# Patient Record
Sex: MALE | Race: WHITE | NOT HISPANIC OR LATINO | Employment: OTHER | URBAN - METROPOLITAN AREA
[De-identification: names, ages, dates, MRNs, and addresses within clinical notes are randomized per-mention and may not be internally consistent; named-entity substitution may affect disease eponyms.]

---

## 2017-01-01 ENCOUNTER — ALLSCRIPTS OFFICE VISIT (OUTPATIENT)
Dept: OTHER | Facility: OTHER | Age: 82
End: 2017-01-01

## 2017-01-01 ENCOUNTER — HOSPITAL ENCOUNTER (EMERGENCY)
Facility: HOSPITAL | Age: 82
Discharge: HOME | End: 2017-08-13
Attending: EMERGENCY MEDICINE
Payer: COMMERCIAL

## 2017-01-01 ENCOUNTER — APPOINTMENT (EMERGENCY)
Dept: CT IMAGING | Facility: HOSPITAL | Age: 82
End: 2017-01-01
Payer: COMMERCIAL

## 2017-01-01 VITALS
WEIGHT: 246.03 LBS | BODY MASS INDEX: 34.31 KG/M2 | HEART RATE: 69 BPM | RESPIRATION RATE: 16 BRPM | OXYGEN SATURATION: 99 % | TEMPERATURE: 97.9 F | DIASTOLIC BLOOD PRESSURE: 69 MMHG | SYSTOLIC BLOOD PRESSURE: 157 MMHG

## 2017-01-01 DIAGNOSIS — R33.9 URINARY RETENTION: ICD-10-CM

## 2017-01-01 DIAGNOSIS — N28.9 RENAL INSUFFICIENCY: ICD-10-CM

## 2017-01-01 DIAGNOSIS — N39.0 URINARY TRACT INFECTION: Primary | ICD-10-CM

## 2017-01-01 LAB
ALBUMIN SERPL BCP-MCNC: 3 G/DL (ref 3.5–5)
ALP SERPL-CCNC: 187 U/L (ref 46–116)
ALT SERPL W P-5'-P-CCNC: 10 U/L (ref 12–78)
ANION GAP SERPL CALCULATED.3IONS-SCNC: 7 MMOL/L (ref 4–13)
APTT PPP: 36 SECONDS (ref 23–35)
AST SERPL W P-5'-P-CCNC: 22 U/L (ref 5–45)
ATRIAL RATE: 70 BPM
BACTERIA BLD CULT: NORMAL
BACTERIA BLD CULT: NORMAL
BACTERIA UR CULT: NORMAL
BACTERIA UR QL AUTO: ABNORMAL /HPF
BASOPHILS # BLD AUTO: 0.02 THOUSANDS/ΜL (ref 0–0.1)
BASOPHILS NFR BLD AUTO: 0 % (ref 0–1)
BILIRUB SERPL-MCNC: 0.9 MG/DL (ref 0.2–1)
BILIRUB UR QL STRIP: NEGATIVE
BUN SERPL-MCNC: 35 MG/DL (ref 5–25)
CALCIUM SERPL-MCNC: 9.2 MG/DL (ref 8.3–10.1)
CHLORIDE SERPL-SCNC: 102 MMOL/L (ref 100–108)
CLARITY UR: CLEAR
CO2 SERPL-SCNC: 36 MMOL/L (ref 21–32)
COLOR UR: YELLOW
CREAT SERPL-MCNC: 1.97 MG/DL (ref 0.6–1.3)
EOSINOPHIL # BLD AUTO: 0.15 THOUSAND/ΜL (ref 0–0.61)
EOSINOPHIL NFR BLD AUTO: 2 % (ref 0–6)
ERYTHROCYTE [DISTWIDTH] IN BLOOD BY AUTOMATED COUNT: 16.8 % (ref 11.6–15.1)
GFR SERPL CREATININE-BSD FRML MDRD: 29 ML/MIN/1.73SQ M
GLUCOSE SERPL-MCNC: 185 MG/DL (ref 65–140)
GLUCOSE UR STRIP-MCNC: NEGATIVE MG/DL
HCT VFR BLD AUTO: 37.7 % (ref 36.5–49.3)
HGB BLD-MCNC: 11.7 G/DL (ref 12–17)
HGB UR QL STRIP.AUTO: ABNORMAL
INR PPP: 1.66 (ref 0.86–1.16)
KETONES UR STRIP-MCNC: NEGATIVE MG/DL
LEUKOCYTE ESTERASE UR QL STRIP: ABNORMAL
LYMPHOCYTES # BLD AUTO: 0.74 THOUSANDS/ΜL (ref 0.6–4.47)
LYMPHOCYTES NFR BLD AUTO: 9 % (ref 14–44)
MCH RBC QN AUTO: 30.7 PG (ref 26.8–34.3)
MCHC RBC AUTO-ENTMCNC: 31 G/DL (ref 31.4–37.4)
MCV RBC AUTO: 99 FL (ref 82–98)
MONOCYTES # BLD AUTO: 0.57 THOUSAND/ΜL (ref 0.17–1.22)
MONOCYTES NFR BLD AUTO: 7 % (ref 4–12)
NEUTROPHILS # BLD AUTO: 6.43 THOUSANDS/ΜL (ref 1.85–7.62)
NEUTS SEG NFR BLD AUTO: 82 % (ref 43–75)
NITRITE UR QL STRIP: NEGATIVE
NON-SQ EPI CELLS URNS QL MICRO: ABNORMAL /HPF
OTHER STN SPEC: ABNORMAL
P AXIS: 133 DEGREES
PH UR STRIP.AUTO: 6.5 [PH] (ref 4.5–8)
PLATELET # BLD AUTO: 97 THOUSANDS/UL (ref 149–390)
PMV BLD AUTO: 12.5 FL (ref 8.9–12.7)
POTASSIUM SERPL-SCNC: 3.2 MMOL/L (ref 3.5–5.3)
PR INTERVAL: 110 MS
PROT SERPL-MCNC: 7.6 G/DL (ref 6.4–8.2)
PROT UR STRIP-MCNC: NEGATIVE MG/DL
PROTHROMBIN TIME: 19.6 SECONDS (ref 12.1–14.4)
QRS AXIS: -72 DEGREES
QRSD INTERVAL: 160 MS
QT INTERVAL: 484 MS
QTC INTERVAL: 522 MS
RBC # BLD AUTO: 3.81 MILLION/UL (ref 3.88–5.62)
RBC #/AREA URNS AUTO: ABNORMAL /HPF
SODIUM SERPL-SCNC: 145 MMOL/L (ref 136–145)
SP GR UR STRIP.AUTO: 1.01 (ref 1–1.03)
T WAVE AXIS: 110 DEGREES
TROPONIN I SERPL-MCNC: 0.02 NG/ML
UROBILINOGEN UR QL STRIP.AUTO: 0.2 E.U./DL
VENTRICULAR RATE: 70 BPM
WBC # BLD AUTO: 7.91 THOUSAND/UL (ref 4.31–10.16)
WBC #/AREA URNS AUTO: ABNORMAL /HPF

## 2017-01-01 PROCEDURE — 85730 THROMBOPLASTIN TIME PARTIAL: CPT | Performed by: EMERGENCY MEDICINE

## 2017-01-01 PROCEDURE — 80053 COMPREHEN METABOLIC PANEL: CPT | Performed by: EMERGENCY MEDICINE

## 2017-01-01 PROCEDURE — 81001 URINALYSIS AUTO W/SCOPE: CPT | Performed by: EMERGENCY MEDICINE

## 2017-01-01 PROCEDURE — 84484 ASSAY OF TROPONIN QUANT: CPT | Performed by: EMERGENCY MEDICINE

## 2017-01-01 PROCEDURE — 74176 CT ABD & PELVIS W/O CONTRAST: CPT

## 2017-01-01 PROCEDURE — 70450 CT HEAD/BRAIN W/O DYE: CPT

## 2017-01-01 PROCEDURE — 93005 ELECTROCARDIOGRAM TRACING: CPT | Performed by: EMERGENCY MEDICINE

## 2017-01-01 PROCEDURE — 87147 CULTURE TYPE IMMUNOLOGIC: CPT | Performed by: EMERGENCY MEDICINE

## 2017-01-01 PROCEDURE — 87086 URINE CULTURE/COLONY COUNT: CPT | Performed by: EMERGENCY MEDICINE

## 2017-01-01 PROCEDURE — 99285 EMERGENCY DEPT VISIT HI MDM: CPT

## 2017-01-01 PROCEDURE — 85610 PROTHROMBIN TIME: CPT | Performed by: EMERGENCY MEDICINE

## 2017-01-01 PROCEDURE — 85025 COMPLETE CBC W/AUTO DIFF WBC: CPT | Performed by: EMERGENCY MEDICINE

## 2017-01-01 RX ORDER — POTASSIUM CHLORIDE 20 MEQ/1
20 TABLET, EXTENDED RELEASE ORAL ONCE
Status: COMPLETED | OUTPATIENT
Start: 2017-01-01 | End: 2017-01-01

## 2017-01-01 RX ORDER — NITROFURANTOIN 25; 75 MG/1; MG/1
100 CAPSULE ORAL 2 TIMES DAILY WITH MEALS
Status: DISCONTINUED | OUTPATIENT
Start: 2017-01-01 | End: 2017-01-01 | Stop reason: HOSPADM

## 2017-01-01 RX ADMIN — POTASSIUM CHLORIDE 20 MEQ: 1500 TABLET, EXTENDED RELEASE ORAL at 18:23

## 2017-01-01 RX ADMIN — NITROFURANTOIN (MONOHYDRATE/MACROCRYSTALS) 100 MG: 75; 25 CAPSULE ORAL at 18:23

## 2017-04-13 ENCOUNTER — APPOINTMENT (EMERGENCY)
Dept: RADIOLOGY | Facility: HOSPITAL | Age: 82
DRG: 194 | End: 2017-04-13
Payer: COMMERCIAL

## 2017-04-13 ENCOUNTER — HOSPITAL ENCOUNTER (INPATIENT)
Facility: HOSPITAL | Age: 82
LOS: 5 days | Discharge: NURSING, REHAB OR LONG-TERM CARE | DRG: 194 | End: 2017-04-18
Attending: EMERGENCY MEDICINE | Admitting: INTERNAL MEDICINE
Payer: COMMERCIAL

## 2017-04-13 DIAGNOSIS — I48.91 ATRIAL FIBRILLATION (HCC): ICD-10-CM

## 2017-04-13 DIAGNOSIS — R06.00 ACUTE DYSPNEA: Primary | ICD-10-CM

## 2017-04-13 DIAGNOSIS — S81.809D WOUND OF LOWER EXTREMITY, UNSPECIFIED LATERALITY, SUBSEQUENT ENCOUNTER: ICD-10-CM

## 2017-04-13 DIAGNOSIS — K83.8 DILATED CBD, ACQUIRED: ICD-10-CM

## 2017-04-13 DIAGNOSIS — J18.9 PNEUMONIA: ICD-10-CM

## 2017-04-13 DIAGNOSIS — R33.9 URINARY RETENTION: ICD-10-CM

## 2017-04-13 DIAGNOSIS — R23.0 CIRCUMORAL CYANOSIS: ICD-10-CM

## 2017-04-13 PROBLEM — D69.6 THROMBOCYTOPENIA (HCC): Chronic | Status: ACTIVE | Noted: 2017-04-13

## 2017-04-13 PROBLEM — E78.5 HYPERLIPIDEMIA: Chronic | Status: ACTIVE | Noted: 2017-04-13
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
## 2017-04-13 LAB
ALBUMIN SERPL BCP-MCNC: 2.9 G/DL (ref 3.5–5)
ALP SERPL-CCNC: 261 U/L (ref 46–116)
ALT SERPL W P-5'-P-CCNC: <6 U/L (ref 12–78)
ANION GAP SERPL CALCULATED.3IONS-SCNC: 4 MMOL/L (ref 4–13)
AST SERPL W P-5'-P-CCNC: 24 U/L (ref 5–45)
ATRIAL RATE: 65 BPM
BASOPHILS # BLD AUTO: 0.02 THOUSANDS/ΜL (ref 0–0.1)
BASOPHILS NFR BLD AUTO: 0 % (ref 0–1)
BILIRUB SERPL-MCNC: 0.9 MG/DL (ref 0.2–1)
BILIRUB UR QL STRIP: NEGATIVE
BUN SERPL-MCNC: 36 MG/DL (ref 5–25)
CALCIUM SERPL-MCNC: 9 MG/DL (ref 8.3–10.1)
CHLORIDE SERPL-SCNC: 105 MMOL/L (ref 100–108)
CLARITY UR: CLEAR
CO2 SERPL-SCNC: 35 MMOL/L (ref 21–32)
COLOR UR: YELLOW
CREAT SERPL-MCNC: 2.25 MG/DL (ref 0.6–1.3)
EOSINOPHIL # BLD AUTO: 0.28 THOUSAND/ΜL (ref 0–0.61)
EOSINOPHIL NFR BLD AUTO: 3 % (ref 0–6)
ERYTHROCYTE [DISTWIDTH] IN BLOOD BY AUTOMATED COUNT: 16.2 % (ref 11.6–15.1)
GFR SERPL CREATININE-BSD FRML MDRD: 27.7 ML/MIN/1.73SQ M
GLUCOSE SERPL-MCNC: 101 MG/DL (ref 65–140)
GLUCOSE SERPL-MCNC: 106 MG/DL (ref 65–140)
GLUCOSE SERPL-MCNC: 148 MG/DL (ref 65–140)
GLUCOSE SERPL-MCNC: 166 MG/DL (ref 65–140)
GLUCOSE UR STRIP-MCNC: NEGATIVE MG/DL
HCT VFR BLD AUTO: 40.2 % (ref 36.5–49.3)
HGB BLD-MCNC: 12.2 G/DL (ref 12–17)
HGB UR QL STRIP.AUTO: NEGATIVE
KETONES UR STRIP-MCNC: NEGATIVE MG/DL
L PNEUMO1 AG UR QL IA.RAPID: NEGATIVE
LEUKOCYTE ESTERASE UR QL STRIP: NEGATIVE
LYMPHOCYTES # BLD AUTO: 0.49 THOUSANDS/ΜL (ref 0.6–4.47)
LYMPHOCYTES NFR BLD AUTO: 6 % (ref 14–44)
MCH RBC QN AUTO: 29.5 PG (ref 26.8–34.3)
MCHC RBC AUTO-ENTMCNC: 30.3 G/DL (ref 31.4–37.4)
MCV RBC AUTO: 97 FL (ref 82–98)
MONOCYTES # BLD AUTO: 0.44 THOUSAND/ΜL (ref 0.17–1.22)
MONOCYTES NFR BLD AUTO: 5 % (ref 4–12)
NEUTROPHILS # BLD AUTO: 7.37 THOUSANDS/ΜL (ref 1.85–7.62)
NEUTS SEG NFR BLD AUTO: 86 % (ref 43–75)
NITRITE UR QL STRIP: NEGATIVE
NT-PROBNP SERPL-MCNC: 3128 PG/ML
PH UR STRIP.AUTO: 6 [PH] (ref 4.5–8)
PLATELET # BLD AUTO: 85 THOUSANDS/UL (ref 149–390)
POTASSIUM SERPL-SCNC: 5 MMOL/L (ref 3.5–5.3)
PROT SERPL-MCNC: 7.7 G/DL (ref 6.4–8.2)
PROT UR STRIP-MCNC: NEGATIVE MG/DL
QRS AXIS: -70 DEGREES
QRSD INTERVAL: 138 MS
QT INTERVAL: 414 MS
QTC INTERVAL: 447 MS
RBC # BLD AUTO: 4.13 MILLION/UL (ref 3.88–5.62)
S PNEUM AG UR QL: NEGATIVE
SODIUM SERPL-SCNC: 144 MMOL/L (ref 136–145)
SP GR UR STRIP.AUTO: 1.01 (ref 1–1.03)
T WAVE AXIS: 105 DEGREES
TROPONIN I SERPL-MCNC: 0.02 NG/ML
UROBILINOGEN UR QL STRIP.AUTO: 0.2 E.U./DL
VENTRICULAR RATE: 70 BPM
WBC # BLD AUTO: 8.6 THOUSAND/UL (ref 4.31–10.16)

## 2017-04-13 PROCEDURE — 36415 COLL VENOUS BLD VENIPUNCTURE: CPT | Performed by: EMERGENCY MEDICINE

## 2017-04-13 PROCEDURE — 87798 DETECT AGENT NOS DNA AMP: CPT | Performed by: INTERNAL MEDICINE

## 2017-04-13 PROCEDURE — 80053 COMPREHEN METABOLIC PANEL: CPT | Performed by: EMERGENCY MEDICINE

## 2017-04-13 PROCEDURE — 84484 ASSAY OF TROPONIN QUANT: CPT | Performed by: EMERGENCY MEDICINE

## 2017-04-13 PROCEDURE — 94640 AIRWAY INHALATION TREATMENT: CPT

## 2017-04-13 PROCEDURE — 85025 COMPLETE CBC W/AUTO DIFF WBC: CPT | Performed by: EMERGENCY MEDICINE

## 2017-04-13 PROCEDURE — 87147 CULTURE TYPE IMMUNOLOGIC: CPT | Performed by: INTERNAL MEDICINE

## 2017-04-13 PROCEDURE — 81003 URINALYSIS AUTO W/O SCOPE: CPT | Performed by: EMERGENCY MEDICINE

## 2017-04-13 PROCEDURE — 71020 HB CHEST X-RAY 2VW FRONTAL&LATL: CPT

## 2017-04-13 PROCEDURE — 83880 ASSAY OF NATRIURETIC PEPTIDE: CPT | Performed by: EMERGENCY MEDICINE

## 2017-04-13 PROCEDURE — 93005 ELECTROCARDIOGRAM TRACING: CPT | Performed by: EMERGENCY MEDICINE

## 2017-04-13 PROCEDURE — 87449 NOS EACH ORGANISM AG IA: CPT | Performed by: INTERNAL MEDICINE

## 2017-04-13 PROCEDURE — 82948 REAGENT STRIP/BLOOD GLUCOSE: CPT

## 2017-04-13 PROCEDURE — 87040 BLOOD CULTURE FOR BACTERIA: CPT | Performed by: EMERGENCY MEDICINE

## 2017-04-13 PROCEDURE — 99285 EMERGENCY DEPT VISIT HI MDM: CPT

## 2017-04-13 PROCEDURE — 94664 DEMO&/EVAL PT USE INHALER: CPT

## 2017-04-13 PROCEDURE — 94760 N-INVAS EAR/PLS OXIMETRY 1: CPT

## 2017-04-13 PROCEDURE — 87081 CULTURE SCREEN ONLY: CPT | Performed by: INTERNAL MEDICINE

## 2017-04-13 RX ORDER — CALCIUM CARBONATE 500(1250)
2 TABLET ORAL 2 TIMES DAILY WITH MEALS
Status: DISCONTINUED | OUTPATIENT
Start: 2017-04-13 | End: 2017-04-13 | Stop reason: CLARIF

## 2017-04-13 RX ORDER — CARVEDILOL 12.5 MG/1
12.5 TABLET ORAL 2 TIMES DAILY WITH MEALS
Status: DISCONTINUED | OUTPATIENT
Start: 2017-04-13 | End: 2017-04-18 | Stop reason: HOSPADM

## 2017-04-13 RX ORDER — ONDANSETRON 2 MG/ML
4 INJECTION INTRAMUSCULAR; INTRAVENOUS EVERY 6 HOURS PRN
Status: DISCONTINUED | OUTPATIENT
Start: 2017-04-13 | End: 2017-04-18 | Stop reason: HOSPADM

## 2017-04-13 RX ORDER — OXYCODONE HYDROCHLORIDE 5 MG/1
2.5 TABLET ORAL EVERY 4 HOURS PRN
Status: DISCONTINUED | OUTPATIENT
Start: 2017-04-13 | End: 2017-04-18 | Stop reason: HOSPADM

## 2017-04-13 RX ORDER — ASPIRIN 81 MG/1
81 TABLET, CHEWABLE ORAL DAILY
Status: DISCONTINUED | OUTPATIENT
Start: 2017-04-14 | End: 2017-04-18 | Stop reason: HOSPADM

## 2017-04-13 RX ORDER — ACETAMINOPHEN 325 MG/1
650 TABLET ORAL EVERY 6 HOURS PRN
Status: DISCONTINUED | OUTPATIENT
Start: 2017-04-13 | End: 2017-04-18 | Stop reason: HOSPADM

## 2017-04-13 RX ORDER — ISOSORBIDE MONONITRATE 60 MG/1
60 TABLET, EXTENDED RELEASE ORAL DAILY
Status: DISCONTINUED | OUTPATIENT
Start: 2017-04-14 | End: 2017-04-18 | Stop reason: HOSPADM

## 2017-04-13 RX ORDER — ACETAMINOPHEN 325 MG/1
650 TABLET ORAL ONCE
Status: COMPLETED | OUTPATIENT
Start: 2017-04-13 | End: 2017-04-13

## 2017-04-13 RX ORDER — TAMSULOSIN HYDROCHLORIDE 0.4 MG/1
0.4 CAPSULE ORAL
Status: DISCONTINUED | OUTPATIENT
Start: 2017-04-13 | End: 2017-04-18 | Stop reason: HOSPADM

## 2017-04-13 RX ORDER — BUMETANIDE 1 MG/1
2 TABLET ORAL DAILY
Status: DISCONTINUED | OUTPATIENT
Start: 2017-04-13 | End: 2017-04-18 | Stop reason: HOSPADM

## 2017-04-13 RX ORDER — ALBUTEROL SULFATE 2.5 MG/3ML
2.5 SOLUTION RESPIRATORY (INHALATION) EVERY 6 HOURS PRN
Status: DISCONTINUED | OUTPATIENT
Start: 2017-04-13 | End: 2017-04-18 | Stop reason: HOSPADM

## 2017-04-13 RX ORDER — BRIMONIDINE TARTRATE 0.15 %
1 DROPS OPHTHALMIC (EYE) 3 TIMES DAILY
Status: DISCONTINUED | OUTPATIENT
Start: 2017-04-13 | End: 2017-04-18 | Stop reason: HOSPADM

## 2017-04-13 RX ORDER — CALCIUM CARBONATE 200(500)MG
1000 TABLET,CHEWABLE ORAL 2 TIMES DAILY
Status: DISCONTINUED | OUTPATIENT
Start: 2017-04-13 | End: 2017-04-18 | Stop reason: HOSPADM

## 2017-04-13 RX ORDER — HYDRALAZINE HYDROCHLORIDE 25 MG/1
50 TABLET, FILM COATED ORAL EVERY 8 HOURS SCHEDULED
Status: DISCONTINUED | OUTPATIENT
Start: 2017-04-13 | End: 2017-04-18 | Stop reason: HOSPADM

## 2017-04-13 RX ORDER — LORATADINE 10 MG/1
10 TABLET ORAL DAILY
COMMUNITY

## 2017-04-13 RX ORDER — LACTULOSE 20 G/30ML
20 SOLUTION ORAL 3 TIMES WEEKLY
Status: DISCONTINUED | OUTPATIENT
Start: 2017-04-14 | End: 2017-04-18 | Stop reason: HOSPADM

## 2017-04-13 RX ORDER — MELATONIN
1000 DAILY
Status: DISCONTINUED | OUTPATIENT
Start: 2017-04-14 | End: 2017-04-18 | Stop reason: HOSPADM

## 2017-04-13 RX ORDER — ASCORBIC ACID 500 MG
500 TABLET ORAL DAILY
Status: DISCONTINUED | OUTPATIENT
Start: 2017-04-14 | End: 2017-04-13 | Stop reason: CLARIF

## 2017-04-13 RX ORDER — PANTOPRAZOLE SODIUM 40 MG/1
40 TABLET, DELAYED RELEASE ORAL
Status: DISCONTINUED | OUTPATIENT
Start: 2017-04-14 | End: 2017-04-18 | Stop reason: HOSPADM

## 2017-04-13 RX ORDER — TAMSULOSIN HYDROCHLORIDE 0.4 MG/1
0.4 CAPSULE ORAL 2 TIMES DAILY
Status: DISCONTINUED | OUTPATIENT
Start: 2017-04-13 | End: 2017-04-13

## 2017-04-13 RX ORDER — METOLAZONE 2.5 MG/1
5 TABLET ORAL EVERY OTHER DAY
Status: DISCONTINUED | OUTPATIENT
Start: 2017-04-14 | End: 2017-04-18 | Stop reason: HOSPADM

## 2017-04-13 RX ORDER — CEFTRIAXONE SODIUM 1 G/50ML
1000 INJECTION, SOLUTION INTRAVENOUS ONCE
Status: COMPLETED | OUTPATIENT
Start: 2017-04-13 | End: 2017-04-13

## 2017-04-13 RX ORDER — LORATADINE 10 MG/1
10 TABLET ORAL DAILY
Status: DISCONTINUED | OUTPATIENT
Start: 2017-04-14 | End: 2017-04-18 | Stop reason: HOSPADM

## 2017-04-13 RX ORDER — CHOLECALCIFEROL (VITAMIN D3) 10 MCG
1 TABLET ORAL DAILY
Status: DISCONTINUED | OUTPATIENT
Start: 2017-04-13 | End: 2017-04-18 | Stop reason: HOSPADM

## 2017-04-13 RX ORDER — GUAIFENESIN 600 MG
600 TABLET, EXTENDED RELEASE 12 HR ORAL EVERY 12 HOURS SCHEDULED
Status: DISCONTINUED | OUTPATIENT
Start: 2017-04-13 | End: 2017-04-16

## 2017-04-13 RX ORDER — GABAPENTIN 100 MG/1
200 CAPSULE ORAL 2 TIMES DAILY
Status: DISCONTINUED | OUTPATIENT
Start: 2017-04-13 | End: 2017-04-18 | Stop reason: HOSPADM

## 2017-04-13 RX ORDER — FEBUXOSTAT 40 MG/1
80 TABLET, FILM COATED ORAL DAILY
Status: DISCONTINUED | OUTPATIENT
Start: 2017-04-14 | End: 2017-04-18 | Stop reason: HOSPADM

## 2017-04-13 RX ORDER — LEVALBUTEROL 1.25 MG/.5ML
1.25 SOLUTION, CONCENTRATE RESPIRATORY (INHALATION)
Status: DISCONTINUED | OUTPATIENT
Start: 2017-04-13 | End: 2017-04-18 | Stop reason: HOSPADM

## 2017-04-13 RX ORDER — SODIUM CHLORIDE FOR INHALATION 0.9 %
3 VIAL, NEBULIZER (ML) INHALATION
Status: DISCONTINUED | OUTPATIENT
Start: 2017-04-13 | End: 2017-04-18 | Stop reason: HOSPADM

## 2017-04-13 RX ORDER — POTASSIUM CHLORIDE 1500 MG/1
20 TABLET, FILM COATED, EXTENDED RELEASE ORAL
Status: DISCONTINUED | OUTPATIENT
Start: 2017-04-13 | End: 2017-04-13 | Stop reason: CLARIF

## 2017-04-13 RX ADMIN — ACETAMINOPHEN 650 MG: 325 TABLET, FILM COATED ORAL at 12:10

## 2017-04-13 RX ADMIN — BUMETANIDE 2 MG: 1 TABLET ORAL at 19:04

## 2017-04-13 RX ADMIN — HYDRALAZINE HYDROCHLORIDE 50 MG: 25 TABLET ORAL at 19:05

## 2017-04-13 RX ADMIN — Medication 400 MG: at 22:54

## 2017-04-13 RX ADMIN — ANTACID TABLETS 1000 MG: 500 TABLET, CHEWABLE ORAL at 19:02

## 2017-04-13 RX ADMIN — GUAIFENESIN 600 MG: 600 TABLET, EXTENDED RELEASE ORAL at 22:54

## 2017-04-13 RX ADMIN — CEFEPIME HYDROCHLORIDE 1000 MG: 1 INJECTION, POWDER, FOR SOLUTION INTRAMUSCULAR; INTRAVENOUS at 22:48

## 2017-04-13 RX ADMIN — CEFTRIAXONE 1000 MG: 1 INJECTION, SOLUTION INTRAVENOUS at 12:11

## 2017-04-13 RX ADMIN — ISODIUM CHLORIDE 3 ML: 0.03 SOLUTION RESPIRATORY (INHALATION) at 20:49

## 2017-04-13 RX ADMIN — CARBIDOPA AND LEVODOPA 1 TABLET: 10; 100 TABLET ORAL at 19:06

## 2017-04-13 RX ADMIN — LEVALBUTEROL 1.25 MG: 1.25 SOLUTION, CONCENTRATE RESPIRATORY (INHALATION) at 20:49

## 2017-04-13 RX ADMIN — TAMSULOSIN HYDROCHLORIDE 0.4 MG: 0.4 CAPSULE ORAL at 19:04

## 2017-04-13 RX ADMIN — ACETAMINOPHEN 650 MG: 325 TABLET, FILM COATED ORAL at 22:58

## 2017-04-13 RX ADMIN — CARVEDILOL 12.5 MG: 12.5 TABLET, FILM COATED ORAL at 19:07

## 2017-04-13 RX ADMIN — BRIMONIDINE TARTRATE 1 DROP: 1.5 SOLUTION OPHTHALMIC at 19:06

## 2017-04-13 RX ADMIN — GABAPENTIN 200 MG: 100 CAPSULE ORAL at 19:05

## 2017-04-13 RX ADMIN — VANCOMYCIN HYDROCHLORIDE 1500 MG: 1 INJECTION, POWDER, LYOPHILIZED, FOR SOLUTION INTRAVENOUS at 13:08

## 2017-04-13 RX ADMIN — Medication 1 CAPSULE: at 19:04

## 2017-04-14 ENCOUNTER — APPOINTMENT (INPATIENT)
Dept: ULTRASOUND IMAGING | Facility: HOSPITAL | Age: 82
DRG: 194 | End: 2017-04-14
Payer: COMMERCIAL

## 2017-04-14 LAB
ALBUMIN SERPL BCP-MCNC: 2.6 G/DL (ref 3.5–5)
ALP SERPL-CCNC: 208 U/L (ref 46–116)
ALT SERPL W P-5'-P-CCNC: 20 U/L (ref 12–78)
ANION GAP SERPL CALCULATED.3IONS-SCNC: 5 MMOL/L (ref 4–13)
AST SERPL W P-5'-P-CCNC: 22 U/L (ref 5–45)
BASOPHILS # BLD AUTO: 0.02 THOUSANDS/ΜL (ref 0–0.1)
BASOPHILS NFR BLD AUTO: 0 % (ref 0–1)
BILIRUB SERPL-MCNC: 1.2 MG/DL (ref 0.2–1)
BUN SERPL-MCNC: 38 MG/DL (ref 5–25)
CALCIUM SERPL-MCNC: 9 MG/DL (ref 8.3–10.1)
CHLORIDE SERPL-SCNC: 104 MMOL/L (ref 100–108)
CO2 SERPL-SCNC: 37 MMOL/L (ref 21–32)
CREAT SERPL-MCNC: 2.34 MG/DL (ref 0.6–1.3)
EOSINOPHIL # BLD AUTO: 0.29 THOUSAND/ΜL (ref 0–0.61)
EOSINOPHIL NFR BLD AUTO: 3 % (ref 0–6)
ERYTHROCYTE [DISTWIDTH] IN BLOOD BY AUTOMATED COUNT: 16.5 % (ref 11.6–15.1)
FLUAV AG SPEC QL: NORMAL
FLUBV AG SPEC QL: NORMAL
GFR SERPL CREATININE-BSD FRML MDRD: 26.4 ML/MIN/1.73SQ M
GLUCOSE SERPL-MCNC: 101 MG/DL (ref 65–140)
GLUCOSE SERPL-MCNC: 101 MG/DL (ref 65–140)
GLUCOSE SERPL-MCNC: 138 MG/DL (ref 65–140)
GLUCOSE SERPL-MCNC: 186 MG/DL (ref 65–140)
GLUCOSE SERPL-MCNC: 211 MG/DL (ref 65–140)
HCT VFR BLD AUTO: 37.1 % (ref 36.5–49.3)
HGB BLD-MCNC: 11.4 G/DL (ref 12–17)
INR PPP: 2.56 (ref 0.86–1.16)
LYMPHOCYTES # BLD AUTO: 0.93 THOUSANDS/ΜL (ref 0.6–4.47)
LYMPHOCYTES NFR BLD AUTO: 11 % (ref 14–44)
MCH RBC QN AUTO: 29.8 PG (ref 26.8–34.3)
MCHC RBC AUTO-ENTMCNC: 30.7 G/DL (ref 31.4–37.4)
MCV RBC AUTO: 97 FL (ref 82–98)
MONOCYTES # BLD AUTO: 0.7 THOUSAND/ΜL (ref 0.17–1.22)
MONOCYTES NFR BLD AUTO: 8 % (ref 4–12)
NEUTROPHILS # BLD AUTO: 6.51 THOUSANDS/ΜL (ref 1.85–7.62)
NEUTS SEG NFR BLD AUTO: 78 % (ref 43–75)
PLATELET # BLD AUTO: 73 THOUSANDS/UL (ref 149–390)
PMV BLD AUTO: 12.9 FL (ref 8.9–12.7)
POTASSIUM SERPL-SCNC: 4.1 MMOL/L (ref 3.5–5.3)
PROT SERPL-MCNC: 7.2 G/DL (ref 6.4–8.2)
PROTHROMBIN TIME: 26.3 SECONDS (ref 12–14.3)
RBC # BLD AUTO: 3.82 MILLION/UL (ref 3.88–5.62)
RSV B RNA SPEC QL NAA+PROBE: NORMAL
SODIUM SERPL-SCNC: 146 MMOL/L (ref 136–145)
WBC # BLD AUTO: 8.45 THOUSAND/UL (ref 4.31–10.16)

## 2017-04-14 PROCEDURE — 87205 SMEAR GRAM STAIN: CPT | Performed by: INTERNAL MEDICINE

## 2017-04-14 PROCEDURE — 94640 AIRWAY INHALATION TREATMENT: CPT

## 2017-04-14 PROCEDURE — 87070 CULTURE OTHR SPECIMN AEROBIC: CPT | Performed by: INTERNAL MEDICINE

## 2017-04-14 PROCEDURE — 76705 ECHO EXAM OF ABDOMEN: CPT

## 2017-04-14 PROCEDURE — 94760 N-INVAS EAR/PLS OXIMETRY 1: CPT

## 2017-04-14 PROCEDURE — 82948 REAGENT STRIP/BLOOD GLUCOSE: CPT

## 2017-04-14 PROCEDURE — 85025 COMPLETE CBC W/AUTO DIFF WBC: CPT | Performed by: INTERNAL MEDICINE

## 2017-04-14 PROCEDURE — 85610 PROTHROMBIN TIME: CPT | Performed by: INTERNAL MEDICINE

## 2017-04-14 PROCEDURE — 80053 COMPREHEN METABOLIC PANEL: CPT | Performed by: INTERNAL MEDICINE

## 2017-04-14 RX ORDER — WARFARIN SODIUM 6 MG/1
6 TABLET ORAL
Status: DISCONTINUED | OUTPATIENT
Start: 2017-04-14 | End: 2017-04-18 | Stop reason: HOSPADM

## 2017-04-14 RX ADMIN — ASPIRIN 81 MG CHEWABLE TABLET 81 MG: 81 TABLET CHEWABLE at 09:25

## 2017-04-14 RX ADMIN — Medication 400 MG: at 21:31

## 2017-04-14 RX ADMIN — WARFARIN SODIUM 6 MG: 6 TABLET ORAL at 18:27

## 2017-04-14 RX ADMIN — BRIMONIDINE TARTRATE 1 DROP: 1.5 SOLUTION OPHTHALMIC at 09:26

## 2017-04-14 RX ADMIN — METOLAZONE 5 MG: 2.5 TABLET ORAL at 09:26

## 2017-04-14 RX ADMIN — LEVALBUTEROL 1.25 MG: 1.25 SOLUTION, CONCENTRATE RESPIRATORY (INHALATION) at 08:26

## 2017-04-14 RX ADMIN — ANTACID TABLETS 1000 MG: 500 TABLET, CHEWABLE ORAL at 09:26

## 2017-04-14 RX ADMIN — TAMSULOSIN HYDROCHLORIDE 0.4 MG: 0.4 CAPSULE ORAL at 16:33

## 2017-04-14 RX ADMIN — GUAIFENESIN 600 MG: 600 TABLET, EXTENDED RELEASE ORAL at 21:31

## 2017-04-14 RX ADMIN — ISODIUM CHLORIDE 3 ML: 0.03 SOLUTION RESPIRATORY (INHALATION) at 08:26

## 2017-04-14 RX ADMIN — CARVEDILOL 12.5 MG: 12.5 TABLET, FILM COATED ORAL at 16:33

## 2017-04-14 RX ADMIN — VITAMIN D, TAB 1000IU (100/BT) 1000 UNITS: 25 TAB at 09:25

## 2017-04-14 RX ADMIN — BRIMONIDINE TARTRATE 1 DROP: 1.5 SOLUTION OPHTHALMIC at 16:34

## 2017-04-14 RX ADMIN — LEVALBUTEROL 1.25 MG: 1.25 SOLUTION, CONCENTRATE RESPIRATORY (INHALATION) at 21:01

## 2017-04-14 RX ADMIN — PANTOPRAZOLE SODIUM 40 MG: 40 TABLET, DELAYED RELEASE ORAL at 05:32

## 2017-04-14 RX ADMIN — ISOSORBIDE MONONITRATE 60 MG: 60 TABLET, EXTENDED RELEASE ORAL at 09:25

## 2017-04-14 RX ADMIN — CARBIDOPA AND LEVODOPA 1 TABLET: 10; 100 TABLET ORAL at 21:32

## 2017-04-14 RX ADMIN — INSULIN LISPRO 1 UNITS: 100 INJECTION, SOLUTION INTRAVENOUS; SUBCUTANEOUS at 21:32

## 2017-04-14 RX ADMIN — CEFEPIME HYDROCHLORIDE 1000 MG: 1 INJECTION, POWDER, FOR SOLUTION INTRAMUSCULAR; INTRAVENOUS at 21:34

## 2017-04-14 RX ADMIN — GABAPENTIN 200 MG: 100 CAPSULE ORAL at 18:26

## 2017-04-14 RX ADMIN — BUMETANIDE 2 MG: 1 TABLET ORAL at 09:26

## 2017-04-14 RX ADMIN — Medication 400 MG: at 08:30

## 2017-04-14 RX ADMIN — LACTULOSE 20 G: 20 SOLUTION ORAL at 09:37

## 2017-04-14 RX ADMIN — Medication 400 MG: at 16:34

## 2017-04-14 RX ADMIN — HYDRALAZINE HYDROCHLORIDE 50 MG: 25 TABLET ORAL at 00:52

## 2017-04-14 RX ADMIN — CARBIDOPA AND LEVODOPA 1 TABLET: 10; 100 TABLET ORAL at 09:26

## 2017-04-14 RX ADMIN — LORATADINE 10 MG: 10 TABLET ORAL at 09:26

## 2017-04-14 RX ADMIN — BRIMONIDINE TARTRATE 1 DROP: 1.5 SOLUTION OPHTHALMIC at 21:32

## 2017-04-14 RX ADMIN — HYDRALAZINE HYDROCHLORIDE 50 MG: 25 TABLET ORAL at 16:33

## 2017-04-14 RX ADMIN — OXYCODONE HYDROCHLORIDE 2.5 MG: 5 TABLET ORAL at 09:46

## 2017-04-14 RX ADMIN — Medication 1 CAPSULE: at 18:26

## 2017-04-14 RX ADMIN — HYDRALAZINE HYDROCHLORIDE 50 MG: 25 TABLET ORAL at 23:33

## 2017-04-14 RX ADMIN — LEVOTHYROXINE SODIUM 225 MCG: 175 TABLET ORAL at 05:32

## 2017-04-14 RX ADMIN — GABAPENTIN 200 MG: 100 CAPSULE ORAL at 09:26

## 2017-04-14 RX ADMIN — CARVEDILOL 12.5 MG: 12.5 TABLET, FILM COATED ORAL at 08:30

## 2017-04-14 RX ADMIN — Medication 400 MG: at 12:58

## 2017-04-14 RX ADMIN — INSULIN LISPRO 1 UNITS: 100 INJECTION, SOLUTION INTRAVENOUS; SUBCUTANEOUS at 16:34

## 2017-04-14 RX ADMIN — VANCOMYCIN HYDROCHLORIDE 1250 MG: 500 INJECTION, POWDER, LYOPHILIZED, FOR SOLUTION INTRAVENOUS at 13:02

## 2017-04-14 RX ADMIN — HYDRALAZINE HYDROCHLORIDE 50 MG: 25 TABLET ORAL at 08:30

## 2017-04-14 RX ADMIN — ISODIUM CHLORIDE 3 ML: 0.03 SOLUTION RESPIRATORY (INHALATION) at 21:01

## 2017-04-14 RX ADMIN — CARBIDOPA AND LEVODOPA 1 TABLET: 10; 100 TABLET ORAL at 16:34

## 2017-04-14 RX ADMIN — GUAIFENESIN 600 MG: 600 TABLET, EXTENDED RELEASE ORAL at 09:25

## 2017-04-15 LAB
ALBUMIN SERPL BCP-MCNC: 2.4 G/DL (ref 3.5–5)
ALP SERPL-CCNC: 208 U/L (ref 46–116)
ALT SERPL W P-5'-P-CCNC: 14 U/L (ref 12–78)
ANION GAP SERPL CALCULATED.3IONS-SCNC: 3 MMOL/L (ref 4–13)
AST SERPL W P-5'-P-CCNC: 20 U/L (ref 5–45)
BASOPHILS # BLD AUTO: 0.01 THOUSANDS/ΜL (ref 0–0.1)
BASOPHILS NFR BLD AUTO: 0 % (ref 0–1)
BILIRUB SERPL-MCNC: 0.8 MG/DL (ref 0.2–1)
BUN SERPL-MCNC: 40 MG/DL (ref 5–25)
CALCIUM SERPL-MCNC: 8.5 MG/DL (ref 8.3–10.1)
CHLORIDE SERPL-SCNC: 102 MMOL/L (ref 100–108)
CO2 SERPL-SCNC: 39 MMOL/L (ref 21–32)
CREAT SERPL-MCNC: 2.14 MG/DL (ref 0.6–1.3)
EOSINOPHIL # BLD AUTO: 0.41 THOUSAND/ΜL (ref 0–0.61)
EOSINOPHIL NFR BLD AUTO: 6 % (ref 0–6)
ERYTHROCYTE [DISTWIDTH] IN BLOOD BY AUTOMATED COUNT: 16.3 % (ref 11.6–15.1)
GFR SERPL CREATININE-BSD FRML MDRD: 29.3 ML/MIN/1.73SQ M
GLUCOSE SERPL-MCNC: 107 MG/DL (ref 65–140)
GLUCOSE SERPL-MCNC: 121 MG/DL (ref 65–140)
GLUCOSE SERPL-MCNC: 146 MG/DL (ref 65–140)
GLUCOSE SERPL-MCNC: 228 MG/DL (ref 65–140)
GLUCOSE SERPL-MCNC: 243 MG/DL (ref 65–140)
HCT VFR BLD AUTO: 35.2 % (ref 36.5–49.3)
HGB BLD-MCNC: 11 G/DL (ref 12–17)
INR PPP: 2.39 (ref 0.86–1.16)
LYMPHOCYTES # BLD AUTO: 0.8 THOUSANDS/ΜL (ref 0.6–4.47)
LYMPHOCYTES NFR BLD AUTO: 11 % (ref 14–44)
MCH RBC QN AUTO: 30.1 PG (ref 26.8–34.3)
MCHC RBC AUTO-ENTMCNC: 31.3 G/DL (ref 31.4–37.4)
MCV RBC AUTO: 96 FL (ref 82–98)
MONOCYTES # BLD AUTO: 0.64 THOUSAND/ΜL (ref 0.17–1.22)
MONOCYTES NFR BLD AUTO: 9 % (ref 4–12)
MRSA NOSE QL CULT: ABNORMAL
NEUTROPHILS # BLD AUTO: 5.29 THOUSANDS/ΜL (ref 1.85–7.62)
NEUTS SEG NFR BLD AUTO: 74 % (ref 43–75)
PLATELET # BLD AUTO: 92 THOUSANDS/UL (ref 149–390)
PMV BLD AUTO: 13.1 FL (ref 8.9–12.7)
POTASSIUM SERPL-SCNC: 3.4 MMOL/L (ref 3.5–5.3)
PROT SERPL-MCNC: 7 G/DL (ref 6.4–8.2)
PROTHROMBIN TIME: 25 SECONDS (ref 12–14.3)
RBC # BLD AUTO: 3.65 MILLION/UL (ref 3.88–5.62)
SODIUM SERPL-SCNC: 144 MMOL/L (ref 136–145)
WBC # BLD AUTO: 7.15 THOUSAND/UL (ref 4.31–10.16)

## 2017-04-15 PROCEDURE — 85025 COMPLETE CBC W/AUTO DIFF WBC: CPT | Performed by: INTERNAL MEDICINE

## 2017-04-15 PROCEDURE — 85610 PROTHROMBIN TIME: CPT | Performed by: INTERNAL MEDICINE

## 2017-04-15 PROCEDURE — 94640 AIRWAY INHALATION TREATMENT: CPT

## 2017-04-15 PROCEDURE — 82948 REAGENT STRIP/BLOOD GLUCOSE: CPT

## 2017-04-15 PROCEDURE — 94760 N-INVAS EAR/PLS OXIMETRY 1: CPT

## 2017-04-15 PROCEDURE — 80053 COMPREHEN METABOLIC PANEL: CPT | Performed by: INTERNAL MEDICINE

## 2017-04-15 RX ADMIN — ISODIUM CHLORIDE 3 ML: 0.03 SOLUTION RESPIRATORY (INHALATION) at 20:26

## 2017-04-15 RX ADMIN — GABAPENTIN 200 MG: 100 CAPSULE ORAL at 17:53

## 2017-04-15 RX ADMIN — GUAIFENESIN 600 MG: 600 TABLET, EXTENDED RELEASE ORAL at 20:59

## 2017-04-15 RX ADMIN — VANCOMYCIN HYDROCHLORIDE 1250 MG: 500 INJECTION, POWDER, LYOPHILIZED, FOR SOLUTION INTRAVENOUS at 12:53

## 2017-04-15 RX ADMIN — OXYCODONE HYDROCHLORIDE 2.5 MG: 5 TABLET ORAL at 15:18

## 2017-04-15 RX ADMIN — Medication 400 MG: at 17:49

## 2017-04-15 RX ADMIN — BRIMONIDINE TARTRATE 1 DROP: 1.5 SOLUTION OPHTHALMIC at 09:24

## 2017-04-15 RX ADMIN — GUAIFENESIN 600 MG: 600 TABLET, EXTENDED RELEASE ORAL at 09:24

## 2017-04-15 RX ADMIN — CARVEDILOL 12.5 MG: 12.5 TABLET, FILM COATED ORAL at 17:51

## 2017-04-15 RX ADMIN — ASPIRIN 81 MG CHEWABLE TABLET 81 MG: 81 TABLET CHEWABLE at 09:23

## 2017-04-15 RX ADMIN — ANTACID TABLETS 1000 MG: 500 TABLET, CHEWABLE ORAL at 17:49

## 2017-04-15 RX ADMIN — CARBIDOPA AND LEVODOPA 1 TABLET: 10; 100 TABLET ORAL at 20:59

## 2017-04-15 RX ADMIN — BRIMONIDINE TARTRATE 1 DROP: 1.5 SOLUTION OPHTHALMIC at 17:48

## 2017-04-15 RX ADMIN — GABAPENTIN 200 MG: 100 CAPSULE ORAL at 09:23

## 2017-04-15 RX ADMIN — Medication 400 MG: at 07:39

## 2017-04-15 RX ADMIN — VITAMIN D, TAB 1000IU (100/BT) 1000 UNITS: 25 TAB at 09:23

## 2017-04-15 RX ADMIN — Medication 400 MG: at 12:52

## 2017-04-15 RX ADMIN — LORATADINE 10 MG: 10 TABLET ORAL at 09:24

## 2017-04-15 RX ADMIN — LEVALBUTEROL 1.25 MG: 1.25 SOLUTION, CONCENTRATE RESPIRATORY (INHALATION) at 20:26

## 2017-04-15 RX ADMIN — BRIMONIDINE TARTRATE 1 DROP: 1.5 SOLUTION OPHTHALMIC at 21:00

## 2017-04-15 RX ADMIN — ISOSORBIDE MONONITRATE 60 MG: 60 TABLET, EXTENDED RELEASE ORAL at 09:23

## 2017-04-15 RX ADMIN — TAMSULOSIN HYDROCHLORIDE 0.4 MG: 0.4 CAPSULE ORAL at 17:48

## 2017-04-15 RX ADMIN — Medication 400 MG: at 20:59

## 2017-04-15 RX ADMIN — BUMETANIDE 2 MG: 1 TABLET ORAL at 09:23

## 2017-04-15 RX ADMIN — Medication 1 APPLICATION: at 14:10

## 2017-04-15 RX ADMIN — ANTACID TABLETS 1000 MG: 500 TABLET, CHEWABLE ORAL at 09:23

## 2017-04-15 RX ADMIN — CARBIDOPA AND LEVODOPA 1 TABLET: 10; 100 TABLET ORAL at 17:48

## 2017-04-15 RX ADMIN — WARFARIN SODIUM 6 MG: 6 TABLET ORAL at 17:48

## 2017-04-15 RX ADMIN — CARBIDOPA AND LEVODOPA 1 TABLET: 10; 100 TABLET ORAL at 09:24

## 2017-04-15 RX ADMIN — CEFEPIME HYDROCHLORIDE 1000 MG: 1 INJECTION, POWDER, FOR SOLUTION INTRAMUSCULAR; INTRAVENOUS at 21:00

## 2017-04-15 RX ADMIN — HYDRALAZINE HYDROCHLORIDE 50 MG: 25 TABLET ORAL at 07:39

## 2017-04-15 RX ADMIN — Medication 1 CAPSULE: at 17:49

## 2017-04-15 RX ADMIN — LEVOTHYROXINE SODIUM 225 MCG: 175 TABLET ORAL at 06:07

## 2017-04-15 RX ADMIN — HYDRALAZINE HYDROCHLORIDE 50 MG: 25 TABLET ORAL at 17:48

## 2017-04-15 RX ADMIN — PANTOPRAZOLE SODIUM 40 MG: 40 TABLET, DELAYED RELEASE ORAL at 06:07

## 2017-04-15 RX ADMIN — Medication: at 21:00

## 2017-04-15 RX ADMIN — INSULIN LISPRO 3 UNITS: 100 INJECTION, SOLUTION INTRAVENOUS; SUBCUTANEOUS at 12:22

## 2017-04-15 RX ADMIN — CARVEDILOL 12.5 MG: 12.5 TABLET, FILM COATED ORAL at 07:39

## 2017-04-15 RX ADMIN — ISODIUM CHLORIDE 3 ML: 0.03 SOLUTION RESPIRATORY (INHALATION) at 08:56

## 2017-04-15 RX ADMIN — INSULIN LISPRO 2 UNITS: 100 INJECTION, SOLUTION INTRAVENOUS; SUBCUTANEOUS at 21:06

## 2017-04-15 RX ADMIN — LEVALBUTEROL 1.25 MG: 1.25 SOLUTION, CONCENTRATE RESPIRATORY (INHALATION) at 08:56

## 2017-04-16 LAB
ANION GAP SERPL CALCULATED.3IONS-SCNC: 2 MMOL/L (ref 4–13)
BACTERIA SPT RESP CULT: NORMAL
BASOPHILS # BLD AUTO: 0.02 THOUSANDS/ΜL (ref 0–0.1)
BASOPHILS NFR BLD AUTO: 0 % (ref 0–1)
BUN SERPL-MCNC: 46 MG/DL (ref 5–25)
CALCIUM SERPL-MCNC: 8.3 MG/DL (ref 8.3–10.1)
CHLORIDE SERPL-SCNC: 101 MMOL/L (ref 100–108)
CO2 SERPL-SCNC: 40 MMOL/L (ref 21–32)
CREAT SERPL-MCNC: 2.23 MG/DL (ref 0.6–1.3)
EOSINOPHIL # BLD AUTO: 0.36 THOUSAND/ΜL (ref 0–0.61)
EOSINOPHIL NFR BLD AUTO: 5 % (ref 0–6)
ERYTHROCYTE [DISTWIDTH] IN BLOOD BY AUTOMATED COUNT: 16.1 % (ref 11.6–15.1)
GFR SERPL CREATININE-BSD FRML MDRD: 27.9 ML/MIN/1.73SQ M
GLUCOSE SERPL-MCNC: 115 MG/DL (ref 65–140)
GLUCOSE SERPL-MCNC: 118 MG/DL (ref 65–140)
GLUCOSE SERPL-MCNC: 134 MG/DL (ref 65–140)
GLUCOSE SERPL-MCNC: 203 MG/DL (ref 65–140)
GLUCOSE SERPL-MCNC: 247 MG/DL (ref 65–140)
GRAM STN SPEC: NORMAL
HCT VFR BLD AUTO: 34.1 % (ref 36.5–49.3)
HGB BLD-MCNC: 10.5 G/DL (ref 12–17)
INR PPP: 2.05 (ref 0.86–1.16)
LYMPHOCYTES # BLD AUTO: 0.87 THOUSANDS/ΜL (ref 0.6–4.47)
LYMPHOCYTES NFR BLD AUTO: 12 % (ref 14–44)
MCH RBC QN AUTO: 29.5 PG (ref 26.8–34.3)
MCHC RBC AUTO-ENTMCNC: 30.8 G/DL (ref 31.4–37.4)
MCV RBC AUTO: 96 FL (ref 82–98)
MONOCYTES # BLD AUTO: 0.71 THOUSAND/ΜL (ref 0.17–1.22)
MONOCYTES NFR BLD AUTO: 10 % (ref 4–12)
NEUTROPHILS # BLD AUTO: 5.39 THOUSANDS/ΜL (ref 1.85–7.62)
NEUTS SEG NFR BLD AUTO: 73 % (ref 43–75)
PLATELET # BLD AUTO: 113 THOUSANDS/UL (ref 149–390)
PMV BLD AUTO: 12.8 FL (ref 8.9–12.7)
POTASSIUM SERPL-SCNC: 3 MMOL/L (ref 3.5–5.3)
PROTHROMBIN TIME: 22.2 SECONDS (ref 12–14.3)
RBC # BLD AUTO: 3.56 MILLION/UL (ref 3.88–5.62)
SODIUM SERPL-SCNC: 143 MMOL/L (ref 136–145)
WBC # BLD AUTO: 7.35 THOUSAND/UL (ref 4.31–10.16)

## 2017-04-16 PROCEDURE — 85610 PROTHROMBIN TIME: CPT | Performed by: HOSPITALIST

## 2017-04-16 PROCEDURE — 82948 REAGENT STRIP/BLOOD GLUCOSE: CPT

## 2017-04-16 PROCEDURE — 94760 N-INVAS EAR/PLS OXIMETRY 1: CPT

## 2017-04-16 PROCEDURE — 80048 BASIC METABOLIC PNL TOTAL CA: CPT | Performed by: HOSPITALIST

## 2017-04-16 PROCEDURE — 94640 AIRWAY INHALATION TREATMENT: CPT

## 2017-04-16 PROCEDURE — 85025 COMPLETE CBC W/AUTO DIFF WBC: CPT | Performed by: HOSPITALIST

## 2017-04-16 RX ORDER — GUAIFENESIN/DEXTROMETHORPHAN 100-10MG/5
10 SYRUP ORAL EVERY 4 HOURS PRN
Status: DISCONTINUED | OUTPATIENT
Start: 2017-04-16 | End: 2017-04-18 | Stop reason: HOSPADM

## 2017-04-16 RX ORDER — DOCUSATE SODIUM 100 MG/1
100 CAPSULE, LIQUID FILLED ORAL 2 TIMES DAILY
Status: DISCONTINUED | OUTPATIENT
Start: 2017-04-16 | End: 2017-04-18 | Stop reason: HOSPADM

## 2017-04-16 RX ORDER — POTASSIUM CHLORIDE 20 MEQ/1
40 TABLET, EXTENDED RELEASE ORAL 2 TIMES DAILY
Status: COMPLETED | OUTPATIENT
Start: 2017-04-16 | End: 2017-04-16

## 2017-04-16 RX ORDER — LEVOFLOXACIN 5 MG/ML
500 INJECTION, SOLUTION INTRAVENOUS
Status: DISCONTINUED | OUTPATIENT
Start: 2017-04-16 | End: 2017-04-18 | Stop reason: HOSPADM

## 2017-04-16 RX ADMIN — BRIMONIDINE TARTRATE 1 DROP: 1.5 SOLUTION OPHTHALMIC at 16:29

## 2017-04-16 RX ADMIN — CARVEDILOL 12.5 MG: 12.5 TABLET, FILM COATED ORAL at 09:54

## 2017-04-16 RX ADMIN — GABAPENTIN 200 MG: 100 CAPSULE ORAL at 18:04

## 2017-04-16 RX ADMIN — HYDRALAZINE HYDROCHLORIDE 50 MG: 25 TABLET ORAL at 00:16

## 2017-04-16 RX ADMIN — BRIMONIDINE TARTRATE 1 DROP: 1.5 SOLUTION OPHTHALMIC at 09:54

## 2017-04-16 RX ADMIN — INSULIN LISPRO 3 UNITS: 100 INJECTION, SOLUTION INTRAVENOUS; SUBCUTANEOUS at 11:34

## 2017-04-16 RX ADMIN — HYDRALAZINE HYDROCHLORIDE 50 MG: 25 TABLET ORAL at 23:00

## 2017-04-16 RX ADMIN — LEVOFLOXACIN 500 MG: 5 INJECTION, SOLUTION INTRAVENOUS at 10:20

## 2017-04-16 RX ADMIN — OXYCODONE HYDROCHLORIDE 2.5 MG: 5 TABLET ORAL at 00:21

## 2017-04-16 RX ADMIN — Medication 400 MG: at 22:57

## 2017-04-16 RX ADMIN — BUMETANIDE 2 MG: 1 TABLET ORAL at 09:55

## 2017-04-16 RX ADMIN — GUAIFENESIN AND DEXTROMETHORPHAN 10 ML: 100; 10 SYRUP ORAL at 02:16

## 2017-04-16 RX ADMIN — WARFARIN SODIUM 6 MG: 6 TABLET ORAL at 18:04

## 2017-04-16 RX ADMIN — GABAPENTIN 200 MG: 100 CAPSULE ORAL at 09:54

## 2017-04-16 RX ADMIN — POTASSIUM CHLORIDE 40 MEQ: 1500 TABLET, EXTENDED RELEASE ORAL at 18:04

## 2017-04-16 RX ADMIN — VITAMIN D, TAB 1000IU (100/BT) 1000 UNITS: 25 TAB at 09:55

## 2017-04-16 RX ADMIN — ISODIUM CHLORIDE 3 ML: 0.03 SOLUTION RESPIRATORY (INHALATION) at 08:42

## 2017-04-16 RX ADMIN — ACETAMINOPHEN 650 MG: 325 TABLET, FILM COATED ORAL at 01:54

## 2017-04-16 RX ADMIN — LEVOTHYROXINE SODIUM 225 MCG: 175 TABLET ORAL at 05:18

## 2017-04-16 RX ADMIN — PANTOPRAZOLE SODIUM 40 MG: 40 TABLET, DELAYED RELEASE ORAL at 05:18

## 2017-04-16 RX ADMIN — LORATADINE 10 MG: 10 TABLET ORAL at 09:55

## 2017-04-16 RX ADMIN — LEVALBUTEROL 1.25 MG: 1.25 SOLUTION, CONCENTRATE RESPIRATORY (INHALATION) at 08:42

## 2017-04-16 RX ADMIN — HYDRALAZINE HYDROCHLORIDE 50 MG: 25 TABLET ORAL at 09:55

## 2017-04-16 RX ADMIN — HYDRALAZINE HYDROCHLORIDE 50 MG: 25 TABLET ORAL at 16:29

## 2017-04-16 RX ADMIN — CARVEDILOL 12.5 MG: 12.5 TABLET, FILM COATED ORAL at 16:30

## 2017-04-16 RX ADMIN — CARBIDOPA AND LEVODOPA 1 TABLET: 10; 100 TABLET ORAL at 16:30

## 2017-04-16 RX ADMIN — Medication 400 MG: at 16:30

## 2017-04-16 RX ADMIN — Medication 400 MG: at 09:54

## 2017-04-16 RX ADMIN — Medication: at 22:57

## 2017-04-16 RX ADMIN — OXYCODONE HYDROCHLORIDE 2.5 MG: 5 TABLET ORAL at 19:51

## 2017-04-16 RX ADMIN — ANTACID TABLETS 1000 MG: 500 TABLET, CHEWABLE ORAL at 09:54

## 2017-04-16 RX ADMIN — METOLAZONE 5 MG: 2.5 TABLET ORAL at 09:55

## 2017-04-16 RX ADMIN — GUAIFENESIN AND DEXTROMETHORPHAN 10 ML: 100; 10 SYRUP ORAL at 16:47

## 2017-04-16 RX ADMIN — ANORECTAL OINTMENT: 15.7; .44; 24; 20.6 OINTMENT TOPICAL at 18:04

## 2017-04-16 RX ADMIN — Medication: at 09:56

## 2017-04-16 RX ADMIN — Medication 400 MG: at 11:38

## 2017-04-16 RX ADMIN — DOCUSATE SODIUM 100 MG: 100 CAPSULE, LIQUID FILLED ORAL at 18:04

## 2017-04-16 RX ADMIN — ISOSORBIDE MONONITRATE 60 MG: 60 TABLET, EXTENDED RELEASE ORAL at 09:55

## 2017-04-16 RX ADMIN — DOCUSATE SODIUM 100 MG: 100 CAPSULE, LIQUID FILLED ORAL at 10:05

## 2017-04-16 RX ADMIN — POTASSIUM CHLORIDE 40 MEQ: 1500 TABLET, EXTENDED RELEASE ORAL at 10:16

## 2017-04-16 RX ADMIN — ASPIRIN 81 MG CHEWABLE TABLET 81 MG: 81 TABLET CHEWABLE at 09:55

## 2017-04-16 RX ADMIN — ACETAMINOPHEN 650 MG: 325 TABLET, FILM COATED ORAL at 16:29

## 2017-04-16 RX ADMIN — INSULIN LISPRO 1 UNITS: 100 INJECTION, SOLUTION INTRAVENOUS; SUBCUTANEOUS at 22:54

## 2017-04-16 RX ADMIN — OXYCODONE HYDROCHLORIDE 2.5 MG: 5 TABLET ORAL at 22:55

## 2017-04-16 RX ADMIN — ANORECTAL OINTMENT: 15.7; .44; 24; 20.6 OINTMENT TOPICAL at 11:34

## 2017-04-16 RX ADMIN — CARBIDOPA AND LEVODOPA 1 TABLET: 10; 100 TABLET ORAL at 09:55

## 2017-04-16 RX ADMIN — BRIMONIDINE TARTRATE 1 DROP: 1.5 SOLUTION OPHTHALMIC at 22:53

## 2017-04-16 RX ADMIN — CARBIDOPA AND LEVODOPA 1 TABLET: 10; 100 TABLET ORAL at 22:54

## 2017-04-16 RX ADMIN — Medication 1 CAPSULE: at 18:03

## 2017-04-16 RX ADMIN — TAMSULOSIN HYDROCHLORIDE 0.4 MG: 0.4 CAPSULE ORAL at 16:30

## 2017-04-17 ENCOUNTER — APPOINTMENT (INPATIENT)
Dept: OCCUPATIONAL THERAPY | Facility: HOSPITAL | Age: 82
DRG: 194 | End: 2017-04-17
Payer: COMMERCIAL

## 2017-04-17 PROBLEM — K83.8 DILATED CBD, ACQUIRED: Status: ACTIVE | Noted: 2017-04-17

## 2017-04-17 LAB
ANION GAP SERPL CALCULATED.3IONS-SCNC: 4 MMOL/L (ref 4–13)
BASOPHILS # BLD AUTO: 0.02 THOUSANDS/ΜL (ref 0–0.1)
BASOPHILS NFR BLD AUTO: 0 % (ref 0–1)
BUN SERPL-MCNC: 53 MG/DL (ref 5–25)
CALCIUM SERPL-MCNC: 8.8 MG/DL (ref 8.3–10.1)
CHLORIDE SERPL-SCNC: 101 MMOL/L (ref 100–108)
CO2 SERPL-SCNC: 37 MMOL/L (ref 21–32)
CREAT SERPL-MCNC: 2.21 MG/DL (ref 0.6–1.3)
EOSINOPHIL # BLD AUTO: 0.34 THOUSAND/ΜL (ref 0–0.61)
EOSINOPHIL NFR BLD AUTO: 6 % (ref 0–6)
ERYTHROCYTE [DISTWIDTH] IN BLOOD BY AUTOMATED COUNT: 16.2 % (ref 11.6–15.1)
GFR SERPL CREATININE-BSD FRML MDRD: 28.2 ML/MIN/1.73SQ M
GLUCOSE SERPL-MCNC: 141 MG/DL (ref 65–140)
GLUCOSE SERPL-MCNC: 145 MG/DL (ref 65–140)
GLUCOSE SERPL-MCNC: 161 MG/DL (ref 65–140)
GLUCOSE SERPL-MCNC: 176 MG/DL (ref 65–140)
HCT VFR BLD AUTO: 37.3 % (ref 36.5–49.3)
HGB BLD-MCNC: 11.6 G/DL (ref 12–17)
INR PPP: 1.77 (ref 0.86–1.16)
LYMPHOCYTES # BLD AUTO: 0.85 THOUSANDS/ΜL (ref 0.6–4.47)
LYMPHOCYTES NFR BLD AUTO: 14 % (ref 14–44)
MCH RBC QN AUTO: 29.7 PG (ref 26.8–34.3)
MCHC RBC AUTO-ENTMCNC: 31.1 G/DL (ref 31.4–37.4)
MCV RBC AUTO: 96 FL (ref 82–98)
MONOCYTES # BLD AUTO: 0.64 THOUSAND/ΜL (ref 0.17–1.22)
MONOCYTES NFR BLD AUTO: 10 % (ref 4–12)
NEUTROPHILS # BLD AUTO: 4.31 THOUSANDS/ΜL (ref 1.85–7.62)
NEUTS SEG NFR BLD AUTO: 70 % (ref 43–75)
PLATELET # BLD AUTO: 108 THOUSANDS/UL (ref 149–390)
PMV BLD AUTO: 12.2 FL (ref 8.9–12.7)
POTASSIUM SERPL-SCNC: 3.6 MMOL/L (ref 3.5–5.3)
PROTHROMBIN TIME: 19.9 SECONDS (ref 12–14.3)
RBC # BLD AUTO: 3.9 MILLION/UL (ref 3.88–5.62)
SODIUM SERPL-SCNC: 142 MMOL/L (ref 136–145)
WBC # BLD AUTO: 6.16 THOUSAND/UL (ref 4.31–10.16)

## 2017-04-17 PROCEDURE — 94640 AIRWAY INHALATION TREATMENT: CPT

## 2017-04-17 PROCEDURE — 85025 COMPLETE CBC W/AUTO DIFF WBC: CPT | Performed by: HOSPITALIST

## 2017-04-17 PROCEDURE — G8979 MOBILITY GOAL STATUS: HCPCS | Performed by: PHYSICAL THERAPIST

## 2017-04-17 PROCEDURE — 85610 PROTHROMBIN TIME: CPT | Performed by: HOSPITALIST

## 2017-04-17 PROCEDURE — 97530 THERAPEUTIC ACTIVITIES: CPT

## 2017-04-17 PROCEDURE — G8997 SWALLOW GOAL STATUS: HCPCS | Performed by: SPEECH-LANGUAGE PATHOLOGIST

## 2017-04-17 PROCEDURE — 94760 N-INVAS EAR/PLS OXIMETRY 1: CPT

## 2017-04-17 PROCEDURE — G8978 MOBILITY CURRENT STATUS: HCPCS | Performed by: PHYSICAL THERAPIST

## 2017-04-17 PROCEDURE — G8988 SELF CARE GOAL STATUS: HCPCS

## 2017-04-17 PROCEDURE — G8987 SELF CARE CURRENT STATUS: HCPCS

## 2017-04-17 PROCEDURE — 97163 PT EVAL HIGH COMPLEX 45 MIN: CPT | Performed by: PHYSICAL THERAPIST

## 2017-04-17 PROCEDURE — 92526 ORAL FUNCTION THERAPY: CPT | Performed by: SPEECH-LANGUAGE PATHOLOGIST

## 2017-04-17 PROCEDURE — G8996 SWALLOW CURRENT STATUS: HCPCS | Performed by: SPEECH-LANGUAGE PATHOLOGIST

## 2017-04-17 PROCEDURE — 97116 GAIT TRAINING THERAPY: CPT | Performed by: PHYSICAL THERAPIST

## 2017-04-17 PROCEDURE — 82948 REAGENT STRIP/BLOOD GLUCOSE: CPT

## 2017-04-17 PROCEDURE — G8998 SWALLOW D/C STATUS: HCPCS | Performed by: SPEECH-LANGUAGE PATHOLOGIST

## 2017-04-17 PROCEDURE — 80048 BASIC METABOLIC PNL TOTAL CA: CPT | Performed by: HOSPITALIST

## 2017-04-17 PROCEDURE — 97167 OT EVAL HIGH COMPLEX 60 MIN: CPT

## 2017-04-17 RX ADMIN — Medication 400 MG: at 13:49

## 2017-04-17 RX ADMIN — Medication 400 MG: at 16:23

## 2017-04-17 RX ADMIN — CARBIDOPA AND LEVODOPA 1 TABLET: 10; 100 TABLET ORAL at 16:23

## 2017-04-17 RX ADMIN — CARVEDILOL 12.5 MG: 12.5 TABLET, FILM COATED ORAL at 16:23

## 2017-04-17 RX ADMIN — PANTOPRAZOLE SODIUM 40 MG: 40 TABLET, DELAYED RELEASE ORAL at 05:28

## 2017-04-17 RX ADMIN — ISODIUM CHLORIDE 3 ML: 0.03 SOLUTION RESPIRATORY (INHALATION) at 08:34

## 2017-04-17 RX ADMIN — CARVEDILOL 12.5 MG: 12.5 TABLET, FILM COATED ORAL at 08:41

## 2017-04-17 RX ADMIN — GABAPENTIN 200 MG: 100 CAPSULE ORAL at 19:56

## 2017-04-17 RX ADMIN — LACTULOSE 20 G: 20 SOLUTION ORAL at 08:46

## 2017-04-17 RX ADMIN — ANORECTAL OINTMENT: 15.7; .44; 24; 20.6 OINTMENT TOPICAL at 08:47

## 2017-04-17 RX ADMIN — DOCUSATE SODIUM 100 MG: 100 CAPSULE, LIQUID FILLED ORAL at 19:57

## 2017-04-17 RX ADMIN — DOCUSATE SODIUM 100 MG: 100 CAPSULE, LIQUID FILLED ORAL at 08:44

## 2017-04-17 RX ADMIN — INSULIN LISPRO 1 UNITS: 100 INJECTION, SOLUTION INTRAVENOUS; SUBCUTANEOUS at 13:49

## 2017-04-17 RX ADMIN — HYDRALAZINE HYDROCHLORIDE 50 MG: 25 TABLET ORAL at 16:23

## 2017-04-17 RX ADMIN — ISOSORBIDE MONONITRATE 60 MG: 60 TABLET, EXTENDED RELEASE ORAL at 08:46

## 2017-04-17 RX ADMIN — BUMETANIDE 2 MG: 1 TABLET ORAL at 08:40

## 2017-04-17 RX ADMIN — CARBIDOPA AND LEVODOPA 1 TABLET: 10; 100 TABLET ORAL at 08:43

## 2017-04-17 RX ADMIN — INSULIN LISPRO 1 UNITS: 100 INJECTION, SOLUTION INTRAVENOUS; SUBCUTANEOUS at 16:20

## 2017-04-17 RX ADMIN — Medication 1 CAPSULE: at 19:57

## 2017-04-17 RX ADMIN — ASPIRIN 81 MG CHEWABLE TABLET 81 MG: 81 TABLET CHEWABLE at 08:39

## 2017-04-17 RX ADMIN — VITAMIN D, TAB 1000IU (100/BT) 1000 UNITS: 25 TAB at 08:44

## 2017-04-17 RX ADMIN — GABAPENTIN 200 MG: 100 CAPSULE ORAL at 08:44

## 2017-04-17 RX ADMIN — Medication 400 MG: at 08:47

## 2017-04-17 RX ADMIN — LEVALBUTEROL 1.25 MG: 1.25 SOLUTION, CONCENTRATE RESPIRATORY (INHALATION) at 20:29

## 2017-04-17 RX ADMIN — Medication 1 APPLICATION: at 08:48

## 2017-04-17 RX ADMIN — ANTACID TABLETS 1000 MG: 500 TABLET, CHEWABLE ORAL at 08:41

## 2017-04-17 RX ADMIN — ISODIUM CHLORIDE 3 ML: 0.03 SOLUTION RESPIRATORY (INHALATION) at 20:29

## 2017-04-17 RX ADMIN — Medication 400 MG: at 19:56

## 2017-04-17 RX ADMIN — ANORECTAL OINTMENT: 15.7; .44; 24; 20.6 OINTMENT TOPICAL at 20:00

## 2017-04-17 RX ADMIN — OXYCODONE HYDROCHLORIDE 2.5 MG: 5 TABLET ORAL at 16:21

## 2017-04-17 RX ADMIN — WARFARIN SODIUM 6 MG: 6 TABLET ORAL at 19:57

## 2017-04-17 RX ADMIN — HYDRALAZINE HYDROCHLORIDE 50 MG: 25 TABLET ORAL at 08:45

## 2017-04-17 RX ADMIN — BRIMONIDINE TARTRATE 1 DROP: 1.5 SOLUTION OPHTHALMIC at 16:24

## 2017-04-17 RX ADMIN — BRIMONIDINE TARTRATE 1 DROP: 1.5 SOLUTION OPHTHALMIC at 08:40

## 2017-04-17 RX ADMIN — ACETAMINOPHEN 650 MG: 325 TABLET, FILM COATED ORAL at 19:57

## 2017-04-17 RX ADMIN — LEVALBUTEROL 1.25 MG: 1.25 SOLUTION, CONCENTRATE RESPIRATORY (INHALATION) at 08:34

## 2017-04-17 RX ADMIN — LORATADINE 10 MG: 10 TABLET ORAL at 08:47

## 2017-04-17 RX ADMIN — LEVOTHYROXINE SODIUM 225 MCG: 175 TABLET ORAL at 05:28

## 2017-04-17 RX ADMIN — TAMSULOSIN HYDROCHLORIDE 0.4 MG: 0.4 CAPSULE ORAL at 16:22

## 2017-04-18 ENCOUNTER — APPOINTMENT (INPATIENT)
Dept: PHYSICAL THERAPY | Facility: HOSPITAL | Age: 82
DRG: 194 | End: 2017-04-18
Payer: COMMERCIAL

## 2017-04-18 ENCOUNTER — APPOINTMENT (INPATIENT)
Dept: OCCUPATIONAL THERAPY | Facility: HOSPITAL | Age: 82
DRG: 194 | End: 2017-04-18
Payer: COMMERCIAL

## 2017-04-18 VITALS
TEMPERATURE: 98.8 F | BODY MASS INDEX: 30.06 KG/M2 | WEIGHT: 214.73 LBS | DIASTOLIC BLOOD PRESSURE: 72 MMHG | HEIGHT: 71 IN | HEART RATE: 70 BPM | SYSTOLIC BLOOD PRESSURE: 152 MMHG | RESPIRATION RATE: 20 BRPM | OXYGEN SATURATION: 94 %

## 2017-04-18 LAB
ALBUMIN SERPL BCP-MCNC: 2.5 G/DL (ref 3.5–5)
ALP SERPL-CCNC: 275 U/L (ref 46–116)
ALT SERPL W P-5'-P-CCNC: 11 U/L (ref 12–78)
ANION GAP SERPL CALCULATED.3IONS-SCNC: 4 MMOL/L (ref 4–13)
AST SERPL W P-5'-P-CCNC: 29 U/L (ref 5–45)
BACTERIA BLD CULT: NORMAL
BACTERIA BLD CULT: NORMAL
BASOPHILS # BLD AUTO: 0.03 THOUSANDS/ΜL (ref 0–0.1)
BASOPHILS NFR BLD AUTO: 0 % (ref 0–1)
BILIRUB DIRECT SERPL-MCNC: 0.38 MG/DL (ref 0–0.2)
BILIRUB SERPL-MCNC: 0.9 MG/DL (ref 0.2–1)
BUN SERPL-MCNC: 54 MG/DL (ref 5–25)
CALCIUM SERPL-MCNC: 8.9 MG/DL (ref 8.3–10.1)
CHLORIDE SERPL-SCNC: 100 MMOL/L (ref 100–108)
CO2 SERPL-SCNC: 38 MMOL/L (ref 21–32)
CREAT SERPL-MCNC: 2.11 MG/DL (ref 0.6–1.3)
EOSINOPHIL # BLD AUTO: 0.51 THOUSAND/ΜL (ref 0–0.61)
EOSINOPHIL NFR BLD AUTO: 7 % (ref 0–6)
ERYTHROCYTE [DISTWIDTH] IN BLOOD BY AUTOMATED COUNT: 16.2 % (ref 11.6–15.1)
GFR SERPL CREATININE-BSD FRML MDRD: 29.8 ML/MIN/1.73SQ M
GLUCOSE SERPL-MCNC: 110 MG/DL (ref 65–140)
GLUCOSE SERPL-MCNC: 126 MG/DL (ref 65–140)
GLUCOSE SERPL-MCNC: 154 MG/DL (ref 65–140)
GLUCOSE SERPL-MCNC: 162 MG/DL (ref 65–140)
GLUCOSE SERPL-MCNC: 232 MG/DL (ref 65–140)
HCT VFR BLD AUTO: 38.4 % (ref 36.5–49.3)
HGB BLD-MCNC: 12 G/DL (ref 12–17)
INR PPP: 1.73 (ref 0.86–1.16)
LYMPHOCYTES # BLD AUTO: 0.89 THOUSANDS/ΜL (ref 0.6–4.47)
LYMPHOCYTES NFR BLD AUTO: 13 % (ref 14–44)
MCH RBC QN AUTO: 29.9 PG (ref 26.8–34.3)
MCHC RBC AUTO-ENTMCNC: 31.3 G/DL (ref 31.4–37.4)
MCV RBC AUTO: 96 FL (ref 82–98)
MONOCYTES # BLD AUTO: 0.53 THOUSAND/ΜL (ref 0.17–1.22)
MONOCYTES NFR BLD AUTO: 8 % (ref 4–12)
NEUTROPHILS # BLD AUTO: 4.9 THOUSANDS/ΜL (ref 1.85–7.62)
NEUTS SEG NFR BLD AUTO: 72 % (ref 43–75)
PLATELET # BLD AUTO: 119 THOUSANDS/UL (ref 149–390)
PMV BLD AUTO: 12.9 FL (ref 8.9–12.7)
POTASSIUM SERPL-SCNC: 3.3 MMOL/L (ref 3.5–5.3)
PROT SERPL-MCNC: 7.7 G/DL (ref 6.4–8.2)
PROTHROMBIN TIME: 19.5 SECONDS (ref 12–14.3)
RBC # BLD AUTO: 4.02 MILLION/UL (ref 3.88–5.62)
SODIUM SERPL-SCNC: 142 MMOL/L (ref 136–145)
WBC # BLD AUTO: 6.86 THOUSAND/UL (ref 4.31–10.16)

## 2017-04-18 PROCEDURE — 94640 AIRWAY INHALATION TREATMENT: CPT

## 2017-04-18 PROCEDURE — 94760 N-INVAS EAR/PLS OXIMETRY 1: CPT

## 2017-04-18 PROCEDURE — 82948 REAGENT STRIP/BLOOD GLUCOSE: CPT

## 2017-04-18 PROCEDURE — 97530 THERAPEUTIC ACTIVITIES: CPT | Performed by: PHYSICAL THERAPIST

## 2017-04-18 PROCEDURE — 85610 PROTHROMBIN TIME: CPT | Performed by: HOSPITALIST

## 2017-04-18 PROCEDURE — 80076 HEPATIC FUNCTION PANEL: CPT | Performed by: PHYSICIAN ASSISTANT

## 2017-04-18 PROCEDURE — 85025 COMPLETE CBC W/AUTO DIFF WBC: CPT | Performed by: HOSPITALIST

## 2017-04-18 PROCEDURE — 97530 THERAPEUTIC ACTIVITIES: CPT

## 2017-04-18 PROCEDURE — 80048 BASIC METABOLIC PNL TOTAL CA: CPT | Performed by: HOSPITALIST

## 2017-04-18 PROCEDURE — 97116 GAIT TRAINING THERAPY: CPT

## 2017-04-18 RX ORDER — LEVOFLOXACIN 750 MG/1
750 TABLET ORAL DAILY
Qty: 7 TABLET | Refills: 0 | Status: SHIPPED | OUTPATIENT
Start: 2017-04-18 | End: 2017-04-25

## 2017-04-18 RX ORDER — GUAIFENESIN/DEXTROMETHORPHAN 100-10MG/5
10 SYRUP ORAL EVERY 4 HOURS PRN
Qty: 118 ML | Refills: 0 | Status: SHIPPED | OUTPATIENT
Start: 2017-04-18 | End: 2017-05-18

## 2017-04-18 RX ADMIN — OXYCODONE HYDROCHLORIDE 2.5 MG: 5 TABLET ORAL at 13:09

## 2017-04-18 RX ADMIN — LEVALBUTEROL 1.25 MG: 1.25 SOLUTION, CONCENTRATE RESPIRATORY (INHALATION) at 07:39

## 2017-04-18 RX ADMIN — ANORECTAL OINTMENT: 15.7; .44; 24; 20.6 OINTMENT TOPICAL at 09:17

## 2017-04-18 RX ADMIN — Medication 1 APPLICATION: at 09:08

## 2017-04-18 RX ADMIN — ASPIRIN 81 MG CHEWABLE TABLET 81 MG: 81 TABLET CHEWABLE at 09:11

## 2017-04-18 RX ADMIN — PANTOPRAZOLE SODIUM 40 MG: 40 TABLET, DELAYED RELEASE ORAL at 06:03

## 2017-04-18 RX ADMIN — CARBIDOPA AND LEVODOPA 1 TABLET: 10; 100 TABLET ORAL at 17:04

## 2017-04-18 RX ADMIN — Medication 400 MG: at 13:02

## 2017-04-18 RX ADMIN — DOCUSATE SODIUM 100 MG: 100 CAPSULE, LIQUID FILLED ORAL at 09:14

## 2017-04-18 RX ADMIN — INSULIN LISPRO 1 UNITS: 100 INJECTION, SOLUTION INTRAVENOUS; SUBCUTANEOUS at 00:11

## 2017-04-18 RX ADMIN — METOLAZONE 5 MG: 2.5 TABLET ORAL at 09:17

## 2017-04-18 RX ADMIN — CARBIDOPA AND LEVODOPA 1 TABLET: 10; 100 TABLET ORAL at 00:12

## 2017-04-18 RX ADMIN — TAMSULOSIN HYDROCHLORIDE 0.4 MG: 0.4 CAPSULE ORAL at 17:07

## 2017-04-18 RX ADMIN — Medication 400 MG: at 09:16

## 2017-04-18 RX ADMIN — INSULIN LISPRO 1 UNITS: 100 INJECTION, SOLUTION INTRAVENOUS; SUBCUTANEOUS at 09:10

## 2017-04-18 RX ADMIN — INSULIN LISPRO 3 UNITS: 100 INJECTION, SOLUTION INTRAVENOUS; SUBCUTANEOUS at 13:01

## 2017-04-18 RX ADMIN — LEVOTHYROXINE SODIUM 225 MCG: 175 TABLET ORAL at 06:03

## 2017-04-18 RX ADMIN — HYDRALAZINE HYDROCHLORIDE 50 MG: 25 TABLET ORAL at 09:15

## 2017-04-18 RX ADMIN — Medication: at 00:13

## 2017-04-18 RX ADMIN — GABAPENTIN 200 MG: 100 CAPSULE ORAL at 09:15

## 2017-04-18 RX ADMIN — HYDRALAZINE HYDROCHLORIDE 50 MG: 25 TABLET ORAL at 00:29

## 2017-04-18 RX ADMIN — LORATADINE 10 MG: 10 TABLET ORAL at 09:16

## 2017-04-18 RX ADMIN — BUMETANIDE 2 MG: 1 TABLET ORAL at 09:12

## 2017-04-18 RX ADMIN — CARVEDILOL 12.5 MG: 12.5 TABLET, FILM COATED ORAL at 09:13

## 2017-04-18 RX ADMIN — CARBIDOPA AND LEVODOPA 1 TABLET: 10; 100 TABLET ORAL at 09:13

## 2017-04-18 RX ADMIN — Medication 400 MG: at 17:06

## 2017-04-18 RX ADMIN — CARVEDILOL 12.5 MG: 12.5 TABLET, FILM COATED ORAL at 17:04

## 2017-04-18 RX ADMIN — ANTACID TABLETS 1000 MG: 500 TABLET, CHEWABLE ORAL at 09:12

## 2017-04-18 RX ADMIN — ISODIUM CHLORIDE 3 ML: 0.03 SOLUTION RESPIRATORY (INHALATION) at 07:39

## 2017-04-18 RX ADMIN — LEVOFLOXACIN 500 MG: 5 INJECTION, SOLUTION INTRAVENOUS at 12:58

## 2017-04-18 RX ADMIN — VITAMIN D, TAB 1000IU (100/BT) 1000 UNITS: 25 TAB at 09:14

## 2017-04-18 RX ADMIN — BRIMONIDINE TARTRATE 1 DROP: 1.5 SOLUTION OPHTHALMIC at 00:12

## 2017-04-18 RX ADMIN — BRIMONIDINE TARTRATE 1 DROP: 1.5 SOLUTION OPHTHALMIC at 09:08

## 2017-04-18 RX ADMIN — HYDRALAZINE HYDROCHLORIDE 50 MG: 25 TABLET ORAL at 17:05

## 2017-04-18 RX ADMIN — BRIMONIDINE TARTRATE 1 DROP: 1.5 SOLUTION OPHTHALMIC at 17:04

## 2017-04-18 RX ADMIN — ISOSORBIDE MONONITRATE 60 MG: 60 TABLET, EXTENDED RELEASE ORAL at 09:16

## 2017-04-21 ENCOUNTER — HOSPITAL ENCOUNTER (EMERGENCY)
Facility: HOSPITAL | Age: 82
Discharge: HOME | End: 2017-04-21
Attending: EMERGENCY MEDICINE | Admitting: EMERGENCY MEDICINE
Payer: COMMERCIAL

## 2017-04-21 VITALS
WEIGHT: 229.5 LBS | HEART RATE: 69 BPM | TEMPERATURE: 97.7 F | SYSTOLIC BLOOD PRESSURE: 123 MMHG | OXYGEN SATURATION: 96 % | DIASTOLIC BLOOD PRESSURE: 65 MMHG | RESPIRATION RATE: 18 BRPM | BODY MASS INDEX: 32.01 KG/M2

## 2017-04-21 DIAGNOSIS — S81.809A MULTIPLE OPEN WOUNDS OF LOWER LEG: Primary | ICD-10-CM

## 2017-04-21 LAB
APTT PPP: 43 SECONDS (ref 24–36)
BASOPHILS # BLD AUTO: 0.03 THOUSANDS/ΜL (ref 0–0.1)
BASOPHILS NFR BLD AUTO: 1 % (ref 0–1)
EOSINOPHIL # BLD AUTO: 0.44 THOUSAND/ΜL (ref 0–0.61)
EOSINOPHIL NFR BLD AUTO: 8 % (ref 0–6)
ERYTHROCYTE [DISTWIDTH] IN BLOOD BY AUTOMATED COUNT: 16.4 % (ref 11.6–15.1)
HCT VFR BLD AUTO: 37.4 % (ref 36.5–49.3)
HGB BLD-MCNC: 11.5 G/DL (ref 12–17)
INR PPP: 2.06 (ref 0.86–1.16)
LYMPHOCYTES # BLD AUTO: 0.96 THOUSANDS/ΜL (ref 0.6–4.47)
LYMPHOCYTES NFR BLD AUTO: 17 % (ref 14–44)
MCH RBC QN AUTO: 29.8 PG (ref 26.8–34.3)
MCHC RBC AUTO-ENTMCNC: 30.7 G/DL (ref 31.4–37.4)
MCV RBC AUTO: 97 FL (ref 82–98)
MONOCYTES # BLD AUTO: 0.41 THOUSAND/ΜL (ref 0.17–1.22)
MONOCYTES NFR BLD AUTO: 7 % (ref 4–12)
NEUTROPHILS # BLD AUTO: 3.97 THOUSANDS/ΜL (ref 1.85–7.62)
NEUTS SEG NFR BLD AUTO: 67 % (ref 43–75)
PLATELET # BLD AUTO: 119 THOUSANDS/UL (ref 149–390)
PMV BLD AUTO: 11.6 FL (ref 8.9–12.7)
PROTHROMBIN TIME: 22.3 SECONDS (ref 12–14.3)
RBC # BLD AUTO: 3.86 MILLION/UL (ref 3.88–5.62)
WBC # BLD AUTO: 5.81 THOUSAND/UL (ref 4.31–10.16)

## 2017-04-21 PROCEDURE — 85025 COMPLETE CBC W/AUTO DIFF WBC: CPT | Performed by: EMERGENCY MEDICINE

## 2017-04-21 PROCEDURE — 85610 PROTHROMBIN TIME: CPT | Performed by: EMERGENCY MEDICINE

## 2017-04-21 PROCEDURE — 99284 EMERGENCY DEPT VISIT MOD MDM: CPT

## 2017-04-21 PROCEDURE — 36415 COLL VENOUS BLD VENIPUNCTURE: CPT | Performed by: EMERGENCY MEDICINE

## 2017-04-21 PROCEDURE — 85730 THROMBOPLASTIN TIME PARTIAL: CPT | Performed by: EMERGENCY MEDICINE

## 2017-07-07 ENCOUNTER — HOSPITAL ENCOUNTER (EMERGENCY)
Facility: HOSPITAL | Age: 82
Discharge: HOME | End: 2017-07-07
Attending: EMERGENCY MEDICINE
Payer: COMMERCIAL

## 2017-07-07 VITALS
DIASTOLIC BLOOD PRESSURE: 65 MMHG | TEMPERATURE: 97.2 F | HEART RATE: 69 BPM | OXYGEN SATURATION: 98 % | SYSTOLIC BLOOD PRESSURE: 145 MMHG | HEIGHT: 71 IN | WEIGHT: 248.24 LBS | RESPIRATION RATE: 17 BRPM | BODY MASS INDEX: 34.75 KG/M2

## 2017-07-07 DIAGNOSIS — N39.0 URINARY TRACT INFECTION: ICD-10-CM

## 2017-07-07 DIAGNOSIS — I49.5 SICK SINUS SYNDROME (HCC): ICD-10-CM

## 2017-07-07 DIAGNOSIS — Z95.0 PACEMAKER: ICD-10-CM

## 2017-07-07 DIAGNOSIS — R53.1 WEAKNESS GENERALIZED: Primary | ICD-10-CM

## 2017-07-07 DIAGNOSIS — N18.9 CHRONIC KIDNEY DISEASE: ICD-10-CM

## 2017-07-07 LAB
ALBUMIN SERPL BCP-MCNC: 2.7 G/DL (ref 3.5–5)
ALP SERPL-CCNC: 188 U/L (ref 46–116)
ALT SERPL W P-5'-P-CCNC: 9 U/L (ref 12–78)
ANION GAP SERPL CALCULATED.3IONS-SCNC: 1 MMOL/L (ref 4–13)
APTT PPP: 46 SECONDS (ref 23–35)
AST SERPL W P-5'-P-CCNC: 27 U/L (ref 5–45)
BACTERIA UR QL AUTO: ABNORMAL /HPF
BASOPHILS # BLD AUTO: 0.03 THOUSANDS/ΜL (ref 0–0.1)
BASOPHILS NFR BLD AUTO: 1 % (ref 0–1)
BILIRUB SERPL-MCNC: 0.6 MG/DL (ref 0.2–1)
BILIRUB UR QL STRIP: NEGATIVE
BUN SERPL-MCNC: 34 MG/DL (ref 5–25)
CALCIUM SERPL-MCNC: 8.6 MG/DL (ref 8.3–10.1)
CHLORIDE SERPL-SCNC: 108 MMOL/L (ref 100–108)
CLARITY UR: ABNORMAL
CO2 SERPL-SCNC: 37 MMOL/L (ref 21–32)
COLOR UR: YELLOW
CREAT SERPL-MCNC: 2.12 MG/DL (ref 0.6–1.3)
EOSINOPHIL # BLD AUTO: 0.47 THOUSAND/ΜL (ref 0–0.61)
EOSINOPHIL NFR BLD AUTO: 9 % (ref 0–6)
ERYTHROCYTE [DISTWIDTH] IN BLOOD BY AUTOMATED COUNT: 18.1 % (ref 11.6–15.1)
GFR SERPL CREATININE-BSD FRML MDRD: 29.6 ML/MIN/1.73SQ M
GLUCOSE SERPL-MCNC: 104 MG/DL (ref 65–140)
GLUCOSE UR STRIP-MCNC: NEGATIVE MG/DL
HCT VFR BLD AUTO: 35 % (ref 36.5–49.3)
HGB BLD-MCNC: 11.1 G/DL (ref 12–17)
HGB UR QL STRIP.AUTO: ABNORMAL
INR PPP: 2.52 (ref 0.86–1.16)
KETONES UR STRIP-MCNC: NEGATIVE MG/DL
LACTATE SERPL-SCNC: 1.3 MMOL/L (ref 0.5–2)
LEUKOCYTE ESTERASE UR QL STRIP: ABNORMAL
LYMPHOCYTES # BLD AUTO: 0.7 THOUSANDS/ΜL (ref 0.6–4.47)
LYMPHOCYTES NFR BLD AUTO: 13 % (ref 14–44)
MCH RBC QN AUTO: 31.4 PG (ref 26.8–34.3)
MCHC RBC AUTO-ENTMCNC: 31.7 G/DL (ref 31.4–37.4)
MCV RBC AUTO: 99 FL (ref 82–98)
MONOCYTES # BLD AUTO: 0.38 THOUSAND/ΜL (ref 0.17–1.22)
MONOCYTES NFR BLD AUTO: 7 % (ref 4–12)
NEUTROPHILS # BLD AUTO: 3.9 THOUSANDS/ΜL (ref 1.85–7.62)
NEUTS SEG NFR BLD AUTO: 70 % (ref 43–75)
NITRITE UR QL STRIP: NEGATIVE
NON-SQ EPI CELLS URNS QL MICRO: ABNORMAL /HPF
PH UR STRIP.AUTO: 7.5 [PH] (ref 4.5–8)
PLATELET # BLD AUTO: 81 THOUSANDS/UL (ref 149–390)
PMV BLD AUTO: 12.8 FL (ref 8.9–12.7)
POTASSIUM SERPL-SCNC: 3.5 MMOL/L (ref 3.5–5.3)
PROT SERPL-MCNC: 7.1 G/DL (ref 6.4–8.2)
PROT UR STRIP-MCNC: ABNORMAL MG/DL
PROTHROMBIN TIME: 27.3 SECONDS (ref 12.1–14.4)
RBC # BLD AUTO: 3.53 MILLION/UL (ref 3.88–5.62)
RBC #/AREA URNS AUTO: ABNORMAL /HPF
SODIUM SERPL-SCNC: 146 MMOL/L (ref 136–145)
SP GR UR STRIP.AUTO: 1.01 (ref 1–1.03)
TROPONIN I SERPL-MCNC: 0.02 NG/ML
UROBILINOGEN UR QL STRIP.AUTO: 0.2 E.U./DL
WBC # BLD AUTO: 5.48 THOUSAND/UL (ref 4.31–10.16)
WBC #/AREA URNS AUTO: ABNORMAL /HPF

## 2017-07-07 PROCEDURE — 99285 EMERGENCY DEPT VISIT HI MDM: CPT

## 2017-07-07 PROCEDURE — 87186 SC STD MICRODIL/AGAR DIL: CPT | Performed by: EMERGENCY MEDICINE

## 2017-07-07 PROCEDURE — 87086 URINE CULTURE/COLONY COUNT: CPT | Performed by: EMERGENCY MEDICINE

## 2017-07-07 PROCEDURE — 80053 COMPREHEN METABOLIC PANEL: CPT | Performed by: EMERGENCY MEDICINE

## 2017-07-07 PROCEDURE — 87147 CULTURE TYPE IMMUNOLOGIC: CPT | Performed by: EMERGENCY MEDICINE

## 2017-07-07 PROCEDURE — 85025 COMPLETE CBC W/AUTO DIFF WBC: CPT | Performed by: EMERGENCY MEDICINE

## 2017-07-07 PROCEDURE — 87077 CULTURE AEROBIC IDENTIFY: CPT | Performed by: EMERGENCY MEDICINE

## 2017-07-07 PROCEDURE — 85730 THROMBOPLASTIN TIME PARTIAL: CPT | Performed by: EMERGENCY MEDICINE

## 2017-07-07 PROCEDURE — 84484 ASSAY OF TROPONIN QUANT: CPT | Performed by: EMERGENCY MEDICINE

## 2017-07-07 PROCEDURE — 96365 THER/PROPH/DIAG IV INF INIT: CPT

## 2017-07-07 PROCEDURE — 81001 URINALYSIS AUTO W/SCOPE: CPT | Performed by: EMERGENCY MEDICINE

## 2017-07-07 PROCEDURE — 83605 ASSAY OF LACTIC ACID: CPT | Performed by: EMERGENCY MEDICINE

## 2017-07-07 PROCEDURE — 85610 PROTHROMBIN TIME: CPT | Performed by: EMERGENCY MEDICINE

## 2017-07-07 PROCEDURE — 36415 COLL VENOUS BLD VENIPUNCTURE: CPT | Performed by: EMERGENCY MEDICINE

## 2017-07-07 PROCEDURE — 87040 BLOOD CULTURE FOR BACTERIA: CPT | Performed by: EMERGENCY MEDICINE

## 2017-07-07 RX ORDER — ASCORBIC ACID 500 MG
500 TABLET ORAL DAILY
Status: ON HOLD | COMMUNITY
End: 2018-01-01

## 2017-07-07 RX ORDER — LEVOFLOXACIN 500 MG/1
500 TABLET, FILM COATED ORAL DAILY
Qty: 7 TABLET | Refills: 0 | Status: SHIPPED | OUTPATIENT
Start: 2017-07-07 | End: 2017-01-01

## 2017-07-07 RX ORDER — POTASSIUM CHLORIDE 1.5 G/1.77G
20 POWDER, FOR SOLUTION ORAL 4 TIMES DAILY
COMMUNITY

## 2017-07-07 RX ORDER — LEVOFLOXACIN 5 MG/ML
500 INJECTION, SOLUTION INTRAVENOUS ONCE
Status: COMPLETED | OUTPATIENT
Start: 2017-07-07 | End: 2017-07-07

## 2017-07-07 RX ADMIN — LEVOFLOXACIN 500 MG: 5 INJECTION, SOLUTION INTRAVENOUS at 10:26

## 2017-07-10 LAB
BACTERIA UR CULT: NORMAL
BACTERIA UR CULT: NORMAL

## 2017-10-17 NOTE — PROGRESS NOTES
Assessment  1  Urinary retention (063 22) (R33 9)    Discussion/Summary  Discussion Summary:   27-year-old male managed by Dr Hailey Webster retention x 2 failed void trails  patient has no abnormalities of his scrotum or penis and his Wiley catheter is in place draining clear yellow urine  Was changed to 10/117 and will be due for another change 11/1/17  Have asked [place] [address] home to continue every four week changes and to obtain a urine culture and treat as necessary as this may be a source of the patient's fever and confusion  Otherwise, he can begin following up on an annual basis or sooner if he has trouble with his catheter  In the past was discussed placing a suprapubic tube but the patient does not wish to do so  The patient and his daughter understand and agree to this treatment plan  All questions and concerns have been addressed and answered  Chief Complaint  Chief Complaint Free Text Note Form: PT HERE FOR WILEY ISSUES (HX OF RETENTION/ FAILED VOID TRAILS)      History of Present Illness  HPI: Mariana Reed is an 27-year-old male here for evaluation of Wiley catheter issues  The patient and his daughter are unaware what these Wiley catheter issues are so [place] [address] facility was called and they have no record or report of Wiley catheter issues  They state that a family member requested his visit today  The patient presents with no complaints about his Wiley catheter  The daughter states he does ask to have it taken out frequently  Unfortunately, he has failed 2 voiding trials in the past so it has been deemed necessary to keep Wiley catheter in indefinitely  Of note, the patient does have a reported fever yesterday and the daughter states that he is acting slightly confused this morning  There is an upper respiratory illness going around hometown nursing per the daughter   In addition the daughter states that she was called in the near past about a wound on the patients penis for which wound care has been managing  Review of Systems  Complete-Male Urology:   Constitutional: No fever or chills, feels well, no tiredness, no recent weight gain or weight loss  Respiratory: No complaints of shortness of breath, no wheezing, no cough, no SOB on exertion, no orthopnea or PND  Cardiovascular: No complaints of slow heart rate, no fast heart rate, no chest pain, no palpitations, no leg claudication, no lower extremity  Gastrointestinal: No complaints of abdominal pain, no constipation, no nausea or vomiting, no diarrhea or bloody stools  Genitourinary: Urethral catheter in place  Musculoskeletal: No complaints of arthralgia, no myalgias, no joint swelling or stiffness, no limb pain or swelling  Integumentary: No complaints of skin rash or skin lesions, no itching, no skin wound, no dry skin  Hematologic/Lymphatic: No complaints of swollen glands, no swollen glands in the neck, does not bleed easily, no easy bruising  Neurological: No compliants of headache, no confusion, no convulsions, no numbness or tingling, no dizziness or fainting, no limb weakness, no difficulty walking  ROS Reviewed:   ROS reviewed  Active Problems  1  Arthralgia of right hand (719 44) (M25 541)   2  Atrial fibrillation (427 31) (I48 91)   3  Benign localized hyperplasia of prostate with urinary obstruction and other lower urinary   tract symptoms (LUTS) (600 21,599 69) (N40 1)   4  Cardiomegaly (429 3) (I51 7)   5  Carpal tunnel syndrome, unspecified laterality (354 0) (G56 00)   6  Chronic kidney disease, stage 3 (585 3) (N18 3)   7  Chronic obstructive pulmonary disease (496) (J44 9)   8  Closed fracture of distal end of left radius (813 42) (S52 502A)   9  Colles' fracture of left radius (813 41) (S52 532A)   10  Congestive heart failure (428 0) (I50 9)   11  Coronary artery disease (414 00) (I25 10)   12  Decubitus ulcer of left heel, stage 2 (174 41,070 67) (S58 462)   13   Depression (311) (F32 9) 14  Diabetes Mellitus (250 00)   15  Diverticular disease of colon (562 10) (K57 30)   16  Hyperlipidemia (272 4) (E78 5)   17  Hypertension (401 9) (I10)   18  Hypothyroidism (244 9) (E03 9)   19  Hypoxia (799 02) (R09 02)   20  Incomplete emptying of bladder (788 21) (R33 9)   21  Osteoarthritis (715 90) (M19 90)   22  Premature ventricular contraction (427 69) (I49 3)   23  Thrombocytopenia (287 5) (D69 6)   24  Transient ischemic attack (435 9) (G45 9)   25  Tremor (781 0) (R25 1)   26  Unstable angina (411 1) (I20 0)   27  Urinary retention (788 20) (R33 9)   28  Venous insufficiency (chronic) (peripheral) (459 81) (I87 2)    Past Medical History  1  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   (453 50) (I82 509)   2  History of Gout (274 9) (M10 9)   3  History of stroke (V12 54) (Z86 73)   4  History of thyroid disease (V12 29) (Z86 39)   5  Old myocardial infarction (12) (I25 2)  Active Problems And Past Medical History Reviewed: The active problems and past medical history were reviewed and updated today  Surgical History  1  History of CABG   2  History of Cardiac Cath Procedure Summary   3  History of Colonoscopic Polypectomy Via Colostomy   4  History of Hip Surgery   5  History of Pacemaker Placement   6  History of Pacemaker Placement  Surgical History Reviewed: The surgical history was reviewed and updated today  Family History  Mother    1  Family history of cerebrovascular accident (V17 1) (Z82 3)  Father    2  Family history of asthma (V17 5) (Z82 5)  Brother    3  Family history of cardiac disorder (V17 49) (Z82 49)   4  Family history of cerebrovascular accident (V17 1) (Z82 3)   5  Family history of diabetes mellitus (V18 0) (Z83 3)  Family History    6  Family history of Coronary Artery Disease (V17 49)   7  Family history of Diabetes Mellitus (V18 0)   8  Family history of Hypertension (V17 49)  Family History Reviewed:    The family history was reviewed and updated today  Social History   · Denied: History of Alcohol Use (History)   · Denied: History of Drug Use   · Former smoker (V15 82) (U79 264)   · Marital History -   Social History Reviewed: The social history was reviewed and updated today  The social history was reviewed and is unchanged  Current Meds   1  Acetaminophen 325 MG CAPS Recorded   2  Allopurinol 300 MG Oral Tablet; Therapy: (25-62-29-72) to Recorded   3  Alphagan P 0 1 % Ophthalmic Solution; Therapy: ([facility]98XNI5485) to Recorded   4  Amiodarone HCl - 200 MG Oral Tablet; TAKE 1 TABLET TWICE DAILY; Therapy: (Recorded:17Bzl1369) to Recorded   5  Bacid TABS; Therapy: (Recorded:15Swe2323) to Recorded   6  Bumetanide 2 MG Oral Tablet; Therapy: (Recorded:38Rav4188) to Recorded   7  Carbidopa-Levodopa  MG Oral Tablet Disintegrating; Therapy: (Recorded:06Axx3624) to Recorded   8  Carvedilol 6 25 MG Oral Tablet; Therapy: 94XAJ6731 to Recorded   9  DiphenhydrAMINE HCl - 50 MG Oral Capsule; Therapy: (Recorded:89Yhv0969) to Recorded   10  Fleet Enema 7-19 GM/118ML Rectal Enema; USE AS DIRECTED; Therapy: 67IAZ1394 to Recorded   11  Fleet Enema 7-19 GM/118ML Rectal Enema; Therapy: (Recorded:48Yir2672) to Recorded   12  Gabapentin 100 MG Oral Capsule; TAKE 2 CAPSULE Daily; Therapy: 02IXC1825 to Recorded   13  HM Loperamide HCl - 2 MG Oral Capsule Recorded   14  HydrALAZINE HCl - 50 MG Oral Tablet; Therapy: (Recorded:49Sqm0542) to Recorded   15  Isosorbide Mononitrate ER 60 MG Oral Tablet Extended Release 24 Hour; 60 mg  every    12 hrs; Therapy: (Recorded:11Rtv7537) to Recorded   16  Lactulose 10 GM/15ML Oral Solution; Therapy: (Recorded:72Keb7620) to Recorded   17  Loratadine 10 MG Oral Capsule; Therapy: (Recorded:42Acy2310) to Recorded   18  Magnesium Oxide 400 MG Oral Tablet; TAKE 1 TABLET DAILY; Therapy: (Recorded:09Xqp6452) to Recorded   19   MetOLazone 5 MG Oral Tablet; Therapy: (Recorded:09Sep2014) to Recorded   20  Nitrostat 0 4 MG Sublingual Tablet Sublingual; DISSOLVE 1 TABLET UNDER THE    TONGUE AS NEEDED FOR CHEST PAIN;    Therapy: 36TKA5103 to Recorded   21  NovoLOG 100 UNIT/ML Subcutaneous Solution; Therapy: (Recorded:66Zgj4860) to Recorded   22  Oxycodone-Acetaminophen 5-325 MG Oral Tablet; TAKE 1 TABLET 1/2 HOUR PRIOR TO    PROCEDURE THEN 1 TO 2 TABLETS EVERY 4 TO 6 HOURS AS NEEDED    FOR PAIN;    Therapy: 00YJA9568 to Recorded   23  Pantoprazole Sodium 40 MG Oral Tablet Delayed Release Recorded   24  Potassium Chloride 20 MEQ/15ML (10%) LIQD;    Therapy: (Recorded:09Sep2014) to Recorded   25  [employer]; Therapy: (Recorded:24Ewo6871) to Recorded   26  Repaglinide 1 MG Oral Tablet; Therapy: (Recorded:50Jyr2459) to Recorded   27  Senexon 8 8 MG/5ML LIQD;    Therapy: (Recorded:09Sep2014) to Recorded   28  Senna S 8 6-50 MG Oral Tablet; TAKE 2 TABLETS DAILY AS NEEDED; Therapy: (Recorded:30Apr2014) to Recorded   29  Siltussin  MG/5ML Oral Syrup; Therapy: (Recorded:10Ofn2758) to Recorded   30  Sorbitol 70 % Oral Solution; Therapy: (Recorded:09Sep2014) to Recorded   31  Systane 0 4-0 3 % Ophthalmic Gel Recorded   32  Tab-A-Soham TABS; Therapy: (Recorded:09Sep2014) to Recorded   33  Tamsulosin HCl - 0 4 MG Oral Capsule; one tablet twice a day; Therapy: 53QHF5180 to (Evaluate:07Jan2015)  Requested for: 09Sep2014; Last    Rx:09Sep2014 Ordered   34  Tamsulosin HCl - 0 4 MG Oral Capsule; Therapy: (Recorded:95Suz4720) to Recorded   35  Tylenol 500 MG CAPS; TAKE 1 CAPSULE Bedtime; Therapy: (Recorded:10Jun2014) to Recorded   36  Uloric 80 MG Oral Tablet; Therapy: (Recorded:75Ciq8889) to Recorded   37  Vitamin C CAPS; Therapy: (Recorded:55Rsg1572) to Recorded   38  Vitamin D (Ergocalciferol) 82383 UNIT Oral Capsule; TAKE 1 CAPSULE WEEKLY; Therapy: 17DCJ1311 to Recorded   39  Warfarin Sodium 1 MG Oral Tablet;     Therapy: (FPYZCITX:09TRX1349) to Recorded  Medication List Reviewed: The medication list was reviewed and updated today  Allergies  1  Cardura TABS   2  Codeine   3  Cozaar TABS   4  Cymbalta CPEP   5  Dyazide CAPS   6  furosemide   7  HydroCHLOROthiazide TABS   8  Hydroxyurea CAPS   9  Iodides   10  Latex Exam Gloves MISC   11  Lozol   12  Metoprolol Tartrate TABS   13  olmesartan   14  Prinivil TABS   15  Procardia CAPS   16  spironolactone   17  [address]   18  Verelan CP24   19  Voltaren SOLN  20  Latex    Vitals  Vital Signs    Recorded: 43XIU7126 09:21AM   Heart Rate 82    Systolic 286    Diastolic 68    Patient Refused Height No No   Patient Refused Weight No No   Height Unobtainable Yes    Weight Unobtainable Yes      Physical Exam    Constitutional   General appearance: No acute distress, well appearing and well nourished  Eyes   Conjunctiva and lids: No erythema, swelling or discharge  Ears, Nose, Mouth, and Throat   Hearing: Normal     Pulmonary   Respiratory effort: No increased work of breathing or signs of respiratory distress  Abdomen   Abdomen: Non-tender, no masses  Genitourinary   Scrotum contents: Normal size, no masses  Penis: Normal, no lesions  -- Indwelling urethral catheter draining clear yellow urine        Signatures   Electronically signed by : Mario Santana, ; Oct 16 2017 10:27AM EST                       (Author)    Electronically signed by : Isa Valencia MD; Oct 16 2017  3:51PM EST

## 2018-01-01 ENCOUNTER — HOSPITAL ENCOUNTER (OUTPATIENT)
Dept: RADIOLOGY | Facility: HOSPITAL | Age: 83
Discharge: HOME | End: 2018-03-02
Payer: COMMERCIAL

## 2018-01-01 ENCOUNTER — APPOINTMENT (INPATIENT)
Dept: ULTRASOUND IMAGING | Facility: HOSPITAL | Age: 83
DRG: 291 | End: 2018-01-01
Payer: COMMERCIAL

## 2018-01-01 ENCOUNTER — TELEPHONE (OUTPATIENT)
Dept: RADIOLOGY | Facility: HOSPITAL | Age: 83
End: 2018-01-01

## 2018-01-01 ENCOUNTER — APPOINTMENT (INPATIENT)
Dept: RADIOLOGY | Facility: HOSPITAL | Age: 83
DRG: 698 | End: 2018-01-01
Payer: COMMERCIAL

## 2018-01-01 ENCOUNTER — APPOINTMENT (EMERGENCY)
Dept: RADIOLOGY | Facility: HOSPITAL | Age: 83
DRG: 291 | End: 2018-01-01
Payer: COMMERCIAL

## 2018-01-01 ENCOUNTER — HOSPITAL ENCOUNTER (INPATIENT)
Facility: HOSPITAL | Age: 83
LOS: 10 days | Discharge: NURSING, REHAB OR LONG-TERM CARE | DRG: 698 | End: 2018-04-10
Attending: EMERGENCY MEDICINE | Admitting: INTERNAL MEDICINE
Payer: COMMERCIAL

## 2018-01-01 ENCOUNTER — APPOINTMENT (INPATIENT)
Dept: RADIOLOGY | Facility: HOSPITAL | Age: 83
DRG: 682 | End: 2018-01-01
Payer: COMMERCIAL

## 2018-01-01 ENCOUNTER — HOSPITAL ENCOUNTER (INPATIENT)
Facility: HOSPITAL | Age: 83
LOS: 4 days | DRG: 682 | End: 2018-07-12
Attending: INTERNAL MEDICINE | Admitting: INTERNAL MEDICINE
Payer: COMMERCIAL

## 2018-01-01 ENCOUNTER — TELEPHONE (OUTPATIENT)
Dept: SURGERY | Facility: HOSPITAL | Age: 83
End: 2018-01-01

## 2018-01-01 ENCOUNTER — APPOINTMENT (INPATIENT)
Dept: CT IMAGING | Facility: HOSPITAL | Age: 83
DRG: 698 | End: 2018-01-01
Payer: COMMERCIAL

## 2018-01-01 ENCOUNTER — APPOINTMENT (INPATIENT)
Dept: NON INVASIVE DIAGNOSTICS | Facility: HOSPITAL | Age: 83
DRG: 682 | End: 2018-01-01
Payer: COMMERCIAL

## 2018-01-01 ENCOUNTER — OFFICE VISIT (OUTPATIENT)
Dept: UROLOGY | Facility: HOSPITAL | Age: 83
End: 2018-01-01
Payer: COMMERCIAL

## 2018-01-01 ENCOUNTER — TELEPHONE (OUTPATIENT)
Dept: GASTROENTEROLOGY | Facility: CLINIC | Age: 83
End: 2018-01-01

## 2018-01-01 ENCOUNTER — ANESTHESIA EVENT (OUTPATIENT)
Dept: RADIOLOGY | Facility: HOSPITAL | Age: 83
End: 2018-01-01

## 2018-01-01 ENCOUNTER — APPOINTMENT (EMERGENCY)
Dept: CT IMAGING | Facility: HOSPITAL | Age: 83
End: 2018-01-01
Payer: COMMERCIAL

## 2018-01-01 ENCOUNTER — CLINICAL SUPPORT (OUTPATIENT)
Dept: CARDIOLOGY CLINIC | Facility: CLINIC | Age: 83
End: 2018-01-01
Payer: COMMERCIAL

## 2018-01-01 ENCOUNTER — APPOINTMENT (INPATIENT)
Dept: RADIOLOGY | Facility: HOSPITAL | Age: 83
DRG: 682 | End: 2018-01-01
Attending: INTERNAL MEDICINE
Payer: COMMERCIAL

## 2018-01-01 ENCOUNTER — OFFICE VISIT (OUTPATIENT)
Dept: GASTROENTEROLOGY | Facility: HOSPITAL | Age: 83
End: 2018-01-01
Payer: COMMERCIAL

## 2018-01-01 ENCOUNTER — APPOINTMENT (EMERGENCY)
Dept: RADIOLOGY | Facility: HOSPITAL | Age: 83
DRG: 698 | End: 2018-01-01
Payer: COMMERCIAL

## 2018-01-01 ENCOUNTER — ANESTHESIA (OUTPATIENT)
Dept: RADIOLOGY | Facility: HOSPITAL | Age: 83
End: 2018-01-01

## 2018-01-01 ENCOUNTER — HOSPITAL ENCOUNTER (EMERGENCY)
Facility: HOSPITAL | Age: 83
Discharge: HOME | End: 2018-01-29
Attending: EMERGENCY MEDICINE | Admitting: EMERGENCY MEDICINE
Payer: COMMERCIAL

## 2018-01-01 ENCOUNTER — HOSPITAL ENCOUNTER (OUTPATIENT)
Dept: RADIOLOGY | Facility: HOSPITAL | Age: 83
Discharge: HOME | End: 2018-05-18
Attending: INTERNAL MEDICINE
Payer: COMMERCIAL

## 2018-01-01 ENCOUNTER — APPOINTMENT (INPATIENT)
Dept: NON INVASIVE DIAGNOSTICS | Facility: HOSPITAL | Age: 83
DRG: 698 | End: 2018-01-01
Payer: COMMERCIAL

## 2018-01-01 ENCOUNTER — TELEPHONE (OUTPATIENT)
Dept: UROLOGY | Facility: CLINIC | Age: 83
End: 2018-01-01

## 2018-01-01 ENCOUNTER — HOSPITAL ENCOUNTER (INPATIENT)
Facility: HOSPITAL | Age: 83
LOS: 2 days | DRG: 291 | End: 2018-07-08
Attending: EMERGENCY MEDICINE | Admitting: FAMILY MEDICINE
Payer: COMMERCIAL

## 2018-01-01 VITALS
DIASTOLIC BLOOD PRESSURE: 70 MMHG | SYSTOLIC BLOOD PRESSURE: 146 MMHG | TEMPERATURE: 98.4 F | BODY MASS INDEX: 35.96 KG/M2 | WEIGHT: 256.84 LBS | HEIGHT: 71 IN | OXYGEN SATURATION: 96 % | RESPIRATION RATE: 18 BRPM | HEART RATE: 70 BPM

## 2018-01-01 VITALS
WEIGHT: 269 LBS | TEMPERATURE: 98.6 F | DIASTOLIC BLOOD PRESSURE: 72 MMHG | BODY MASS INDEX: 37.66 KG/M2 | HEART RATE: 70 BPM | SYSTOLIC BLOOD PRESSURE: 152 MMHG | HEIGHT: 71 IN

## 2018-01-01 VITALS
HEIGHT: 71 IN | BODY MASS INDEX: 36.2 KG/M2 | HEART RATE: 70 BPM | WEIGHT: 258.6 LBS | RESPIRATION RATE: 16 BRPM | SYSTOLIC BLOOD PRESSURE: 80 MMHG | TEMPERATURE: 97.7 F | DIASTOLIC BLOOD PRESSURE: 38 MMHG | OXYGEN SATURATION: 98 %

## 2018-01-01 VITALS
DIASTOLIC BLOOD PRESSURE: 65 MMHG | HEIGHT: 71 IN | OXYGEN SATURATION: 96 % | RESPIRATION RATE: 18 BRPM | WEIGHT: 264.99 LBS | BODY MASS INDEX: 37.1 KG/M2 | SYSTOLIC BLOOD PRESSURE: 128 MMHG | TEMPERATURE: 98.4 F | HEART RATE: 68 BPM

## 2018-01-01 VITALS
RESPIRATION RATE: 15 BRPM | DIASTOLIC BLOOD PRESSURE: 71 MMHG | SYSTOLIC BLOOD PRESSURE: 123 MMHG | TEMPERATURE: 98.3 F | HEART RATE: 71 BPM | OXYGEN SATURATION: 96 %

## 2018-01-01 VITALS — DIASTOLIC BLOOD PRESSURE: 62 MMHG | SYSTOLIC BLOOD PRESSURE: 124 MMHG | HEART RATE: 70 BPM

## 2018-01-01 VITALS
RESPIRATION RATE: 18 BRPM | OXYGEN SATURATION: 99 % | HEART RATE: 70 BPM | DIASTOLIC BLOOD PRESSURE: 70 MMHG | SYSTOLIC BLOOD PRESSURE: 132 MMHG | WEIGHT: 264.99 LBS | BODY MASS INDEX: 36.96 KG/M2 | TEMPERATURE: 99.1 F

## 2018-01-01 VITALS — SYSTOLIC BLOOD PRESSURE: 122 MMHG | HEART RATE: 82 BPM | DIASTOLIC BLOOD PRESSURE: 68 MMHG

## 2018-01-01 VITALS — SYSTOLIC BLOOD PRESSURE: 128 MMHG | DIASTOLIC BLOOD PRESSURE: 60 MMHG | HEART RATE: 68 BPM

## 2018-01-01 VITALS
WEIGHT: 246 LBS | SYSTOLIC BLOOD PRESSURE: 130 MMHG | DIASTOLIC BLOOD PRESSURE: 70 MMHG | HEART RATE: 68 BPM | BODY MASS INDEX: 34.31 KG/M2

## 2018-01-01 DIAGNOSIS — I25.119 CORONARY ARTERY DISEASE INVOLVING NATIVE CORONARY ARTERY OF NATIVE HEART WITH ANGINA PECTORIS (HCC): ICD-10-CM

## 2018-01-01 DIAGNOSIS — E11.65 TYPE 2 DIABETES MELLITUS WITH HYPERGLYCEMIA, WITH LONG-TERM CURRENT USE OF INSULIN (HCC): ICD-10-CM

## 2018-01-01 DIAGNOSIS — M54.50 LOW BACK PAIN: ICD-10-CM

## 2018-01-01 DIAGNOSIS — Z95.0 PRESENCE OF PERMANENT CARDIAC PACEMAKER: ICD-10-CM

## 2018-01-01 DIAGNOSIS — N17.9 AKI (ACUTE KIDNEY INJURY) (HCC): Primary | ICD-10-CM

## 2018-01-01 DIAGNOSIS — Z93.59 SUPRAPUBIC CATHETER (HCC): ICD-10-CM

## 2018-01-01 DIAGNOSIS — R13.19 ESOPHAGEAL DYSPHAGIA: ICD-10-CM

## 2018-01-01 DIAGNOSIS — M48.061 SPINAL STENOSIS AT L4-L5 LEVEL: Primary | ICD-10-CM

## 2018-01-01 DIAGNOSIS — R33.9 URINARY RETENTION: ICD-10-CM

## 2018-01-01 DIAGNOSIS — R13.10 DYSPHAGIA, UNSPECIFIED TYPE: ICD-10-CM

## 2018-01-01 DIAGNOSIS — I50.23 ACUTE ON CHRONIC SYSTOLIC CHF (CONGESTIVE HEART FAILURE) (HCC): ICD-10-CM

## 2018-01-01 DIAGNOSIS — R33.9 RETENTION OF URINE: Primary | ICD-10-CM

## 2018-01-01 DIAGNOSIS — N17.9 ACUTE KIDNEY INJURY SUPERIMPOSED ON CHRONIC KIDNEY DISEASE (HCC): Primary | ICD-10-CM

## 2018-01-01 DIAGNOSIS — I50.9 CHF (CONGESTIVE HEART FAILURE) (HCC): Primary | ICD-10-CM

## 2018-01-01 DIAGNOSIS — R33.9 URINARY RETENTION: Primary | ICD-10-CM

## 2018-01-01 DIAGNOSIS — I50.9 ACUTE CONGESTIVE HEART FAILURE, UNSPECIFIED CONGESTIVE HEART FAILURE TYPE: ICD-10-CM

## 2018-01-01 DIAGNOSIS — D69.6 THROMBOCYTOPENIA (HCC): ICD-10-CM

## 2018-01-01 DIAGNOSIS — J96.00 ACUTE RESPIRATORY FAILURE (HCC): ICD-10-CM

## 2018-01-01 DIAGNOSIS — Z79.4 TYPE 2 DIABETES MELLITUS WITH HYPERGLYCEMIA, WITH LONG-TERM CURRENT USE OF INSULIN (HCC): ICD-10-CM

## 2018-01-01 DIAGNOSIS — I50.23 ACUTE ON CHRONIC SYSTOLIC (CONGESTIVE) HEART FAILURE (HCC): ICD-10-CM

## 2018-01-01 DIAGNOSIS — N30.00 ACUTE CYSTITIS WITHOUT HEMATURIA: ICD-10-CM

## 2018-01-01 DIAGNOSIS — J18.9 HEALTHCARE-ASSOCIATED PNEUMONIA: ICD-10-CM

## 2018-01-01 DIAGNOSIS — N18.9 ACUTE KIDNEY INJURY SUPERIMPOSED ON CHRONIC KIDNEY DISEASE (HCC): Primary | ICD-10-CM

## 2018-01-01 DIAGNOSIS — J96.01 ACUTE RESPIRATORY FAILURE WITH HYPOXIA (HCC): ICD-10-CM

## 2018-01-01 DIAGNOSIS — E88.09 HYPOALBUMINEMIA: ICD-10-CM

## 2018-01-01 DIAGNOSIS — J90 PLEURAL EFFUSION: ICD-10-CM

## 2018-01-01 DIAGNOSIS — N18.4 STAGE 4 CHRONIC KIDNEY DISEASE (HCC): ICD-10-CM

## 2018-01-01 DIAGNOSIS — N39.0 UTI (URINARY TRACT INFECTION): ICD-10-CM

## 2018-01-01 DIAGNOSIS — R79.1 SUPRATHERAPEUTIC INR: ICD-10-CM

## 2018-01-01 DIAGNOSIS — N17.9 ARF (ACUTE RENAL FAILURE) (HCC): ICD-10-CM

## 2018-01-01 DIAGNOSIS — R13.19 ESOPHAGEAL DYSPHAGIA: Primary | ICD-10-CM

## 2018-01-01 DIAGNOSIS — N17.9 ACUTE KIDNEY INJURY SUPERIMPOSED ON CHRONIC KIDNEY DISEASE (HCC): ICD-10-CM

## 2018-01-01 DIAGNOSIS — R79.89 ELEVATED BRAIN NATRIURETIC PEPTIDE (BNP) LEVEL: ICD-10-CM

## 2018-01-01 DIAGNOSIS — R50.9 FEVER: ICD-10-CM

## 2018-01-01 DIAGNOSIS — I49.5 SICK SINUS SYNDROME (HCC): Primary | ICD-10-CM

## 2018-01-01 DIAGNOSIS — N18.9 ACUTE KIDNEY INJURY SUPERIMPOSED ON CHRONIC KIDNEY DISEASE (HCC): ICD-10-CM

## 2018-01-01 LAB
ALBUMIN FLD-MCNC: 1.8 G/DL
ALBUMIN SERPL BCP-MCNC: 2.2 G/DL (ref 3.5–5)
ALBUMIN SERPL BCP-MCNC: 2.3 G/DL (ref 3.5–5)
ALBUMIN SERPL BCP-MCNC: 2.4 G/DL (ref 3.5–5)
ALBUMIN SERPL BCP-MCNC: 2.6 G/DL (ref 3.5–5)
ALBUMIN SERPL BCP-MCNC: 3.1 G/DL (ref 3.5–5)
ALP SERPL-CCNC: 180 U/L (ref 46–116)
ALP SERPL-CCNC: 191 U/L (ref 46–116)
ALP SERPL-CCNC: 209 U/L (ref 46–116)
ALP SERPL-CCNC: 234 U/L (ref 46–116)
ALP SERPL-CCNC: 237 U/L (ref 46–116)
ALP SERPL-CCNC: 262 U/L (ref 46–116)
ALP SERPL-CCNC: 280 U/L (ref 46–116)
ALP SERPL-CCNC: 361 U/L (ref 46–116)
ALT SERPL W P-5'-P-CCNC: 14 U/L (ref 12–78)
ALT SERPL W P-5'-P-CCNC: 14 U/L (ref 12–78)
ALT SERPL W P-5'-P-CCNC: 16 U/L (ref 12–78)
ALT SERPL W P-5'-P-CCNC: 6 U/L (ref 12–78)
ALT SERPL W P-5'-P-CCNC: 7 U/L (ref 12–78)
ALT SERPL W P-5'-P-CCNC: 7 U/L (ref 12–78)
ALT SERPL W P-5'-P-CCNC: 8 U/L (ref 12–78)
ALT SERPL W P-5'-P-CCNC: 8 U/L (ref 12–78)
AMORPH PHOS CRY URNS QL MICRO: ABNORMAL /HPF
ANION GAP SERPL CALCULATED.3IONS-SCNC: 10 MMOL/L (ref 4–13)
ANION GAP SERPL CALCULATED.3IONS-SCNC: 12 MMOL/L (ref 4–13)
ANION GAP SERPL CALCULATED.3IONS-SCNC: 3 MMOL/L (ref 4–13)
ANION GAP SERPL CALCULATED.3IONS-SCNC: 3 MMOL/L (ref 4–13)
ANION GAP SERPL CALCULATED.3IONS-SCNC: 4 MMOL/L (ref 4–13)
ANION GAP SERPL CALCULATED.3IONS-SCNC: 5 MMOL/L (ref 4–13)
ANION GAP SERPL CALCULATED.3IONS-SCNC: 6 MMOL/L (ref 4–13)
ANION GAP SERPL CALCULATED.3IONS-SCNC: 6 MMOL/L (ref 4–13)
ANION GAP SERPL CALCULATED.3IONS-SCNC: 7 MMOL/L (ref 4–13)
ANION GAP SERPL CALCULATED.3IONS-SCNC: 8 MMOL/L (ref 4–13)
APTT PPP: 51 SECONDS (ref 23–35)
APTT PPP: 54 SECONDS (ref 24–36)
ARTERIAL PATENCY WRIST A: NO
ARTERIAL PATENCY WRIST A: YES
AST SERPL W P-5'-P-CCNC: 19 U/L (ref 5–45)
AST SERPL W P-5'-P-CCNC: 21 U/L (ref 5–45)
AST SERPL W P-5'-P-CCNC: 22 U/L (ref 5–45)
AST SERPL W P-5'-P-CCNC: 24 U/L (ref 5–45)
AST SERPL W P-5'-P-CCNC: 24 U/L (ref 5–45)
AST SERPL W P-5'-P-CCNC: 25 U/L (ref 5–45)
AST SERPL W P-5'-P-CCNC: 25 U/L (ref 5–45)
AST SERPL W P-5'-P-CCNC: 26 U/L (ref 5–45)
ATRIAL RATE: 40 BPM
ATRIAL RATE: 82 BPM
BACTERIA BLD CULT: NORMAL
BACTERIA UR CULT: ABNORMAL
BACTERIA UR QL AUTO: ABNORMAL /HPF
BASE EX.OXY STD BLDV CALC-SCNC: 71 % (ref 60–80)
BASE EXCESS BLDA CALC-SCNC: -0.4 MMOL/L
BASE EXCESS BLDA CALC-SCNC: -5.9 MMOL/L
BASE EXCESS BLDA CALC-SCNC: 12.6 MMOL/L
BASE EXCESS BLDA CALC-SCNC: 3.6 MMOL/L
BASE EXCESS BLDA CALC-SCNC: 5 MMOL/L
BASE EXCESS BLDV CALC-SCNC: 2.7 MMOL/L
BASOPHILS # BLD AUTO: 0.01 THOUSANDS/ΜL (ref 0–0.1)
BASOPHILS # BLD AUTO: 0.02 THOUSANDS/ΜL (ref 0–0.1)
BASOPHILS # BLD AUTO: 0.04 THOUSANDS/ΜL (ref 0–0.1)
BASOPHILS # BLD AUTO: 0.06 THOUSANDS/ΜL (ref 0–0.1)
BASOPHILS # BLD MANUAL: 0 THOUSAND/UL (ref 0–0.1)
BASOPHILS NFR BLD AUTO: 0 % (ref 0–1)
BASOPHILS NFR BLD AUTO: 1 % (ref 0–1)
BASOPHILS NFR BLD AUTO: 2 % (ref 0–1)
BASOPHILS NFR MAR MANUAL: 0 % (ref 0–1)
BILIRUB DIRECT SERPL-MCNC: 0.54 MG/DL (ref 0–0.2)
BILIRUB SERPL-MCNC: 0.8 MG/DL (ref 0.2–1)
BILIRUB SERPL-MCNC: 0.8 MG/DL (ref 0.2–1)
BILIRUB SERPL-MCNC: 0.9 MG/DL (ref 0.2–1)
BILIRUB SERPL-MCNC: 1.1 MG/DL (ref 0.2–1)
BILIRUB SERPL-MCNC: 1.2 MG/DL (ref 0.2–1)
BILIRUB UR QL STRIP: NEGATIVE
BLD SMEAR INTERP: NORMAL
BUN SERPL-MCNC: 28 MG/DL (ref 5–25)
BUN SERPL-MCNC: 31 MG/DL (ref 5–25)
BUN SERPL-MCNC: 34 MG/DL (ref 5–25)
BUN SERPL-MCNC: 35 MG/DL (ref 5–25)
BUN SERPL-MCNC: 35 MG/DL (ref 5–25)
BUN SERPL-MCNC: 40 MG/DL (ref 5–25)
BUN SERPL-MCNC: 41 MG/DL (ref 5–25)
BUN SERPL-MCNC: 42 MG/DL (ref 5–25)
BUN SERPL-MCNC: 42 MG/DL (ref 5–25)
BUN SERPL-MCNC: 43 MG/DL (ref 5–25)
BUN SERPL-MCNC: 44 MG/DL (ref 5–25)
BUN SERPL-MCNC: 45 MG/DL (ref 5–25)
BUN SERPL-MCNC: 56 MG/DL (ref 5–25)
BUN SERPL-MCNC: 62 MG/DL (ref 5–25)
BUN SERPL-MCNC: 70 MG/DL (ref 5–25)
BUN SERPL-MCNC: 81 MG/DL (ref 5–25)
BUN SERPL-MCNC: 88 MG/DL (ref 5–25)
BUN SERPL-MCNC: 93 MG/DL (ref 5–25)
CALCIUM SERPL-MCNC: 8.4 MG/DL (ref 8.3–10.1)
CALCIUM SERPL-MCNC: 8.5 MG/DL (ref 8.3–10.1)
CALCIUM SERPL-MCNC: 8.5 MG/DL (ref 8.3–10.1)
CALCIUM SERPL-MCNC: 8.6 MG/DL (ref 8.3–10.1)
CALCIUM SERPL-MCNC: 8.7 MG/DL (ref 8.3–10.1)
CALCIUM SERPL-MCNC: 8.7 MG/DL (ref 8.3–10.1)
CALCIUM SERPL-MCNC: 8.8 MG/DL (ref 8.3–10.1)
CALCIUM SERPL-MCNC: 8.9 MG/DL (ref 8.3–10.1)
CALCIUM SERPL-MCNC: 9 MG/DL
CALCIUM SERPL-MCNC: 9 MG/DL (ref 8.3–10.1)
CHLORIDE SERPL-SCNC: 102 MMOL/L (ref 100–108)
CHLORIDE SERPL-SCNC: 102 MMOL/L (ref 100–108)
CHLORIDE SERPL-SCNC: 103 MMOL/L (ref 100–108)
CHLORIDE SERPL-SCNC: 104 MMOL/L (ref 100–108)
CHLORIDE SERPL-SCNC: 105 MMOL/L (ref 100–108)
CHLORIDE SERPL-SCNC: 106 MMOL/L (ref 100–108)
CHLORIDE SERPL-SCNC: 107 MMOL/L (ref 100–108)
CHLORIDE UR-SCNC: 109 MMOL/L (ref 10–330)
CK SERPL-CCNC: 48 U/L (ref 39–308)
CLARITY UR: ABNORMAL
CO2 SERPL-SCNC: 27 MMOL/L (ref 21–32)
CO2 SERPL-SCNC: 27 MMOL/L (ref 21–32)
CO2 SERPL-SCNC: 29 MMOL/L (ref 21–32)
CO2 SERPL-SCNC: 29 MMOL/L (ref 21–32)
CO2 SERPL-SCNC: 30 MMOL/L (ref 21–32)
CO2 SERPL-SCNC: 31 MMOL/L (ref 21–32)
CO2 SERPL-SCNC: 33 MMOL/L (ref 21–32)
CO2 SERPL-SCNC: 34 MMOL/L (ref 21–32)
CO2 SERPL-SCNC: 35 MMOL/L (ref 21–32)
CO2 SERPL-SCNC: 36 MMOL/L (ref 21–32)
CO2 SERPL-SCNC: 37 MMOL/L (ref 21–32)
CO2 SERPL-SCNC: 38 MMOL/L (ref 21–32)
CO2 SERPL-SCNC: 39 MMOL/L (ref 21–32)
COLOR UR: ABNORMAL
COLOR UR: YELLOW
CREAT SERPL-MCNC: 1.93 MG/DL (ref 0.6–1.3)
CREAT SERPL-MCNC: 1.95 MG/DL (ref 0.6–1.3)
CREAT SERPL-MCNC: 1.99 MG/DL (ref 0.6–1.3)
CREAT SERPL-MCNC: 2.05 MG/DL (ref 0.6–1.3)
CREAT SERPL-MCNC: 2.22 MG/DL (ref 0.6–1.3)
CREAT SERPL-MCNC: 2.34 MG/DL (ref 0.6–1.3)
CREAT SERPL-MCNC: 2.39 MG/DL (ref 0.6–1.3)
CREAT SERPL-MCNC: 2.41 MG/DL (ref 0.6–1.3)
CREAT SERPL-MCNC: 2.43 MG/DL (ref 0.6–1.3)
CREAT SERPL-MCNC: 2.44 MG/DL (ref 0.6–1.3)
CREAT SERPL-MCNC: 2.45 MG/DL (ref 0.6–1.3)
CREAT SERPL-MCNC: 2.58 MG/DL (ref 0.6–1.3)
CREAT SERPL-MCNC: 2.87 MG/DL (ref 0.6–1.3)
CREAT SERPL-MCNC: 2.98 MG/DL (ref 0.6–1.3)
CREAT SERPL-MCNC: 3.43 MG/DL (ref 0.6–1.3)
CREAT SERPL-MCNC: 3.53 MG/DL (ref 0.6–1.3)
CREAT SERPL-MCNC: 3.74 MG/DL (ref 0.6–1.3)
CREAT SERPL-MCNC: 3.75 MG/DL (ref 0.6–1.3)
CREAT UR-MCNC: 33.5 MG/DL
CREAT UR-MCNC: 50.5 MG/DL
CREAT UR-MCNC: 56.4 MG/DL
EOSINOPHIL # BLD AUTO: 0.02 THOUSAND/ΜL (ref 0–0.61)
EOSINOPHIL # BLD AUTO: 0.06 THOUSAND/ΜL (ref 0–0.61)
EOSINOPHIL # BLD AUTO: 0.07 THOUSAND/ΜL (ref 0–0.61)
EOSINOPHIL # BLD AUTO: 0.09 THOUSAND/ΜL (ref 0–0.61)
EOSINOPHIL # BLD AUTO: 0.13 THOUSAND/ΜL (ref 0–0.61)
EOSINOPHIL # BLD AUTO: 0.3 THOUSAND/ΜL (ref 0–0.61)
EOSINOPHIL # BLD AUTO: 0.37 THOUSAND/ΜL (ref 0–0.61)
EOSINOPHIL # BLD AUTO: 0.41 THOUSAND/ΜL (ref 0–0.61)
EOSINOPHIL # BLD AUTO: 0.41 THOUSAND/ΜL (ref 0–0.61)
EOSINOPHIL # BLD AUTO: 0.46 THOUSAND/ΜL (ref 0–0.61)
EOSINOPHIL # BLD AUTO: 0.48 THOUSAND/ΜL (ref 0–0.61)
EOSINOPHIL # BLD AUTO: 0.51 THOUSAND/ΜL (ref 0–0.61)
EOSINOPHIL # BLD MANUAL: 0.42 THOUSAND/UL (ref 0–0.4)
EOSINOPHIL NFR BLD AUTO: 0 % (ref 0–6)
EOSINOPHIL NFR BLD AUTO: 1 % (ref 0–6)
EOSINOPHIL NFR BLD AUTO: 11 % (ref 0–6)
EOSINOPHIL NFR BLD AUTO: 13 % (ref 0–6)
EOSINOPHIL NFR BLD AUTO: 2 % (ref 0–6)
EOSINOPHIL NFR BLD AUTO: 2 % (ref 0–6)
EOSINOPHIL NFR BLD AUTO: 3 % (ref 0–6)
EOSINOPHIL NFR BLD AUTO: 5 % (ref 0–6)
EOSINOPHIL NFR BLD AUTO: 5 % (ref 0–6)
EOSINOPHIL NFR BLD AUTO: 7 % (ref 0–6)
EOSINOPHIL NFR BLD AUTO: 9 % (ref 0–6)
EOSINOPHIL NFR BLD AUTO: 9 % (ref 0–6)
EOSINOPHIL NFR BLD MANUAL: 8 % (ref 0–6)
ERYTHROCYTE [DISTWIDTH] IN BLOOD BY AUTOMATED COUNT: 17.2 % (ref 11.6–15.1)
ERYTHROCYTE [DISTWIDTH] IN BLOOD BY AUTOMATED COUNT: 17.4 % (ref 11.6–15.1)
ERYTHROCYTE [DISTWIDTH] IN BLOOD BY AUTOMATED COUNT: 17.6 % (ref 11.6–15.1)
ERYTHROCYTE [DISTWIDTH] IN BLOOD BY AUTOMATED COUNT: 17.7 % (ref 11.6–15.1)
ERYTHROCYTE [DISTWIDTH] IN BLOOD BY AUTOMATED COUNT: 17.7 % (ref 11.6–15.1)
ERYTHROCYTE [DISTWIDTH] IN BLOOD BY AUTOMATED COUNT: 17.8 % (ref 11.6–15.1)
ERYTHROCYTE [DISTWIDTH] IN BLOOD BY AUTOMATED COUNT: 17.8 % (ref 11.6–15.1)
ERYTHROCYTE [DISTWIDTH] IN BLOOD BY AUTOMATED COUNT: 17.9 % (ref 11.6–15.1)
ERYTHROCYTE [DISTWIDTH] IN BLOOD BY AUTOMATED COUNT: 18.2 % (ref 11.6–15.1)
ERYTHROCYTE [DISTWIDTH] IN BLOOD BY AUTOMATED COUNT: 18.2 % (ref 11.6–15.1)
ERYTHROCYTE [DISTWIDTH] IN BLOOD BY AUTOMATED COUNT: 18.4 % (ref 11.6–15.1)
ERYTHROCYTE [DISTWIDTH] IN BLOOD BY AUTOMATED COUNT: 18.4 % (ref 11.6–15.1)
ERYTHROCYTE [DISTWIDTH] IN BLOOD BY AUTOMATED COUNT: 18.7 % (ref 11.6–15.1)
ERYTHROCYTE [DISTWIDTH] IN BLOOD BY AUTOMATED COUNT: 18.7 % (ref 11.6–15.1)
EST. AVERAGE GLUCOSE BLD GHB EST-MCNC: 146 MG/DL
EST. AVERAGE GLUCOSE BLD GHB EST-MCNC: 151 MG/DL
FLUAV AG SPEC QL: NORMAL
FLUBV AG SPEC QL: NORMAL
FOLATE SERPL-MCNC: >20 NG/ML (ref 3.1–17.5)
GFR SERPL CREATININE-BSD FRML MDRD: 13 ML/MIN/1.73SQ M
GFR SERPL CREATININE-BSD FRML MDRD: 13 ML/MIN/1.73SQ M
GFR SERPL CREATININE-BSD FRML MDRD: 14 ML/MIN/1.73SQ M
GFR SERPL CREATININE-BSD FRML MDRD: 15 ML/MIN/1.73SQ M
GFR SERPL CREATININE-BSD FRML MDRD: 18 ML/MIN/1.73SQ M
GFR SERPL CREATININE-BSD FRML MDRD: 19 ML/MIN/1.73SQ M
GFR SERPL CREATININE-BSD FRML MDRD: 21 ML/MIN/1.73SQ M
GFR SERPL CREATININE-BSD FRML MDRD: 22 ML/MIN/1.73SQ M
GFR SERPL CREATININE-BSD FRML MDRD: 23 ML/MIN/1.73SQ M
GFR SERPL CREATININE-BSD FRML MDRD: 24 ML/MIN/1.73SQ M
GFR SERPL CREATININE-BSD FRML MDRD: 25 ML/MIN/1.73SQ M
GFR SERPL CREATININE-BSD FRML MDRD: 28 ML/MIN/1.73SQ M
GFR SERPL CREATININE-BSD FRML MDRD: 29 ML/MIN/1.73SQ M
GFR SERPL CREATININE-BSD FRML MDRD: 30 ML/MIN/1.73SQ M
GFR SERPL CREATININE-BSD FRML MDRD: 30 ML/MIN/1.73SQ M
GLUCOSE P FAST SERPL-MCNC: 132 MG/DL (ref 65–99)
GLUCOSE SERPL-MCNC: 102 MG/DL (ref 65–140)
GLUCOSE SERPL-MCNC: 103 MG/DL (ref 65–140)
GLUCOSE SERPL-MCNC: 105 MG/DL (ref 65–140)
GLUCOSE SERPL-MCNC: 105 MG/DL (ref 65–140)
GLUCOSE SERPL-MCNC: 106 MG/DL (ref 65–140)
GLUCOSE SERPL-MCNC: 106 MG/DL (ref 65–140)
GLUCOSE SERPL-MCNC: 107 MG/DL (ref 65–140)
GLUCOSE SERPL-MCNC: 109 MG/DL (ref 65–140)
GLUCOSE SERPL-MCNC: 111 MG/DL (ref 65–140)
GLUCOSE SERPL-MCNC: 112 MG/DL (ref 65–140)
GLUCOSE SERPL-MCNC: 115 MG/DL (ref 65–140)
GLUCOSE SERPL-MCNC: 115 MG/DL (ref 65–140)
GLUCOSE SERPL-MCNC: 117 MG/DL (ref 65–140)
GLUCOSE SERPL-MCNC: 120 MG/DL (ref 65–140)
GLUCOSE SERPL-MCNC: 121 MG/DL (ref 65–140)
GLUCOSE SERPL-MCNC: 123 MG/DL (ref 65–140)
GLUCOSE SERPL-MCNC: 127 MG/DL (ref 65–140)
GLUCOSE SERPL-MCNC: 128 MG/DL (ref 65–140)
GLUCOSE SERPL-MCNC: 129 MG/DL (ref 65–140)
GLUCOSE SERPL-MCNC: 129 MG/DL (ref 65–140)
GLUCOSE SERPL-MCNC: 132 MG/DL (ref 65–140)
GLUCOSE SERPL-MCNC: 134 MG/DL (ref 65–140)
GLUCOSE SERPL-MCNC: 134 MG/DL (ref 65–140)
GLUCOSE SERPL-MCNC: 135 MG/DL (ref 65–140)
GLUCOSE SERPL-MCNC: 135 MG/DL (ref 65–140)
GLUCOSE SERPL-MCNC: 136 MG/DL (ref 65–140)
GLUCOSE SERPL-MCNC: 136 MG/DL (ref 65–140)
GLUCOSE SERPL-MCNC: 137 MG/DL (ref 65–140)
GLUCOSE SERPL-MCNC: 141 MG/DL (ref 65–140)
GLUCOSE SERPL-MCNC: 141 MG/DL (ref 65–140)
GLUCOSE SERPL-MCNC: 143 MG/DL (ref 65–140)
GLUCOSE SERPL-MCNC: 147 MG/DL (ref 65–140)
GLUCOSE SERPL-MCNC: 150 MG/DL (ref 65–140)
GLUCOSE SERPL-MCNC: 150 MG/DL (ref 65–140)
GLUCOSE SERPL-MCNC: 151 MG/DL (ref 65–140)
GLUCOSE SERPL-MCNC: 154 MG/DL (ref 65–140)
GLUCOSE SERPL-MCNC: 154 MG/DL (ref 65–140)
GLUCOSE SERPL-MCNC: 155 MG/DL (ref 65–140)
GLUCOSE SERPL-MCNC: 156 MG/DL (ref 65–140)
GLUCOSE SERPL-MCNC: 162 MG/DL (ref 65–140)
GLUCOSE SERPL-MCNC: 163 MG/DL (ref 65–140)
GLUCOSE SERPL-MCNC: 164 MG/DL (ref 65–140)
GLUCOSE SERPL-MCNC: 165 MG/DL (ref 65–140)
GLUCOSE SERPL-MCNC: 170 MG/DL (ref 65–140)
GLUCOSE SERPL-MCNC: 171 MG/DL (ref 65–140)
GLUCOSE SERPL-MCNC: 173 MG/DL (ref 65–140)
GLUCOSE SERPL-MCNC: 175 MG/DL (ref 65–140)
GLUCOSE SERPL-MCNC: 180 MG/DL (ref 65–140)
GLUCOSE SERPL-MCNC: 181 MG/DL (ref 65–140)
GLUCOSE SERPL-MCNC: 182 MG/DL (ref 65–140)
GLUCOSE SERPL-MCNC: 186 MG/DL (ref 65–140)
GLUCOSE SERPL-MCNC: 189 MG/DL (ref 65–140)
GLUCOSE SERPL-MCNC: 196 MG/DL (ref 65–140)
GLUCOSE SERPL-MCNC: 210 MG/DL (ref 65–140)
GLUCOSE SERPL-MCNC: 65 MG/DL (ref 65–140)
GLUCOSE SERPL-MCNC: 67 MG/DL (ref 65–140)
GLUCOSE SERPL-MCNC: 69 MG/DL (ref 65–140)
GLUCOSE SERPL-MCNC: 75 MG/DL (ref 65–140)
GLUCOSE SERPL-MCNC: 76 MG/DL (ref 65–140)
GLUCOSE SERPL-MCNC: 78 MG/DL (ref 65–140)
GLUCOSE SERPL-MCNC: 79 MG/DL (ref 65–140)
GLUCOSE SERPL-MCNC: 80 MG/DL (ref 65–140)
GLUCOSE SERPL-MCNC: 81 MG/DL (ref 65–140)
GLUCOSE SERPL-MCNC: 84 MG/DL (ref 65–140)
GLUCOSE SERPL-MCNC: 85 MG/DL (ref 65–140)
GLUCOSE SERPL-MCNC: 86 MG/DL (ref 65–140)
GLUCOSE SERPL-MCNC: 87 MG/DL (ref 65–140)
GLUCOSE SERPL-MCNC: 89 MG/DL (ref 65–140)
GLUCOSE SERPL-MCNC: 89 MG/DL (ref 65–140)
GLUCOSE SERPL-MCNC: 90 MG/DL (ref 65–140)
GLUCOSE SERPL-MCNC: 91 MG/DL (ref 65–140)
GLUCOSE SERPL-MCNC: 92 MG/DL (ref 65–140)
GLUCOSE SERPL-MCNC: 93 MG/DL (ref 65–140)
GLUCOSE SERPL-MCNC: 96 MG/DL (ref 65–140)
GLUCOSE SERPL-MCNC: 96 MG/DL (ref 65–140)
GLUCOSE SERPL-MCNC: 97 MG/DL (ref 65–140)
GLUCOSE SERPL-MCNC: 97 MG/DL (ref 65–140)
GLUCOSE SERPL-MCNC: 98 MG/DL (ref 65–140)
GLUCOSE UR STRIP-MCNC: NEGATIVE MG/DL
HBA1C MFR BLD: 6.7 % (ref 4.2–6.3)
HBA1C MFR BLD: 6.9 % (ref 4.2–6.3)
HCO3 BLDA-SCNC: 19.3 MMOL/L (ref 22–28)
HCO3 BLDA-SCNC: 26.8 MMOL/L (ref 22–28)
HCO3 BLDA-SCNC: 29.1 MMOL/L (ref 22–28)
HCO3 BLDA-SCNC: 30.2 MMOL/L (ref 22–28)
HCO3 BLDA-SCNC: 36.9 MMOL/L (ref 22–28)
HCO3 BLDV-SCNC: 28.9 MMOL/L (ref 24–30)
HCT VFR BLD AUTO: 30.3 % (ref 36.5–49.3)
HCT VFR BLD AUTO: 31.4 % (ref 36.5–49.3)
HCT VFR BLD AUTO: 31.8 % (ref 36.5–49.3)
HCT VFR BLD AUTO: 32.1 % (ref 36.5–49.3)
HCT VFR BLD AUTO: 32.1 % (ref 36.5–49.3)
HCT VFR BLD AUTO: 32.2 % (ref 36.5–49.3)
HCT VFR BLD AUTO: 32.2 % (ref 36.5–49.3)
HCT VFR BLD AUTO: 32.4 % (ref 36.5–49.3)
HCT VFR BLD AUTO: 32.5 % (ref 36.5–49.3)
HCT VFR BLD AUTO: 33.1 % (ref 36.5–49.3)
HCT VFR BLD AUTO: 33.8 % (ref 36.5–49.3)
HCT VFR BLD AUTO: 34.4 % (ref 36.5–49.3)
HCT VFR BLD AUTO: 34.7 % (ref 36.5–49.3)
HCT VFR BLD AUTO: 34.8 % (ref 36.5–49.3)
HCT VFR BLD AUTO: 35.4 % (ref 36.5–49.3)
HCT VFR BLD AUTO: 36.5 % (ref 36.5–49.3)
HCT VFR BLD AUTO: 37.7 % (ref 36.5–49.3)
HGB BLD-MCNC: 10 G/DL (ref 12–17)
HGB BLD-MCNC: 10.1 G/DL (ref 12–17)
HGB BLD-MCNC: 10.1 G/DL (ref 12–17)
HGB BLD-MCNC: 10.2 G/DL (ref 12–17)
HGB BLD-MCNC: 10.4 G/DL (ref 12–17)
HGB BLD-MCNC: 10.6 G/DL (ref 12–17)
HGB BLD-MCNC: 10.7 G/DL (ref 12–17)
HGB BLD-MCNC: 10.8 G/DL (ref 12–17)
HGB BLD-MCNC: 11.3 G/DL (ref 12–17)
HGB BLD-MCNC: 11.9 G/DL (ref 12–17)
HGB BLD-MCNC: 9.4 G/DL (ref 12–17)
HGB BLD-MCNC: 9.6 G/DL (ref 12–17)
HGB BLD-MCNC: 9.8 G/DL (ref 12–17)
HGB BLD-MCNC: 9.9 G/DL (ref 12–17)
HGB BLD-MCNC: 9.9 G/DL (ref 12–17)
HGB UR QL STRIP.AUTO: ABNORMAL
HOLD SPECIMEN: NORMAL
IMM GRANULOCYTES # BLD AUTO: 0.02 THOUSAND/UL (ref 0–0.2)
IMM GRANULOCYTES # BLD AUTO: 0.02 THOUSAND/UL (ref 0–0.2)
IMM GRANULOCYTES NFR BLD AUTO: 0 % (ref 0–2)
IMM GRANULOCYTES NFR BLD AUTO: 1 % (ref 0–2)
INR PPP: 1.64 (ref 0.86–1.16)
INR PPP: 2.01 (ref 0.86–1.16)
INR PPP: 2.09 (ref 0.86–1.16)
INR PPP: 2.18 (ref 0.86–1.16)
INR PPP: 2.27 (ref 0.86–1.16)
INR PPP: 2.28 (ref 0.86–1.16)
INR PPP: 2.32 (ref 0.86–1.17)
INR PPP: 2.58 (ref 0.86–1.16)
INR PPP: 2.75 (ref 0.86–1.16)
INR PPP: 2.97 (ref 0.86–1.16)
INR PPP: 3.03 (ref 0.86–1.16)
INR PPP: 3.28 (ref 0.86–1.16)
INR PPP: 3.32 (ref 0.86–1.17)
INR PPP: 3.33 (ref 0.86–1.16)
INR PPP: 4.35 (ref 0.86–1.17)
INR PPP: 7.13 (ref 0.86–1.17)
IPAP: 14
IPAP: 16
IRON SATN MFR SERPL: 12 %
IRON SATN MFR SERPL: 7 %
IRON SERPL-MCNC: 22 UG/DL (ref 65–175)
IRON SERPL-MCNC: 51 UG/DL (ref 65–175)
KETONES UR STRIP-MCNC: ABNORMAL MG/DL
KETONES UR STRIP-MCNC: NEGATIVE MG/DL
LACTATE SERPL-SCNC: 0.9 MMOL/L (ref 0.5–2)
LACTATE SERPL-SCNC: 1.1 MMOL/L (ref 0.5–2)
LACTATE SERPL-SCNC: 1.6 MMOL/L (ref 0.5–2)
LDH FLD L TO P-CCNC: 70 U/L
LEUKOCYTE ESTERASE UR QL STRIP: ABNORMAL
LEUKOCYTE ESTERASE UR QL STRIP: NEGATIVE
LG PLATELETS BLD QL SMEAR: PRESENT
LYMPHOCYTES # BLD AUTO: 0.35 THOUSANDS/ΜL (ref 0.6–4.47)
LYMPHOCYTES # BLD AUTO: 0.39 THOUSANDS/ΜL (ref 0.6–4.47)
LYMPHOCYTES # BLD AUTO: 0.46 THOUSANDS/ΜL (ref 0.6–4.47)
LYMPHOCYTES # BLD AUTO: 0.46 THOUSANDS/ΜL (ref 0.6–4.47)
LYMPHOCYTES # BLD AUTO: 0.51 THOUSANDS/ΜL (ref 0.6–4.47)
LYMPHOCYTES # BLD AUTO: 0.51 THOUSANDS/ΜL (ref 0.6–4.47)
LYMPHOCYTES # BLD AUTO: 0.56 THOUSANDS/ΜL (ref 0.6–4.47)
LYMPHOCYTES # BLD AUTO: 0.56 THOUSANDS/ΜL (ref 0.6–4.47)
LYMPHOCYTES # BLD AUTO: 0.59 THOUSANDS/ΜL (ref 0.6–4.47)
LYMPHOCYTES # BLD AUTO: 0.6 THOUSANDS/ΜL (ref 0.6–4.47)
LYMPHOCYTES # BLD AUTO: 0.63 THOUSAND/UL (ref 0.6–4.47)
LYMPHOCYTES # BLD AUTO: 0.65 THOUSANDS/ΜL (ref 0.6–4.47)
LYMPHOCYTES # BLD AUTO: 0.88 THOUSANDS/ΜL (ref 0.6–4.47)
LYMPHOCYTES # BLD AUTO: 12 % (ref 14–44)
LYMPHOCYTES NFR BLD AUTO: 10 % (ref 14–44)
LYMPHOCYTES NFR BLD AUTO: 12 % (ref 14–44)
LYMPHOCYTES NFR BLD AUTO: 13 % (ref 14–44)
LYMPHOCYTES NFR BLD AUTO: 15 % (ref 14–44)
LYMPHOCYTES NFR BLD AUTO: 19 % (ref 14–44)
LYMPHOCYTES NFR BLD AUTO: 27 %
LYMPHOCYTES NFR BLD AUTO: 6 % (ref 14–44)
LYMPHOCYTES NFR BLD AUTO: 8 % (ref 14–44)
LYMPHOCYTES NFR BLD AUTO: 9 % (ref 14–44)
LYMPHOCYTES NFR BLD AUTO: 9 % (ref 14–44)
MAGNESIUM SERPL-MCNC: 1.6 MG/DL (ref 1.6–2.6)
MAGNESIUM SERPL-MCNC: 1.6 MG/DL (ref 1.6–2.6)
MAGNESIUM SERPL-MCNC: 1.7 MG/DL (ref 1.6–2.6)
MAGNESIUM SERPL-MCNC: 1.8 MG/DL (ref 1.6–2.6)
MAGNESIUM SERPL-MCNC: 2.1 MG/DL (ref 1.6–2.6)
MAGNESIUM SERPL-MCNC: 2.1 MG/DL (ref 1.6–2.6)
MAGNESIUM SERPL-MCNC: 2.6 MG/DL (ref 1.6–2.6)
MCH RBC QN AUTO: 30.2 PG (ref 26.8–34.3)
MCH RBC QN AUTO: 30.2 PG (ref 26.8–34.3)
MCH RBC QN AUTO: 30.6 PG (ref 26.8–34.3)
MCH RBC QN AUTO: 30.6 PG (ref 26.8–34.3)
MCH RBC QN AUTO: 30.7 PG (ref 26.8–34.3)
MCH RBC QN AUTO: 30.8 PG (ref 26.8–34.3)
MCH RBC QN AUTO: 30.8 PG (ref 26.8–34.3)
MCH RBC QN AUTO: 30.9 PG (ref 26.8–34.3)
MCH RBC QN AUTO: 30.9 PG (ref 26.8–34.3)
MCH RBC QN AUTO: 31 PG (ref 26.8–34.3)
MCH RBC QN AUTO: 31 PG (ref 26.8–34.3)
MCH RBC QN AUTO: 31.1 PG (ref 26.8–34.3)
MCH RBC QN AUTO: 31.3 PG (ref 26.8–34.3)
MCH RBC QN AUTO: 31.4 PG (ref 26.8–34.3)
MCH RBC QN AUTO: 31.5 PG (ref 26.8–34.3)
MCHC RBC AUTO-ENTMCNC: 30 G/DL (ref 31.4–37.4)
MCHC RBC AUTO-ENTMCNC: 30.2 G/DL (ref 31.4–37.4)
MCHC RBC AUTO-ENTMCNC: 30.4 G/DL (ref 31.4–37.4)
MCHC RBC AUTO-ENTMCNC: 30.5 G/DL (ref 31.4–37.4)
MCHC RBC AUTO-ENTMCNC: 30.5 G/DL (ref 31.4–37.4)
MCHC RBC AUTO-ENTMCNC: 30.6 G/DL (ref 31.4–37.4)
MCHC RBC AUTO-ENTMCNC: 30.8 G/DL (ref 31.4–37.4)
MCHC RBC AUTO-ENTMCNC: 31 G/DL (ref 31.4–37.4)
MCHC RBC AUTO-ENTMCNC: 31 G/DL (ref 31.4–37.4)
MCHC RBC AUTO-ENTMCNC: 31.1 G/DL (ref 31.4–37.4)
MCHC RBC AUTO-ENTMCNC: 31.2 G/DL (ref 31.4–37.4)
MCHC RBC AUTO-ENTMCNC: 31.2 G/DL (ref 31.4–37.4)
MCHC RBC AUTO-ENTMCNC: 31.4 G/DL (ref 31.4–37.4)
MCHC RBC AUTO-ENTMCNC: 31.6 G/DL (ref 31.4–37.4)
MCHC RBC AUTO-ENTMCNC: 31.7 G/DL (ref 31.4–37.4)
MCV RBC AUTO: 100 FL (ref 82–98)
MCV RBC AUTO: 101 FL (ref 82–98)
MCV RBC AUTO: 102 FL (ref 82–98)
MCV RBC AUTO: 98 FL (ref 82–98)
MCV RBC AUTO: 99 FL (ref 82–98)
MICROALBUMIN UR-MCNC: 53.3 MG/L (ref 0–20)
MICROALBUMIN/CREAT 24H UR: 106 MG/G CREATININE (ref 0–30)
MONOCYTES # BLD AUTO: 0.26 THOUSAND/ΜL (ref 0.17–1.22)
MONOCYTES # BLD AUTO: 0.28 THOUSAND/ΜL (ref 0.17–1.22)
MONOCYTES # BLD AUTO: 0.29 THOUSAND/ΜL (ref 0.17–1.22)
MONOCYTES # BLD AUTO: 0.32 THOUSAND/UL (ref 0–1.22)
MONOCYTES # BLD AUTO: 0.34 THOUSAND/ΜL (ref 0.17–1.22)
MONOCYTES # BLD AUTO: 0.35 THOUSAND/ΜL (ref 0.17–1.22)
MONOCYTES # BLD AUTO: 0.37 THOUSAND/ΜL (ref 0.17–1.22)
MONOCYTES # BLD AUTO: 0.43 THOUSAND/ΜL (ref 0.17–1.22)
MONOCYTES # BLD AUTO: 0.46 THOUSAND/ΜL (ref 0.17–1.22)
MONOCYTES # BLD AUTO: 0.49 THOUSAND/ΜL (ref 0.17–1.22)
MONOCYTES # BLD AUTO: 0.49 THOUSAND/ΜL (ref 0.17–1.22)
MONOCYTES # BLD AUTO: 0.52 THOUSAND/ΜL (ref 0.17–1.22)
MONOCYTES # BLD AUTO: 0.63 THOUSAND/ΜL (ref 0.17–1.22)
MONOCYTES NFR BLD AUTO: 47 %
MONOCYTES NFR BLD AUTO: 5 % (ref 4–12)
MONOCYTES NFR BLD AUTO: 6 % (ref 4–12)
MONOCYTES NFR BLD AUTO: 7 % (ref 4–12)
MONOCYTES NFR BLD AUTO: 7 % (ref 4–12)
MONOCYTES NFR BLD AUTO: 8 % (ref 4–12)
MONOCYTES NFR BLD AUTO: 9 % (ref 4–12)
MONOCYTES NFR BLD AUTO: 9 % (ref 4–12)
MONOCYTES NFR BLD: 6 % (ref 4–12)
MRSA NOSE QL CULT: NORMAL
NASAL CANNULA: ABNORMAL
NEUTROPHILS # BLD AUTO: 2.06 THOUSANDS/ΜL (ref 1.85–7.62)
NEUTROPHILS # BLD AUTO: 2.19 THOUSANDS/ΜL (ref 1.85–7.62)
NEUTROPHILS # BLD AUTO: 3.08 THOUSANDS/ΜL (ref 1.85–7.62)
NEUTROPHILS # BLD AUTO: 3.26 THOUSANDS/ΜL (ref 1.85–7.62)
NEUTROPHILS # BLD AUTO: 3.56 THOUSANDS/ΜL (ref 1.85–7.62)
NEUTROPHILS # BLD AUTO: 3.58 THOUSANDS/ΜL (ref 1.85–7.62)
NEUTROPHILS # BLD AUTO: 3.73 THOUSANDS/ΜL (ref 1.85–7.62)
NEUTROPHILS # BLD AUTO: 4.71 THOUSANDS/ΜL (ref 1.85–7.62)
NEUTROPHILS # BLD AUTO: 4.78 THOUSANDS/ΜL (ref 1.85–7.62)
NEUTROPHILS # BLD AUTO: 5.82 THOUSANDS/ΜL (ref 1.85–7.62)
NEUTROPHILS # BLD AUTO: 7.96 THOUSANDS/ΜL (ref 1.85–7.62)
NEUTROPHILS # BLD AUTO: 8.47 THOUSANDS/ΜL (ref 1.85–7.62)
NEUTROPHILS # BLD MANUAL: 3.91 THOUSAND/UL (ref 1.85–7.62)
NEUTS SEG NFR BLD AUTO: 26 %
NEUTS SEG NFR BLD AUTO: 62 % (ref 43–75)
NEUTS SEG NFR BLD AUTO: 63 % (ref 43–75)
NEUTS SEG NFR BLD AUTO: 69 % (ref 43–75)
NEUTS SEG NFR BLD AUTO: 70 % (ref 43–75)
NEUTS SEG NFR BLD AUTO: 74 % (ref 43–75)
NEUTS SEG NFR BLD AUTO: 77 % (ref 43–75)
NEUTS SEG NFR BLD AUTO: 79 % (ref 43–75)
NEUTS SEG NFR BLD AUTO: 80 % (ref 43–75)
NEUTS SEG NFR BLD AUTO: 81 % (ref 43–75)
NEUTS SEG NFR BLD AUTO: 82 % (ref 43–75)
NEUTS SEG NFR BLD AUTO: 87 % (ref 43–75)
NITRITE UR QL STRIP: NEGATIVE
NITRITE UR QL STRIP: NEGATIVE
NITRITE UR QL STRIP: POSITIVE
NITRITE UR QL STRIP: POSITIVE
NON VENT- BIPAP: ABNORMAL
NON-SQ EPI CELLS URNS QL MICRO: ABNORMAL /HPF
NRBC BLD AUTO-RTO: 0 /100 WBCS
NRBC BLD AUTO-RTO: 0 /100 WBCS
NT-PROBNP SERPL-MCNC: 3072 PG/ML
NT-PROBNP SERPL-MCNC: 6852 PG/ML
O2 CT BLDA-SCNC: 13.3 ML/DL (ref 16–23)
O2 CT BLDA-SCNC: 14.1 ML/DL (ref 16–23)
O2 CT BLDA-SCNC: 15 ML/DL (ref 16–23)
O2 CT BLDA-SCNC: 16 ML/DL (ref 16–23)
O2 CT BLDA-SCNC: 16.1 ML/DL (ref 16–23)
O2 CT BLDV-SCNC: 8.5 ML/DL
OXYHGB MFR BLDA: 91.7 % (ref 94–97)
OXYHGB MFR BLDA: 96.5 % (ref 94–97)
OXYHGB MFR BLDA: 96.7 % (ref 94–97)
OXYHGB MFR BLDA: 97.7 % (ref 94–97)
OXYHGB MFR BLDA: 98.1 % (ref 94–97)
PCO2 BLDA: 36.8 MM HG (ref 36–44)
PCO2 BLDA: 40.8 MM HG (ref 36–44)
PCO2 BLDA: 46.6 MM HG (ref 36–44)
PCO2 BLDA: 55.4 MM HG (ref 36–44)
PCO2 BLDA: 57 MM HG (ref 36–44)
PCO2 BLDV: 53.4 MM HG (ref 42–50)
PEEP MAX SETTING VENT: 6 CM[H2O]
PEEP MAX SETTING VENT: 8 CM[H2O]
PH BLDA: 7.29 [PH] (ref 7.35–7.45)
PH BLDA: 7.34 [PH] (ref 7.35–7.45)
PH BLDA: 7.36 [PH] (ref 7.35–7.45)
PH BLDA: 7.47 [PH] (ref 7.35–7.45)
PH BLDA: 7.52 [PH] (ref 7.35–7.45)
PH BLDV: 7.35 [PH] (ref 7.3–7.4)
PH UR STRIP.AUTO: 5 [PH] (ref 4.5–8)
PH UR STRIP.AUTO: 5.5 [PH] (ref 4.5–8)
PH UR STRIP.AUTO: 7 [PH] (ref 4.5–8)
PH UR STRIP.AUTO: 7 [PH] (ref 4.5–8)
PHOSPHATE SERPL-MCNC: 3.1 MG/DL (ref 2.3–4.1)
PHOSPHATE SERPL-MCNC: 3.4 MG/DL (ref 2.3–4.1)
PHOSPHATE SERPL-MCNC: 3.8 MG/DL (ref 2.3–4.1)
PHOSPHATE SERPL-MCNC: 4.1 MG/DL (ref 2.3–4.1)
PHOSPHATE SERPL-MCNC: 4.8 MG/DL (ref 2.3–4.1)
PHOSPHATE SERPL-MCNC: 6.2 MG/DL (ref 2.3–4.1)
PLATELET # BLD AUTO: 117 THOUSANDS/UL (ref 149–390)
PLATELET # BLD AUTO: 69 THOUSANDS/UL (ref 149–390)
PLATELET # BLD AUTO: 71 THOUSANDS/UL (ref 149–390)
PLATELET # BLD AUTO: 72 THOUSANDS/UL (ref 149–390)
PLATELET # BLD AUTO: 73 THOUSANDS/UL (ref 149–390)
PLATELET # BLD AUTO: 74 THOUSANDS/UL (ref 149–390)
PLATELET # BLD AUTO: 75 THOUSANDS/UL (ref 149–390)
PLATELET # BLD AUTO: 75 THOUSANDS/UL (ref 149–390)
PLATELET # BLD AUTO: 76 THOUSANDS/UL (ref 149–390)
PLATELET # BLD AUTO: 76 THOUSANDS/UL (ref 149–390)
PLATELET # BLD AUTO: 77 THOUSANDS/UL (ref 149–390)
PLATELET # BLD AUTO: 81 THOUSANDS/UL (ref 149–390)
PLATELET # BLD AUTO: 84 THOUSANDS/UL (ref 149–390)
PLATELET # BLD AUTO: 85 THOUSANDS/UL (ref 149–390)
PLATELET # BLD AUTO: 97 THOUSANDS/UL (ref 149–390)
PLATELET BLD QL SMEAR: ABNORMAL
PMV BLD AUTO: 12.5 FL (ref 8.9–12.7)
PMV BLD AUTO: 12.6 FL (ref 8.9–12.7)
PMV BLD AUTO: 12.8 FL (ref 8.9–12.7)
PMV BLD AUTO: 12.9 FL (ref 8.9–12.7)
PMV BLD AUTO: 13 FL (ref 8.9–12.7)
PMV BLD AUTO: 13.5 FL (ref 8.9–12.7)
PMV BLD AUTO: 13.5 FL (ref 8.9–12.7)
PMV BLD AUTO: 13.8 FL (ref 8.9–12.7)
PMV BLD AUTO: 14 FL (ref 8.9–12.7)
PO2 BLDA: 104.3 MM HG (ref 75–129)
PO2 BLDA: 127.5 MM HG (ref 75–129)
PO2 BLDA: 181.7 MM HG (ref 75–129)
PO2 BLDA: 63.6 MM HG (ref 75–129)
PO2 BLDA: 99.1 MM HG (ref 75–129)
PO2 BLDV: 42.5 MM HG (ref 35–45)
POTASSIUM SERPL-SCNC: 2.8 MMOL/L (ref 3.5–5.3)
POTASSIUM SERPL-SCNC: 2.8 MMOL/L (ref 3.5–5.3)
POTASSIUM SERPL-SCNC: 2.9 MMOL/L (ref 3.5–5.3)
POTASSIUM SERPL-SCNC: 2.9 MMOL/L (ref 3.5–5.3)
POTASSIUM SERPL-SCNC: 3 MMOL/L (ref 3.5–5.3)
POTASSIUM SERPL-SCNC: 3.3 MMOL/L (ref 3.5–5.3)
POTASSIUM SERPL-SCNC: 3.4 MMOL/L (ref 3.5–5.3)
POTASSIUM SERPL-SCNC: 3.4 MMOL/L (ref 3.5–5.3)
POTASSIUM SERPL-SCNC: 3.7 MMOL/L (ref 3.5–5.3)
POTASSIUM SERPL-SCNC: 3.9 MMOL/L (ref 3.5–5.3)
POTASSIUM SERPL-SCNC: 4.1 MMOL/L (ref 3.5–5.3)
POTASSIUM SERPL-SCNC: 4.5 MMOL/L (ref 3.5–5.3)
POTASSIUM SERPL-SCNC: 4.6 MMOL/L (ref 3.5–5.3)
PREALB SERPL-MCNC: 12.4 MG/DL (ref 18–40)
PROCALCITONIN SERPL-MCNC: 0.31 NG/ML
PROCALCITONIN SERPL-MCNC: 0.33 NG/ML
PROCALCITONIN SERPL-MCNC: 0.4 NG/ML
PROT FLD-MCNC: 4 G/DL
PROT SERPL-MCNC: 6.4 G/DL (ref 6.4–8.2)
PROT SERPL-MCNC: 6.4 G/DL (ref 6.4–8.2)
PROT SERPL-MCNC: 6.5 G/DL (ref 6.4–8.2)
PROT SERPL-MCNC: 6.6 G/DL (ref 6.4–8.2)
PROT SERPL-MCNC: 7.3 G/DL (ref 6.4–8.2)
PROT SERPL-MCNC: 7.9 G/DL (ref 6.4–8.2)
PROT UR STRIP-MCNC: ABNORMAL MG/DL
PROT UR STRIP-MCNC: ABNORMAL MG/DL
PROT UR STRIP-MCNC: NEGATIVE MG/DL
PROT UR STRIP-MCNC: NEGATIVE MG/DL
PROT UR-MCNC: 23 MG/DL
PROT/CREAT UR: 0.46 MG/G{CREAT} (ref 0–0.1)
PROTHROMBIN TIME: 19.5 SECONDS (ref 12.1–14.4)
PROTHROMBIN TIME: 22.8 SECONDS (ref 12.1–14.4)
PROTHROMBIN TIME: 23.5 SECONDS (ref 12.1–14.4)
PROTHROMBIN TIME: 24.3 SECONDS (ref 12.1–14.4)
PROTHROMBIN TIME: 25.1 SECONDS (ref 12.1–14.4)
PROTHROMBIN TIME: 25.2 SECONDS (ref 12.1–14.4)
PROTHROMBIN TIME: 25.5 SECONDS (ref 11.8–14.2)
PROTHROMBIN TIME: 27.8 SECONDS (ref 12.1–14.4)
PROTHROMBIN TIME: 29.2 SECONDS (ref 12.1–14.4)
PROTHROMBIN TIME: 31.1 SECONDS (ref 12.1–14.4)
PROTHROMBIN TIME: 31.6 SECONDS (ref 12.1–14.4)
PROTHROMBIN TIME: 32.2 SECONDS (ref 11.8–14.2)
PROTHROMBIN TIME: 33.6 SECONDS (ref 12.1–14.4)
PROTHROMBIN TIME: 34 SECONDS (ref 12.1–14.4)
PROTHROMBIN TIME: 39.7 SECONDS (ref 11.8–14.2)
PROTHROMBIN TIME: 58.2 SECONDS (ref 11.8–14.2)
QRS AXIS: -62 DEGREES
QRS AXIS: -70 DEGREES
QRSD INTERVAL: 120 MS
QRSD INTERVAL: 144 MS
QT INTERVAL: 386 MS
QT INTERVAL: 424 MS
QTC INTERVAL: 450 MS
QTC INTERVAL: 457 MS
RBC # BLD AUTO: 3.04 MILLION/UL (ref 3.88–5.62)
RBC # BLD AUTO: 3.12 MILLION/UL (ref 3.88–5.62)
RBC # BLD AUTO: 3.2 MILLION/UL (ref 3.88–5.62)
RBC # BLD AUTO: 3.23 MILLION/UL (ref 3.88–5.62)
RBC # BLD AUTO: 3.24 MILLION/UL (ref 3.88–5.62)
RBC # BLD AUTO: 3.25 MILLION/UL (ref 3.88–5.62)
RBC # BLD AUTO: 3.31 MILLION/UL (ref 3.88–5.62)
RBC # BLD AUTO: 3.31 MILLION/UL (ref 3.88–5.62)
RBC # BLD AUTO: 3.4 MILLION/UL (ref 3.88–5.62)
RBC # BLD AUTO: 3.42 MILLION/UL (ref 3.88–5.62)
RBC # BLD AUTO: 3.44 MILLION/UL (ref 3.88–5.62)
RBC # BLD AUTO: 3.46 MILLION/UL (ref 3.88–5.62)
RBC # BLD AUTO: 3.48 MILLION/UL (ref 3.88–5.62)
RBC # BLD AUTO: 3.61 MILLION/UL (ref 3.88–5.62)
RBC # BLD AUTO: 3.78 MILLION/UL (ref 3.88–5.62)
RBC #/AREA URNS AUTO: ABNORMAL /HPF
RSV B RNA SPEC QL NAA+PROBE: NORMAL
SITE: NORMAL
SODIUM 24H UR-SCNC: 47 MOL/L
SODIUM 24H UR-SCNC: 61 MOL/L
SODIUM SERPL-SCNC: 141 MMOL/L (ref 136–145)
SODIUM SERPL-SCNC: 143 MMOL/L (ref 136–145)
SODIUM SERPL-SCNC: 144 MMOL/L (ref 136–145)
SODIUM SERPL-SCNC: 144 MMOL/L (ref 136–145)
SODIUM SERPL-SCNC: 145 MMOL/L (ref 136–145)
SODIUM SERPL-SCNC: 146 MMOL/L (ref 136–145)
SODIUM SERPL-SCNC: 147 MMOL/L (ref 136–145)
SODIUM SERPL-SCNC: 147 MMOL/L (ref 136–145)
SP GR UR STRIP.AUTO: 1.01 (ref 1–1.03)
SP GR UR STRIP.AUTO: 1.01 (ref 1–1.03)
SP GR UR STRIP.AUTO: 1.02 (ref 1–1.03)
SP GR UR STRIP.AUTO: 1.02 (ref 1–1.03)
SPECIMEN SOURCE: ABNORMAL
T WAVE AXIS: -66 DEGREES
T WAVE AXIS: 104 DEGREES
TIBC SERPL-MCNC: 316 UG/DL (ref 250–450)
TIBC SERPL-MCNC: 422 UG/DL (ref 250–450)
TOTAL CELLS COUNTED SPEC: 100
TOTAL CELLS COUNTED SPEC: 100
TROPONIN I SERPL-MCNC: 0.02 NG/ML
TROPONIN I SERPL-MCNC: 0.03 NG/ML
TROPONIN I SERPL-MCNC: 0.04 NG/ML
TROPONIN I SERPL-MCNC: 0.05 NG/ML
TSH SERPL DL<=0.05 MIU/L-ACNC: 0.61 UIU/ML (ref 0.36–3.74)
TSH SERPL DL<=0.05 MIU/L-ACNC: 1.77 UIU/ML (ref 0.36–3.74)
TSH SERPL DL<=0.05 MIU/L-ACNC: 3.76 UIU/ML (ref 0.36–3.74)
UROBILINOGEN UR QL STRIP.AUTO: 0.2 E.U./DL
UROBILINOGEN UR QL STRIP.AUTO: 1 E.U./DL
UUN 24H UR-MCNC: 233 MG/DL
UUN 24H UR-MCNC: 276 MG/DL
VENT BIPAP FIO2: 40 %
VENT BIPAP FIO2: 50 %
VENTRICULAR RATE: 70 BPM
VENTRICULAR RATE: 82 BPM
VIT B12 SERPL-MCNC: 505 PG/ML (ref 100–900)
WBC # BLD AUTO: 3.35 THOUSAND/UL (ref 4.31–10.16)
WBC # BLD AUTO: 3.46 THOUSAND/UL (ref 4.31–10.16)
WBC # BLD AUTO: 3.83 THOUSAND/UL (ref 4.31–10.16)
WBC # BLD AUTO: 4.06 THOUSAND/UL (ref 4.31–10.16)
WBC # BLD AUTO: 4.38 THOUSAND/UL (ref 4.31–10.16)
WBC # BLD AUTO: 4.44 THOUSAND/UL (ref 4.31–10.16)
WBC # BLD AUTO: 4.47 THOUSAND/UL (ref 4.31–10.16)
WBC # BLD AUTO: 4.66 THOUSAND/UL (ref 4.31–10.16)
WBC # BLD AUTO: 4.72 THOUSAND/UL (ref 4.31–10.16)
WBC # BLD AUTO: 4.86 THOUSAND/UL (ref 4.31–10.16)
WBC # BLD AUTO: 5.28 THOUSAND/UL (ref 4.31–10.16)
WBC # BLD AUTO: 5.84 THOUSAND/UL (ref 4.31–10.16)
WBC # BLD AUTO: 6.13 THOUSAND/UL (ref 4.31–10.16)
WBC # BLD AUTO: 7.28 THOUSAND/UL (ref 4.31–10.16)
WBC # BLD AUTO: 9.73 THOUSAND/UL (ref 4.31–10.16)
WBC # BLD AUTO: 9.75 THOUSAND/UL (ref 4.31–10.16)
WBC # BLD AUTO: 9.82 THOUSAND/UL (ref 4.31–10.16)
WBC # FLD MANUAL: 233 /UL
WBC #/AREA URNS AUTO: ABNORMAL /HPF

## 2018-01-01 PROCEDURE — 82948 REAGENT STRIP/BLOOD GLUCOSE: CPT

## 2018-01-01 PROCEDURE — 99232 SBSQ HOSP IP/OBS MODERATE 35: CPT | Performed by: INTERNAL MEDICINE

## 2018-01-01 PROCEDURE — 80048 BASIC METABOLIC PNL TOTAL CA: CPT | Performed by: RADIOLOGY

## 2018-01-01 PROCEDURE — 85025 COMPLETE CBC W/AUTO DIFF WBC: CPT | Performed by: FAMILY MEDICINE

## 2018-01-01 PROCEDURE — 84443 ASSAY THYROID STIM HORMONE: CPT | Performed by: INTERNAL MEDICINE

## 2018-01-01 PROCEDURE — 82248 BILIRUBIN DIRECT: CPT | Performed by: INTERNAL MEDICINE

## 2018-01-01 PROCEDURE — 84484 ASSAY OF TROPONIN QUANT: CPT | Performed by: PHYSICIAN ASSISTANT

## 2018-01-01 PROCEDURE — 85610 PROTHROMBIN TIME: CPT | Performed by: INTERNAL MEDICINE

## 2018-01-01 PROCEDURE — 83605 ASSAY OF LACTIC ACID: CPT | Performed by: INTERNAL MEDICINE

## 2018-01-01 PROCEDURE — 80048 BASIC METABOLIC PNL TOTAL CA: CPT | Performed by: INTERNAL MEDICINE

## 2018-01-01 PROCEDURE — 36415 COLL VENOUS BLD VENIPUNCTURE: CPT | Performed by: EMERGENCY MEDICINE

## 2018-01-01 PROCEDURE — 82436 ASSAY OF URINE CHLORIDE: CPT | Performed by: PHYSICIAN ASSISTANT

## 2018-01-01 PROCEDURE — 84157 ASSAY OF PROTEIN OTHER: CPT | Performed by: INTERNAL MEDICINE

## 2018-01-01 PROCEDURE — 94660 CPAP INITIATION&MGMT: CPT

## 2018-01-01 PROCEDURE — 92526 ORAL FUNCTION THERAPY: CPT | Performed by: SPEECH-LANGUAGE PATHOLOGIST

## 2018-01-01 PROCEDURE — 87070 CULTURE OTHR SPECIMN AEROBIC: CPT | Performed by: INTERNAL MEDICINE

## 2018-01-01 PROCEDURE — 93005 ELECTROCARDIOGRAM TRACING: CPT

## 2018-01-01 PROCEDURE — 83735 ASSAY OF MAGNESIUM: CPT | Performed by: FAMILY MEDICINE

## 2018-01-01 PROCEDURE — 80053 COMPREHEN METABOLIC PANEL: CPT | Performed by: EMERGENCY MEDICINE

## 2018-01-01 PROCEDURE — 99223 1ST HOSP IP/OBS HIGH 75: CPT | Performed by: INTERNAL MEDICINE

## 2018-01-01 PROCEDURE — 89051 BODY FLUID CELL COUNT: CPT | Performed by: INTERNAL MEDICINE

## 2018-01-01 PROCEDURE — 84484 ASSAY OF TROPONIN QUANT: CPT | Performed by: INTERNAL MEDICINE

## 2018-01-01 PROCEDURE — 80053 COMPREHEN METABOLIC PANEL: CPT | Performed by: INTERNAL MEDICINE

## 2018-01-01 PROCEDURE — 85610 PROTHROMBIN TIME: CPT | Performed by: PHYSICIAN ASSISTANT

## 2018-01-01 PROCEDURE — 93010 ELECTROCARDIOGRAM REPORT: CPT | Performed by: INTERNAL MEDICINE

## 2018-01-01 PROCEDURE — 84100 ASSAY OF PHOSPHORUS: CPT | Performed by: INTERNAL MEDICINE

## 2018-01-01 PROCEDURE — 84156 ASSAY OF PROTEIN URINE: CPT | Performed by: INTERNAL MEDICINE

## 2018-01-01 PROCEDURE — 87040 BLOOD CULTURE FOR BACTERIA: CPT | Performed by: PHYSICIAN ASSISTANT

## 2018-01-01 PROCEDURE — 83605 ASSAY OF LACTIC ACID: CPT | Performed by: PHYSICIAN ASSISTANT

## 2018-01-01 PROCEDURE — 36600 WITHDRAWAL OF ARTERIAL BLOOD: CPT

## 2018-01-01 PROCEDURE — 81001 URINALYSIS AUTO W/SCOPE: CPT | Performed by: PHYSICIAN ASSISTANT

## 2018-01-01 PROCEDURE — 87186 SC STD MICRODIL/AGAR DIL: CPT | Performed by: PHYSICIAN ASSISTANT

## 2018-01-01 PROCEDURE — 0T2BX0Z CHANGE DRAINAGE DEVICE IN BLADDER, EXTERNAL APPROACH: ICD-10-PCS | Performed by: UROLOGY

## 2018-01-01 PROCEDURE — 97530 THERAPEUTIC ACTIVITIES: CPT

## 2018-01-01 PROCEDURE — 84145 PROCALCITONIN (PCT): CPT | Performed by: INTERNAL MEDICINE

## 2018-01-01 PROCEDURE — 80048 BASIC METABOLIC PNL TOTAL CA: CPT | Performed by: PHYSICIAN ASSISTANT

## 2018-01-01 PROCEDURE — 82805 BLOOD GASES W/O2 SATURATION: CPT | Performed by: INTERNAL MEDICINE

## 2018-01-01 PROCEDURE — 94760 N-INVAS EAR/PLS OXIMETRY 1: CPT

## 2018-01-01 PROCEDURE — G8988 SELF CARE GOAL STATUS: HCPCS

## 2018-01-01 PROCEDURE — 97162 PT EVAL MOD COMPLEX 30 MIN: CPT

## 2018-01-01 PROCEDURE — 94640 AIRWAY INHALATION TREATMENT: CPT

## 2018-01-01 PROCEDURE — 36569 INSJ PICC 5 YR+ W/O IMAGING: CPT

## 2018-01-01 PROCEDURE — 71046 X-RAY EXAM CHEST 2 VIEWS: CPT

## 2018-01-01 PROCEDURE — 84540 ASSAY OF URINE/UREA-N: CPT | Performed by: INTERNAL MEDICINE

## 2018-01-01 PROCEDURE — 93293 PM PHONE R-STRIP DEVICE EVAL: CPT | Performed by: INTERNAL MEDICINE

## 2018-01-01 PROCEDURE — 99232 SBSQ HOSP IP/OBS MODERATE 35: CPT | Performed by: PHYSICIAN ASSISTANT

## 2018-01-01 PROCEDURE — 82607 VITAMIN B-12: CPT | Performed by: INTERNAL MEDICINE

## 2018-01-01 PROCEDURE — 83036 HEMOGLOBIN GLYCOSYLATED A1C: CPT | Performed by: INTERNAL MEDICINE

## 2018-01-01 PROCEDURE — 81001 URINALYSIS AUTO W/SCOPE: CPT | Performed by: EMERGENCY MEDICINE

## 2018-01-01 PROCEDURE — 83735 ASSAY OF MAGNESIUM: CPT | Performed by: PHYSICIAN ASSISTANT

## 2018-01-01 PROCEDURE — 83880 ASSAY OF NATRIURETIC PEPTIDE: CPT | Performed by: PHYSICIAN ASSISTANT

## 2018-01-01 PROCEDURE — 82570 ASSAY OF URINE CREATININE: CPT | Performed by: INTERNAL MEDICINE

## 2018-01-01 PROCEDURE — 83550 IRON BINDING TEST: CPT | Performed by: INTERNAL MEDICINE

## 2018-01-01 PROCEDURE — 87040 BLOOD CULTURE FOR BACTERIA: CPT | Performed by: EMERGENCY MEDICINE

## 2018-01-01 PROCEDURE — 85610 PROTHROMBIN TIME: CPT | Performed by: RADIOLOGY

## 2018-01-01 PROCEDURE — 51102 DRAIN BL W/CATH INSERTION: CPT | Performed by: RADIOLOGY

## 2018-01-01 PROCEDURE — 83540 ASSAY OF IRON: CPT | Performed by: INTERNAL MEDICINE

## 2018-01-01 PROCEDURE — 85610 PROTHROMBIN TIME: CPT | Performed by: FAMILY MEDICINE

## 2018-01-01 PROCEDURE — 84134 ASSAY OF PREALBUMIN: CPT | Performed by: INTERNAL MEDICINE

## 2018-01-01 PROCEDURE — 85007 BL SMEAR W/DIFF WBC COUNT: CPT | Performed by: PHYSICIAN ASSISTANT

## 2018-01-01 PROCEDURE — 76700 US EXAM ABDOM COMPLETE: CPT

## 2018-01-01 PROCEDURE — 87186 SC STD MICRODIL/AGAR DIL: CPT | Performed by: EMERGENCY MEDICINE

## 2018-01-01 PROCEDURE — 99222 1ST HOSP IP/OBS MODERATE 55: CPT | Performed by: FAMILY MEDICINE

## 2018-01-01 PROCEDURE — 36415 COLL VENOUS BLD VENIPUNCTURE: CPT | Performed by: PHYSICIAN ASSISTANT

## 2018-01-01 PROCEDURE — 72131 CT LUMBAR SPINE W/O DYE: CPT

## 2018-01-01 PROCEDURE — 87086 URINE CULTURE/COLONY COUNT: CPT | Performed by: EMERGENCY MEDICINE

## 2018-01-01 PROCEDURE — 74220 X-RAY XM ESOPHAGUS 1CNTRST: CPT

## 2018-01-01 PROCEDURE — 85025 COMPLETE CBC W/AUTO DIFF WBC: CPT | Performed by: INTERNAL MEDICINE

## 2018-01-01 PROCEDURE — 87798 DETECT AGENT NOS DNA AMP: CPT | Performed by: EMERGENCY MEDICINE

## 2018-01-01 PROCEDURE — 99232 SBSQ HOSP IP/OBS MODERATE 35: CPT | Performed by: NURSE PRACTITIONER

## 2018-01-01 PROCEDURE — 85025 COMPLETE CBC W/AUTO DIFF WBC: CPT | Performed by: PHYSICIAN ASSISTANT

## 2018-01-01 PROCEDURE — 87086 URINE CULTURE/COLONY COUNT: CPT | Performed by: PHYSICIAN ASSISTANT

## 2018-01-01 PROCEDURE — 85027 COMPLETE CBC AUTOMATED: CPT | Performed by: INTERNAL MEDICINE

## 2018-01-01 PROCEDURE — 83735 ASSAY OF MAGNESIUM: CPT | Performed by: EMERGENCY MEDICINE

## 2018-01-01 PROCEDURE — 99213 OFFICE O/P EST LOW 20 MIN: CPT | Performed by: PHYSICIAN ASSISTANT

## 2018-01-01 PROCEDURE — 85025 COMPLETE CBC W/AUTO DIFF WBC: CPT | Performed by: EMERGENCY MEDICINE

## 2018-01-01 PROCEDURE — 92610 EVALUATE SWALLOWING FUNCTION: CPT

## 2018-01-01 PROCEDURE — 83615 LACTATE (LD) (LDH) ENZYME: CPT | Performed by: INTERNAL MEDICINE

## 2018-01-01 PROCEDURE — 81001 URINALYSIS AUTO W/SCOPE: CPT | Performed by: INTERNAL MEDICINE

## 2018-01-01 PROCEDURE — 99285 EMERGENCY DEPT VISIT HI MDM: CPT

## 2018-01-01 PROCEDURE — 83735 ASSAY OF MAGNESIUM: CPT | Performed by: INTERNAL MEDICINE

## 2018-01-01 PROCEDURE — 71045 X-RAY EXAM CHEST 1 VIEW: CPT

## 2018-01-01 PROCEDURE — 87081 CULTURE SCREEN ONLY: CPT | Performed by: INTERNAL MEDICINE

## 2018-01-01 PROCEDURE — 94664 DEMO&/EVAL PT USE INHALER: CPT

## 2018-01-01 PROCEDURE — 99232 SBSQ HOSP IP/OBS MODERATE 35: CPT | Performed by: FAMILY MEDICINE

## 2018-01-01 PROCEDURE — 76942 ECHO GUIDE FOR BIOPSY: CPT | Performed by: RADIOLOGY

## 2018-01-01 PROCEDURE — 85610 PROTHROMBIN TIME: CPT | Performed by: EMERGENCY MEDICINE

## 2018-01-01 PROCEDURE — 87205 SMEAR GRAM STAIN: CPT | Performed by: INTERNAL MEDICINE

## 2018-01-01 PROCEDURE — C1751 CATH, INF, PER/CENT/MIDLINE: HCPCS

## 2018-01-01 PROCEDURE — 84300 ASSAY OF URINE SODIUM: CPT | Performed by: FAMILY MEDICINE

## 2018-01-01 PROCEDURE — 84443 ASSAY THYROID STIM HORMONE: CPT | Performed by: PHYSICIAN ASSISTANT

## 2018-01-01 PROCEDURE — 82042 OTHER SOURCE ALBUMIN QUAN EA: CPT | Performed by: INTERNAL MEDICINE

## 2018-01-01 PROCEDURE — 84484 ASSAY OF TROPONIN QUANT: CPT | Performed by: EMERGENCY MEDICINE

## 2018-01-01 PROCEDURE — 87077 CULTURE AEROBIC IDENTIFY: CPT | Performed by: EMERGENCY MEDICINE

## 2018-01-01 PROCEDURE — 83880 ASSAY OF NATRIURETIC PEPTIDE: CPT | Performed by: EMERGENCY MEDICINE

## 2018-01-01 PROCEDURE — 99222 1ST HOSP IP/OBS MODERATE 55: CPT | Performed by: INTERNAL MEDICINE

## 2018-01-01 PROCEDURE — 93306 TTE W/DOPPLER COMPLETE: CPT

## 2018-01-01 PROCEDURE — G8979 MOBILITY GOAL STATUS: HCPCS

## 2018-01-01 PROCEDURE — 82570 ASSAY OF URINE CREATININE: CPT | Performed by: FAMILY MEDICINE

## 2018-01-01 PROCEDURE — 84300 ASSAY OF URINE SODIUM: CPT | Performed by: PHYSICIAN ASSISTANT

## 2018-01-01 PROCEDURE — 85027 COMPLETE CBC AUTOMATED: CPT | Performed by: PHYSICIAN ASSISTANT

## 2018-01-01 PROCEDURE — 76770 US EXAM ABDO BACK WALL COMP: CPT

## 2018-01-01 PROCEDURE — G8987 SELF CARE CURRENT STATUS: HCPCS

## 2018-01-01 PROCEDURE — 99239 HOSP IP/OBS DSCHRG MGMT >30: CPT | Performed by: FAMILY MEDICINE

## 2018-01-01 PROCEDURE — 84484 ASSAY OF TROPONIN QUANT: CPT | Performed by: FAMILY MEDICINE

## 2018-01-01 PROCEDURE — 87147 CULTURE TYPE IMMUNOLOGIC: CPT | Performed by: EMERGENCY MEDICINE

## 2018-01-01 PROCEDURE — 96365 THER/PROPH/DIAG IV INF INIT: CPT

## 2018-01-01 PROCEDURE — 02HV33Z INSERTION OF INFUSION DEVICE INTO SUPERIOR VENA CAVA, PERCUTANEOUS APPROACH: ICD-10-PCS | Performed by: INTERNAL MEDICINE

## 2018-01-01 PROCEDURE — 99222 1ST HOSP IP/OBS MODERATE 55: CPT | Performed by: UROLOGY

## 2018-01-01 PROCEDURE — 87077 CULTURE AEROBIC IDENTIFY: CPT | Performed by: PHYSICIAN ASSISTANT

## 2018-01-01 PROCEDURE — 97530 THERAPEUTIC ACTIVITIES: CPT | Performed by: PHYSICAL THERAPIST

## 2018-01-01 PROCEDURE — 92610 EVALUATE SWALLOWING FUNCTION: CPT | Performed by: SPEECH-LANGUAGE PATHOLOGIST

## 2018-01-01 PROCEDURE — 99204 OFFICE O/P NEW MOD 45 MIN: CPT | Performed by: INTERNAL MEDICINE

## 2018-01-01 PROCEDURE — 84443 ASSAY THYROID STIM HORMONE: CPT | Performed by: EMERGENCY MEDICINE

## 2018-01-01 PROCEDURE — 82043 UR ALBUMIN QUANTITATIVE: CPT | Performed by: INTERNAL MEDICINE

## 2018-01-01 PROCEDURE — 84145 PROCALCITONIN (PCT): CPT | Performed by: FAMILY MEDICINE

## 2018-01-01 PROCEDURE — 82570 ASSAY OF URINE CREATININE: CPT | Performed by: PHYSICIAN ASSISTANT

## 2018-01-01 PROCEDURE — 80053 COMPREHEN METABOLIC PANEL: CPT | Performed by: PHYSICIAN ASSISTANT

## 2018-01-01 PROCEDURE — 71250 CT THORAX DX C-: CPT

## 2018-01-01 PROCEDURE — 97167 OT EVAL HIGH COMPLEX 60 MIN: CPT

## 2018-01-01 PROCEDURE — 93306 TTE W/DOPPLER COMPLETE: CPT | Performed by: INTERNAL MEDICINE

## 2018-01-01 PROCEDURE — 85730 THROMBOPLASTIN TIME PARTIAL: CPT | Performed by: EMERGENCY MEDICINE

## 2018-01-01 PROCEDURE — 80048 BASIC METABOLIC PNL TOTAL CA: CPT | Performed by: FAMILY MEDICINE

## 2018-01-01 PROCEDURE — 94762 N-INVAS EAR/PLS OXIMTRY CONT: CPT

## 2018-01-01 PROCEDURE — 96375 TX/PRO/DX INJ NEW DRUG ADDON: CPT

## 2018-01-01 PROCEDURE — 97110 THERAPEUTIC EXERCISES: CPT

## 2018-01-01 PROCEDURE — 82550 ASSAY OF CK (CPK): CPT | Performed by: INTERNAL MEDICINE

## 2018-01-01 PROCEDURE — 0W9G3ZZ DRAINAGE OF PERITONEAL CAVITY, PERCUTANEOUS APPROACH: ICD-10-PCS | Performed by: INTERNAL MEDICINE

## 2018-01-01 PROCEDURE — 51705 CHANGE OF BLADDER TUBE: CPT | Performed by: UROLOGY

## 2018-01-01 PROCEDURE — 84540 ASSAY OF URINE/UREA-N: CPT | Performed by: PHYSICIAN ASSISTANT

## 2018-01-01 PROCEDURE — 85027 COMPLETE CBC AUTOMATED: CPT | Performed by: RADIOLOGY

## 2018-01-01 PROCEDURE — G8978 MOBILITY CURRENT STATUS: HCPCS

## 2018-01-01 PROCEDURE — 82746 ASSAY OF FOLIC ACID SERUM: CPT | Performed by: INTERNAL MEDICINE

## 2018-01-01 PROCEDURE — 85730 THROMBOPLASTIN TIME PARTIAL: CPT | Performed by: PHYSICIAN ASSISTANT

## 2018-01-01 PROCEDURE — 51102 DRAIN BL W/CATH INSERTION: CPT

## 2018-01-01 PROCEDURE — 99238 HOSP IP/OBS DSCHRG MGMT 30/<: CPT | Performed by: PHYSICIAN ASSISTANT

## 2018-01-01 PROCEDURE — 96372 THER/PROPH/DIAG INJ SC/IM: CPT

## 2018-01-01 PROCEDURE — C1769 GUIDE WIRE: HCPCS

## 2018-01-01 RX ORDER — ISOSORBIDE MONONITRATE 60 MG/1
60 TABLET, EXTENDED RELEASE ORAL DAILY
Status: DISCONTINUED | OUTPATIENT
Start: 2018-01-01 | End: 2018-07-12 | Stop reason: HOSPADM

## 2018-01-01 RX ORDER — ALBUMIN (HUMAN) 12.5 G/50ML
12.5 SOLUTION INTRAVENOUS ONCE
Status: COMPLETED | OUTPATIENT
Start: 2018-01-01 | End: 2018-01-01

## 2018-01-01 RX ORDER — BUMETANIDE 0.25 MG/ML
1 INJECTION, SOLUTION INTRAMUSCULAR; INTRAVENOUS ONCE
Status: COMPLETED | OUTPATIENT
Start: 2018-01-01 | End: 2018-01-01

## 2018-01-01 RX ORDER — PROPOFOL 10 MG/ML
INJECTION, EMULSION INTRAVENOUS CONTINUOUS PRN
Status: DISCONTINUED | OUTPATIENT
Start: 2018-01-01 | End: 2018-01-01 | Stop reason: SURG

## 2018-01-01 RX ORDER — BUMETANIDE 1 MG/1
2 TABLET ORAL DAILY
Status: DISCONTINUED | OUTPATIENT
Start: 2018-01-01 | End: 2018-01-01

## 2018-01-01 RX ORDER — ASPIRIN 81 MG/1
81 TABLET, CHEWABLE ORAL DAILY
Status: DISCONTINUED | OUTPATIENT
Start: 2018-01-01 | End: 2018-07-12 | Stop reason: HOSPADM

## 2018-01-01 RX ORDER — ACETAMINOPHEN 325 MG/1
650 TABLET ORAL EVERY 6 HOURS PRN
Status: DISCONTINUED | OUTPATIENT
Start: 2018-01-01 | End: 2018-01-01

## 2018-01-01 RX ORDER — PHYTONADIONE 5 MG/1
5 TABLET ORAL ONCE
Status: COMPLETED | OUTPATIENT
Start: 2018-01-01 | End: 2018-01-01

## 2018-01-01 RX ORDER — LEVALBUTEROL 1.25 MG/.5ML
1.25 SOLUTION, CONCENTRATE RESPIRATORY (INHALATION)
Status: DISCONTINUED | OUTPATIENT
Start: 2018-01-01 | End: 2018-01-01

## 2018-01-01 RX ORDER — ATORVASTATIN CALCIUM 40 MG/1
40 TABLET, FILM COATED ORAL
Status: CANCELLED | OUTPATIENT
Start: 2018-01-01

## 2018-01-01 RX ORDER — CARVEDILOL 12.5 MG/1
12.5 TABLET ORAL 2 TIMES DAILY WITH MEALS
Status: DISCONTINUED | OUTPATIENT
Start: 2018-01-01 | End: 2018-07-12 | Stop reason: HOSPADM

## 2018-01-01 RX ORDER — ISOSORBIDE MONONITRATE 60 MG/1
60 TABLET, EXTENDED RELEASE ORAL DAILY
Status: DISCONTINUED | OUTPATIENT
Start: 2018-01-01 | End: 2018-01-01

## 2018-01-01 RX ORDER — POTASSIUM CHLORIDE 20 MEQ/1
40 TABLET, EXTENDED RELEASE ORAL 2 TIMES DAILY
Status: COMPLETED | OUTPATIENT
Start: 2018-01-01 | End: 2018-01-01

## 2018-01-01 RX ORDER — EPHEDRINE SULFATE 50 MG/ML
INJECTION, SOLUTION INTRAVENOUS AS NEEDED
Status: DISCONTINUED | OUTPATIENT
Start: 2018-01-01 | End: 2018-01-01 | Stop reason: SURG

## 2018-01-01 RX ORDER — LACTULOSE 20 G/30ML
20 SOLUTION ORAL 3 TIMES WEEKLY
Status: CANCELLED | OUTPATIENT
Start: 2018-01-01

## 2018-01-01 RX ORDER — METOLAZONE 2.5 MG/1
2.5 TABLET ORAL DAILY
Status: DISCONTINUED | OUTPATIENT
Start: 2018-01-01 | End: 2018-07-12 | Stop reason: HOSPADM

## 2018-01-01 RX ORDER — FEBUXOSTAT 80 MG/1
80 TABLET, FILM COATED ORAL DAILY
COMMUNITY

## 2018-01-01 RX ORDER — GUAIFENESIN 600 MG
600 TABLET, EXTENDED RELEASE 12 HR ORAL EVERY 12 HOURS SCHEDULED
Qty: 20 TABLET | Refills: 0 | Status: SHIPPED | OUTPATIENT
Start: 2018-01-01

## 2018-01-01 RX ORDER — BUMETANIDE 0.25 MG/ML
2 INJECTION, SOLUTION INTRAMUSCULAR; INTRAVENOUS 2 TIMES DAILY
Status: DISCONTINUED | OUTPATIENT
Start: 2018-01-01 | End: 2018-01-01

## 2018-01-01 RX ORDER — HYDRALAZINE HYDROCHLORIDE 25 MG/1
50 TABLET, FILM COATED ORAL EVERY 8 HOURS SCHEDULED
Status: DISCONTINUED | OUTPATIENT
Start: 2018-01-01 | End: 2018-01-01

## 2018-01-01 RX ORDER — ASPIRIN 81 MG/1
81 TABLET, CHEWABLE ORAL DAILY
Status: DISCONTINUED | OUTPATIENT
Start: 2018-01-01 | End: 2018-01-01 | Stop reason: HOSPADM

## 2018-01-01 RX ORDER — CHOLECALCIFEROL (VITAMIN D3) 10 MCG
1 TABLET ORAL DAILY
Status: CANCELLED | OUTPATIENT
Start: 2018-01-01

## 2018-01-01 RX ORDER — IPRATROPIUM BROMIDE AND ALBUTEROL SULFATE 2.5; .5 MG/3ML; MG/3ML
3 SOLUTION RESPIRATORY (INHALATION)
Status: DISCONTINUED | OUTPATIENT
Start: 2018-01-01 | End: 2018-01-01

## 2018-01-01 RX ORDER — BENZONATATE 100 MG/1
100 CAPSULE ORAL 3 TIMES DAILY PRN
Status: DISCONTINUED | OUTPATIENT
Start: 2018-01-01 | End: 2018-01-01 | Stop reason: HOSPADM

## 2018-01-01 RX ORDER — ATORVASTATIN CALCIUM 40 MG/1
40 TABLET, FILM COATED ORAL
Status: DISCONTINUED | OUTPATIENT
Start: 2018-01-01 | End: 2018-01-01 | Stop reason: HOSPADM

## 2018-01-01 RX ORDER — LACTULOSE 20 G/30ML
20 SOLUTION ORAL 3 TIMES WEEKLY
Status: DISCONTINUED | OUTPATIENT
Start: 2018-01-01 | End: 2018-07-12 | Stop reason: HOSPADM

## 2018-01-01 RX ORDER — LEVOFLOXACIN 5 MG/ML
750 INJECTION, SOLUTION INTRAVENOUS ONCE
Status: COMPLETED | OUTPATIENT
Start: 2018-01-01 | End: 2018-01-01

## 2018-01-01 RX ORDER — HYDRALAZINE HYDROCHLORIDE 25 MG/1
50 TABLET, FILM COATED ORAL EVERY 8 HOURS SCHEDULED
Status: DISCONTINUED | OUTPATIENT
Start: 2018-01-01 | End: 2018-07-12 | Stop reason: HOSPADM

## 2018-01-01 RX ORDER — FENTANYL CITRATE 50 UG/ML
INJECTION, SOLUTION INTRAMUSCULAR; INTRAVENOUS AS NEEDED
Status: DISCONTINUED | OUTPATIENT
Start: 2018-01-01 | End: 2018-01-01 | Stop reason: SURG

## 2018-01-01 RX ORDER — BUMETANIDE 1 MG/1
1 TABLET ORAL DAILY
Status: DISCONTINUED | OUTPATIENT
Start: 2018-01-01 | End: 2018-01-01 | Stop reason: HOSPADM

## 2018-01-01 RX ORDER — LEVOFLOXACIN 5 MG/ML
750 INJECTION, SOLUTION INTRAVENOUS
Status: DISCONTINUED | OUTPATIENT
Start: 2018-01-01 | End: 2018-01-01

## 2018-01-01 RX ORDER — ISOSORBIDE MONONITRATE 60 MG/1
60 TABLET, EXTENDED RELEASE ORAL DAILY
Status: DISCONTINUED | OUTPATIENT
Start: 2018-01-01 | End: 2018-01-01 | Stop reason: HOSPADM

## 2018-01-01 RX ORDER — WARFARIN SODIUM 3 MG/1
3 TABLET ORAL
Status: DISCONTINUED | OUTPATIENT
Start: 2018-01-01 | End: 2018-01-01

## 2018-01-01 RX ORDER — ALLOPURINOL 100 MG/1
200 TABLET ORAL DAILY
Status: DISCONTINUED | OUTPATIENT
Start: 2018-01-01 | End: 2018-01-01 | Stop reason: HOSPADM

## 2018-01-01 RX ORDER — BUMETANIDE 0.25 MG/ML
1 INJECTION, SOLUTION INTRAMUSCULAR; INTRAVENOUS
Status: DISCONTINUED | OUTPATIENT
Start: 2018-01-01 | End: 2018-01-01

## 2018-01-01 RX ORDER — POTASSIUM CHLORIDE 20 MEQ/1
40 TABLET, EXTENDED RELEASE ORAL 2 TIMES DAILY WITH MEALS
Status: COMPLETED | OUTPATIENT
Start: 2018-01-01 | End: 2018-01-01

## 2018-01-01 RX ORDER — DEXTROSE MONOHYDRATE 25 G/50ML
25 INJECTION, SOLUTION INTRAVENOUS EVERY 4 HOURS PRN
Status: CANCELLED | OUTPATIENT
Start: 2018-01-01

## 2018-01-01 RX ORDER — AMIODARONE HYDROCHLORIDE 200 MG/1
1 TABLET ORAL 2 TIMES DAILY
COMMUNITY
End: 2018-01-01 | Stop reason: ALTCHOICE

## 2018-01-01 RX ORDER — ALLOPURINOL 100 MG/1
200 TABLET ORAL DAILY
Status: CANCELLED | OUTPATIENT
Start: 2018-01-01

## 2018-01-01 RX ORDER — GABAPENTIN 100 MG/1
100 CAPSULE ORAL 2 TIMES DAILY
Status: DISCONTINUED | OUTPATIENT
Start: 2018-01-01 | End: 2018-01-01 | Stop reason: HOSPADM

## 2018-01-01 RX ORDER — POTASSIUM CHLORIDE 20 MEQ/1
40 TABLET, EXTENDED RELEASE ORAL ONCE
Status: DISCONTINUED | OUTPATIENT
Start: 2018-01-01 | End: 2018-01-01

## 2018-01-01 RX ORDER — ASPIRIN 81 MG/1
81 TABLET, CHEWABLE ORAL DAILY
Status: CANCELLED | OUTPATIENT
Start: 2018-01-01

## 2018-01-01 RX ORDER — GUAIFENESIN 600 MG
600 TABLET, EXTENDED RELEASE 12 HR ORAL EVERY 12 HOURS SCHEDULED
Status: DISCONTINUED | OUTPATIENT
Start: 2018-01-01 | End: 2018-01-01 | Stop reason: HOSPADM

## 2018-01-01 RX ORDER — ATORVASTATIN CALCIUM 40 MG/1
40 TABLET, FILM COATED ORAL
Status: DISCONTINUED | OUTPATIENT
Start: 2018-01-01 | End: 2018-07-12 | Stop reason: HOSPADM

## 2018-01-01 RX ORDER — LORATADINE 10 MG/1
10 TABLET ORAL DAILY
Status: DISCONTINUED | OUTPATIENT
Start: 2018-01-01 | End: 2018-01-01 | Stop reason: HOSPADM

## 2018-01-01 RX ORDER — MELATONIN
2000 DAILY
Status: DISCONTINUED | OUTPATIENT
Start: 2018-01-01 | End: 2018-01-01 | Stop reason: HOSPADM

## 2018-01-01 RX ORDER — AMOXICILLIN 250 MG
1 CAPSULE ORAL DAILY
Status: DISCONTINUED | OUTPATIENT
Start: 2018-01-01 | End: 2018-01-01

## 2018-01-01 RX ORDER — POTASSIUM CHLORIDE 20 MEQ/1
40 TABLET, EXTENDED RELEASE ORAL ONCE
Status: COMPLETED | OUTPATIENT
Start: 2018-01-01 | End: 2018-01-01

## 2018-01-01 RX ORDER — ISOSORBIDE MONONITRATE 60 MG/1
60 TABLET, EXTENDED RELEASE ORAL DAILY
Status: CANCELLED | OUTPATIENT
Start: 2018-01-01

## 2018-01-01 RX ORDER — PANTOPRAZOLE SODIUM 40 MG/1
40 TABLET, DELAYED RELEASE ORAL
Status: DISCONTINUED | OUTPATIENT
Start: 2018-01-01 | End: 2018-01-01

## 2018-01-01 RX ORDER — CHOLECALCIFEROL (VITAMIN D3) 125 MCG
2000 CAPSULE ORAL DAILY
COMMUNITY

## 2018-01-01 RX ORDER — LACTULOSE 20 G/30ML
20 SOLUTION ORAL 3 TIMES WEEKLY
Status: DISCONTINUED | OUTPATIENT
Start: 2018-01-01 | End: 2018-01-01 | Stop reason: HOSPADM

## 2018-01-01 RX ORDER — POTASSIUM CHLORIDE 14.9 MG/ML
20 INJECTION INTRAVENOUS ONCE
Status: COMPLETED | OUTPATIENT
Start: 2018-01-01 | End: 2018-01-01

## 2018-01-01 RX ORDER — CARVEDILOL 12.5 MG/1
12.5 TABLET ORAL 2 TIMES DAILY WITH MEALS
Status: DISCONTINUED | OUTPATIENT
Start: 2018-01-01 | End: 2018-01-01 | Stop reason: HOSPADM

## 2018-01-01 RX ORDER — POTASSIUM CHLORIDE 20MEQ/15ML
60 LIQUID (ML) ORAL ONCE
Status: DISCONTINUED | OUTPATIENT
Start: 2018-01-01 | End: 2018-01-01

## 2018-01-01 RX ORDER — ACETAMINOPHEN 325 MG/1
650 TABLET ORAL EVERY 6 HOURS PRN
Status: DISCONTINUED | OUTPATIENT
Start: 2018-01-01 | End: 2018-01-01 | Stop reason: HOSPADM

## 2018-01-01 RX ORDER — PANTOPRAZOLE SODIUM 40 MG/1
40 TABLET, DELAYED RELEASE ORAL
Status: DISCONTINUED | OUTPATIENT
Start: 2018-01-01 | End: 2018-01-01 | Stop reason: HOSPADM

## 2018-01-01 RX ORDER — OXYCODONE HYDROCHLORIDE AND ACETAMINOPHEN 5; 325 MG/1; MG/1
1 TABLET ORAL EVERY 4 HOURS PRN
Status: DISCONTINUED | OUTPATIENT
Start: 2018-01-01 | End: 2018-01-01 | Stop reason: HOSPADM

## 2018-01-01 RX ORDER — FEBUXOSTAT 40 MG/1
80 TABLET, FILM COATED ORAL DAILY
Status: DISCONTINUED | OUTPATIENT
Start: 2018-01-01 | End: 2018-01-01

## 2018-01-01 RX ORDER — PANTOPRAZOLE SODIUM 40 MG/1
40 TABLET, DELAYED RELEASE ORAL
Status: CANCELLED | OUTPATIENT
Start: 2018-01-01

## 2018-01-01 RX ORDER — SODIUM CHLORIDE 9 MG/ML
50 INJECTION, SOLUTION INTRAVENOUS CONTINUOUS
Status: CANCELLED | OUTPATIENT
Start: 2018-01-01

## 2018-01-01 RX ORDER — GABAPENTIN 100 MG/1
100 CAPSULE ORAL 2 TIMES DAILY
Status: DISCONTINUED | OUTPATIENT
Start: 2018-01-01 | End: 2018-07-12 | Stop reason: HOSPADM

## 2018-01-01 RX ORDER — HEPARIN SODIUM 5000 [USP'U]/ML
5000 INJECTION, SOLUTION INTRAVENOUS; SUBCUTANEOUS EVERY 8 HOURS SCHEDULED
Status: DISCONTINUED | OUTPATIENT
Start: 2018-01-01 | End: 2018-07-12 | Stop reason: HOSPADM

## 2018-01-01 RX ORDER — CHOLECALCIFEROL (VITAMIN D3) 10 MCG
1 TABLET ORAL DAILY
Status: DISCONTINUED | OUTPATIENT
Start: 2018-01-01 | End: 2018-07-12 | Stop reason: HOSPADM

## 2018-01-01 RX ORDER — LOPERAMIDE HYDROCHLORIDE 2 MG/1
CAPSULE ORAL
COMMUNITY
End: 2018-01-01 | Stop reason: ALTCHOICE

## 2018-01-01 RX ORDER — NITROGLYCERIN 0.4 MG/1
0.4 TABLET SUBLINGUAL
Status: DISCONTINUED | OUTPATIENT
Start: 2018-01-01 | End: 2018-01-01

## 2018-01-01 RX ORDER — ONDANSETRON 2 MG/ML
INJECTION INTRAMUSCULAR; INTRAVENOUS AS NEEDED
Status: DISCONTINUED | OUTPATIENT
Start: 2018-01-01 | End: 2018-01-01 | Stop reason: SURG

## 2018-01-01 RX ORDER — ALBUTEROL SULFATE 2.5 MG/3ML
2.5 SOLUTION RESPIRATORY (INHALATION) EVERY 4 HOURS PRN
Status: DISCONTINUED | OUTPATIENT
Start: 2018-01-01 | End: 2018-01-01

## 2018-01-01 RX ORDER — OXYCODONE HYDROCHLORIDE AND ACETAMINOPHEN 5; 325 MG/1; MG/1
1 TABLET ORAL EVERY 4 HOURS PRN
Status: DISCONTINUED | OUTPATIENT
Start: 2018-01-01 | End: 2018-01-01

## 2018-01-01 RX ORDER — FLUTICASONE PROPIONATE 50 MCG
1 SPRAY, SUSPENSION (ML) NASAL DAILY
Status: DISCONTINUED | OUTPATIENT
Start: 2018-01-01 | End: 2018-01-01 | Stop reason: HOSPADM

## 2018-01-01 RX ORDER — POTASSIUM CHLORIDE 20 MEQ/1
40 TABLET, EXTENDED RELEASE ORAL 2 TIMES DAILY WITH MEALS
Status: DISPENSED | OUTPATIENT
Start: 2018-01-01 | End: 2018-01-01

## 2018-01-01 RX ORDER — PANTOPRAZOLE SODIUM 40 MG/1
40 TABLET, DELAYED RELEASE ORAL
Status: DISCONTINUED | OUTPATIENT
Start: 2018-01-01 | End: 2018-07-12 | Stop reason: HOSPADM

## 2018-01-01 RX ORDER — ATORVASTATIN CALCIUM 40 MG/1
40 TABLET, FILM COATED ORAL
Qty: 30 TABLET | Refills: 0 | Status: SHIPPED | OUTPATIENT
Start: 2018-01-01

## 2018-01-01 RX ORDER — CHOLECALCIFEROL (VITAMIN D3) 10 MCG
1 TABLET ORAL DAILY
Status: DISCONTINUED | OUTPATIENT
Start: 2018-01-01 | End: 2018-01-01 | Stop reason: HOSPADM

## 2018-01-01 RX ORDER — LACTULOSE 20 G/30ML
10 SOLUTION ORAL 2 TIMES DAILY PRN
Status: DISCONTINUED | OUTPATIENT
Start: 2018-01-01 | End: 2018-01-01

## 2018-01-01 RX ORDER — LEVOFLOXACIN 250 MG/1
250 TABLET ORAL
COMMUNITY
End: 2018-01-01 | Stop reason: ALTCHOICE

## 2018-01-01 RX ORDER — KETAMINE HYDROCHLORIDE 50 MG/ML
INJECTION, SOLUTION, CONCENTRATE INTRAMUSCULAR; INTRAVENOUS AS NEEDED
Status: DISCONTINUED | OUTPATIENT
Start: 2018-01-01 | End: 2018-01-01 | Stop reason: SURG

## 2018-01-01 RX ORDER — ALLOPURINOL 300 MG/1
TABLET ORAL
COMMUNITY
End: 2018-01-01 | Stop reason: CLARIF

## 2018-01-01 RX ORDER — ALBUTEROL SULFATE 2.5 MG/3ML
2.5 SOLUTION RESPIRATORY (INHALATION) EVERY 4 HOURS PRN
Status: DISCONTINUED | OUTPATIENT
Start: 2018-01-01 | End: 2018-07-12 | Stop reason: HOSPADM

## 2018-01-01 RX ORDER — OXYCODONE HYDROCHLORIDE 5 MG/1
5 TABLET ORAL EVERY 4 HOURS PRN
Status: DISCONTINUED | OUTPATIENT
Start: 2018-01-01 | End: 2018-01-01

## 2018-01-01 RX ORDER — POTASSIUM CHLORIDE 14.9 MG/ML
20 INJECTION INTRAVENOUS
Status: DISCONTINUED | OUTPATIENT
Start: 2018-01-01 | End: 2018-01-01

## 2018-01-01 RX ORDER — BUMETANIDE 0.25 MG/ML
1 INJECTION INTRAMUSCULAR; INTRAVENOUS CONTINUOUS
Status: DISCONTINUED | OUTPATIENT
Start: 2018-01-01 | End: 2018-07-12 | Stop reason: HOSPADM

## 2018-01-01 RX ORDER — POTASSIUM CHLORIDE 20 MEQ/1
40 TABLET, EXTENDED RELEASE ORAL
Status: COMPLETED | OUTPATIENT
Start: 2018-01-01 | End: 2018-01-01

## 2018-01-01 RX ORDER — CARVEDILOL 12.5 MG/1
12.5 TABLET ORAL 2 TIMES DAILY WITH MEALS
Status: DISCONTINUED | OUTPATIENT
Start: 2018-01-01 | End: 2018-01-01

## 2018-01-01 RX ORDER — LACTULOSE 20 G/30ML
20 SOLUTION ORAL 3 TIMES WEEKLY
Status: DISCONTINUED | OUTPATIENT
Start: 2018-01-01 | End: 2018-01-01

## 2018-01-01 RX ORDER — BUMETANIDE 0.25 MG/ML
1 INJECTION, SOLUTION INTRAMUSCULAR; INTRAVENOUS
Status: DISCONTINUED | OUTPATIENT
Start: 2018-01-01 | End: 2018-01-01 | Stop reason: HOSPADM

## 2018-01-01 RX ORDER — WARFARIN SODIUM 5 MG/1
5 TABLET ORAL
Status: DISCONTINUED | OUTPATIENT
Start: 2018-01-01 | End: 2018-01-01

## 2018-01-01 RX ORDER — BISACODYL 10 MG
10 SUPPOSITORY, RECTAL RECTAL ONCE
Status: COMPLETED | OUTPATIENT
Start: 2018-01-01 | End: 2018-01-01

## 2018-01-01 RX ORDER — HYDRALAZINE HYDROCHLORIDE 25 MG/1
50 TABLET, FILM COATED ORAL EVERY 8 HOURS SCHEDULED
Status: CANCELLED | OUTPATIENT
Start: 2018-01-01

## 2018-01-01 RX ORDER — POTASSIUM CHLORIDE 20MEQ/15ML
40 LIQUID (ML) ORAL ONCE
Status: CANCELLED | OUTPATIENT
Start: 2018-01-01 | End: 2018-01-01

## 2018-01-01 RX ORDER — BUMETANIDE 0.25 MG/ML
1 INJECTION, SOLUTION INTRAMUSCULAR; INTRAVENOUS
Status: CANCELLED | OUTPATIENT
Start: 2018-01-01

## 2018-01-01 RX ORDER — SODIUM PHOSPHATE, DIBASIC AND SODIUM PHOSPHATE, MONOBASIC 7; 19 G/133ML; G/133ML
1 ENEMA RECTAL ONCE AS NEEDED
Status: DISCONTINUED | OUTPATIENT
Start: 2018-01-01 | End: 2018-01-01

## 2018-01-01 RX ORDER — OXYCODONE HYDROCHLORIDE AND ACETAMINOPHEN 5; 325 MG/1; MG/1
1 TABLET ORAL EVERY 4 HOURS PRN
Status: CANCELLED | OUTPATIENT
Start: 2018-01-01

## 2018-01-01 RX ORDER — OXYCODONE HYDROCHLORIDE AND ACETAMINOPHEN 5; 325 MG/1; MG/1
1 TABLET ORAL EVERY 4 HOURS PRN
Status: DISCONTINUED | OUTPATIENT
Start: 2018-01-01 | End: 2018-07-12 | Stop reason: HOSPADM

## 2018-01-01 RX ORDER — WARFARIN SODIUM 5 MG/1
5 TABLET ORAL
Status: DISCONTINUED | OUTPATIENT
Start: 2018-01-01 | End: 2018-01-01 | Stop reason: HOSPADM

## 2018-01-01 RX ORDER — MORPHINE SULFATE 2 MG/ML
1 INJECTION, SOLUTION INTRAMUSCULAR; INTRAVENOUS EVERY 4 HOURS PRN
Status: DISCONTINUED | OUTPATIENT
Start: 2018-01-01 | End: 2018-07-12 | Stop reason: HOSPADM

## 2018-01-01 RX ORDER — ALBUMIN (HUMAN) 12.5 G/50ML
25 SOLUTION INTRAVENOUS
Status: COMPLETED | OUTPATIENT
Start: 2018-01-01 | End: 2018-01-01

## 2018-01-01 RX ORDER — LEVOFLOXACIN 5 MG/ML
750 INJECTION, SOLUTION INTRAVENOUS EVERY 24 HOURS
Status: DISCONTINUED | OUTPATIENT
Start: 2018-01-01 | End: 2018-01-01

## 2018-01-01 RX ORDER — ALBUTEROL SULFATE 2.5 MG/3ML
2.5 SOLUTION RESPIRATORY (INHALATION) EVERY 6 HOURS PRN
Status: DISCONTINUED | OUTPATIENT
Start: 2018-01-01 | End: 2018-01-01

## 2018-01-01 RX ORDER — ALBUTEROL SULFATE 2.5 MG/3ML
SOLUTION RESPIRATORY (INHALATION)
Status: DISPENSED
Start: 2018-01-01 | End: 2018-01-01

## 2018-01-01 RX ORDER — ECHINACEA PURPUREA EXTRACT 125 MG
1 TABLET ORAL 3 TIMES DAILY
Status: DISCONTINUED | OUTPATIENT
Start: 2018-01-01 | End: 2018-01-01 | Stop reason: HOSPADM

## 2018-01-01 RX ORDER — ASCORBIC ACID 500 MG
500 TABLET ORAL DAILY
Status: DISCONTINUED | OUTPATIENT
Start: 2018-01-01 | End: 2018-01-01 | Stop reason: HOSPADM

## 2018-01-01 RX ORDER — SODIUM CHLORIDE 9 MG/ML
50 INJECTION, SOLUTION INTRAVENOUS CONTINUOUS
Status: DISCONTINUED | OUTPATIENT
Start: 2018-01-01 | End: 2018-01-01 | Stop reason: HOSPADM

## 2018-01-01 RX ORDER — ALBUTEROL SULFATE 2.5 MG/3ML
2.5 SOLUTION RESPIRATORY (INHALATION) EVERY 4 HOURS PRN
Status: DISCONTINUED | OUTPATIENT
Start: 2018-01-01 | End: 2018-01-01 | Stop reason: HOSPADM

## 2018-01-01 RX ORDER — MAGNESIUM SULFATE HEPTAHYDRATE 40 MG/ML
2 INJECTION, SOLUTION INTRAVENOUS ONCE
Status: DISCONTINUED | OUTPATIENT
Start: 2018-01-01 | End: 2018-01-01

## 2018-01-01 RX ORDER — DEXTROSE MONOHYDRATE 25 G/50ML
25 INJECTION, SOLUTION INTRAVENOUS EVERY 4 HOURS PRN
Status: DISCONTINUED | OUTPATIENT
Start: 2018-01-01 | End: 2018-07-12 | Stop reason: HOSPADM

## 2018-01-01 RX ORDER — GABAPENTIN 100 MG/1
100 CAPSULE ORAL 2 TIMES DAILY
Status: CANCELLED | OUTPATIENT
Start: 2018-01-01

## 2018-01-01 RX ORDER — ASCORBIC ACID 500 MG
500 TABLET ORAL DAILY
COMMUNITY

## 2018-01-01 RX ORDER — ALLOPURINOL 100 MG/1
200 TABLET ORAL DAILY
Status: DISCONTINUED | OUTPATIENT
Start: 2018-01-01 | End: 2018-07-12 | Stop reason: HOSPADM

## 2018-01-01 RX ORDER — METOLAZONE 2.5 MG/1
5 TABLET ORAL EVERY OTHER DAY
Status: DISCONTINUED | OUTPATIENT
Start: 2018-01-01 | End: 2018-01-01

## 2018-01-01 RX ORDER — DEXTROSE MONOHYDRATE 25 G/50ML
25 INJECTION, SOLUTION INTRAVENOUS EVERY 4 HOURS PRN
Status: DISCONTINUED | OUTPATIENT
Start: 2018-01-01 | End: 2018-01-01 | Stop reason: HOSPADM

## 2018-01-01 RX ORDER — POTASSIUM CHLORIDE 29.8 MG/ML
40 INJECTION INTRAVENOUS ONCE
Status: COMPLETED | OUTPATIENT
Start: 2018-01-01 | End: 2018-01-01

## 2018-01-01 RX ORDER — POTASSIUM CHLORIDE 14.9 MG/ML
20 INJECTION INTRAVENOUS
Status: COMPLETED | OUTPATIENT
Start: 2018-01-01 | End: 2018-01-01

## 2018-01-01 RX ORDER — NITROGLYCERIN 20 MG/100ML
5-200 INJECTION INTRAVENOUS
Status: DISCONTINUED | OUTPATIENT
Start: 2018-01-01 | End: 2018-01-01

## 2018-01-01 RX ORDER — OXYCODONE HYDROCHLORIDE 5 MG/1
5 TABLET ORAL ONCE
Status: COMPLETED | OUTPATIENT
Start: 2018-01-01 | End: 2018-01-01

## 2018-01-01 RX ORDER — REPAGLINIDE 1 MG/1
1 TABLET ORAL
Status: DISCONTINUED | OUTPATIENT
Start: 2018-01-01 | End: 2018-01-01

## 2018-01-01 RX ORDER — POLYETHYLENE GLYCOL 3350 17 G/17G
17 POWDER, FOR SOLUTION ORAL DAILY PRN
Status: DISCONTINUED | OUTPATIENT
Start: 2018-01-01 | End: 2018-01-01 | Stop reason: HOSPADM

## 2018-01-01 RX ORDER — CARVEDILOL 12.5 MG/1
12.5 TABLET ORAL 2 TIMES DAILY WITH MEALS
Status: CANCELLED | OUTPATIENT
Start: 2018-01-01

## 2018-01-01 RX ORDER — MORPHINE SULFATE 4 MG/ML
4 INJECTION, SOLUTION INTRAMUSCULAR; INTRAVENOUS EVERY 4 HOURS PRN
Status: DISCONTINUED | OUTPATIENT
Start: 2018-01-01 | End: 2018-07-12 | Stop reason: HOSPADM

## 2018-01-01 RX ORDER — BUMETANIDE 1 MG/1
1 TABLET ORAL DAILY
Qty: 30 TABLET | Refills: 0 | Status: SHIPPED | OUTPATIENT
Start: 2018-01-01

## 2018-01-01 RX ORDER — HYDRALAZINE HYDROCHLORIDE 25 MG/1
50 TABLET, FILM COATED ORAL EVERY 8 HOURS SCHEDULED
Status: DISCONTINUED | OUTPATIENT
Start: 2018-01-01 | End: 2018-01-01 | Stop reason: HOSPADM

## 2018-01-01 RX ORDER — MAGNESIUM SULFATE HEPTAHYDRATE 40 MG/ML
2 INJECTION, SOLUTION INTRAVENOUS ONCE
Status: COMPLETED | OUTPATIENT
Start: 2018-01-01 | End: 2018-01-01

## 2018-01-01 RX ADMIN — Medication 400 MG: at 08:43

## 2018-01-01 RX ADMIN — OXYCODONE HYDROCHLORIDE AND ACETAMINOPHEN 500 MG: 500 TABLET ORAL at 08:07

## 2018-01-01 RX ADMIN — OXYCODONE HYDROCHLORIDE 5 MG: 5 TABLET ORAL at 00:48

## 2018-01-01 RX ADMIN — ASPIRIN 81 MG 81 MG: 81 TABLET ORAL at 08:30

## 2018-01-01 RX ADMIN — AMPICILLIN SODIUM 2000 MG: 2 INJECTION, POWDER, FOR SOLUTION INTRAMUSCULAR; INTRAVENOUS at 18:15

## 2018-01-01 RX ADMIN — POTASSIUM CHLORIDE 20 MEQ: 200 INJECTION, SOLUTION INTRAVENOUS at 10:36

## 2018-01-01 RX ADMIN — OXYCODONE HYDROCHLORIDE 5 MG: 5 TABLET ORAL at 15:46

## 2018-01-01 RX ADMIN — Medication 400 MG: at 09:54

## 2018-01-01 RX ADMIN — HYDRALAZINE HYDROCHLORIDE 50 MG: 25 TABLET, FILM COATED ORAL at 22:16

## 2018-01-01 RX ADMIN — LEVALBUTEROL 1.25 MG: 1.25 SOLUTION, CONCENTRATE RESPIRATORY (INHALATION) at 20:14

## 2018-01-01 RX ADMIN — CARVEDILOL 12.5 MG: 12.5 TABLET, FILM COATED ORAL at 16:34

## 2018-01-01 RX ADMIN — CARVEDILOL 12.5 MG: 12.5 TABLET, FILM COATED ORAL at 08:57

## 2018-01-01 RX ADMIN — HYDRALAZINE HYDROCHLORIDE 50 MG: 25 TABLET, FILM COATED ORAL at 14:52

## 2018-01-01 RX ADMIN — VANCOMYCIN HYDROCHLORIDE 1250 MG: 1 INJECTION, POWDER, LYOPHILIZED, FOR SOLUTION INTRAVENOUS at 19:24

## 2018-01-01 RX ADMIN — NEPHROCAP 1 CAPSULE: 1 CAP ORAL at 09:10

## 2018-01-01 RX ADMIN — OXYCODONE HYDROCHLORIDE 5 MG: 5 TABLET ORAL at 12:34

## 2018-01-01 RX ADMIN — CARBIDOPA AND LEVODOPA 1 TABLET: 10; 100 TABLET ORAL at 15:53

## 2018-01-01 RX ADMIN — GABAPENTIN 100 MG: 100 CAPSULE ORAL at 17:10

## 2018-01-01 RX ADMIN — OXYCODONE HYDROCHLORIDE AND ACETAMINOPHEN 500 MG: 500 TABLET ORAL at 07:49

## 2018-01-01 RX ADMIN — ASPIRIN 81 MG 81 MG: 81 TABLET ORAL at 09:07

## 2018-01-01 RX ADMIN — KETAMINE HYDROCHLORIDE 20 MG: 50 INJECTION INTRAMUSCULAR; INTRAVENOUS at 10:39

## 2018-01-01 RX ADMIN — INSULIN LISPRO 2 UNITS: 100 INJECTION, SOLUTION INTRAVENOUS; SUBCUTANEOUS at 16:47

## 2018-01-01 RX ADMIN — IPRATROPIUM BROMIDE 0.5 MG: 0.5 SOLUTION RESPIRATORY (INHALATION) at 20:03

## 2018-01-01 RX ADMIN — AMPICILLIN SODIUM 2000 MG: 2 INJECTION, POWDER, FOR SOLUTION INTRAMUSCULAR; INTRAVENOUS at 19:59

## 2018-01-01 RX ADMIN — OXYCODONE HYDROCHLORIDE AND ACETAMINOPHEN 500 MG: 500 TABLET ORAL at 08:39

## 2018-01-01 RX ADMIN — VANCOMYCIN HYDROCHLORIDE 1500 MG: 1 INJECTION, POWDER, LYOPHILIZED, FOR SOLUTION INTRAVENOUS at 15:05

## 2018-01-01 RX ADMIN — ACETAMINOPHEN 650 MG: 325 TABLET, FILM COATED ORAL at 09:18

## 2018-01-01 RX ADMIN — CARBIDOPA AND LEVODOPA 1 TABLET: 10; 100 TABLET ORAL at 22:33

## 2018-01-01 RX ADMIN — GABAPENTIN 100 MG: 100 CAPSULE ORAL at 08:08

## 2018-01-01 RX ADMIN — GABAPENTIN 100 MG: 100 CAPSULE ORAL at 18:14

## 2018-01-01 RX ADMIN — IPRATROPIUM BROMIDE AND ALBUTEROL SULFATE 3 ML: .5; 3 SOLUTION RESPIRATORY (INHALATION) at 09:58

## 2018-01-01 RX ADMIN — INSULIN LISPRO 2 UNITS: 100 INJECTION, SOLUTION INTRAVENOUS; SUBCUTANEOUS at 21:39

## 2018-01-01 RX ADMIN — CARVEDILOL 12.5 MG: 12.5 TABLET, FILM COATED ORAL at 16:49

## 2018-01-01 RX ADMIN — OXYCODONE HYDROCHLORIDE 5 MG: 5 TABLET ORAL at 09:17

## 2018-01-01 RX ADMIN — ASPIRIN 81 MG 81 MG: 81 TABLET ORAL at 08:07

## 2018-01-01 RX ADMIN — LEVALBUTEROL 1.25 MG: 1.25 SOLUTION, CONCENTRATE RESPIRATORY (INHALATION) at 08:49

## 2018-01-01 RX ADMIN — CEFEPIME HYDROCHLORIDE 2000 MG: 2 INJECTION, SOLUTION INTRAVENOUS at 16:31

## 2018-01-01 RX ADMIN — OXYCODONE HYDROCHLORIDE 5 MG: 5 TABLET ORAL at 06:21

## 2018-01-01 RX ADMIN — POTASSIUM CHLORIDE 40 MEQ: 1500 TABLET, EXTENDED RELEASE ORAL at 15:46

## 2018-01-01 RX ADMIN — BUMETANIDE 2 MG: 0.25 INJECTION INTRAMUSCULAR; INTRAVENOUS at 17:56

## 2018-01-01 RX ADMIN — Medication 1 SPRAY: at 07:51

## 2018-01-01 RX ADMIN — ASPIRIN 81 MG 81 MG: 81 TABLET ORAL at 08:39

## 2018-01-01 RX ADMIN — OXYCODONE HYDROCHLORIDE AND ACETAMINOPHEN 500 MG: 500 TABLET ORAL at 08:40

## 2018-01-01 RX ADMIN — INSULIN LISPRO 2 UNITS: 100 INJECTION, SOLUTION INTRAVENOUS; SUBCUTANEOUS at 12:16

## 2018-01-01 RX ADMIN — ATORVASTATIN CALCIUM 40 MG: 40 TABLET, FILM COATED ORAL at 16:15

## 2018-01-01 RX ADMIN — Medication 400 MG: at 17:10

## 2018-01-01 RX ADMIN — INSULIN DETEMIR 16 UNITS: 100 INJECTION, SOLUTION SUBCUTANEOUS at 21:28

## 2018-01-01 RX ADMIN — ALLOPURINOL 200 MG: 100 TABLET ORAL at 09:08

## 2018-01-01 RX ADMIN — OXYCODONE HYDROCHLORIDE AND ACETAMINOPHEN 1 TABLET: 5; 325 TABLET ORAL at 05:18

## 2018-01-01 RX ADMIN — CARBIDOPA AND LEVODOPA 1 TABLET: 10; 100 TABLET ORAL at 17:44

## 2018-01-01 RX ADMIN — OXYCODONE HYDROCHLORIDE AND ACETAMINOPHEN 500 MG: 500 TABLET ORAL at 08:15

## 2018-01-01 RX ADMIN — CEFEPIME HYDROCHLORIDE 1000 MG: 1 INJECTION, SOLUTION INTRAVENOUS at 16:03

## 2018-01-01 RX ADMIN — INSULIN LISPRO 2 UNITS: 100 INJECTION, SOLUTION INTRAVENOUS; SUBCUTANEOUS at 15:55

## 2018-01-01 RX ADMIN — Medication 1 MG/HR: at 05:52

## 2018-01-01 RX ADMIN — FLUTICASONE PROPIONATE 1 SPRAY: 50 SPRAY, METERED NASAL at 08:58

## 2018-01-01 RX ADMIN — CARBIDOPA AND LEVODOPA 1 TABLET: 10; 100 TABLET ORAL at 17:00

## 2018-01-01 RX ADMIN — POTASSIUM CHLORIDE 40 MEQ: 1500 TABLET, EXTENDED RELEASE ORAL at 16:49

## 2018-01-01 RX ADMIN — ATORVASTATIN CALCIUM 40 MG: 40 TABLET, FILM COATED ORAL at 18:04

## 2018-01-01 RX ADMIN — VITAMIN D, TAB 1000IU (100/BT) 2000 UNITS: 25 TAB at 08:30

## 2018-01-01 RX ADMIN — POTASSIUM CHLORIDE 20 MEQ: 200 INJECTION, SOLUTION INTRAVENOUS at 10:11

## 2018-01-01 RX ADMIN — GABAPENTIN 100 MG: 100 CAPSULE ORAL at 19:48

## 2018-01-01 RX ADMIN — OXYCODONE HYDROCHLORIDE 5 MG: 5 TABLET ORAL at 14:47

## 2018-01-01 RX ADMIN — HYDRALAZINE HYDROCHLORIDE 50 MG: 25 TABLET, FILM COATED ORAL at 23:17

## 2018-01-01 RX ADMIN — HYDROMORPHONE HYDROCHLORIDE 1 MG: 1 INJECTION, SOLUTION INTRAMUSCULAR; INTRAVENOUS; SUBCUTANEOUS at 04:30

## 2018-01-01 RX ADMIN — Medication 1 CAPSULE: at 16:34

## 2018-01-01 RX ADMIN — VITAMIN D, TAB 1000IU (100/BT) 2000 UNITS: 25 TAB at 08:39

## 2018-01-01 RX ADMIN — CARVEDILOL 12.5 MG: 12.5 TABLET, FILM COATED ORAL at 08:38

## 2018-01-01 RX ADMIN — LORATADINE 10 MG: 10 TABLET ORAL at 08:57

## 2018-01-01 RX ADMIN — KETAMINE HYDROCHLORIDE 30 MG: 50 INJECTION INTRAMUSCULAR; INTRAVENOUS at 10:14

## 2018-01-01 RX ADMIN — OXYCODONE HYDROCHLORIDE 5 MG: 5 TABLET ORAL at 23:44

## 2018-01-01 RX ADMIN — CARVEDILOL 12.5 MG: 12.5 TABLET, FILM COATED ORAL at 16:15

## 2018-01-01 RX ADMIN — IPRATROPIUM BROMIDE 0.5 MG: 0.5 SOLUTION RESPIRATORY (INHALATION) at 20:14

## 2018-01-01 RX ADMIN — FENTANYL CITRATE 25 MCG: 50 INJECTION, SOLUTION INTRAMUSCULAR; INTRAVENOUS at 10:39

## 2018-01-01 RX ADMIN — LEVOTHYROXINE SODIUM 225 MCG: 175 TABLET ORAL at 05:53

## 2018-01-01 RX ADMIN — CARBIDOPA AND LEVODOPA 1 TABLET: 10; 100 TABLET ORAL at 08:44

## 2018-01-01 RX ADMIN — GABAPENTIN 100 MG: 100 CAPSULE ORAL at 17:54

## 2018-01-01 RX ADMIN — Medication 1 CAPSULE: at 17:00

## 2018-01-01 RX ADMIN — Medication 1 SPRAY: at 15:51

## 2018-01-01 RX ADMIN — LEVALBUTEROL 1.25 MG: 1.25 SOLUTION, CONCENTRATE RESPIRATORY (INHALATION) at 21:43

## 2018-01-01 RX ADMIN — Medication 1 SPRAY: at 08:49

## 2018-01-01 RX ADMIN — CARVEDILOL 12.5 MG: 12.5 TABLET, FILM COATED ORAL at 15:48

## 2018-01-01 RX ADMIN — ISOSORBIDE MONONITRATE 60 MG: 60 TABLET, EXTENDED RELEASE ORAL at 08:44

## 2018-01-01 RX ADMIN — INSULIN LISPRO 4 UNITS: 100 INJECTION, SOLUTION INTRAVENOUS; SUBCUTANEOUS at 21:35

## 2018-01-01 RX ADMIN — GABAPENTIN 100 MG: 100 CAPSULE ORAL at 17:13

## 2018-01-01 RX ADMIN — IODIXANOL 34 ML: 320 INJECTION, SOLUTION INTRAVASCULAR at 11:03

## 2018-01-01 RX ADMIN — LEVOFLOXACIN 750 MG: 5 INJECTION, SOLUTION INTRAVENOUS at 21:00

## 2018-01-01 RX ADMIN — VITAMIN D, TAB 1000IU (100/BT) 2000 UNITS: 25 TAB at 08:49

## 2018-01-01 RX ADMIN — Medication 400 MG: at 17:54

## 2018-01-01 RX ADMIN — ASPIRIN 81 MG 81 MG: 81 TABLET ORAL at 07:49

## 2018-01-01 RX ADMIN — VANCOMYCIN HYDROCHLORIDE 1250 MG: 1 INJECTION, POWDER, LYOPHILIZED, FOR SOLUTION INTRAVENOUS at 17:59

## 2018-01-01 RX ADMIN — POTASSIUM CHLORIDE 20 MEQ: 200 INJECTION, SOLUTION INTRAVENOUS at 13:57

## 2018-01-01 RX ADMIN — OXYCODONE HYDROCHLORIDE 5 MG: 5 TABLET ORAL at 22:09

## 2018-01-01 RX ADMIN — LORATADINE 10 MG: 10 TABLET ORAL at 08:30

## 2018-01-01 RX ADMIN — CARBIDOPA AND LEVODOPA 1 TABLET: 10; 100 TABLET ORAL at 09:30

## 2018-01-01 RX ADMIN — LEVOTHYROXINE SODIUM 225 MCG: 175 TABLET ORAL at 05:37

## 2018-01-01 RX ADMIN — INSULIN DETEMIR 20 UNITS: 100 INJECTION, SOLUTION SUBCUTANEOUS at 21:35

## 2018-01-01 RX ADMIN — Medication 1 CAPSULE: at 09:07

## 2018-01-01 RX ADMIN — ALBUMIN HUMAN 12.5 G: 0.25 SOLUTION INTRAVENOUS at 09:08

## 2018-01-01 RX ADMIN — OXYCODONE HYDROCHLORIDE 5 MG: 5 TABLET ORAL at 16:45

## 2018-01-01 RX ADMIN — GUAIFENESIN 600 MG: 600 TABLET, EXTENDED RELEASE ORAL at 08:49

## 2018-01-01 RX ADMIN — WARFARIN SODIUM 5 MG: 5 TABLET ORAL at 17:42

## 2018-01-01 RX ADMIN — LEVOTHYROXINE SODIUM 225 MCG: 175 TABLET ORAL at 05:28

## 2018-01-01 RX ADMIN — MORPHINE SULFATE 4 MG: 4 INJECTION INTRAVENOUS at 17:25

## 2018-01-01 RX ADMIN — GABAPENTIN 100 MG: 100 CAPSULE ORAL at 08:30

## 2018-01-01 RX ADMIN — FLUTICASONE PROPIONATE 1 SPRAY: 50 SPRAY, METERED NASAL at 09:46

## 2018-01-01 RX ADMIN — ACETAMINOPHEN 650 MG: 325 TABLET, FILM COATED ORAL at 21:11

## 2018-01-01 RX ADMIN — ISOSORBIDE MONONITRATE 60 MG: 60 TABLET, EXTENDED RELEASE ORAL at 07:50

## 2018-01-01 RX ADMIN — BUMETANIDE 2 MG: 0.25 INJECTION INTRAMUSCULAR; INTRAVENOUS at 12:00

## 2018-01-01 RX ADMIN — ATORVASTATIN CALCIUM 40 MG: 40 TABLET, FILM COATED ORAL at 17:13

## 2018-01-01 RX ADMIN — BUMETANIDE 2 MG: 0.25 INJECTION INTRAMUSCULAR; INTRAVENOUS at 19:49

## 2018-01-01 RX ADMIN — CARBIDOPA AND LEVODOPA 1 TABLET: 10; 100 TABLET ORAL at 21:39

## 2018-01-01 RX ADMIN — GABAPENTIN 100 MG: 100 CAPSULE ORAL at 09:09

## 2018-01-01 RX ADMIN — GABAPENTIN 100 MG: 100 CAPSULE ORAL at 18:04

## 2018-01-01 RX ADMIN — LEVOTHYROXINE SODIUM 225 MCG: 175 TABLET ORAL at 05:32

## 2018-01-01 RX ADMIN — LEVOTHYROXINE SODIUM 225 MCG: 125 TABLET ORAL at 05:47

## 2018-01-01 RX ADMIN — ISOSORBIDE MONONITRATE 60 MG: 60 TABLET, EXTENDED RELEASE ORAL at 08:07

## 2018-01-01 RX ADMIN — BUMETANIDE 2 MG: 0.25 INJECTION INTRAMUSCULAR; INTRAVENOUS at 20:27

## 2018-01-01 RX ADMIN — Medication 1 CAPSULE: at 16:49

## 2018-01-01 RX ADMIN — GUAIFENESIN 600 MG: 600 TABLET, EXTENDED RELEASE ORAL at 21:51

## 2018-01-01 RX ADMIN — Medication 1 SPRAY: at 08:58

## 2018-01-01 RX ADMIN — CARVEDILOL 12.5 MG: 12.5 TABLET, FILM COATED ORAL at 09:09

## 2018-01-01 RX ADMIN — INSULIN LISPRO 1 UNITS: 100 INJECTION, SOLUTION INTRAVENOUS; SUBCUTANEOUS at 13:05

## 2018-01-01 RX ADMIN — ISOSORBIDE MONONITRATE 60 MG: 60 TABLET, EXTENDED RELEASE ORAL at 08:14

## 2018-01-01 RX ADMIN — CARBIDOPA AND LEVODOPA 1 TABLET: 10; 100 TABLET ORAL at 22:13

## 2018-01-01 RX ADMIN — Medication 400 MG: at 10:46

## 2018-01-01 RX ADMIN — HYDRALAZINE HYDROCHLORIDE 50 MG: 25 TABLET, FILM COATED ORAL at 14:22

## 2018-01-01 RX ADMIN — ATORVASTATIN CALCIUM 40 MG: 40 TABLET, FILM COATED ORAL at 15:50

## 2018-01-01 RX ADMIN — Medication 1 CAPSULE: at 15:50

## 2018-01-01 RX ADMIN — IPRATROPIUM BROMIDE 0.5 MG: 0.5 SOLUTION RESPIRATORY (INHALATION) at 08:55

## 2018-01-01 RX ADMIN — CEFEPIME HYDROCHLORIDE 1000 MG: 1 INJECTION, SOLUTION INTRAVENOUS at 17:13

## 2018-01-01 RX ADMIN — BUMETANIDE 1 MG: 1 TABLET ORAL at 08:39

## 2018-01-01 RX ADMIN — ASPIRIN 81 MG 81 MG: 81 TABLET ORAL at 08:43

## 2018-01-01 RX ADMIN — HYDRALAZINE HYDROCHLORIDE 50 MG: 25 TABLET, FILM COATED ORAL at 05:37

## 2018-01-01 RX ADMIN — FLUTICASONE PROPIONATE 1 SPRAY: 50 SPRAY, METERED NASAL at 08:44

## 2018-01-01 RX ADMIN — INSULIN LISPRO 2 UNITS: 100 INJECTION, SOLUTION INTRAVENOUS; SUBCUTANEOUS at 21:49

## 2018-01-01 RX ADMIN — BUMETANIDE 1 MG: 1 TABLET ORAL at 07:50

## 2018-01-01 RX ADMIN — BUMETANIDE 2 MG: 0.25 INJECTION INTRAMUSCULAR; INTRAVENOUS at 08:30

## 2018-01-01 RX ADMIN — INSULIN LISPRO 2 UNITS: 100 INJECTION, SOLUTION INTRAVENOUS; SUBCUTANEOUS at 17:43

## 2018-01-01 RX ADMIN — OXYCODONE HYDROCHLORIDE AND ACETAMINOPHEN 500 MG: 500 TABLET ORAL at 08:57

## 2018-01-01 RX ADMIN — WARFARIN SODIUM 5 MG: 5 TABLET ORAL at 17:01

## 2018-01-01 RX ADMIN — HYDRALAZINE HYDROCHLORIDE 50 MG: 25 TABLET, FILM COATED ORAL at 05:13

## 2018-01-01 RX ADMIN — POTASSIUM CHLORIDE 40 MEQ: 1500 TABLET, EXTENDED RELEASE ORAL at 16:34

## 2018-01-01 RX ADMIN — Medication 1 CAPSULE: at 15:46

## 2018-01-01 RX ADMIN — LEVALBUTEROL 1.25 MG: 1.25 SOLUTION, CONCENTRATE RESPIRATORY (INHALATION) at 20:03

## 2018-01-01 RX ADMIN — HYDRALAZINE HYDROCHLORIDE 50 MG: 25 TABLET, FILM COATED ORAL at 05:26

## 2018-01-01 RX ADMIN — NEPHROCAP 1 CAPSULE: 1 CAP ORAL at 10:48

## 2018-01-01 RX ADMIN — INSULIN DETEMIR 16 UNITS: 100 INJECTION, SOLUTION SUBCUTANEOUS at 21:39

## 2018-01-01 RX ADMIN — ATORVASTATIN CALCIUM 40 MG: 40 TABLET, FILM COATED ORAL at 19:22

## 2018-01-01 RX ADMIN — ASPIRIN 81 MG 81 MG: 81 TABLET ORAL at 10:47

## 2018-01-01 RX ADMIN — ALBUTEROL SULFATE 2.5 MG: 2.5 SOLUTION RESPIRATORY (INHALATION) at 09:54

## 2018-01-01 RX ADMIN — POTASSIUM CHLORIDE 40 MEQ: 1500 TABLET, EXTENDED RELEASE ORAL at 17:45

## 2018-01-01 RX ADMIN — Medication 1 SPRAY: at 10:10

## 2018-01-01 RX ADMIN — CARBIDOPA AND LEVODOPA 1 TABLET: 10; 100 TABLET ORAL at 08:33

## 2018-01-01 RX ADMIN — POTASSIUM CHLORIDE 40 MEQ: 1500 TABLET, EXTENDED RELEASE ORAL at 08:16

## 2018-01-01 RX ADMIN — ATORVASTATIN CALCIUM 40 MG: 40 TABLET, FILM COATED ORAL at 17:00

## 2018-01-01 RX ADMIN — LEVALBUTEROL 1.25 MG: 1.25 SOLUTION, CONCENTRATE RESPIRATORY (INHALATION) at 20:25

## 2018-01-01 RX ADMIN — CARBIDOPA AND LEVODOPA 1 TABLET: 10; 100 TABLET ORAL at 08:58

## 2018-01-01 RX ADMIN — WARFARIN SODIUM 6.5 MG: 2.5 TABLET ORAL at 23:17

## 2018-01-01 RX ADMIN — ALLOPURINOL 200 MG: 100 TABLET ORAL at 09:07

## 2018-01-01 RX ADMIN — CARVEDILOL 12.5 MG: 12.5 TABLET, FILM COATED ORAL at 17:29

## 2018-01-01 RX ADMIN — CARVEDILOL 12.5 MG: 12.5 TABLET, FILM COATED ORAL at 07:49

## 2018-01-01 RX ADMIN — ATORVASTATIN CALCIUM 40 MG: 40 TABLET, FILM COATED ORAL at 15:45

## 2018-01-01 RX ADMIN — FLUTICASONE PROPIONATE 1 SPRAY: 50 SPRAY, METERED NASAL at 07:49

## 2018-01-01 RX ADMIN — LORATADINE 10 MG: 10 TABLET ORAL at 08:49

## 2018-01-01 RX ADMIN — BUMETANIDE 2 MG: 0.25 INJECTION INTRAMUSCULAR; INTRAVENOUS at 08:41

## 2018-01-01 RX ADMIN — Medication 1 MG/HR: at 18:22

## 2018-01-01 RX ADMIN — INSULIN DETEMIR 16 UNITS: 100 INJECTION, SOLUTION SUBCUTANEOUS at 21:51

## 2018-01-01 RX ADMIN — INSULIN LISPRO 2 UNITS: 100 INJECTION, SOLUTION INTRAVENOUS; SUBCUTANEOUS at 15:09

## 2018-01-01 RX ADMIN — ISOSORBIDE MONONITRATE 60 MG: 60 TABLET, EXTENDED RELEASE ORAL at 10:48

## 2018-01-01 RX ADMIN — Medication 1 SPRAY: at 16:53

## 2018-01-01 RX ADMIN — CARBIDOPA AND LEVODOPA 1 TABLET: 10; 100 TABLET ORAL at 17:10

## 2018-01-01 RX ADMIN — BISACODYL 10 MG: 10 SUPPOSITORY RECTAL at 00:17

## 2018-01-01 RX ADMIN — ASPIRIN 81 MG 81 MG: 81 TABLET ORAL at 08:49

## 2018-01-01 RX ADMIN — HYDRALAZINE HYDROCHLORIDE 50 MG: 25 TABLET, FILM COATED ORAL at 13:51

## 2018-01-01 RX ADMIN — GABAPENTIN 100 MG: 100 CAPSULE ORAL at 09:54

## 2018-01-01 RX ADMIN — INSULIN DETEMIR 24 UNITS: 100 INJECTION, SOLUTION SUBCUTANEOUS at 23:17

## 2018-01-01 RX ADMIN — MAGNESIUM SULFATE HEPTAHYDRATE 2 G: 40 INJECTION, SOLUTION INTRAVENOUS at 17:59

## 2018-01-01 RX ADMIN — ATORVASTATIN CALCIUM 40 MG: 40 TABLET, FILM COATED ORAL at 16:49

## 2018-01-01 RX ADMIN — Medication 1 CAPSULE: at 08:30

## 2018-01-01 RX ADMIN — SODIUM CHLORIDE 50 ML/HR: 0.9 INJECTION, SOLUTION INTRAVENOUS at 09:39

## 2018-01-01 RX ADMIN — LORATADINE 10 MG: 10 TABLET ORAL at 08:44

## 2018-01-01 RX ADMIN — CARBIDOPA AND LEVODOPA 1 TABLET: 10; 100 TABLET ORAL at 08:15

## 2018-01-01 RX ADMIN — NITROGLYCERIN 10 MCG/MIN: 20 INJECTION INTRAVENOUS at 21:19

## 2018-01-01 RX ADMIN — CARVEDILOL 12.5 MG: 12.5 TABLET, FILM COATED ORAL at 17:01

## 2018-01-01 RX ADMIN — Medication 1 MG/HR: at 18:12

## 2018-01-01 RX ADMIN — ISOSORBIDE MONONITRATE 60 MG: 60 TABLET, EXTENDED RELEASE ORAL at 08:39

## 2018-01-01 RX ADMIN — BENZONATATE 100 MG: 100 CAPSULE ORAL at 17:50

## 2018-01-01 RX ADMIN — INSULIN LISPRO 2 UNITS: 100 INJECTION, SOLUTION INTRAVENOUS; SUBCUTANEOUS at 15:57

## 2018-01-01 RX ADMIN — VITAMIN D, TAB 1000IU (100/BT) 2000 UNITS: 25 TAB at 09:54

## 2018-01-01 RX ADMIN — CARVEDILOL 12.5 MG: 12.5 TABLET, FILM COATED ORAL at 16:47

## 2018-01-01 RX ADMIN — CEFEPIME HYDROCHLORIDE 1000 MG: 1 INJECTION, SOLUTION INTRAVENOUS at 16:50

## 2018-01-01 RX ADMIN — POTASSIUM CHLORIDE 40 MEQ: 400 INJECTION, SOLUTION INTRAVENOUS at 15:17

## 2018-01-01 RX ADMIN — GUAIFENESIN 600 MG: 600 TABLET, EXTENDED RELEASE ORAL at 08:57

## 2018-01-01 RX ADMIN — AMPICILLIN SODIUM 2000 MG: 2 INJECTION, POWDER, FOR SOLUTION INTRAMUSCULAR; INTRAVENOUS at 18:48

## 2018-01-01 RX ADMIN — FENTANYL CITRATE 25 MCG: 50 INJECTION, SOLUTION INTRAMUSCULAR; INTRAVENOUS at 10:47

## 2018-01-01 RX ADMIN — Medication 1 CAPSULE: at 16:58

## 2018-01-01 RX ADMIN — LEVALBUTEROL 1.25 MG: 1.25 SOLUTION, CONCENTRATE RESPIRATORY (INHALATION) at 08:07

## 2018-01-01 RX ADMIN — BENZONATATE 100 MG: 100 CAPSULE ORAL at 09:17

## 2018-01-01 RX ADMIN — ACETAMINOPHEN 650 MG: 325 TABLET, FILM COATED ORAL at 23:27

## 2018-01-01 RX ADMIN — INSULIN DETEMIR 16 UNITS: 100 INJECTION, SOLUTION SUBCUTANEOUS at 21:11

## 2018-01-01 RX ADMIN — CEFEPIME HYDROCHLORIDE 1000 MG: 1 INJECTION, SOLUTION INTRAVENOUS at 15:05

## 2018-01-01 RX ADMIN — CARBIDOPA AND LEVODOPA 1 TABLET: 10; 100 TABLET ORAL at 10:47

## 2018-01-01 RX ADMIN — ATORVASTATIN CALCIUM 40 MG: 40 TABLET, FILM COATED ORAL at 16:34

## 2018-01-01 RX ADMIN — OXYCODONE HYDROCHLORIDE AND ACETAMINOPHEN 1 TABLET: 5; 325 TABLET ORAL at 18:28

## 2018-01-01 RX ADMIN — POTASSIUM CHLORIDE 40 MEQ: 1500 TABLET, EXTENDED RELEASE ORAL at 12:01

## 2018-01-01 RX ADMIN — LEVOTHYROXINE SODIUM 225 MCG: 175 TABLET ORAL at 06:09

## 2018-01-01 RX ADMIN — INSULIN LISPRO 2 UNITS: 100 INJECTION, SOLUTION INTRAVENOUS; SUBCUTANEOUS at 13:30

## 2018-01-01 RX ADMIN — PROPOFOL 15 MCG/KG/MIN: 10 INJECTION, EMULSION INTRAVENOUS at 10:14

## 2018-01-01 RX ADMIN — OXYCODONE HYDROCHLORIDE AND ACETAMINOPHEN 1 TABLET: 5; 325 TABLET ORAL at 09:57

## 2018-01-01 RX ADMIN — REPAGLINIDE 1 MG: 1 TABLET ORAL at 11:53

## 2018-01-01 RX ADMIN — BUMETANIDE 2 MG: 1 TABLET ORAL at 08:43

## 2018-01-01 RX ADMIN — Medication 1 SPRAY: at 17:42

## 2018-01-01 RX ADMIN — REPAGLINIDE 1 MG: 1 TABLET ORAL at 19:22

## 2018-01-01 RX ADMIN — CARVEDILOL 12.5 MG: 12.5 TABLET, FILM COATED ORAL at 08:09

## 2018-01-01 RX ADMIN — Medication 400 MG: at 19:22

## 2018-01-01 RX ADMIN — HYDRALAZINE HYDROCHLORIDE 50 MG: 25 TABLET, FILM COATED ORAL at 05:47

## 2018-01-01 RX ADMIN — GABAPENTIN 100 MG: 100 CAPSULE ORAL at 09:07

## 2018-01-01 RX ADMIN — ATORVASTATIN CALCIUM 40 MG: 40 TABLET, FILM COATED ORAL at 17:10

## 2018-01-01 RX ADMIN — BUMETANIDE 2 MG: 0.25 INJECTION INTRAMUSCULAR; INTRAVENOUS at 23:16

## 2018-01-01 RX ADMIN — ATORVASTATIN CALCIUM 40 MG: 40 TABLET, FILM COATED ORAL at 17:42

## 2018-01-01 RX ADMIN — INSULIN LISPRO 2 UNITS: 100 INJECTION, SOLUTION INTRAVENOUS; SUBCUTANEOUS at 22:03

## 2018-01-01 RX ADMIN — INSULIN DETEMIR 16 UNITS: 100 INJECTION, SOLUTION SUBCUTANEOUS at 21:45

## 2018-01-01 RX ADMIN — POLYETHYLENE GLYCOL 3350 17 G: 17 POWDER, FOR SOLUTION ORAL at 00:17

## 2018-01-01 RX ADMIN — CARBIDOPA AND LEVODOPA 1 TABLET: 10; 100 TABLET ORAL at 08:41

## 2018-01-01 RX ADMIN — CARBIDOPA AND LEVODOPA 1 TABLET: 10; 100 TABLET ORAL at 19:22

## 2018-01-01 RX ADMIN — OXYCODONE HYDROCHLORIDE AND ACETAMINOPHEN 500 MG: 500 TABLET ORAL at 08:43

## 2018-01-01 RX ADMIN — ASPIRIN 81 MG 81 MG: 81 TABLET ORAL at 08:57

## 2018-01-01 RX ADMIN — GABAPENTIN 100 MG: 100 CAPSULE ORAL at 18:27

## 2018-01-01 RX ADMIN — VITAMIN D, TAB 1000IU (100/BT) 2000 UNITS: 25 TAB at 08:57

## 2018-01-01 RX ADMIN — GABAPENTIN 100 MG: 100 CAPSULE ORAL at 19:22

## 2018-01-01 RX ADMIN — BUMETANIDE 1 MG: 0.25 INJECTION INTRAMUSCULAR; INTRAVENOUS at 07:48

## 2018-01-01 RX ADMIN — BUMETANIDE 1 MG: 1 TABLET ORAL at 08:49

## 2018-01-01 RX ADMIN — CARVEDILOL 12.5 MG: 12.5 TABLET, FILM COATED ORAL at 10:47

## 2018-01-01 RX ADMIN — INSULIN LISPRO 2 UNITS: 100 INJECTION, SOLUTION INTRAVENOUS; SUBCUTANEOUS at 23:16

## 2018-01-01 RX ADMIN — ATORVASTATIN CALCIUM 40 MG: 40 TABLET, FILM COATED ORAL at 16:55

## 2018-01-01 RX ADMIN — LEVOTHYROXINE SODIUM 225 MCG: 175 TABLET ORAL at 05:13

## 2018-01-01 RX ADMIN — FENTANYL CITRATE 25 MCG: 50 INJECTION, SOLUTION INTRAMUSCULAR; INTRAVENOUS at 10:35

## 2018-01-01 RX ADMIN — GABAPENTIN 100 MG: 100 CAPSULE ORAL at 17:59

## 2018-01-01 RX ADMIN — OXYCODONE HYDROCHLORIDE AND ACETAMINOPHEN 1 TABLET: 5; 325 TABLET ORAL at 18:04

## 2018-01-01 RX ADMIN — CARVEDILOL 12.5 MG: 12.5 TABLET, FILM COATED ORAL at 08:08

## 2018-01-01 RX ADMIN — LEVOTHYROXINE SODIUM 225 MCG: 175 TABLET ORAL at 05:26

## 2018-01-01 RX ADMIN — INSULIN LISPRO 2 UNITS: 100 INJECTION, SOLUTION INTRAVENOUS; SUBCUTANEOUS at 21:12

## 2018-01-01 RX ADMIN — FLUTICASONE PROPIONATE 1 SPRAY: 50 SPRAY, METERED NASAL at 08:19

## 2018-01-01 RX ADMIN — LEVOTHYROXINE SODIUM 225 MCG: 175 TABLET ORAL at 05:08

## 2018-01-01 RX ADMIN — AMPICILLIN SODIUM 2000 MG: 2 INJECTION, POWDER, FOR SOLUTION INTRAMUSCULAR; INTRAVENOUS at 05:49

## 2018-01-01 RX ADMIN — OXYCODONE HYDROCHLORIDE 5 MG: 5 TABLET ORAL at 05:19

## 2018-01-01 RX ADMIN — FENTANYL CITRATE 25 MCG: 50 INJECTION, SOLUTION INTRAMUSCULAR; INTRAVENOUS at 10:14

## 2018-01-01 RX ADMIN — ISOSORBIDE MONONITRATE 60 MG: 60 TABLET, EXTENDED RELEASE ORAL at 09:07

## 2018-01-01 RX ADMIN — WARFARIN SODIUM 5 MG: 5 TABLET ORAL at 17:13

## 2018-01-01 RX ADMIN — WARFARIN SODIUM 3 MG: 3 TABLET ORAL at 17:59

## 2018-01-01 RX ADMIN — ACETAMINOPHEN 650 MG: 325 TABLET, FILM COATED ORAL at 06:10

## 2018-01-01 RX ADMIN — INSULIN LISPRO 2 UNITS: 100 INJECTION, SOLUTION INTRAVENOUS; SUBCUTANEOUS at 21:44

## 2018-01-01 RX ADMIN — OXYCODONE HYDROCHLORIDE 5 MG: 5 TABLET ORAL at 06:09

## 2018-01-01 RX ADMIN — BUMETANIDE 1 MG: 0.25 INJECTION INTRAMUSCULAR; INTRAVENOUS at 15:44

## 2018-01-01 RX ADMIN — Medication 1 SPRAY: at 21:51

## 2018-01-01 RX ADMIN — CARVEDILOL 12.5 MG: 12.5 TABLET, FILM COATED ORAL at 18:04

## 2018-01-01 RX ADMIN — IPRATROPIUM BROMIDE 0.5 MG: 0.5 SOLUTION RESPIRATORY (INHALATION) at 21:44

## 2018-01-01 RX ADMIN — PHYTONADIONE 5 MG: 5 TABLET ORAL at 07:49

## 2018-01-01 RX ADMIN — LORATADINE 10 MG: 10 TABLET ORAL at 09:54

## 2018-01-01 RX ADMIN — VITAMIN D, TAB 1000IU (100/BT) 2000 UNITS: 25 TAB at 08:08

## 2018-01-01 RX ADMIN — IPRATROPIUM BROMIDE 0.5 MG: 0.5 SOLUTION RESPIRATORY (INHALATION) at 20:33

## 2018-01-01 RX ADMIN — PANTOPRAZOLE SODIUM 40 MG: 40 TABLET, DELAYED RELEASE ORAL at 05:13

## 2018-01-01 RX ADMIN — INSULIN DETEMIR 16 UNITS: 100 INJECTION, SOLUTION SUBCUTANEOUS at 21:16

## 2018-01-01 RX ADMIN — GABAPENTIN 100 MG: 100 CAPSULE ORAL at 09:39

## 2018-01-01 RX ADMIN — BUMETANIDE 1 MG: 0.25 INJECTION INTRAMUSCULAR; INTRAVENOUS at 07:37

## 2018-01-01 RX ADMIN — CARBIDOPA AND LEVODOPA 1 TABLET: 10; 100 TABLET ORAL at 20:17

## 2018-01-01 RX ADMIN — POLYETHYLENE GLYCOL 3350 17 G: 17 POWDER, FOR SOLUTION ORAL at 16:28

## 2018-01-01 RX ADMIN — CARVEDILOL 12.5 MG: 12.5 TABLET, FILM COATED ORAL at 08:30

## 2018-01-01 RX ADMIN — BUMETANIDE 1 MG: 0.25 INJECTION INTRAMUSCULAR; INTRAVENOUS at 16:32

## 2018-01-01 RX ADMIN — FLUTICASONE PROPIONATE 1 SPRAY: 50 SPRAY, METERED NASAL at 08:43

## 2018-01-01 RX ADMIN — ATORVASTATIN CALCIUM 40 MG: 40 TABLET, FILM COATED ORAL at 15:40

## 2018-01-01 RX ADMIN — MORPHINE SULFATE 1 MG: 2 INJECTION, SOLUTION INTRAMUSCULAR; INTRAVENOUS at 16:56

## 2018-01-01 RX ADMIN — PANTOPRAZOLE SODIUM 40 MG: 40 TABLET, DELAYED RELEASE ORAL at 06:58

## 2018-01-01 RX ADMIN — GABAPENTIN 100 MG: 100 CAPSULE ORAL at 08:15

## 2018-01-01 RX ADMIN — CARBIDOPA AND LEVODOPA 1 TABLET: 10; 100 TABLET ORAL at 21:50

## 2018-01-01 RX ADMIN — OXYCODONE HYDROCHLORIDE 5 MG: 5 TABLET ORAL at 21:11

## 2018-01-01 RX ADMIN — CARVEDILOL 12.5 MG: 12.5 TABLET, FILM COATED ORAL at 19:22

## 2018-01-01 RX ADMIN — INSULIN LISPRO 2 UNITS: 100 INJECTION, SOLUTION INTRAVENOUS; SUBCUTANEOUS at 21:28

## 2018-01-01 RX ADMIN — CARBIDOPA AND LEVODOPA 1 TABLET: 10; 100 TABLET ORAL at 21:29

## 2018-01-01 RX ADMIN — CARBIDOPA AND LEVODOPA 1 TABLET: 10; 100 TABLET ORAL at 21:51

## 2018-01-01 RX ADMIN — OXYCODONE HYDROCHLORIDE AND ACETAMINOPHEN 500 MG: 500 TABLET ORAL at 08:30

## 2018-01-01 RX ADMIN — HYDRALAZINE HYDROCHLORIDE 50 MG: 25 TABLET, FILM COATED ORAL at 06:58

## 2018-01-01 RX ADMIN — GABAPENTIN 100 MG: 100 CAPSULE ORAL at 17:42

## 2018-01-01 RX ADMIN — ALLOPURINOL 200 MG: 100 TABLET ORAL at 10:46

## 2018-01-01 RX ADMIN — CARBIDOPA AND LEVODOPA 1 TABLET: 10; 100 TABLET ORAL at 21:11

## 2018-01-01 RX ADMIN — OXYCODONE HYDROCHLORIDE 5 MG: 5 TABLET ORAL at 05:32

## 2018-01-01 RX ADMIN — ISOSORBIDE MONONITRATE 60 MG: 60 TABLET, EXTENDED RELEASE ORAL at 08:40

## 2018-01-01 RX ADMIN — LEVOTHYROXINE SODIUM 225 MCG: 175 TABLET ORAL at 05:20

## 2018-01-01 RX ADMIN — OXYCODONE HYDROCHLORIDE AND ACETAMINOPHEN 1 TABLET: 5; 325 TABLET ORAL at 05:26

## 2018-01-01 RX ADMIN — AMPICILLIN SODIUM 2000 MG: 2 INJECTION, POWDER, FOR SOLUTION INTRAMUSCULAR; INTRAVENOUS at 06:22

## 2018-01-01 RX ADMIN — LORATADINE 10 MG: 10 TABLET ORAL at 08:15

## 2018-01-01 RX ADMIN — LEVOTHYROXINE SODIUM 225 MCG: 125 TABLET ORAL at 06:58

## 2018-01-01 RX ADMIN — IPRATROPIUM BROMIDE 0.5 MG: 0.5 SOLUTION RESPIRATORY (INHALATION) at 07:43

## 2018-01-01 RX ADMIN — CARVEDILOL 12.5 MG: 12.5 TABLET, FILM COATED ORAL at 08:15

## 2018-01-01 RX ADMIN — GABAPENTIN 100 MG: 100 CAPSULE ORAL at 08:57

## 2018-01-01 RX ADMIN — CARBIDOPA AND LEVODOPA 1 TABLET: 10; 100 TABLET ORAL at 07:51

## 2018-01-01 RX ADMIN — WARFARIN SODIUM 5 MG: 5 TABLET ORAL at 17:54

## 2018-01-01 RX ADMIN — OXYCODONE HYDROCHLORIDE 5 MG: 5 TABLET ORAL at 09:00

## 2018-01-01 RX ADMIN — CARBIDOPA AND LEVODOPA 1 TABLET: 10; 100 TABLET ORAL at 16:15

## 2018-01-01 RX ADMIN — LACTULOSE 20 G: 20 SOLUTION ORAL at 10:47

## 2018-01-01 RX ADMIN — LEVOTHYROXINE SODIUM 225 MCG: 175 TABLET ORAL at 06:21

## 2018-01-01 RX ADMIN — ALBUMIN HUMAN 25 G: 0.25 SOLUTION INTRAVENOUS at 18:26

## 2018-01-01 RX ADMIN — OXYCODONE HYDROCHLORIDE AND ACETAMINOPHEN 500 MG: 500 TABLET ORAL at 09:54

## 2018-01-01 RX ADMIN — CEFEPIME HYDROCHLORIDE 1000 MG: 1 INJECTION, SOLUTION INTRAVENOUS at 16:59

## 2018-01-01 RX ADMIN — VITAMIN D, TAB 1000IU (100/BT) 2000 UNITS: 25 TAB at 08:43

## 2018-01-01 RX ADMIN — WARFARIN SODIUM 6.5 MG: 2.5 TABLET ORAL at 21:17

## 2018-01-01 RX ADMIN — INSULIN DETEMIR 16 UNITS: 100 INJECTION, SOLUTION SUBCUTANEOUS at 22:14

## 2018-01-01 RX ADMIN — FLUTICASONE PROPIONATE 1 SPRAY: 50 SPRAY, METERED NASAL at 10:15

## 2018-01-01 RX ADMIN — POLYETHYLENE GLYCOL 3350 17 G: 17 POWDER, FOR SOLUTION ORAL at 09:18

## 2018-01-01 RX ADMIN — Medication 400 MG: at 18:04

## 2018-01-01 RX ADMIN — GABAPENTIN 100 MG: 100 CAPSULE ORAL at 17:33

## 2018-01-01 RX ADMIN — CARBIDOPA AND LEVODOPA 1 TABLET: 10; 100 TABLET ORAL at 16:45

## 2018-01-01 RX ADMIN — Medication 400 MG: at 08:30

## 2018-01-01 RX ADMIN — FLUTICASONE PROPIONATE 1 SPRAY: 50 SPRAY, METERED NASAL at 08:49

## 2018-01-01 RX ADMIN — BUMETANIDE 1 MG: 0.25 INJECTION INTRAMUSCULAR; INTRAVENOUS at 18:06

## 2018-01-01 RX ADMIN — Medication 400 MG: at 09:08

## 2018-01-01 RX ADMIN — LACTULOSE 20 G: 20 SOLUTION ORAL at 19:21

## 2018-01-01 RX ADMIN — GABAPENTIN 100 MG: 100 CAPSULE ORAL at 10:48

## 2018-01-01 RX ADMIN — Medication 400 MG: at 18:35

## 2018-01-01 RX ADMIN — LORATADINE 10 MG: 10 TABLET ORAL at 08:40

## 2018-01-01 RX ADMIN — EPHEDRINE SULFATE 5 MG: 50 INJECTION, SOLUTION INTRAMUSCULAR; INTRAVENOUS; SUBCUTANEOUS at 10:51

## 2018-01-01 RX ADMIN — Medication 0.5 MG/HR: at 15:53

## 2018-01-01 RX ADMIN — CEFEPIME HYDROCHLORIDE 1000 MG: 1 INJECTION, SOLUTION INTRAVENOUS at 15:55

## 2018-01-01 RX ADMIN — POTASSIUM CHLORIDE 40 MEQ: 1500 TABLET, EXTENDED RELEASE ORAL at 10:36

## 2018-01-01 RX ADMIN — CARVEDILOL 12.5 MG: 12.5 TABLET, FILM COATED ORAL at 17:43

## 2018-01-01 RX ADMIN — ISOSORBIDE MONONITRATE 60 MG: 60 TABLET, EXTENDED RELEASE ORAL at 09:10

## 2018-01-01 RX ADMIN — LORATADINE 10 MG: 10 TABLET ORAL at 08:39

## 2018-01-01 RX ADMIN — POTASSIUM CHLORIDE 40 MEQ: 1500 TABLET, EXTENDED RELEASE ORAL at 09:31

## 2018-01-01 RX ADMIN — CARBIDOPA AND LEVODOPA 1 TABLET: 10; 100 TABLET ORAL at 17:15

## 2018-01-01 RX ADMIN — CARVEDILOL 12.5 MG: 12.5 TABLET, FILM COATED ORAL at 08:40

## 2018-01-01 RX ADMIN — Medication 1 TABLET: at 09:54

## 2018-01-01 RX ADMIN — CARBIDOPA AND LEVODOPA 1 TABLET: 10; 100 TABLET ORAL at 09:07

## 2018-01-01 RX ADMIN — OXYCODONE HYDROCHLORIDE 5 MG: 5 TABLET ORAL at 21:29

## 2018-01-01 RX ADMIN — OXYCODONE HYDROCHLORIDE AND ACETAMINOPHEN 1 TABLET: 5; 325 TABLET ORAL at 05:37

## 2018-01-01 RX ADMIN — ALBUTEROL SULFATE 2.5 MG: 2.5 SOLUTION RESPIRATORY (INHALATION) at 14:19

## 2018-01-01 RX ADMIN — Medication 400 MG: at 17:33

## 2018-01-01 RX ADMIN — Medication 1 SPRAY: at 20:18

## 2018-01-01 RX ADMIN — AMPICILLIN SODIUM 2000 MG: 2 INJECTION, POWDER, FOR SOLUTION INTRAMUSCULAR; INTRAVENOUS at 08:39

## 2018-01-01 RX ADMIN — OXYCODONE HYDROCHLORIDE 5 MG: 5 TABLET ORAL at 19:48

## 2018-01-01 RX ADMIN — CARBIDOPA AND LEVODOPA 1 TABLET: 10; 100 TABLET ORAL at 08:08

## 2018-01-01 RX ADMIN — ISOSORBIDE MONONITRATE 60 MG: 60 TABLET, EXTENDED RELEASE ORAL at 08:49

## 2018-01-01 RX ADMIN — PANTOPRAZOLE SODIUM 40 MG: 40 TABLET, DELAYED RELEASE ORAL at 06:03

## 2018-01-01 RX ADMIN — CARBIDOPA AND LEVODOPA 1 TABLET: 10; 100 TABLET ORAL at 21:16

## 2018-01-01 RX ADMIN — Medication 1 CAPSULE: at 16:47

## 2018-01-01 RX ADMIN — HYDRALAZINE HYDROCHLORIDE 50 MG: 25 TABLET, FILM COATED ORAL at 13:05

## 2018-01-01 RX ADMIN — CARBIDOPA AND LEVODOPA 1 TABLET: 10; 100 TABLET ORAL at 16:49

## 2018-01-01 RX ADMIN — CARBIDOPA AND LEVODOPA 1 TABLET: 10; 100 TABLET ORAL at 08:50

## 2018-01-01 RX ADMIN — BUMETANIDE 1 MG: 1 TABLET ORAL at 08:57

## 2018-01-01 RX ADMIN — HYDRALAZINE HYDROCHLORIDE 50 MG: 25 TABLET, FILM COATED ORAL at 21:17

## 2018-01-01 RX ADMIN — Medication 1 SPRAY: at 21:35

## 2018-01-01 RX ADMIN — Medication 1 SPRAY: at 17:13

## 2018-01-01 RX ADMIN — CARBIDOPA AND LEVODOPA 1 TABLET: 10; 100 TABLET ORAL at 09:09

## 2018-01-01 RX ADMIN — GABAPENTIN 100 MG: 100 CAPSULE ORAL at 08:49

## 2018-01-01 RX ADMIN — CARBIDOPA AND LEVODOPA 1 TABLET: 10; 100 TABLET ORAL at 09:46

## 2018-01-01 RX ADMIN — GABAPENTIN 100 MG: 100 CAPSULE ORAL at 07:50

## 2018-01-01 RX ADMIN — CARBIDOPA AND LEVODOPA 1 TABLET: 10; 100 TABLET ORAL at 16:35

## 2018-01-01 RX ADMIN — GABAPENTIN 100 MG: 100 CAPSULE ORAL at 08:38

## 2018-01-01 RX ADMIN — Medication 1 CAPSULE: at 17:43

## 2018-01-01 RX ADMIN — CARBIDOPA AND LEVODOPA 1 TABLET: 10; 100 TABLET ORAL at 21:45

## 2018-01-01 RX ADMIN — PANTOPRAZOLE SODIUM 40 MG: 40 TABLET, DELAYED RELEASE ORAL at 05:47

## 2018-01-01 RX ADMIN — PANTOPRAZOLE SODIUM 40 MG: 40 TABLET, DELAYED RELEASE ORAL at 05:39

## 2018-01-01 RX ADMIN — Medication 400 MG: at 12:01

## 2018-01-01 RX ADMIN — CARBIDOPA AND LEVODOPA 1 TABLET: 10; 100 TABLET ORAL at 15:40

## 2018-01-01 RX ADMIN — LEVALBUTEROL 1.25 MG: 1.25 SOLUTION, CONCENTRATE RESPIRATORY (INHALATION) at 20:33

## 2018-01-01 RX ADMIN — LEVALBUTEROL 1.25 MG: 1.25 SOLUTION, CONCENTRATE RESPIRATORY (INHALATION) at 07:42

## 2018-01-01 RX ADMIN — INSULIN LISPRO 1 UNITS: 100 INJECTION, SOLUTION INTRAVENOUS; SUBCUTANEOUS at 01:29

## 2018-01-01 RX ADMIN — BUMETANIDE 1 MG: 0.25 INJECTION INTRAMUSCULAR; INTRAVENOUS at 20:38

## 2018-01-01 RX ADMIN — LEVOTHYROXINE SODIUM 225 MCG: 125 TABLET ORAL at 06:03

## 2018-01-01 RX ADMIN — FLUTICASONE PROPIONATE 1 SPRAY: 50 SPRAY, METERED NASAL at 08:08

## 2018-01-01 RX ADMIN — HEPARIN SODIUM 5000 UNITS: 5000 INJECTION, SOLUTION INTRAVENOUS; SUBCUTANEOUS at 22:13

## 2018-01-01 RX ADMIN — ISOSORBIDE MONONITRATE 60 MG: 60 TABLET, EXTENDED RELEASE ORAL at 08:57

## 2018-01-01 RX ADMIN — Medication 0.5 MG/HR: at 01:30

## 2018-01-01 RX ADMIN — ISOSORBIDE MONONITRATE 60 MG: 60 TABLET, EXTENDED RELEASE ORAL at 09:53

## 2018-01-01 RX ADMIN — GABAPENTIN 100 MG: 100 CAPSULE ORAL at 08:40

## 2018-01-01 RX ADMIN — INSULIN DETEMIR 16 UNITS: 100 INJECTION, SOLUTION SUBCUTANEOUS at 21:12

## 2018-01-01 RX ADMIN — POTASSIUM CHLORIDE 40 MEQ: 1500 TABLET, EXTENDED RELEASE ORAL at 17:01

## 2018-01-01 RX ADMIN — POTASSIUM CHLORIDE 40 MEQ: 1500 TABLET, EXTENDED RELEASE ORAL at 08:49

## 2018-01-01 RX ADMIN — WARFARIN SODIUM 5 MG: 5 TABLET ORAL at 18:14

## 2018-01-01 RX ADMIN — Medication 1 SPRAY: at 21:13

## 2018-01-01 RX ADMIN — GABAPENTIN 100 MG: 100 CAPSULE ORAL at 17:01

## 2018-01-01 RX ADMIN — HEPARIN SODIUM 5000 UNITS: 5000 INJECTION, SOLUTION INTRAVENOUS; SUBCUTANEOUS at 05:47

## 2018-01-01 RX ADMIN — POTASSIUM CHLORIDE 40 MEQ: 1500 TABLET, EXTENDED RELEASE ORAL at 08:43

## 2018-01-01 RX ADMIN — ALBUMIN HUMAN 25 G: 0.25 SOLUTION INTRAVENOUS at 11:18

## 2018-01-01 RX ADMIN — CARVEDILOL 12.5 MG: 12.5 TABLET, FILM COATED ORAL at 15:40

## 2018-01-01 RX ADMIN — ISOSORBIDE MONONITRATE 60 MG: 60 TABLET, EXTENDED RELEASE ORAL at 08:30

## 2018-01-01 RX ADMIN — LEVALBUTEROL 1.25 MG: 1.25 SOLUTION, CONCENTRATE RESPIRATORY (INHALATION) at 08:55

## 2018-01-01 RX ADMIN — POTASSIUM CHLORIDE 20 MEQ: 200 INJECTION, SOLUTION INTRAVENOUS at 12:19

## 2018-01-01 RX ADMIN — ASPIRIN 81 MG 81 MG: 81 TABLET ORAL at 08:15

## 2018-01-01 RX ADMIN — HEPARIN SODIUM 5000 UNITS: 5000 INJECTION, SOLUTION INTRAVENOUS; SUBCUTANEOUS at 17:10

## 2018-01-01 RX ADMIN — EPHEDRINE SULFATE 5 MG: 50 INJECTION, SOLUTION INTRAMUSCULAR; INTRAVENOUS; SUBCUTANEOUS at 10:54

## 2018-01-01 RX ADMIN — BUMETANIDE 2 MG: 1 TABLET ORAL at 08:07

## 2018-01-01 RX ADMIN — LEVOTHYROXINE SODIUM 225 MCG: 175 TABLET ORAL at 05:47

## 2018-01-01 RX ADMIN — ASPIRIN 81 MG 81 MG: 81 TABLET ORAL at 09:54

## 2018-01-01 RX ADMIN — ATORVASTATIN CALCIUM 40 MG: 40 TABLET, FILM COATED ORAL at 16:47

## 2018-01-01 RX ADMIN — LEVOTHYROXINE SODIUM 225 MCG: 175 TABLET ORAL at 06:11

## 2018-01-01 RX ADMIN — ALLOPURINOL 200 MG: 100 TABLET ORAL at 08:30

## 2018-01-01 RX ADMIN — VITAMIN D, TAB 1000IU (100/BT) 2000 UNITS: 25 TAB at 08:15

## 2018-01-01 RX ADMIN — HEPARIN SODIUM 5000 UNITS: 5000 INJECTION, SOLUTION INTRAVENOUS; SUBCUTANEOUS at 15:05

## 2018-01-01 RX ADMIN — IPRATROPIUM BROMIDE 0.5 MG: 0.5 SOLUTION RESPIRATORY (INHALATION) at 20:25

## 2018-01-01 RX ADMIN — LORATADINE 10 MG: 10 TABLET ORAL at 07:50

## 2018-01-01 RX ADMIN — OXYCODONE HYDROCHLORIDE 5 MG: 5 TABLET ORAL at 20:17

## 2018-01-01 RX ADMIN — INSULIN DETEMIR 24 UNITS: 100 INJECTION, SOLUTION SUBCUTANEOUS at 22:04

## 2018-01-01 RX ADMIN — CARBIDOPA AND LEVODOPA 1 TABLET: 10; 100 TABLET ORAL at 21:35

## 2018-01-01 RX ADMIN — ASPIRIN 81 MG 81 MG: 81 TABLET ORAL at 08:38

## 2018-01-01 RX ADMIN — ONDANSETRON HYDROCHLORIDE 4 MG: 2 INJECTION, SOLUTION INTRAVENOUS at 10:15

## 2018-01-01 RX ADMIN — IPRATROPIUM BROMIDE 0.5 MG: 0.5 SOLUTION RESPIRATORY (INHALATION) at 08:49

## 2018-01-01 RX ADMIN — Medication 400 MG: at 09:45

## 2018-01-01 RX ADMIN — IPRATROPIUM BROMIDE 0.5 MG: 0.5 SOLUTION RESPIRATORY (INHALATION) at 08:07

## 2018-01-01 RX ADMIN — LORATADINE 10 MG: 10 TABLET ORAL at 08:07

## 2018-01-01 RX ADMIN — HYDRALAZINE HYDROCHLORIDE 50 MG: 25 TABLET, FILM COATED ORAL at 21:16

## 2018-01-01 RX ADMIN — CARBIDOPA AND LEVODOPA 1 TABLET: 10; 100 TABLET ORAL at 22:04

## 2018-01-01 RX ADMIN — OXYCODONE HYDROCHLORIDE AND ACETAMINOPHEN 500 MG: 500 TABLET ORAL at 08:49

## 2018-01-01 RX ADMIN — POTASSIUM CHLORIDE 40 MEQ: 1500 TABLET, EXTENDED RELEASE ORAL at 17:13

## 2018-01-01 RX ADMIN — VITAMIN D, TAB 1000IU (100/BT) 2000 UNITS: 25 TAB at 07:50

## 2018-01-01 RX ADMIN — BUMETANIDE 2 MG: 0.25 INJECTION INTRAMUSCULAR; INTRAVENOUS at 09:55

## 2018-01-01 RX ADMIN — ASPIRIN 81 MG 81 MG: 81 TABLET ORAL at 09:09

## 2018-01-01 RX ADMIN — PANTOPRAZOLE SODIUM 40 MG: 40 TABLET, DELAYED RELEASE ORAL at 05:26

## 2018-01-01 RX ADMIN — CARVEDILOL 12.5 MG: 12.5 TABLET, FILM COATED ORAL at 08:43

## 2018-01-01 RX ADMIN — CARVEDILOL 12.5 MG: 12.5 TABLET, FILM COATED ORAL at 15:50

## 2018-01-01 RX ADMIN — Medication 400 MG: at 09:07

## 2018-01-01 RX ADMIN — Medication 1 CAPSULE: at 17:13

## 2018-01-01 RX ADMIN — CARVEDILOL 12.5 MG: 12.5 TABLET, FILM COATED ORAL at 17:13

## 2018-01-01 RX ADMIN — BUMETANIDE 1 MG: 0.25 INJECTION INTRAMUSCULAR; INTRAVENOUS at 23:56

## 2018-01-01 RX ADMIN — CARBIDOPA AND LEVODOPA 1 TABLET: 10; 100 TABLET ORAL at 16:52

## 2018-01-01 RX ADMIN — DEXTROSE MONOHYDRATE 25 ML: 25 INJECTION, SOLUTION INTRAVENOUS at 18:33

## 2018-01-01 RX ADMIN — CARBIDOPA AND LEVODOPA 1 TABLET: 10; 100 TABLET ORAL at 15:46

## 2018-01-01 RX ADMIN — MORPHINE SULFATE 1 MG: 2 INJECTION, SOLUTION INTRAMUSCULAR; INTRAVENOUS at 13:44

## 2018-01-01 RX ADMIN — GABAPENTIN 100 MG: 100 CAPSULE ORAL at 08:43

## 2018-01-01 RX ADMIN — OXYCODONE HYDROCHLORIDE AND ACETAMINOPHEN 1 TABLET: 5; 325 TABLET ORAL at 22:35

## 2018-01-01 RX ADMIN — GUAIFENESIN 600 MG: 600 TABLET, EXTENDED RELEASE ORAL at 21:11

## 2018-01-01 RX ADMIN — CARVEDILOL 12.5 MG: 12.5 TABLET, FILM COATED ORAL at 08:32

## 2018-01-11 NOTE — PROCEDURES
Procedures by Keena Bocanegra RN  at 12/23/2016 12:47 PM      Author:  Keena Bocanegra RN Service:  Interventional Radiology  Author Type:  Registered Nurse    Filed:  12/23/2016 12:56 PM Date of Service:  12/23/2016 12:47 PM Status:  Attested    :  Keena Bocanegra RN (Registered Nurse)  Cosigner:  Nila Mays MD at 1/9/2017  8:45 AM      Procedure Orders:       1  Insert PICC line [[id number]] ordered by Arslan Gonzalez MD at 12/23/16 7480               Attestation signed by Nila Mays MD at 1/9/2017  8:45 AM           I have reviewed the notes, assessments, and/or procedures performed by Adama Corado RN, I concur with her/his documentation of Kourtney Delaney  Insert PICC line  Date/Time: 12/23/2016 12:51 PM  Performed by: Nayana Godfrey  Authorized by: Eduardo Ngo     Patient location:  Other (comment) (IR [facility])  Other  Assisting Provider: No    Consent:     Consent obtained:  Written    Consent given by:  Patient    Risks discussed:  Incorrect placement, infection, bleeding, nerve damage and arterial puncture    Alternatives discussed:  Delayed treatment  Universal protocol:     Procedure explained and questions answered to patient or proxy's satisfaction: yes      Relevant documents present and verified: yes      Test results available and properly labeled: yes       Imaging studies available: yes      Required blood products, implants, devices, and special equipment available: yes      Site/side marked: yes      Immediately prior to procedure, a time out was called: yes      Patient identity confirmed:  Verbally with patient, arm band and hospital-assigned identification number  Pre-procedure details:     Hand hygiene: Hand hygiene performed prior to insertion      Sterile barrier technique:  All elements of maximal sterile technique followed      Skin preparation:  ChloraPrep    Skin preparation agent: Skin preparation agent completely dried prior to procedure    Indications:     PICC line indications: vascular access and long term antibiotics    Anesthesia (see MAR for exact dosages): Anesthesia method:  Local infiltration    Local anesthetic:  Lidocaine 1% w/o epi  Procedure details:     Location:  [place]    Vessel type: vein      Laterality:  Right    Site selection rationale:  Left pacer    Approach: percutaneous technique used      Patient position:  Flat    Procedural supplies:  Double lumen    Catheter size:  5 Fr    Landmarks identified: yes      Ultrasound guidance: yes      Sterile ultrasound techniques: Sterile gel and sterile probe covers were used      Number of attempts:  1    Successful placement: yes      Vessel of catheter tip end:  SVC    Total catheter length (cm):  48    Catheter out on skin (cm):  0    Max flow rate:  999ml/hr    Arm circumference:  36cm  Post-procedure details:     Post-procedure:  Securement device placed    Assessment:  Blood return through all ports, placement verified by x-ray and free fluid flow (in IR)    Post-procedure complications: none      Patient tolerance of procedure:   Tolerated well, no immediate complications                     Received for:Provider  EPIC   Jan 9 2017  8:46AM [facility] Standard Time

## 2018-01-29 NOTE — ED NOTES
[employer] called and updated on patient condition, discharge status and transportation time        Ramy Armenta RN  01/29/18 9600

## 2018-01-29 NOTE — ED PROVIDER NOTES
History  Chief Complaint   Patient presents with    Back Pain     According to [facility] staff - pt and family requesting eval for back pain unrelieved by Percocet and repositioning that normally helps     HPI     80 yoM presents with acute on chronic lumbago  He always has pain, but tonight it's less relieved by percocet than usual   He states he can't remember not having pain like this, but the SNF notes he usually settles down after percocet  He walks with assistance when he's out of bed  No abd pain, n/v/d/c  No bowel dysfxn, has chronic Motta and is nearly complete with a levaquin course for possible uti earlier this week  No falls  Mdm:  Imp: back pain possible occult fx, he warrants CT lumbar and pain control  Prior to Admission Medications   Prescriptions Last Dose Informant Patient Reported? Taking? B Complex-C-Folic Acid (MICHEAL CAPS) 1 MG CAPS 1/28/2018 at Unknown time Other (Specify) Yes Yes   Sig: Take 1 capsule by mouth daily  Cholecalciferol (VITAMIN D3) 2000 UNITS capsule 1/28/2018 at Unknown time Other (Specify) Yes Yes   Sig: Take 2,000 Units by mouth daily  Insulin Syringes, Disposable, (MONOJECT INS SYR 1CC) U-100 1 ML MISC 1/28/2018 at Unknown time Other (Specify) Yes Yes   Sig: by Does not apply route  Probiotic Product (JOHNNIE-BID PROBIOTIC PO) 1/28/2018 at Unknown time Other (Specify) Yes Yes   Sig: Take 1 tablet by mouth daily   Sennosides-Docusate Sodium (SENEXON-S PO) 1/28/2018 at Unknown time Other (Specify) Yes Yes   Sig: Take 1 tablet by mouth daily  acetaminophen (TYLENOL) 325 mg tablet Past Month at Unknown time Other (Specify) Yes Yes   Sig: Take 650 mg by mouth every 6 (six) hours as needed for mild pain or fever (not to exceed 3 gm/24 hrs)       ascorbic acid (VITAMIN C) 500 mg tablet 1/28/2018 at Unknown time Other (Specify) Yes Yes   Sig: Take 500 mg by mouth daily   aspirin 81 MG tablet 1/28/2018 at Unknown time Other (Specify) Yes Yes   Sig: Take 81 mg by mouth daily    bumetanide (BUMEX) 2 mg tablet 1/28/2018 at Unknown time Other (Specify) Yes Yes   Sig: Take 2 mg by mouth daily     carbidopa-levodopa (SINEMET)  mg per tablet 1/28/2018 at Unknown time Other (Specify) Yes Yes   Sig: Take 1 tablet by mouth 3 (three) times a day  carvedilol (COREG) 12 5 mg tablet 1/28/2018 at Unknown time Other (Specify) Yes Yes   Sig: Take 12 5 mg by mouth 2 (two) times a day with meals  Hold for HR < 60 or SBP < 100  diphenhydrAMINE (BENADRYL) 50 mg capsule Past Month at Unknown time Other (Specify) Yes Yes   Sig: Take 50 mg by mouth every 8 (eight) hours as needed for itching  febuxostat (ULORIC) 40 mg tablet 1/28/2018 at Unknown time Other (Specify) Yes Yes   Sig: Take 80 mg by mouth daily  Dose = 2 tablets (80 mg)    gabapentin (NEURONTIN) 100 mg capsule 1/28/2018 at Unknown time Other (Specify) Yes Yes   Sig: Take 200 mg by mouth 2 (two) times a day  Two capsules (200 mg) twice daily    hydrALAZINE (APRESOLINE) 50 mg tablet 1/28/2018 at Unknown time Other (Specify) Yes Yes   Sig: Take 50 mg by mouth every 8 (eight) hours  insulin aspart (NovoLOG) 100 units/mL injection 1/28/2018 at Unknown time Other (Specify) Yes Yes   Sig: Inject under the skin 2 (two) times a day before meals Indications: sliding scale  Accuchecks twice daily at 0700 and 1600 with coverage per sliding scale    insulin detemir (LEVEMIR) 100 units/mL subcutaneous injection 1/28/2018 at Unknown time Other (Specify) Yes Yes   Sig: Inject 24 Units under the skin daily at bedtime   isosorbide mononitrate (IMDUR) 60 mg 24 hr tablet 1/28/2018 at Unknown time Other (Specify) Yes Yes   Sig: Take 60 mg by mouth daily  Hold for SBP < 110    lactulose 20 g/30 mL Past Week at Unknown time Other (Specify) Yes Yes   Sig: Take 20 g by mouth 3 (three) times a week   On Monday, Wednesday, and Friday mornings only   levofloxacin (LEVAQUIN) 250 mg tablet 1/28/2018 at Unknown time Other (Specify) Yes Yes   Sig: Take 250 mg by mouth daily at bedtime   levothyroxine 200 mcg tablet 1/28/2018 at Unknown time Other (Specify) Yes Yes   Sig: Take 225 mcg by mouth daily in the early morning 200 mcg tablet plus 25 mcg tablet to equal 225 mcg dose    loratadine (CLARITIN) 10 mg tablet 1/28/2018 at Unknown time Other (Specify) Yes Yes   Sig: Take 10 mg by mouth daily   magnesium oxide (MAG-OX) 400 mg 1/28/2018 at Unknown time Other (Specify) Yes Yes   Sig: Take 800 mg by mouth 2 (two) times a day     metolazone (ZAROXOLYN) 5 mg tablet Past Week at Unknown time Other (Specify) Yes Yes   Sig: Take 5 mg by mouth every other day   nitroglycerin (NITROSTAT) 0 4 mg SL tablet Unknown at Unknown time Other (Specify) Yes No   Sig: Place 0 4 mg under the tongue every 5 (five) minutes as needed for chest pain  oxyCODONE-acetaminophen (PERCOCET) 5-325 mg per tablet 1/29/2018 at Unknown time Other (Specify) Yes Yes   Sig: Take 1 tablet by mouth every 4 (four) hours as needed for moderate pain   pantoprazole (PROTONIX) 40 mg tablet 1/28/2018 at Unknown time Other (Specify) Yes Yes   Sig: Take 40 mg by mouth daily in the early morning  polyethylene glycol-propylene glycol (SYSTANE) 0 4-0 3 % 1/28/2018 at Unknown time Other (Specify) Yes Yes   Sig: Administer 1 drop to both eyes 3 (three) times a day  potassium chloride (KLOR-CON) 20 mEq packet 1/28/2018 at Unknown time Other (Specify) Yes Yes   Sig: Take 20 mEq by mouth 4 (four) times a day   repaglinide (PRANDIN) 0 5 mg tablet 1/28/2018 at Unknown time Other (Specify) Yes Yes   Sig: Take 0 5 mg by mouth 2 (two) times a day before meals  sodium phosphate (FLEET) enema Unknown at Unknown time Other (Specify) Yes No   Sig: Insert 1 enema into the rectum once follow package directions   sorbitol 70 % solution Unknown at Unknown time Other (Specify) Yes No   Sig: Take 15 mL by mouth daily as needed (15 ml if no BM on day 3, 30 ml if no BM on day 4)       warfarin (COUMADIN) 4 mg tablet 1/28/2018 at Unknown time Other (Specify) No Yes   Sig: Take 1 5 tablets by mouth daily for 30 days 6MG M,W,F 5 5 ON T,TH SAT SUN   Patient taking differently: Take 6 mg by mouth daily        Facility-Administered Medications: None       Past Medical History:   Diagnosis Date    Anemia     Atrial fibrillation (HCC)     BPH (benign prostatic hypertrophy)     CAD (coronary artery disease)     Cancer (HCC)     thyroid    Cardiac disease     atrial fib   CHF (congestive heart failure) (HCC)     Chronic systolic heart failure (HCC)     Ef 25%    Degenerative joint disease     Depression     Diabetes mellitus ([address] )     Disease of thyroid gland     Gastroparesis     GERD (gastroesophageal reflux disease)     Gout     Hyperlipidemia     Hypertension     Hypokalemia     Neuropathy     Other fluid overload     fluid restriction    Psychiatric disorder     depression    PVD (peripheral vascular disease) (HCC)     Renal disorder     stage 3 chronic kidney disease    Sick sinus syndrome (HCC)     Sleep apnea     Thrombocytopenia (HCC)     Tracheobronchitis        Past Surgical History:   Procedure Laterality Date    ABDOMINAL SURGERY      cholecystectomy    CARDIAC PACEMAKER PLACEMENT      CARPAL TUNNEL RELEASE      bilateral    CHOLECYSTECTOMY      CORONARY ARTERY BYPASS GRAFT      EYE SURGERY      bilateral CAT EXT W/IOL    HERNIA REPAIR      JOINT REPLACEMENT         Family History   Problem Relation Age of Onset    Hypertension Daughter      I have reviewed and agree with the history as documented  Social History   Substance Use Topics    Smoking status: Former Smoker     Packs/day: 3 00     Years: 30 00     Types: Cigarettes    Smokeless tobacco: Never Used      Comment: quit late 40's yrs old;total tobacco use 30yrs,used 3 pks of cigarettes form 3-4 yrs then changed to 10 cigars/day    Alcohol use No        Review of Systems   Constitutional: Negative    Negative for appetite change, diaphoresis, fatigue and fever  HENT: Negative for congestion, dental problem, drooling, ear discharge, ear pain, postnasal drip, rhinorrhea, sore throat, trouble swallowing and voice change  Eyes: Negative for photophobia, pain, discharge, redness and visual disturbance  Respiratory: Negative for cough, choking, chest tightness, shortness of breath, wheezing and stridor  Cardiovascular: Negative for chest pain, palpitations and leg swelling  Gastrointestinal: Negative for abdominal pain, blood in stool, constipation, diarrhea, nausea and vomiting  Endocrine: Negative  Genitourinary: Negative  Musculoskeletal: Positive for back pain  Skin: Negative  Allergic/Immunologic: Negative  Neurological: Negative  Hematological: Negative  Psychiatric/Behavioral: Negative  Physical Exam  ED Triage Vitals [01/29/18 0420]   Temperature Pulse Respirations Blood Pressure SpO2   99 1 °F (37 3 °C) 69 20 161/72 100 %      Temp Source Heart Rate Source Patient Position - Orthostatic VS BP Location FiO2 (%)   Temporal Monitor Lying Left arm --      Pain Score       9           Orthostatic Vital Signs  Vitals:    01/29/18 0420 01/29/18 0500 01/29/18 0730 01/29/18 0800   BP: 161/72 135/61 132/70 132/70   Pulse: 69 70 70 70   Patient Position - Orthostatic VS: Lying Lying Lying Lying       Physical Exam   Constitutional: He is oriented to person, place, and time  He appears well-developed and well-nourished  No distress  Depressive affect elderly male  HENT:   Head: Normocephalic and atraumatic  Nose: Nose normal    Mouth/Throat: Oropharynx is clear and moist    Eyes: Conjunctivae and EOM are normal  Pupils are equal, round, and reactive to light  Neck: Normal range of motion  Neck supple  No thyromegaly present  Cardiovascular: Normal rate, regular rhythm, normal heart sounds and intact distal pulses  Exam reveals no gallop and no friction rub  No murmur heard    Pulmonary/Chest: Effort normal and breath sounds normal  No stridor  No respiratory distress  He has no wheezes  He has no rales  He exhibits no tenderness  Abdominal: Soft  Bowel sounds are normal  There is no tenderness  There is no guarding  Musculoskeletal: Normal range of motion  He exhibits tenderness  He exhibits no deformity  Severe pain with flexion at the hips bilaterally and TTP of the lumbar midline and paraspinal musculature  No deformities  Lifts legs off the bed with equal strength unassisted without hudson /   Neurological: He is alert and oriented to person, place, and time  No sensory deficit  He exhibits normal muscle tone  Coordination normal    No myoclonus or hyperreflexia of lower limbs  S1 sensation normal bilaterally  Skin: Skin is warm and dry  Psychiatric: He has a normal mood and affect  Vitals reviewed  ED Medications  Medications   HYDROmorphone (DILAUDID) injection 1 mg (1 mg Intramuscular Given 1/29/18 0430)       Diagnostic Studies  Results Reviewed     None                 CT spine lumbar without contrast   Final Result by Sapna Rose DO (01/29 2234)   Degenerative changes of the lumbar spine, most pronounced L4-L5  The findings have not significantly changed from the prior CT scan of the abdomen and pelvis           Workstation performed: [id number]                    Procedures  Procedures       Phone Contacts  ED Phone Contact    ED Course  ED Course                                MDM  CritCare Time    Disposition  Final diagnoses:   Spinal stenosis at L4-L5 level   Low back pain     Time reflects when diagnosis was documented in both MDM as applicable and the Disposition within this note     Time User Action Codes Description Comment    1/29/2018  5:46 AM Vj Munoz Add [place] Area Spinal stenosis at L4-L5 level     1/29/2018  5:46 AM Vj Munoz Add [M54 5] Low back pain       ED Disposition     ED Disposition Condition Comment    Discharge  Mathieu Phelan discharge to home/self care  Condition at discharge: Good        Follow-up Information     Follow up With Specialties Details Why Bernardino Maharaj DO Anesthesiology, Pain Medicine Schedule an appointment as soon as possible for a visit for severe low back arthritis [address] [facility] [address] [phone]          Discharge Medication List as of 1/29/2018  6:02 AM      CONTINUE these medications which have NOT CHANGED    Details   acetaminophen (TYLENOL) 325 mg tablet Take 650 mg by mouth every 6 (six) hours as needed for mild pain or fever (not to exceed 3 gm/24 hrs)  , Historical Med      ascorbic acid (VITAMIN C) 500 mg tablet Take 500 mg by mouth daily, Historical Med      aspirin 81 MG tablet Take 81 mg by mouth daily  , Historical Med      B Complex-C-Folic Acid (MICHEAL CAPS) 1 MG CAPS Take 1 capsule by mouth daily  , Historical Med      bumetanide (BUMEX) 2 mg tablet Take 2 mg by mouth daily  , Historical Med      carbidopa-levodopa (SINEMET)  mg per tablet Take 1 tablet by mouth 3 (three) times a day , Historical Med      carvedilol (COREG) 12 5 mg tablet Take 12 5 mg by mouth 2 (two) times a day with meals  Hold for HR < 60 or SBP < 100  , Historical Med      Cholecalciferol (VITAMIN D3) 2000 UNITS capsule Take 2,000 Units by mouth daily  , Historical Med      diphenhydrAMINE (BENADRYL) 50 mg capsule Take 50 mg by mouth every 8 (eight) hours as needed for itching , Historical Med      febuxostat (ULORIC) 40 mg tablet Take 80 mg by mouth daily  Dose = 2 tablets (80 mg) , Historical Med      gabapentin (NEURONTIN) 100 mg capsule Take 200 mg by mouth 2 (two) times a day  Two capsules (200 mg) twice daily , Historical Med      hydrALAZINE (APRESOLINE) 50 mg tablet Take 50 mg by mouth every 8 (eight) hours  , Historical Med      insulin aspart (NovoLOG) 100 units/mL injection Inject under the skin 2 (two) times a day before meals Indications: sliding scale   Accuchecks twice daily at 0700 and 1600 with coverage per sliding scale , Historical Med      insulin detemir (LEVEMIR) 100 units/mL subcutaneous injection Inject 24 Units under the skin daily at bedtime, Historical Med      Insulin Syringes, Disposable, (MONOJECT INS SYR 1CC) U-100 1 ML MISC by Does not apply route , Historical Med      isosorbide mononitrate (IMDUR) 60 mg 24 hr tablet Take 60 mg by mouth daily  Hold for SBP < 110 , Historical Med      lactulose 20 g/30 mL Take 20 g by mouth 3 (three) times a week  On Monday, Wednesday, and Friday mornings only, Historical Med      levofloxacin (LEVAQUIN) 250 mg tablet Take 250 mg by mouth daily at bedtime, Historical Med      levothyroxine 200 mcg tablet Take 225 mcg by mouth daily in the early morning 200 mcg tablet plus 25 mcg tablet to equal 225 mcg dose , Historical Med      loratadine (CLARITIN) 10 mg tablet Take 10 mg by mouth daily, Historical Med      magnesium oxide (MAG-OX) 400 mg Take 800 mg by mouth 2 (two) times a day  , Historical Med      metolazone (ZAROXOLYN) 5 mg tablet Take 5 mg by mouth every other day, Historical Med      oxyCODONE-acetaminophen (PERCOCET) 5-325 mg per tablet Take 1 tablet by mouth every 4 (four) hours as needed for moderate pain, Historical Med      pantoprazole (PROTONIX) 40 mg tablet Take 40 mg by mouth daily in the early morning   , Historical Med      polyethylene glycol-propylene glycol (SYSTANE) 0 4-0 3 % Administer 1 drop to both eyes 3 (three) times a day   , Historical Med      potassium chloride (KLOR-CON) 20 mEq packet Take 20 mEq by mouth 4 (four) times a day, Historical Med      Probiotic Product (JOHNNIE-BID PROBIOTIC PO) Take 1 tablet by mouth daily, Historical Med      repaglinide (PRANDIN) 0 5 mg tablet Take 0 5 mg by mouth 2 (two) times a day before meals  , Historical Med      Sennosides-Docusate Sodium (SENEXON-S PO) Take 1 tablet by mouth daily  , Historical Med      warfarin (COUMADIN) 4 mg tablet Take 1 5 tablets by mouth daily for 30 days 6MG M,W,F 5 5 ON T,TH SAT SUN, Starting Mon 12/26/2016, Until Mon 1/29/2018, Print      nitroglycerin (NITROSTAT) 0 4 mg SL tablet Place 0 4 mg under the tongue every 5 (five) minutes as needed for chest pain , Historical Med      sodium phosphate (FLEET) enema Insert 1 enema into the rectum once follow package directions, Historical Med      sorbitol 70 % solution Take 15 mL by mouth daily as needed (15 ml if no BM on day 3, 30 ml if no BM on day 4)  , Historical Med           No discharge procedures on file      ED Provider  Electronically Signed by           Vj Farooq DO  01/30/18 6399

## 2018-01-29 NOTE — DISCHARGE INSTRUCTIONS
Lumbar Spinal Stenosis   WHAT YOU NEED TO KNOW:   Lumbar spinal stenosis is narrowing of the spinal canal in your lower back  Your spinal canal holds your spinal cord  The spinal cord controls your ability to move  When your spinal canal narrows, it may put pressure on your spinal cord  DISCHARGE INSTRUCTIONS:   Return to the emergency department if:   · You have pain in your leg that does not go away or gets worse  · You have trouble moving your legs  · You cannot control when you urinate or have a bowel movement  Contact your healthcare provider if:   · You have new or worsening symptoms  · Your symptoms keep you from doing your daily activities  · You have questions or concerns about your condition or care  Medicines:   · NSAIDs , such as ibuprofen, help decrease swelling, pain, and fever  NSAIDs can cause stomach bleeding or kidney problems in certain people  If you take blood thinner medicine, always ask your healthcare provider if NSAIDs are safe for you  Always read the medicine label and follow directions  · Acetaminophen  decreases pain and fever  It is available without a doctor's order  Ask how much to take and how often to take it  Follow directions  Read the labels of all other medicines you are using to see if they also contain acetaminophen, or ask your doctor or pharmacist  Acetaminophen can cause liver damage if not taken correctly  Do not use more than 4 grams (4,000 milligrams) total of acetaminophen in one day  · Prescription pain medicine  may be given  Ask how to take this medicine safely  · Muscle relaxers  help decrease pain and muscle spasms  · Take your medicine as directed  Contact your healthcare provider if you think your medicine is not helping or if you have side effects  Tell him or her if you are allergic to any medicine  Keep a list of the medicines, vitamins, and herbs you take  Include the amounts, and when and why you take them   Bring the list or the pill bottles to follow-up visits  Carry your medicine list with you in case of an emergency  Self-care:   · Rest  when you feel it is needed  Slowly start to do more each day  Return to your daily activities as directed  · Apply heat on your back for 20 to 30 minutes every 2 hours for as many days as directed  Heat helps decrease pain and muscle spasms  · Apply ice  on your back for 15 to 20 minutes every hour or as directed  Use an ice pack, or put crushed ice in a plastic bag  Cover it with a towel before you apply it to your skin  Ice helps prevent tissue damage and decreases swelling and pain  Go to physical and occupational therapy as directed:  A physical therapist teaches you exercises to help improve movement and strength, and to decrease pain  An occupational therapist teaches you skills to help with your daily activities  Follow up with your healthcare provider as directed:  Write down your questions so you remember to ask them during your visits  © 2017 [facility] Information is for End User's use only and may not be sold, redistributed or otherwise used for commercial purposes  All illustrations and images included in CareNotes® are the copyrighted property of [employer] , [email or web address]  or [employer]  The above information is an  only  It is not intended as medical advice for individual conditions or treatments  Talk to your doctor, nurse or pharmacist before following any medical regimen to see if it is safe and effective for you

## 2018-02-12 NOTE — PROGRESS NOTES
2/13/2018      Chief Complaint   Patient presents with    Penis Pain         Assessment and Plan    80 y o  male managed by Dr Zambrano Breeding    1  Urinary retention x 2 failed void trials    Discussed with the patient and his daughter suprapubic tube placement  Although placement itself will not decrease his chance of infection my hope is that it will be easier to clean and will not be affected by his frequent bowel movements  The patient is agreeable to proceeding with suprapubic tube placement  Will have this performed in interventional radiology and will need to treat prophylactically with antibiotics as he has been getting numerous recurrent infections  Will have his nursing home facility fax over culture results so we can appropriately treat him  Will follow up 6 weeks after his suprapubic tube placement to see how he is doing and to change his 1st tube  The patient is agreeable to this  All questions and concerns were addressed answered  History of Present Illness  Angella Coello is a 80 y o  male here for evaluation of penile pain  He has an indwelling urethral catheter and according to his daughter has been having recurrent urinary tract infections  His typical complaints that prompt culture are confusion, burning, and occasionally back pain  The patients nursing home facility did not send over only culture data with him today  His daughter states that he has frequent bowel movements which she believes is difficult hygiene wise  Currently has no signs of infection and is doing well  Is satisfied with his urethral catheter  Deferred suprapubic tube placement in the past         Review of Systems   Constitutional: Negative for activity change, chills and fever  Gastrointestinal: Positive for diarrhea  Negative for abdominal distention, abdominal pain, constipation, nausea and vomiting  Psychiatric/Behavioral: Negative for behavioral problems and confusion         Past Medical History  Past Medical History:   Diagnosis Date    Anemia     Arthralgia     Atrial fibrillation (HCC)     BPH (benign prostatic hypertrophy)     CAD (coronary artery disease)     Cancer (HCC)     thyroid    Cardiac disease     atrial fib      Cardiomegaly     Carpal tunnel syndrome     CHF (congestive heart failure) (HCC)     Chronic kidney disease     Chronic systolic heart failure (HCC)     Ef 25%    Degenerative joint disease     Depression     Diabetes mellitus (HCC)     Disease of thyroid gland     Fracture of left radius     Gastroparesis     GERD (gastroesophageal reflux disease)     Gout     Hyperlipidemia     Hypertension     Hyperthyroidism     Hypokalemia     Hypoxia     Incomplete bladder emptying     Neuropathy     Osteoarthritis     Other fluid overload     fluid restriction    Premature ventricular contraction     Psychiatric disorder     depression    PVD (peripheral vascular disease) (HCC)     Renal disorder     stage 3 chronic kidney disease    Sick sinus syndrome (HCC)     Sleep apnea     Thrombocytopenia (HCC)     Tracheobronchitis     Tremor     Unstable angina (HCC)     Urinary retention        Past Social History  Past Surgical History:   Procedure Laterality Date    ABDOMINAL SURGERY      cholecystectomy    CARDIAC PACEMAKER PLACEMENT      CARPAL TUNNEL RELEASE      bilateral    CHOLECYSTECTOMY      COLONOSCOPY W/ POLYPECTOMY      CORONARY ARTERY BYPASS GRAFT      EYE SURGERY      bilateral CAT EXT W/IOL    HERNIA REPAIR      JOINT REPLACEMENT       History   Smoking Status    Former Smoker    Packs/day: 3 00    Years: 30 00    Types: Cigarettes   Smokeless Tobacco    Never Used     Comment: quit late 42's yrs old;total tobacco use 30yrs,used 3 pks of cigarettes form 3-4 yrs then changed to 8 cigars/day       Past Family History  Family History   Problem Relation Age of Onset    Stroke Mother     Hypertension Daughter     Heart disease Brother    Kee Ruggiero Diabetes Brother     Stroke Brother        Past Social history  Social History     Social History    Marital status:      Spouse name: N/A    Number of children: N/A    Years of education: N/A     Occupational History    Not on file  Social History Main Topics    Smoking status: Former Smoker     Packs/day: 3 00     Years: 30 00     Types: Cigarettes    Smokeless tobacco: Never Used      Comment: quit late 40's yrs old;total tobacco use 30yrs,used 3 pks of cigarettes form 3-4 yrs then changed to 10 cigars/day    Alcohol use No    Drug use: No    Sexual activity: Not Currently     Other Topics Concern    Not on file     Social History Narrative    No narrative on file       Current Medications  Current Outpatient Prescriptions   Medication Sig Dispense Refill    acetaminophen (TYLENOL) 325 mg tablet Take 650 mg by mouth every 6 (six) hours as needed for mild pain or fever (not to exceed 3 gm/24 hrs)   allopurinol (ZYLOPRIM) 300 mg tablet Take by mouth      amiodarone 200 mg tablet Take 1 tablet by mouth 2 (two) times a day      ascorbic acid (VITAMIN C) 500 mg tablet Take 500 mg by mouth daily      aspirin 81 MG tablet Take 81 mg by mouth daily   B Complex-C-Folic Acid (MICHEAL CAPS) 1 MG CAPS Take 1 capsule by mouth daily   brimonidine (ALPHAGAN P) 0 1 % Apply to eye      bumetanide (BUMEX) 2 mg tablet Take 2 mg by mouth daily        carbidopa-levodopa (SINEMET)  mg per tablet Take 1 tablet by mouth 3 (three) times a day   carvedilol (COREG) 12 5 mg tablet Take 12 5 mg by mouth 2 (two) times a day with meals  Hold for HR < 60 or SBP < 100   Cholecalciferol (VITAMIN D3) 2000 UNITS capsule Take 2,000 Units by mouth daily   diphenhydrAMINE (BENADRYL) 50 mg capsule Take 50 mg by mouth every 8 (eight) hours as needed for itching   febuxostat (ULORIC) 40 mg tablet Take 80 mg by mouth daily   Dose = 2 tablets (80 mg)       gabapentin (NEURONTIN) 100 mg capsule Take 200 mg by mouth 2 (two) times a day  Two capsules (200 mg) twice daily       hydrALAZINE (APRESOLINE) 50 mg tablet Take 50 mg by mouth every 8 (eight) hours   insulin aspart (NovoLOG) 100 units/mL injection Inject under the skin 2 (two) times a day before meals Indications: sliding scale  Accuchecks twice daily at 0700 and 1600 with coverage per sliding scale       insulin detemir (LEVEMIR) 100 units/mL subcutaneous injection Inject 24 Units under the skin daily at bedtime      Insulin Syringes, Disposable, (MONOJECT INS SYR 1CC) U-100 1 ML MISC by Does not apply route   isosorbide mononitrate (IMDUR) 60 mg 24 hr tablet Take 60 mg by mouth daily  Hold for SBP < 110   lactulose 20 g/30 mL Take 20 g by mouth 3 (three) times a week  On Monday, Wednesday, and Friday mornings only      levofloxacin (LEVAQUIN) 250 mg tablet Take 250 mg by mouth daily at bedtime      levothyroxine 200 mcg tablet Take 225 mcg by mouth daily in the early morning 200 mcg tablet plus 25 mcg tablet to equal 225 mcg dose       loperamide (IMODIUM) 2 mg capsule Take by mouth      loratadine (CLARITIN) 10 mg tablet Take 10 mg by mouth daily      magnesium oxide (MAG-OX) 400 mg Take 800 mg by mouth 2 (two) times a day        metolazone (ZAROXOLYN) 5 mg tablet Take 5 mg by mouth every other day      nitroglycerin (NITROSTAT) 0 4 mg SL tablet Place 0 4 mg under the tongue every 5 (five) minutes as needed for chest pain   oxyCODONE-acetaminophen (PERCOCET) 5-325 mg per tablet Take 1 tablet by mouth every 4 (four) hours as needed for moderate pain      pantoprazole (PROTONIX) 40 mg tablet Take 40 mg by mouth daily in the early morning   polyethylene glycol-propylene glycol (SYSTANE) 0 4-0 3 % Administer 1 drop to both eyes 3 (three) times a day          potassium chloride (KLOR-CON) 20 mEq packet Take 20 mEq by mouth 4 (four) times a day      Probiotic Product (BACID) TABS Take by mouth      Probiotic Product (JOHNNIE-BID PROBIOTIC PO) Take 1 tablet by mouth daily      repaglinide (PRANDIN) 0 5 mg tablet Take 0 5 mg by mouth 2 (two) times a day before meals   Sennosides-Docusate Sodium (SENEXON-S PO) Take 1 tablet by mouth daily   sodium phosphate (FLEET) enema Insert 1 enema into the rectum once follow package directions      sorbitol 70 % solution Take 15 mL by mouth daily as needed (15 ml if no BM on day 3, 30 ml if no BM on day 4)   warfarin (COUMADIN) 4 mg tablet Take 1 5 tablets by mouth daily for 30 days 6MG M,W,F 5 5 ON T,TH SAT SUN (Patient taking differently: Take 6 mg by mouth daily  ) 45 tablet 0     No current facility-administered medications for this visit  Allergies  Allergies   Allergen Reactions    Benicar [Olmesartan] Other (See Comments)     Can't recall    Cardura [Doxazosin]     Codeine Other (See Comments)     confusion    Cozaar [Losartan]     Cymbalta [Duloxetine Hcl]     Dyazide [Hydrochlorothiazide W-Triamterene]     Hctz [Hydrochlorothiazide]     Iodinated Diagnostic Agents     Lasix [Furosemide] Other (See Comments)     Can't recall    Lopressor [Metoprolol] Other (See Comments)     Can't recall    Lozol [Indapamide]     Prinivil [Lisinopril]     Procardia [Nifedipine]     Verapamil     Voltaren [Diclofenac Sodium]     Aldactone [Spironolactone] Other (See Comments)     Tremors;starts as a low reaction then gets worse    Latex Rash         The following portions of the patient's history were reviewed and updated as appropriate: allergies, current medications, past medical history, past social history, past surgical history and problem list       Vitals  Vitals:    02/13/18 1134   BP: 124/62   Pulse: 70         Physical Exam    Constitutional   General appearance: Patient is seated and in no acute distress, well appearing and well nourished     Head and Face   Head and face: Normal     Eyes   Conjunctiva and lids: No erythema, swelling or discharge  Ears, Nose, Mouth, and Throat   Hearing: Normal     Pulmonary   Respiratory effort: No increased work of breathing or signs of respiratory distress  Cardiovascular   Examination of extremities for edema and/or varicosities: Normal     Abdomen   Abdomen: Non-tender, no masses  Musculoskeletal   Gait and station:  Wheelchair bound  Skin   Skin and subcutaneous tissue: Warm, dry, and intact  No visible lesions or rashes  Psychiatric   Judgment and insight: Normal  Recent and remote memory:  Normal  Mood and affect: Normal        Results  No results found for this or any previous visit (from the past 1 hour(s))  ]  No results found for: PSA  Lab Results   Component Value Date    GLUCOSE 185 (H) 08/13/2017    CALCIUM 9 2 08/13/2017     08/13/2017    K 3 2 (L) 08/13/2017    CO2 36 (H) 08/13/2017     08/13/2017    BUN 35 (H) 08/13/2017    CREATININE 1 97 (H) 08/13/2017     Lab Results   Component Value Date    WBC 7 91 08/13/2017    HGB 11 7 (L) 08/13/2017    HCT 37 7 08/13/2017    MCV 99 (H) 08/13/2017    PLT 97 (L) 08/13/2017           Orders  No orders of the defined types were placed in this encounter

## 2018-03-02 NOTE — DISCHARGE INSTRUCTIONS
Suprapubic Cystostomy     WHAT YOU NEED TO KNOW:   Suprapubic cystostomy is surgery to create a stoma (opening) through your abdomen into your bladder  This opening is where a catheter is inserted to drain urine  You may need a cystostomy if your urine flow is blocked  DISCHARGE INSTRUCTIONS:   Resume your normal diet  Small sips of flat soda will help with mild nausea  Follow up with your healthcare provider as directed: You may need to return to have your suprapubic catheter changed or removed  Write down your questions so you remember to ask them during your visits  Empty your urine drainage bag: Empty your urine drainage bag when it is ½ to ? full, or every 8 hours  If you have a smaller leg bag, empty it every 3 to 4 hours  Do the following when you empty your urine drainage bag:  · Hold the urine bag over a toilet or large container  · Remove the drain spout from its sleeve at the bottom of the urine bag  Do not touch the tip of the drain spout  Open the slide valve on the spout  · Let the urine flow out of the urine bag into the toilet or container  Do not let the drainage tube touch anything  · Clean the end of the drain spout with alcohol when the bag is empty  Ask which cleaning solution is best to use  · Close the slide valve and put the drain spout into its sleeve at the bottom of the urine bag  Write down how much urine was in your bag if you were asked to keep a record  Prevent an infection:   · Clean the stoma and skin around it daily  Wash your hands before and after cystostomy care  Put on a new pair of clean medical gloves  · Change your urine bag or clean reusable bags  Ask how often you need to change or clean your urine drainage bag  You may need to change your reusable bag at least once a week  · Keep the bag below your waist  This will prevent urine from flowing back into your bladder and causing an infection or other problems   Also, keep the tube free of kinks so the urine will drain properly  Do not pull on the catheter  This can cause pain and bleeding and may cause the catheter to come out  Contact Interventional Radiology at [phone] Ryan PATIENTS: Contact Interventional Radiology at [phone]) Elbert Yao PATIENTS: Contact Interventional Radiology at [phone]) if any of the following occur:  · You have a fever or chills  · Persistent nausea or vomiting  · You have severe pain  · You have a burning pain in your stoma  · Your stoma is red, swollen, or draining pus  · You have blood in your urine  · You have questions or concerns about your condition or care    Seek care immediately or call 911 if:   · No urine is draining into the urine bag

## 2018-03-02 NOTE — BRIEF OP NOTE (RAD/CATH)
IR SUPRAPUBIC TUBE  Procedure Note    PATIENT NAME: Anita Wagner  : 1928  MRN: [id number]     Pre-op Diagnosis:   1  Urinary retention      Post-op Diagnosis:   1   Urinary retention        Surgeon:   Katharine Lovelace MD  Assistants:     No qualified resident was available, Resident is only observing    Estimated Blood Loss: None  Findings: 18 Fr suprapubic catheter inserted    Specimens: none    Complications:  none    Anesthesia: Local and MAC    Katharine Lovelace MD     Date: 3/2/2018  Time: 10:58 AM

## 2018-03-02 NOTE — ANESTHESIA PREPROCEDURE EVALUATION
Review of Systems/Medical History  Patient summary reviewed  Chart reviewed      Cardiovascular  Exercise tolerance: poor,  Hyperlipidemia, Hypertension poorly controlled, CAD, , Dysrhythmias, atrial fibrillation, Angina Stable, CHF ,    Pulmonary  Pneumonia, Sleep apnea ,        GI/Hepatic    GERD well controlled,        Chronic kidney disease stage 3,        Endo/Other  Diabetes type 2 Insulin, History of thyroid disease , hypothyroidism,   Obesity  morbid obesity   GYN       Hematology  Anemia ,     Musculoskeletal    Arthritis     Neurology    Neuromuscular disease ,    Psychology   Depression ,              Physical Exam    Airway    Mallampati score: II  TM Distance: <3 FB  Neck ROM: full     Dental       Cardiovascular  Rhythm: regular, Rate: normal,     Pulmonary  Breath sounds clear to auscultation, Decreased breath sounds,     Other Findings  missing teeth      Anesthesia Plan  ASA Score- 4     Anesthesia Type- IV sedation with anesthesia and general with ASA Monitors  Additional Monitors:   Airway Plan:         Plan Factors- Patient instructed to abstain from smoking on day of procedure  Patient did not smoke on day of surgery  Induction- intravenous  Postoperative Plan-     Informed Consent- Anesthetic plan and risks discussed with patient  I personally reviewed this patient with the CRNA  Discussed and agreed on the Anesthesia Plan with the CRNA  Millie Leger

## 2018-03-02 NOTE — PROGRESS NOTES
Call to transport where ambulance is to p/u-Eliane stated she would have someone here between [id number]  Call back to transport at 1400 spoke to Nelson Huizar who stated he would p/u at approx [phone], roads were bad  Call back at [phone] spoke to Nelson Huizar at [facility] who reports they would be here between [phone]  At 1710 no transport here   Order received to kimi LEBLANC prior to retrieving an evening meal

## 2018-03-31 PROBLEM — R06.02 SOB (SHORTNESS OF BREATH): Status: ACTIVE | Noted: 2018-01-01

## 2018-04-01 PROBLEM — J96.01 ACUTE RESPIRATORY FAILURE WITH HYPOXIA (HCC): Status: ACTIVE | Noted: 2018-01-01

## 2018-04-01 PROBLEM — E88.09 HYPOALBUMINEMIA: Status: ACTIVE | Noted: 2018-01-01

## 2018-04-01 NOTE — ASSESSMENT & PLAN NOTE
-continue Bumex 2 mg IV every 12 hours  -strict intake/output measurements  -daily weights  -check an echocardiogram  -trend troponin levels x 3 sets  -consult Cardiology  -continue coreg  -patient has an allergy to ARB's and Ace-inhibitors

## 2018-04-01 NOTE — PROGRESS NOTES
Progress Note - Nevaeh Phelan 11/1/1928, 80 y o  male MRN: [id number]    Unit/Bed#:  Encounter: [id number]    Primary Care Provider: Lelon Favre, DO   Date and time admitted to hospital: 3/31/2018  7:57 PM        * Acute on chronic systolic CHF (congestive heart failure) (HCC)   Assessment & Plan    -continue Bumex 2 mg IV every 12 hours  -strict intake/output measurements  -daily weights  -check an echocardiogram  -trend troponin levels x 3 sets  -consult Cardiology  -continue coreg  -patient has an allergy to ARB's and Ace-inhibitors          Acute respiratory failure with hypoxia ([address] )   Assessment & Plan    -patient was successfully weaned off BiPAP  -continue supplemental oxygen via nasal cannula to maintain oxygen saturation levels of 92% and above  -continue the respiratory protocol  -initiate airway clearance protocol  -the patient can be transferred to med/surg level of care with telemetry monitoring and continuous pulse oximetry        Fever   Assessment & Plan    -check influenza/RSV PCR testing  -check blood cultures x 2 sets  -check urine culture  -observe off antibiotics for now        Hypothyroidism   Assessment & Plan    -check a TSH level  -continue PO levothyroxine        Macrocytic anemia   Assessment & Plan    -check a vitamin B12 level and folate level  -follow CBC        Thrombocytopenia (HCC)   Assessment & Plan    -monitor the patient for any signs of bleeding  -follow the CBC        Stage 4 chronic kidney disease (HCC)   Assessment & Plan    -consult Nephrology  -avoid all nephrotoxic agents  -continue to monitor the patient's renal function and electrolytes        Atrial fibrillation (HCC)   Assessment & Plan    -continue Coreg for heart rate control  -continue Coumadin for full anticoagulation        Type 2 diabetes mellitus with hyperglycemia, with long-term current use of insulin (HCC)   Assessment & Plan    -follow patient's blood glucose trend  -check hemoglobin A1c level  -continue the current insulin regimen        Essential hypertension   Assessment & Plan    -continue coreg, hydralazine, and imdur   -follow the patient's blood pressure trend            VTE Pharmacologic Prophylaxis:   Pharmacologic: Warfarin (Coumadin)  Mechanical VTE Prophylaxis in Place: Yes     Patient Centered Rounds: I have performed bedside rounds with nursing staff today  Time Spent for Care: 30 minutes  More than 50% of total time spent on counseling and coordination of care as described above  Current Length of Stay: 1 day(s)    Current Patient Status: Inpatient   Certification Statement: The patient will continue to require additional inpatient hospital stay due to the need for IV Bumex and with a supplemental oxygen requirement  Code Status: Level 1 - Full Code      Subjective: The patient was seen and examined  The patient's shortness of breath is improving  He was weaned off BiPAP  No chest pain  Objective:     Vitals:   Temp (24hrs), Av 8 °F (37 7 °C), Min:98 6 °F (37 °C), Max:102 5 °F (39 2 °C)    HR:  [69-91] 70  Resp:  [19-27] 21  BP: (112-193)/(50-94) 123/58  SpO2:  [97 %-100 %] 97 %  Body mass index is 37 79 kg/m²  Input and Output Summary (last 24 hours):        Intake/Output Summary (Last 24 hours) at 18 1047  Last data filed at 18 0501   Gross per 24 hour   Intake           132 28 ml   Output              800 ml   Net          -667 72 ml       Physical Exam:     Physical Exam  General:  NAD, awake, alert, + conversational dyspnea  HEENT:  NC/AT, mucous membranes moist  Neck:  Supple, No JVP elevation  CV:  + S1, + S2, RRR  Pulm:  Bibasilar crackles  Abd:  Soft, Non-tender, Non-distended  Ext:  No clubbing/cyanosis, + bilateral pretibial edema  Skin:  Chronic venous stasis changes of the bilateral anterior shins      Additional Data:     Labs:      Results from last 7 days  Lab Units 1810 18   WBC Thousand/uL 9 75 9 82 HEMOGLOBIN g/dL 10 0* 11 9*   HEMATOCRIT % 32 1* 37 7   PLATELETS Thousands/uL 85* 117*   NEUTROS PCT %  --  81*   LYMPHS PCT %  --  9*   MONOS PCT %  --  5   EOS PCT %  --  5       Results from last 7 days  Lab Units 04/01/18  0510   SODIUM mmol/L 145   POTASSIUM mmol/L 4 1   CHLORIDE mmol/L 106   CO2 mmol/L 34*   BUN mg/dL 34*   CREATININE mg/dL 2 43*   CALCIUM mg/dL 8 5   TOTAL PROTEIN g/dL 6 4   BILIRUBIN TOTAL mg/dL 0 80   ALK PHOS U/L 209*   ALT U/L 14   AST U/L 25   GLUCOSE RANDOM mg/dL 129       Results from last 7 days  Lab Units 04/01/18  0510   INR  2 97*       * I Have Reviewed All Lab Data Listed Above  * Additional Pertinent Lab Tests Reviewed:  [address] Admission Reviewed        Recent Cultures (last 7 days):           Last 24 Hours Medication List:     Current Facility-Administered Medications:  acetaminophen 650 mg Oral Q6H PRN Littie Gemma, DO   ascorbic acid 500 mg Oral Daily Juan Jose Rodrigues, DO   aspirin 81 mg Oral Daily Juan Jose Rodrigues, DO   atorvastatin 40 mg Oral Daily With [employer], DO   b complex-vitamin C-folic acid 1 capsule Oral Daily Juan Jose Rodrigues, DO   bumetanide 2 mg Intravenous BID Nava Means, DO   carbidopa-levodopa 1 tablet Oral TID Nava Means, DO   carvedilol 12 5 mg Oral BID With Meals José Dukes, DO   cholecalciferol 2,000 Units Oral Daily Juan Jose Rodrigues, DO   gabapentin 100 mg Oral BID Juan Jose Rodrigues, DO   hydrALAZINE 50 mg Oral Q8H [facility] Juan Jose Rodrigues, DO   insulin detemir 24 Units Subcutaneous HS Juan Jose Rodrigues, DO   insulin lispro 2-12 Units Subcutaneous TID AC Juan Jose Rodrigues, DO   insulin lispro 2-12 Units Subcutaneous HS Juan Jose Rodrigues, DO   isosorbide mononitrate 60 mg Oral Daily Juan Jose Rodrigues, DO   levothyroxine 225 mcg Oral Early Morning Juan Jose Rodrigues, DO   loratadine 10 mg Oral Daily Juan Jose Rodrigues, DO   oxyCODONE 5 mg Oral Q4H PRN Littie Gemma, DO   polyethylene glycol 17 g Oral Daily PRN Littie Gemma, DO   warfarin 5 mg Oral Daily (warfarin) Walt Hunter DO        Today, Patient Was Seen By: Walt Hunter DO    ** Please Note: Dictation voice to text software may have been used in the creation of this document   **

## 2018-04-01 NOTE — H&P
History and Physical - [facility]    Patient Information: Keyur Saini 80 y o  male MRN: [id number]  Unit/Bed#: [facility] Encounter: [id number]  Admitting Physician: Johanne Fothergill, DO  PCP: Denny Barraza DO  Date of Admission:  03/31/18    Assessment/Plan:    Hospital Problem List:     Principal Problem:    SOB (shortness of breath)  Active Problems:    Chronic systolic CHF (congestive heart failure) ([facility])    Acute on chronic kidney failure ([facility])    CAD (coronary artery disease)    Sleep apnea    PVD (peripheral vascular disease) ([place] [id number] )    Neuropathy    Hypertension    Hyperlipidemia    GERD (gastroesophageal reflux disease)    Diabetes mellitus ([place] [id number] )    Atrial fibrillation ([place] [address] )    Chronic kidney disease    Hyperlipidemia    Urinary retention    Premature ventricular contraction    Anemia      Plan for the Primary Problem(s):    1  Shortness of breath   - likely 2/2 exacerbation of acute on chronic systolic heart failure   - cont nitro for now, titrate to keep systolic bp <842   - bipap for respiratory distress   - check abg   - iv bumex 2mg bid   - close monitoring of I/o   - tele monitoring   - daily weights     2  Fever   - f/u cultures   - cont levaquin for now   - likely 2/2 uti     3  DM   - cont levemir and sliding scale   - accuchecks   - carb control diet   - hemoglobin a1c    4  HTN   - resume home meds    5  A fib   - rate controlled   - on ac   - cont warfarin    6  GERD   - cont ppi      VTE Prophylaxis: Warfarin (Coumadin)  / sequential compression device   Code Status: full code  POLST: POLST form is not discussed and not completed at this time  Anticipated Length of Stay:  Patient will be admitted on an Inpatient basis with an anticipated length of stay of  Greater than 2 midnights  Justification for Hospital Stay: chf exacerbation    Total Time for Visit, including Counseling / Coordination of Care: 45 minutes    Greater than 50% of this total time spent on direct patient counseling and coordination of care  Chief Complaint:   SOB    History of Present Illness:    Tory Salcedo is a 80 y o  male who presents from nursing home with shortness of breath that began this afternoon  He has a history of chronic systolic heart failure with an EF of 25% last seen on echo in December 2016  He had a meal with his family today for the holiday and may have had too much liquids  He has also been a little bit more somnolent lately as well per his daughter and his appetite has not been as robust   His daughter states that he appears a bit confused as well  Has been fatigued and complaining of a slightly increased cough  At the nursing home, he was desaturating to the low 80's and EMS started him on cpap to increase his saturation to 99%  ER staff noted that his weight is significantly increased from prior measurements  Patient also has increased lower extremity edema  In the ED, patient was started on nitro for an elevated systolic bp in the 159'V and given iv bumex  His breathing appears to be much more comfortable now while on bipap  He was also febrile to 102 5 upon arrival and has an indwelling suprapubic catheter  His mental status is also improved now and is oriented x 2-3  Review of Systems:    Review of Systems   Constitutional: Positive for chills, fatigue and fever  Decreased appetite   HENT: Negative for congestion, ear pain, hearing loss, postnasal drip, sneezing, sore throat and trouble swallowing  Eyes: Negative  Respiratory: Positive for cough and shortness of breath  Negative for chest tightness and wheezing  Cardiovascular: Positive for leg swelling  Negative for chest pain and palpitations  Gastrointestinal: Negative for abdominal distention, abdominal pain, blood in stool, diarrhea, nausea and vomiting  Endocrine: Negative for polydipsia, polyphagia and polyuria  Genitourinary: Negative for decreased urine volume, dysuria, flank pain and urgency  No CVA Tenderness   Musculoskeletal: Negative for myalgias  Skin: Negative  Allergic/Immunologic: Negative  Neurological: Negative for dizziness, seizures, syncope, speech difficulty, weakness, light-headedness and headaches  Hematological: Negative  Psychiatric/Behavioral: Negative for agitation and confusion  Past Medical and Surgical History:     Past Medical History:   Diagnosis Date    Anemia     Arthralgia     Atrial fibrillation (HCC)     BPH (benign prostatic hypertrophy)     CAD (coronary artery disease)     Cancer (HCC)     thyroid    Cardiac disease     atrial fib      Cardiomegaly     Carpal tunnel syndrome     CHF (congestive heart failure) (HCC)     Chronic kidney disease     Chronic systolic heart failure (HCC)     Ef 25%    Degenerative joint disease     Depression     Diabetes mellitus ([facility] [address] )     Disease of thyroid gland     Fracture of left radius     Gastroparesis     GERD (gastroesophageal reflux disease)     Gout     Hyperlipidemia     Hypertension     Hyperthyroidism     Hypokalemia     Hypothyroid     Hypoxia     Incomplete bladder emptying     Neuropathy     Osteoarthritis     Other fluid overload     fluid restriction    Premature ventricular contraction     Psychiatric disorder     depression    PVD (peripheral vascular disease) (HCC)     Renal disorder     stage 3 chronic kidney disease    Sick sinus syndrome (HCC)     Sleep apnea     Thrombocytopenia (HCC)     Tracheobronchitis     Tremor     Unstable angina (HCC)     Urinary retention        Past Surgical History:   Procedure Laterality Date    ABDOMINAL SURGERY      cholecystectomy    CARDIAC PACEMAKER PLACEMENT      CARPAL TUNNEL RELEASE      bilateral    CHOLECYSTECTOMY      COLONOSCOPY W/ POLYPECTOMY      CORONARY ARTERY BYPASS GRAFT      EYE SURGERY      bilateral CAT EXT W/IOL    HERNIA REPAIR      JOINT REPLACEMENT         Meds/Allergies:    Prior to Admission medications    Medication Sig Start Date End Date Taking? Authorizing Provider   acetaminophen (TYLENOL) 325 mg tablet Take 650 mg by mouth every 6 (six) hours as needed for mild pain or fever (not to exceed 3 gm/24 hrs)  Yes Historical Provider, MD   ascorbic acid (VITAMIN C) 500 mg tablet Take 500 mg by mouth daily   Yes Historical Provider, MD   aspirin 81 MG tablet Take 81 mg by mouth daily  Yes Historical Provider, MD   B Complex-C-Folic Acid (MICHEAL CAPS) 1 MG CAPS Take 1 capsule by mouth daily  Yes Historical Provider, MD   bumetanide (BUMEX) 2 mg tablet Take 2 mg by mouth daily     Yes Historical Provider, MD   carbidopa-levodopa (SINEMET)  mg per tablet Take 1 tablet by mouth 3 (three) times a day  Yes Historical Provider, MD   carvedilol (COREG) 12 5 mg tablet Take 12 5 mg by mouth 2 (two) times a day with meals  Hold for HR < 60 or SBP < 100  Yes Historical Provider, MD   Cholecalciferol (VITAMIN D3) 2000 units TABS Take 2,000 Units by mouth daily   Yes Historical Provider, MD   febuxostat (ULORIC) 80 MG TABS Take 80 mg by mouth daily   Yes Historical Provider, MD   gabapentin (NEURONTIN) 100 mg capsule Take 100 mg by mouth 2 (two) times a day Two capsules (200 mg) twice daily    Yes Historical Provider, MD   hydrALAZINE (APRESOLINE) 50 mg tablet Take 50 mg by mouth every 8 (eight) hours  Yes Historical Provider, MD   insulin aspart (NovoLOG) 100 units/mL injection Inject under the skin 2 (two) times a day before meals Indications: sliding scale  Accuchecks twice daily at 0700 and 1600 with coverage per sliding scale    Yes Historical Provider, MD   insulin detemir (LEVEMIR) 100 units/mL subcutaneous injection Inject 24 Units under the skin daily at bedtime   Yes Historical Provider, MD   isosorbide mononitrate (IMDUR) 60 mg 24 hr tablet Take 60 mg by mouth daily  Hold for SBP < 110  Yes Historical Provider, MD   lactulose 20 g/30 mL Take 20 g by mouth 3 (three) times a week   On Monday, Wednesday, and Friday mornings only   Yes Historical Provider, MD   levothyroxine 200 mcg tablet Take 225 mcg by mouth daily in the early morning 200 mcg tablet plus 25 mcg tablet to equal 225 mcg dose    Yes Historical Provider, MD   loratadine (CLARITIN) 10 mg tablet Take 10 mg by mouth daily   Yes Historical Provider, MD   magnesium oxide (MAG-OX) 400 mg Take 400 mg by mouth 2 (two) times a day     Yes Historical Provider, MD   metolazone (ZAROXOLYN) 5 mg tablet Take 5 mg by mouth every other day   Yes Historical Provider, MD   pantoprazole (PROTONIX) 40 mg tablet Take 40 mg by mouth daily in the early morning  Yes Historical Provider, MD   polyethylene glycol-propylene glycol (SYSTANE) 0 4-0 3 % Administer 1 drop to both eyes 3 (three) times a day  Yes Historical Provider, MD   potassium chloride (KLOR-CON) 20 mEq packet Take 20 mEq by mouth 4 (four) times a day   Yes Historical Provider, MD   repaglinide (PRANDIN) 0 5 mg tablet Take 1 mg by mouth 3 (three) times a day before meals     Yes Historical Provider, MD   Sennosides-Docusate Sodium (SENEXON-S PO) Take 1 tablet by mouth daily  Yes Historical Provider, MD   WARFARIN SODIUM PO Take 6 5 mg by mouth daily at bedtime   Yes Historical Provider, MD   Cholecalciferol (VITAMIN D3) 2000 UNITS capsule Take 2,000 Units by mouth daily  3/31/18 Yes Historical Provider, MD   febuxostat (ULORIC) 40 mg tablet Take 80 mg by mouth daily  Dose = 2 tablets (80 mg)   3/31/18 Yes Historical Provider, MD   benzocaine (ORAJEL) 10 % mucosal gel Apply 1 application to the mouth or throat 3 (three) times a day as needed for mucositis    Historical Provider, MD   diphenhydrAMINE (BENADRYL) 50 mg capsule Take 50 mg by mouth every 8 (eight) hours as needed for itching  Historical Provider, MD   nitroglycerin (NITROSTAT) 0 4 mg SL tablet Place 0 4 mg under the tongue every 5 (five) minutes as needed for chest pain      Historical Provider, MD oxyCODONE-acetaminophen (PERCOCET) 5-325 mg per tablet Take 1 tablet by mouth every 4 (four) hours as needed for moderate pain    Historical Provider, MD   sodium phosphate (FLEET) enema Insert 1 enema into the rectum once follow package directions    Historical Provider, MD   sorbitol 70 % solution Take 15 mL by mouth daily as needed (15 ml if no BM on day 3, 30 ml if no BM on day 4)  Historical Provider, MD   allopurinol (ZYLOPRIM) 300 mg tablet Take by mouth  3/31/18  Historical Provider, MD   amiodarone 200 mg tablet Take 1 tablet by mouth 2 (two) times a day  3/31/18  Historical Provider, MD   brimonidine (ALPHAGAN P) 0 1 % Apply to eye  3/31/18  Historical Provider, MD   Insulin Syringes, Disposable, (MONOJECT INS SYR 1CC) U-100 1 ML MISC by Does not apply route  3/31/18  Historical Provider, MD   levofloxacin (LEVAQUIN) 250 mg tablet Take 250 mg by mouth daily at bedtime  3/31/18  Historical Provider, MD   loperamide (IMODIUM) 2 mg capsule Take by mouth  3/31/18  Historical Provider, MD   Probiotic Product (BACID) TABS Take by mouth  3/31/18  Historical Provider, MD   Probiotic Product (JOHNNIE-BID PROBIOTIC PO) Take 1 tablet by mouth daily  3/31/18  Historical Provider, MD   warfarin (COUMADIN) 4 mg tablet Take 1 5 tablets by mouth daily for 30 days 6MG M,W,F 5 5 ON T,TH SAT SUN  Patient taking differently: Take 6 mg by mouth daily   12/26/16 3/31/18  Stewart Humphries MD     I have reviewed home medications with patient personally  Allergies:    Allergies   Allergen Reactions    Benicar [Olmesartan] Other (See Comments)     Can't recall    Cardura [Doxazosin]     Codeine Other (See Comments)     confusion    Cozaar [Losartan]     Cymbalta [Duloxetine Hcl]     Dyazide [Hydrochlorothiazide W-Triamterene]     Hctz [Hydrochlorothiazide]     Iodinated Diagnostic Agents     Lasix [Furosemide] Other (See Comments)     Can't recall    Lopressor [Metoprolol] Other (See Comments)     Can't recall    Lozol [Indapamide]     Prinivil [Lisinopril]     Procardia [Nifedipine]     Verapamil     Voltaren [Diclofenac Sodium]     Aldactone [Spironolactone] Other (See Comments)     Tremors;starts as a low reaction then gets worse    Latex Rash       Social History:     Marital Status:      Substance Use History:   History   Alcohol Use No     History   Smoking Status    Former Smoker    Packs/day: 3 00    Years: 30 00    Types: Cigarettes   Smokeless Tobacco    Never Used     Comment: quit late 40's yrs old;total tobacco use 30yrs,used 3 pks of cigarettes form 3-4 yrs then changed to 10 cigars/day     History   Drug Use No       Family History:    Family History   Problem Relation Age of Onset    Stroke Mother     Hypertension Daughter     Heart disease Brother     Diabetes Brother     Stroke Brother        Physical Exam:     Vitals:   Blood Pressure: 164/77 (03/31/18 2200)  Pulse: 69 (03/31/18 2200)  Temperature: 99 7 °F (37 6 °C) (03/31/18 2115)  Temp Source: Temporal (03/31/18 2115)  Respirations: (!) 24 (03/31/18 2200)  Weight - Scale: 133 kg (293 lb 14 oz) (03/31/18 2001)  SpO2: 100 % (03/31/18 2207)    Physical Exam   Constitutional: He appears well-developed and well-nourished  On bipap, not in distress   HENT:   Head: Normocephalic and atraumatic  Eyes: Conjunctivae and EOM are normal  Pupils are equal, round, and reactive to light  Neck: Normal range of motion  JVD present  No thyromegaly present  Cardiovascular: Normal rate, normal heart sounds and intact distal pulses  No murmur heard  Pulmonary/Chest: Effort normal  No stridor  No respiratory distress  He has no wheezes  He has rales  He exhibits no tenderness  Mild crackles b/l   Abdominal: Soft  Bowel sounds are normal  He exhibits no distension  There is no tenderness  There is no rebound and no guarding     Genitourinary:   Genitourinary Comments: No CVA tenderness  Suprapubic catheter in place   Musculoskeletal: Normal range of motion  He exhibits edema  He exhibits no tenderness  2+ pitting edema b/l lower extremities   Neurological: He is alert  No cranial nerve deficit  Oriented x 2-3   Skin: Skin is warm  He is not diaphoretic  B/l venous stasis changes in lower extremities   Psychiatric: He has a normal mood and affect  Additional Data:     Lab Results: I have personally reviewed pertinent reports  Results from last 7 days  Lab Units 03/31/18 2018   WBC Thousand/uL 9 82   HEMOGLOBIN g/dL 11 9*   HEMATOCRIT % 37 7   PLATELETS Thousands/uL 117*   NEUTROS PCT % 81*   LYMPHS PCT % 9*   MONOS PCT % 5   EOS PCT % 5       Results from last 7 days  Lab Units 03/31/18 2018   SODIUM mmol/L 144   POTASSIUM mmol/L 4 5   CHLORIDE mmol/L 103   CO2 mmol/L 33*   BUN mg/dL 31*   CREATININE mg/dL 2 34*   CALCIUM mg/dL 8 8   TOTAL PROTEIN g/dL 7 9   BILIRUBIN TOTAL mg/dL 0 80   ALK PHOS U/L 280*   ALT U/L 16   AST U/L 26   GLUCOSE RANDOM mg/dL 175*       Results from last 7 days  Lab Units 03/31/18 2018   INR  2 75*       Imaging: I have personally reviewed pertinent reports  Ir Suprapubic Tube    Result Date: 3/2/2018  Narrative: Image guided suprapubic catheter insertion Clinical history: Urinary retention  Unable to tolerate Motta catheter  Contrast: 34 mL of iodixanol (VISIPAQUE) Fluoroscopy time: 1 4 minutes Images: 3 Procedure: After explaining the risks and benefits of the procedure to the patient, informed consent was obtained  The patient was identified verbally and by wristband  Ultrasound was used to localize the bladder  Under Mac anesthesia, the patient was placed supine on the fluoroscopy skin  The skin overlying the bladder was prepped and draped in usual sterile fashion and local anesthesia obtained with 1% lidocaine solution  Under ultrasound guidance, a 19-gauge needle was advanced in the suprapubic region into the bladder    Once urine was aspirated, a 0 035 Amplatz wire was inserted under fluoroscopic guidance  The tract was dilated in order to place a 22-Italian peel-away sheath through which an 18-Italian Motta catheter was inserted  Under fluoroscopy, the peel-away sheath was removed, the retention balloon was filled with 4 mL of normal saline, and contrast was injected confirming proper positioning  The catheter was within the proximal urethra and was  repositioned into the bladder  A sterile dressing was applied  The patient tolerated the procedure well and left the department in stable condition  Impression: Impression: Successful suprapubic catheter insertion  Workstation performed: [id number]     CXR: increased vascular congestion      EKG, Pathology, and Other Studies Reviewed on Admission:   · EKG: a fib, septal infarct age indeterminant    Allscripts / Epic Records Reviewed: No     ** Please Note: This note has been constructed using a voice recognition system   **

## 2018-04-01 NOTE — SOCIAL WORK
The patient is a resident at the [facility] and he will need prior auth with shirana prior to returning to the facility

## 2018-04-01 NOTE — ASSESSMENT & PLAN NOTE
-patient was successfully weaned off BiPAP  -continue supplemental oxygen via nasal cannula to maintain oxygen saturation levels of 92% and above  -continue the respiratory protocol  -initiate airway clearance protocol  -the patient can be transferred to [facility]/surg level of care with telemetry monitoring and continuous pulse oximetry

## 2018-04-01 NOTE — PLAN OF CARE
Problem: PAIN - ADULT  Goal: Verbalizes/displays adequate comfort level or baseline comfort level  Interventions:  - Encourage patient to monitor pain and request assistance  - Assess pain using appropriate pain scale  - Administer analgesics based on type and severity of pain and evaluate response  - Implement non-pharmacological measures as appropriate and evaluate response  - Consider cultural and social influences on pain and pain management  - Notify physician/advanced practitioner if interventions unsuccessful or patient reports new pain  Outcome: Not Progressing      Problem: INFECTION - ADULT  Goal: Absence or prevention of progression during hospitalization  INTERVENTIONS:  - Assess and monitor for signs and symptoms of infection  - Monitor lab/diagnostic results  - Monitor all insertion sites, i e  indwelling lines, tubes, and drains  - Monitor endotracheal (as able) and nasal secretions for changes in amount and color  - [place] appropriate cooling/warming therapies per order  - Administer medications as ordered  - Instruct and encourage patient and family to use good hand hygiene technique  - Identify and instruct in appropriate isolation precautions for identified infection/condition  Outcome: Not Progressing    Goal: Absence of fever/infection during neutropenic period  INTERVENTIONS:  - Monitor WBC  - Implement neutropenic guidelines  Outcome: Not Progressing      Problem: SAFETY ADULT  Goal: Maintain or return to baseline ADL function  INTERVENTIONS:  -  Assess patient's ability to carry out ADLs; assess patient's baseline for ADL function and identify physical deficits which impact ability to perform ADLs (bathing, care of mouth/teeth, toileting, grooming, dressing, etc )  - Assess/evaluate cause of self-care deficits   - Assess range of motion  - Assess patient's mobility; develop plan if impaired  - Assess patient's need for assistive devices and provide as appropriate  - Encourage maximum independence but intervene and supervise when necessary  ¯ Involve family in performance of ADLs  ¯ Assess for home care needs following discharge   ¯ Request OT consult to assist with ADL evaluation and planning for discharge  ¯ Provide patient education as appropriate  Outcome: Not Progressing    Goal: Maintain or return mobility status to optimal level  INTERVENTIONS:  - Assess patient's baseline mobility status (ambulation, transfers, stairs, etc )    - Identify cognitive and physical deficits and behaviors that affect mobility  - Identify mobility aids required to assist with transfers and/or ambulation (gait belt, sit-to-stand, lift, walker, cane, etc )  - [place] fall precautions as indicated by assessment  - Record patient progress and toleration of activity level on Mobility SBAR; progress patient to next Phase/Stage  - Instruct patient to call for assistance with activity based on assessment  - Request Rehabilitation consult to assist with strengthening/weightbearing, etc   Outcome: Not Progressing

## 2018-04-01 NOTE — ED PROVIDER NOTES
History  Chief Complaint   Patient presents with    Shortness of Breath     According to S staff - pt c/o chills with SOB ~ 1 hr ago w/spO2 of 81% on 3l/min NC and cyanotic - last seen normal @ 0      80year-old male resident of [facility] presents with shortness of breath he was last seen well at 1400 hours he is on 2 L nasal cannula chronically he has a history of CHF chronic kidney disease supra indwelling suprapubic catheter ambulance was called for shortness of breath  Upon their arrival he is on 3 L at 81% placing a non-rebreather on him he increases to 88% they placed him on CPAP and get his sats to 99%  His respiratory rate has improved  There is no history of altered mental status  Accu-Chek EN route was in the 162; blood pressure 189/90 heart rate is 76 and respirations were 20-24  Patient received tylenol just prior to leaviing  Patient has known history of atrial fibrillation he is anticoagulated on Coumadin  Patient was found to have a fever at [facility] that was low-grade 100 5 he did receive Tylenol there  The patient is denying any cough  He is only complaint is that he feels cold when asked about nausea or vomiting he just states he feels cold  He does not respond to questions regarding headache  Difference from the last known weight of from [facility] as he is up by 11 kg  Prior to Admission Medications   Prescriptions Last Dose Informant Patient Reported? Taking? B Complex-C-Folic Acid (MICHEAL CAPS) 1 MG CAPS 3/31/2018 at Unknown time Other (Specify) Yes Yes   Sig: Take 1 capsule by mouth daily       Cholecalciferol (VITAMIN D3) 2000 units TABS 3/30/2018 at Unknown time Other (Specify) Yes Yes   Sig: Take 2,000 Units by mouth daily   Probiotic Product (JOHNNIE-BID PROBIOTIC PO) 3/30/2018 at Unknown time  Yes Yes   Sig: Take 1 tablet by mouth daily   Sennosides-Docusate Sodium (SENEXON-S PO) 3/30/2018 at Unknown time Other (Specify) Yes Yes   Sig: Take 1 tablet by mouth daily  WARFARIN SODIUM PO 3/30/2018 at Unknown time Other (Specify) Yes Yes   Sig: Take 6 5 mg by mouth daily at bedtime   acetaminophen (TYLENOL) 325 mg tablet 3/31/2018 at 1600 Other (Specify) Yes Yes   Sig: Take 650 mg by mouth every 6 (six) hours as needed for mild pain or fever (not to exceed 3 gm/24 hrs)  ascorbic acid (VITAMIN C) 500 mg tablet 3/30/2018 at Unknown time  Yes Yes   Sig: Take 500 mg by mouth daily   aspirin 81 MG tablet 3/31/2018 at Unknown time Other (Specify) Yes Yes   Sig: Take 81 mg by mouth daily  benzocaine (ORAJEL) 10 % mucosal gel Unknown at Unknown time Other (Specify) Yes No   Sig: Apply 1 application to the mouth or throat 3 (three) times a day as needed for mucositis   bumetanide (BUMEX) 2 mg tablet 3/31/2018 at Unknown time Other (Specify) Yes Yes   Sig: Take 2 mg by mouth daily     carbidopa-levodopa (SINEMET)  mg per tablet 3/30/2018 at Unknown time Other (Specify) Yes Yes   Sig: Take 1 tablet by mouth 3 (three) times a day  carvedilol (COREG) 12 5 mg tablet 3/30/2018 at Unknown time Other (Specify) Yes Yes   Sig: Take 12 5 mg by mouth 2 (two) times a day with meals  Hold for HR < 60 or SBP < 100  diphenhydrAMINE (BENADRYL) 50 mg capsule Unknown at Unknown time Other (Specify) Yes No   Sig: Take 50 mg by mouth every 8 (eight) hours as needed for itching  febuxostat (ULORIC) 80 MG TABS 3/30/2018 at Unknown time Other (Specify) Yes Yes   Sig: Take 80 mg by mouth daily   gabapentin (NEURONTIN) 100 mg capsule 3/30/2018 at Unknown time Other (Specify) Yes Yes   Sig: Take 100 mg by mouth 2 (two) times a day Two capsules (200 mg) twice daily    hydrALAZINE (APRESOLINE) 50 mg tablet 3/30/2018 at Unknown time Other (Specify) Yes Yes   Sig: Take 50 mg by mouth every 8 (eight) hours       insulin aspart (NovoLOG) 100 units/mL injection 3/30/2018 at Unknown time Other (Specify) Yes Yes   Sig: Inject under the skin 2 (two) times a day before meals Indications: sliding scale  Accuchecks twice daily at 0700 and 1600 with coverage per sliding scale    insulin detemir (LEVEMIR) 100 units/mL subcutaneous injection 3/30/2018 at Unknown time Other (Specify) Yes Yes   Sig: Inject 24 Units under the skin daily at bedtime   isosorbide mononitrate (IMDUR) 60 mg 24 hr tablet 3/30/2018 at Unknown time Other (Specify) Yes Yes   Sig: Take 60 mg by mouth daily  Hold for SBP < 110    lactulose 20 g/30 mL 3/30/2018 at Unknown time Other (Specify) Yes Yes   Sig: Take 20 g by mouth 3 (three) times a week  On Monday, Wednesday, and Friday mornings only   levothyroxine 200 mcg tablet 3/30/2018 at Unknown time Other (Specify) Yes Yes   Sig: Take 225 mcg by mouth daily in the early morning 200 mcg tablet plus 25 mcg tablet to equal 225 mcg dose    loratadine (CLARITIN) 10 mg tablet 3/30/2018 at Unknown time Other (Specify) Yes Yes   Sig: Take 10 mg by mouth daily   magnesium oxide (MAG-OX) 400 mg 3/31/2018 at Unknown time Other (Specify) Yes Yes   Sig: Take 400 mg by mouth 2 (two) times a day     metolazone (ZAROXOLYN) 5 mg tablet 3/31/2018 at Unknown time Other (Specify) Yes Yes   Sig: Take 5 mg by mouth every other day   nitroglycerin (NITROSTAT) 0 4 mg SL tablet Unknown at Unknown time Other (Specify) Yes No   Sig: Place 0 4 mg under the tongue every 5 (five) minutes as needed for chest pain  oxyCODONE-acetaminophen (PERCOCET) 5-325 mg per tablet Unknown at Unknown time Other (Specify) Yes No   Sig: Take 1 tablet by mouth every 4 (four) hours as needed for moderate pain   pantoprazole (PROTONIX) 40 mg tablet 3/30/2018 at Unknown time Other (Specify) Yes Yes   Sig: Take 40 mg by mouth daily in the early morning  polyethylene glycol-propylene glycol (SYSTANE) 0 4-0 3 % 3/30/2018 at Unknown time Other (Specify) Yes Yes   Sig: Administer 1 drop to both eyes 3 (three) times a day       potassium chloride (KLOR-CON) 20 mEq packet 3/30/2018 at Unknown time Other (Specify) Yes Yes   Sig: Take 20 mEq by mouth 4 (four) times a day   repaglinide (PRANDIN) 0 5 mg tablet 3/30/2018 at Unknown time Other (Specify) Yes Yes   Sig: Take 1 mg by mouth 3 (three) times a day before meals     sodium phosphate (FLEET) enema Unknown at Unknown time Other (Specify) Yes No   Sig: Insert 1 enema into the rectum once follow package directions   sorbitol 70 % solution Unknown at Unknown time Other (Specify) Yes No   Sig: Take 15 mL by mouth daily as needed (15 ml if no BM on day 3, 30 ml if no BM on day 4)  Facility-Administered Medications: None       Past Medical History:   Diagnosis Date    Anemia     Arthralgia     Atrial fibrillation (HCC)     BPH (benign prostatic hypertrophy)     CAD (coronary artery disease)     Cancer (HCC)     thyroid    Cardiac disease     atrial fib      Cardiomegaly     Carpal tunnel syndrome     CHF (congestive heart failure) (HCC)     Chronic kidney disease     Chronic systolic heart failure (HCC)     Ef 25%    Degenerative joint disease     Depression     Diabetes mellitus ([address] )     Disease of thyroid gland     Fracture of left radius     Gastroparesis     GERD (gastroesophageal reflux disease)     Gout     Hyperlipidemia     Hypertension     Hyperthyroidism     Hypokalemia     Hypothyroid     Hypoxia     Incomplete bladder emptying     Neuropathy     Osteoarthritis     Other fluid overload     fluid restriction    Premature ventricular contraction     Psychiatric disorder     depression    PVD (peripheral vascular disease) (HCC)     Renal disorder     stage 3 chronic kidney disease    Sick sinus syndrome (HCC)     Sleep apnea     Thrombocytopenia (HCC)     Tracheobronchitis     Tremor     Unstable angina (HCC)     Urinary retention        Past Surgical History:   Procedure Laterality Date    ABDOMINAL SURGERY      cholecystectomy    CARDIAC PACEMAKER PLACEMENT      CARPAL TUNNEL RELEASE      bilateral    CHOLECYSTECTOMY      COLONOSCOPY W/ POLYPECTOMY      CORONARY ARTERY BYPASS GRAFT      EYE SURGERY      bilateral CAT EXT W/IOL    HERNIA REPAIR      JOINT REPLACEMENT         Family History   Problem Relation Age of Onset    Stroke Mother     Hypertension Daughter     Heart disease Brother     Diabetes Brother     Stroke Brother      I have reviewed and agree with the history as documented  Social History   Substance Use Topics    Smoking status: Former Smoker     Packs/day: 3 00     Years: 30 00     Types: Cigarettes    Smokeless tobacco: Never Used      Comment: quit late 40's yrs old;total tobacco use 30yrs,used 3 pks of cigarettes form 3-4 yrs then changed to 10 cigars/day    Alcohol use No        Review of Systems   Constitutional: Positive for fever  Negative for activity change and appetite change  HENT: Negative for congestion, sinus pain, sinus pressure and sore throat  Eyes: Negative for discharge and visual disturbance  Respiratory: Positive for shortness of breath  Negative for wheezing  Cardiovascular: Positive for leg swelling  Negative for chest pain  Gastrointestinal: Positive for abdominal distention  Negative for abdominal pain, nausea and vomiting  Genitourinary: Negative for difficulty urinating  Musculoskeletal: Negative for back pain, neck pain and neck stiffness  Skin: Positive for wound (rt hand, left 2nd toe)         Physical Exam  ED Triage Vitals   Temperature Pulse Respirations Blood Pressure SpO2   03/31/18 2001 03/31/18 2001 03/31/18 2001 03/31/18 2001 03/31/18 2001   (!) 102 5 °F (39 2 °C) 91 (!) 26 139/94 98 %      Temp Source Heart Rate Source Patient Position - Orthostatic VS BP Location FiO2 (%)   03/31/18 2001 03/31/18 2001 03/31/18 2001 03/31/18 2001 03/31/18 2356   Temporal Monitor Sitting Left arm 40      Pain Score       03/31/18 2242       No Pain           Orthostatic Vital Signs  Vitals:    04/01/18 0300 04/01/18 0400 04/01/18 0500 04/01/18 0809   BP: 113/56 113/56 123/60 123/58   Pulse: 70 70 70 70   Patient Position - Orthostatic VS:           Physical Exam   Constitutional: He is oriented to person, place, and time  He appears well-developed and well-nourished  He appears distressed  HENT:   Head: Normocephalic and atraumatic  Right Ear: External ear normal    Left Ear: External ear normal    Nose: Nose normal    Mouth/Throat: Oropharynx is clear and moist  No oropharyngeal exudate  TMS pale   Eyes: Conjunctivae and EOM are normal  Pupils are equal, round, and reactive to light  Right eye exhibits no discharge  Left eye exhibits no discharge  No scleral icterus  Neck: Normal range of motion  Neck supple  Cardiovascular: Normal rate  Pulmonary/Chest: He is in respiratory distress  He has rales  Speaks full sentences on BIPAP   Abdominal: Soft  Bowel sounds are normal  He exhibits no distension and no mass  There is no tenderness  There is no rebound and no guarding  Suprapubic catheter; Back: no mildline or CVA tenderness   Genitourinary: Rectal exam shows guaiac positive stool  Genitourinary Comments: No sacral ulcer; nml tone trace heme positive   Musculoskeletal: Normal range of motion  He exhibits edema (3+ symmetric myxedema)  He exhibits no tenderness or deformity  Lymphadenopathy:     He has no cervical adenopathy  Neurological: He is alert and oriented to person, place, and time  No cranial nerve deficit or sensory deficit  He exhibits normal muscle tone  Coordination normal    Gait steady   Skin: Skin is warm and dry  He is not diaphoretic  Rt dorsal hand dressing  Left 2nd toe blister   Psychiatric: He has a normal mood and affect  Nursing note and vitals reviewed        ED Medications  Medications   nitroGLYcerin (TRIDIL) 50 mg in 250 mL infusion (premix) (0 mcg/min Intravenous Stopped 3/31/18 2201)   aspirin chewable tablet 81 mg (not administered)   b complex-vitamin C-folic acid (NEPHROCAPS) capsule 1 capsule (not administered)   magnesium oxide (MAG-OX) tablet 400 mg (not administered)   pantoprazole (PROTONIX) EC tablet 40 mg (40 mg Oral Given 4/1/18 0513)   gabapentin (NEURONTIN) capsule 100 mg (not administered)   carvedilol (COREG) tablet 12 5 mg (12 5 mg Oral Given 4/1/18 0809)   hydrALAZINE (APRESOLINE) tablet 50 mg (50 mg Oral Given 4/1/18 0513)   levothyroxine tablet 225 mcg (225 mcg Oral Given 4/1/18 0513)   nitroglycerin (NITROSTAT) SL tablet 0 4 mg (not administered)   senna-docusate sodium (SENOKOT S) 8 6-50 mg per tablet 1 tablet (not administered)   acetaminophen (TYLENOL) tablet 650 mg (650 mg Oral Given 3/31/18 2327)   carbidopa-levodopa (SINEMET)  mg per tablet 1 tablet (1 tablet Oral Not Given 3/31/18 2318)   lactulose 20 g/30 mL oral solution 10 g (not administered)   isosorbide mononitrate (IMDUR) 24 hr tablet 60 mg (not administered)   lactulose 20 g/30 mL oral solution 20 g (not administered)   oxyCODONE-acetaminophen (PERCOCET) 5-325 mg per tablet 1 tablet (1 tablet Oral Given 4/1/18 0518)   metolazone (ZAROXOLYN) tablet 5 mg (not administered)   sodium phosphate-biphosphate (FLEET) enema 1 enema (not administered)   loratadine (CLARITIN) tablet 10 mg (not administered)   ascorbic acid (VITAMIN C) tablet 500 mg (not administered)   insulin detemir (LEVEMIR) subcutaneous injection 24 Units (24 Units Subcutaneous Given 3/31/18 2317)   warfarin (COUMADIN) tablet 6 5 mg (6 5 mg Oral Given 3/31/18 2317)   cholecalciferol (VITAMIN D3) tablet 2,000 Units (not administered)   insulin lispro (HumaLOG) 100 units/mL subcutaneous injection 2-12 Units (0 Units Subcutaneous Not Given 4/1/18 0720)   insulin lispro (HumaLOG) 100 units/mL subcutaneous injection 2-12 Units (2 Units Subcutaneous Given 3/31/18 2316)   bumetanide (BUMEX) injection 2 mg (2 mg Intravenous Given 3/31/18 2316)   levofloxacin (LEVAQUIN) IVPB (premix) 750 mg (not administered)   bumetanide (BUMEX) injection 1 mg (1 mg Intravenous Given 3/31/18 2038)   levofloxacin [facility]) IVPB (premix) 750 mg (750 mg Intravenous New Bag 3/31/18 2100)       Diagnostic Studies  Results Reviewed     Procedure Component Value Units Date/Time    [place] draw [[id number]] Collected:  03/31/18 2018    Lab Status: In process Specimen:  Blood Updated:  04/01/18 0200    Narrative: The following orders were created for panel order [place] draw  Procedure                               Abnormality         Status                     ---------                               -----------         ------                     April Sly top on XIRI[[id number]]                                  In process                 Green / Black tube on HDEI[[id number]]                        Final result               Lavender Top 7ml on SYNT[[id number]]                          Final result                 Please view results for these tests on the individual orders  Urine Microscopic [[id number]]  (Abnormal) Collected:  03/31/18 2057    Lab Status:  Final result Specimen:  Urine from Urine, Suprapubic catheter Updated:  03/31/18 2120     RBC, UA Innumerable (A) /hpf      WBC, UA  /hpf      Field obscured, unable to enumerate (A)     Epithelial Cells  /hpf      Field obscured, unable to enumerate (A)     Bacteria, UA  /hpf      Field obscured, unable to enumerate (A)    Urine culture [[id number]] Collected:  03/31/18 2057    Lab Status:   In process Specimen:  Urine from Urine, Suprapubic catheter Updated:  03/31/18 2120    UA w Reflex to Microscopic w Reflex to Culture [[id number]]  (Abnormal) Collected:  03/31/18 2057    Lab Status:  Final result Specimen:  Urine from Urine, Suprapubic catheter Updated:  03/31/18 2103     Color, UA Carey     Clarity, UA Slightly Cloudy     Specific Gravity, UA 1 025     pH, UA 5 5     Leukocytes, UA Small (A)     Nitrite, UA Positive (A)     Protein,  (2+) (A) mg/dl      Glucose, UA Negative mg/dl      Ketones, UA Trace (A) mg/dl      Urobilinogen, UA 0 2 E U /dl      Bilirubin, UA Negative Blood, UA Large (A)    Comprehensive metabolic panel [[id number]]  (Abnormal) Collected:  03/31/18 2018    Lab Status:  Final result Specimen:  Blood from Arm, Left Updated:  03/31/18 2050     Sodium 144 mmol/L      Potassium 4 5 mmol/L      Chloride 103 mmol/L      CO2 33 (H) mmol/L      Anion Gap 8 mmol/L      BUN 31 (H) mg/dL      Creatinine 2 34 (H) mg/dL      Glucose 175 (H) mg/dL      Calcium 8 8 mg/dL      AST 26 U/L      ALT 16 U/L      Alkaline Phosphatase 280 (H) U/L      Total Protein 7 9 g/dL      Albumin 3 1 (L) g/dL      Total Bilirubin 0 80 mg/dL      eGFR 24 ml/min/1 73sq m     Narrative:         National Kidney Disease Education Program recommendations are as follows:  GFR calculation is accurate only with a steady state creatinine  Chronic Kidney disease less than 60 ml/min/1 73 sq  meters  Kidney failure less than 15 ml/min/1 73 sq  meters  TSH [[id number]]  (Abnormal) Collected:  03/31/18 2018    Lab Status:  Final result Specimen:  Blood from Arm, Left Updated:  03/31/18 2050     TSH 3RD GENERATON 3 762 (H) uIU/mL     Narrative:         Patients undergoing fluorescein dye angiography may retain small amounts of fluorescein in the body for 48-72 hours post procedure  Samples containing fluorescein can produce falsely depressed TSH values  If the patient had this procedure,a specimen should be resubmitted post fluorescein clearance      Magnesium [[id number]]  (Normal) Collected:  03/31/18 2018    Lab Status:  Final result Specimen:  Blood from Arm, Left Updated:  03/31/18 2050     Magnesium 1 8 mg/dL     B-type natriuretic peptide [[id number]]  (Abnormal) Collected:  03/31/18 2018    Lab Status:  Final result Specimen:  Blood from Arm, Left Updated:  03/31/18 2050     NT-proBNP 3,072 (H) pg/mL     Troponin I [[id number]]  (Normal) Collected:  03/31/18 2018    Lab Status:  Final result Specimen:  Blood from Arm, Left Updated:  03/31/18 2047     Troponin I 0 02 ng/mL     Narrative:         [employer] Chemistry analyzer 99% cutoff is > 0 04 ng/mL in network labs    o cTnI 99% cutoff is useful only when applied to patients in the clinical setting of myocardial ischemia  o cTnI 99% cutoff should be interpreted in the context of clinical history, ECG findings and possibly cardiac imaging to establish correct diagnosis  o cTnI 99% cutoff may be suggestive but clearly not indicative of a coronary event without the clinical setting of myocardial ischemia  APTT [[id number]]  (Abnormal) Collected:  03/31/18 2018    Lab Status:  Final result Specimen:  Blood from Arm, Left Updated:  03/31/18 2039     PTT 51 (H) seconds     Narrative: Therapeutic Heparin Range = 60-90 seconds    Protime-INR [[id number]]  (Abnormal) Collected:  03/31/18 2018    Lab Status:  Final result Specimen:  Blood from Arm, Left Updated:  03/31/18 2038     Protime 29 2 (H) seconds      INR 2 75 (H)    CBC and differential [[id number]]  (Abnormal) Collected:  03/31/18 2018    Lab Status:  Final result Specimen:  Blood from Arm, Left Updated:  03/31/18 2025     WBC 9 82 Thousand/uL      RBC 3 78 (L) Million/uL      Hemoglobin 11 9 (L) g/dL      Hematocrit 37 7 %       (H) fL      MCH 31 5 pg      MCHC 31 6 g/dL      RDW 18 2 (H) %      MPV 13 0 (H) fL      Platelets 618 (L) Thousands/uL      Neutrophils Relative 81 (H) %      Lymphocytes Relative 9 (L) %      Monocytes Relative 5 %      Eosinophils Relative 5 %      Basophils Relative 0 %      Neutrophils Absolute 7 96 (H) Thousands/µL      Lymphocytes Absolute 0 88 Thousands/µL      Monocytes Absolute 0 46 Thousand/µL      Eosinophils Absolute 0 48 Thousand/µL      Basophils Absolute 0 04 Thousands/µL     Influenza A/B and RSV by PCR (indicated for patients >2 mo of age) [[id number]] Collected:  03/31/18 2018    Lab Status: In process Specimen:  Nasopharyngeal from Nasopharyngeal Swab Updated:  03/31/18 2023    Blood culture #2 [[id number]] Collected:  03/31/18 2018    Lab Status:   In process Specimen:  Blood from Arm, Left Updated:  03/31/18 2023    Blood culture #1 [[id number]] Collected:  03/31/18 2018    Lab Status: In process Specimen:  Blood from Arm, Right Updated:  03/31/18 2023                 XR chest 1 view portable   ED Interpretation by Cliff Pritchard MD (03/31 2042)   Read by me; Radiologist to provide formal interpretation moderate pulm vasc congestion worse vs prev CXR vs 12/20/16                 Procedures  ECG 12 Lead Documentation  Date/Time: 3/31/2018 8:08 PM  Performed by: Parker Ayala by: Veronique Benjamin     Indications / Diagnosis:  Sob  ECG reviewed by me, the ED Provider: yes    Patient location:  ED  Previous ECG:     Previous ECG:  Compared to current    Comparison ECG info:  Prev 8/13/17 ventricular paced rhythm  Rate:     ECG rate:  82    ECG rate assessment: normal    Rhythm:     Rhythm: atrial fibrillation    QRS:     QRS axis:  Left  Comments:      No acute ischemic changes           Phone Contacts  ED Phone Contact    ED Course  ED Course as of Apr 01 0846   Sat Mar 31, 2018   2034 On BIPAP 16/6 18 95-99% 50%    2138 Case reviewed with Dr Pichardo Foot will admit ICU full     2139 Additional history obtained from the daughter she noticed that he was not his usual self not is perky since yesterday more slumped over no history of any confusion his mental status is at baseline  MDM  Number of Diagnoses or Management Options  Diagnosis management comments: Mdm: chf with fever, 11kg weight gain, pneumonia, influenza, UTI     The patient presented with a condition in which there was a high probability of imminent or life-threatening deterioration, and critical care services (excluding separately billable procedures) totalled 30-74 minutes (reassessment of resp status; ntg infusion )          Disposition  Final diagnoses:   CHF (congestive heart failure) (HCC)   Acute respiratory failure with hypoxia (HCC)   Fever   UTI (urinary tract infection) - chronic suprapubic catheter     Time reflects when diagnosis was documented in both MDM as applicable and the Disposition within this note     Time User Action Codes Description Comment    3/31/2018  9:44 PM Italo Mcknight Add [I50 9] CHF (congestive heart failure) ([facility] )     3/31/2018  9:44 PM Italo Mcknight Modify [I50 9] [facility]     3/31/2018  9:44 PM Ravinderdallas Mcknight Add [J96 01] Acute respiratory failure with hypoxia [facility] )     3/31/2018  9:45 PM Italo Mcknight Add [R50 9] Fever     3/31/2018  9:45 PM Italo Mcknight Add [N39 0] UTI (urinary tract infection)     3/31/2018  9:45 PM Hiral Padilla Lipoma Modify [N39 0] UTI (urinary tract infection) chronic suprapubic catheter    3/31/2018 11:58 PM Koko Goel Add [I50 9] Acute congestive heart failure, unspecified congestive heart failure type [facility])       ED Disposition     ED Disposition Condition Comment    Admit  Case was discussed with Dr Roberto Tovar  and the patient's admission status was agreed to be Admission Status: inpatient status to the service of Dr Roberto Tovar   Follow-up Information    None       Current Discharge Medication List      CONTINUE these medications which have NOT CHANGED    Details   acetaminophen (TYLENOL) 325 mg tablet Take 650 mg by mouth every 6 (six) hours as needed for mild pain or fever (not to exceed 3 gm/24 hrs)  ascorbic acid (VITAMIN C) 500 mg tablet Take 500 mg by mouth daily      aspirin 81 MG tablet Take 81 mg by mouth daily  B Complex-C-Folic Acid (MICHEAL CAPS) 1 MG CAPS Take 1 capsule by mouth daily  bumetanide (BUMEX) 2 mg tablet Take 2 mg by mouth daily        carbidopa-levodopa (SINEMET)  mg per tablet Take 1 tablet by mouth 3 (three) times a day  carvedilol (COREG) 12 5 mg tablet Take 12 5 mg by mouth 2 (two) times a day with meals  Hold for HR < 60 or SBP < 100         Cholecalciferol (VITAMIN D3) 2000 units TABS Take 2,000 Units by mouth daily      febuxostat (ULORIC) 80 MG TABS Take 80 mg by mouth daily      gabapentin (NEURONTIN) 100 mg capsule Take 100 mg by mouth 2 (two) times a day Two capsules (200 mg) twice daily       hydrALAZINE (APRESOLINE) 50 mg tablet Take 50 mg by mouth every 8 (eight) hours  insulin aspart (NovoLOG) 100 units/mL injection Inject under the skin 2 (two) times a day before meals Indications: sliding scale  Accuchecks twice daily at 0700 and 1600 with coverage per sliding scale       insulin detemir (LEVEMIR) 100 units/mL subcutaneous injection Inject 24 Units under the skin daily at bedtime      isosorbide mononitrate (IMDUR) 60 mg 24 hr tablet Take 60 mg by mouth daily  Hold for SBP < 110       lactulose 20 g/30 mL Take 20 g by mouth 3 (three) times a week  On Monday, Wednesday, and Friday mornings only      levothyroxine 200 mcg tablet Take 225 mcg by mouth daily in the early morning 200 mcg tablet plus 25 mcg tablet to equal 225 mcg dose       loratadine (CLARITIN) 10 mg tablet Take 10 mg by mouth daily      magnesium oxide (MAG-OX) 400 mg Take 400 mg by mouth 2 (two) times a day        metolazone (ZAROXOLYN) 5 mg tablet Take 5 mg by mouth every other day      pantoprazole (PROTONIX) 40 mg tablet Take 40 mg by mouth daily in the early morning  polyethylene glycol-propylene glycol (SYSTANE) 0 4-0 3 % Administer 1 drop to both eyes 3 (three) times a day  potassium chloride (KLOR-CON) 20 mEq packet Take 20 mEq by mouth 4 (four) times a day      Probiotic Product (JOHNNIE-BID PROBIOTIC PO) Take 1 tablet by mouth daily      repaglinide (PRANDIN) 0 5 mg tablet Take 1 mg by mouth 3 (three) times a day before meals        Sennosides-Docusate Sodium (SENEXON-S PO) Take 1 tablet by mouth daily        WARFARIN SODIUM PO Take 6 5 mg by mouth daily at bedtime      benzocaine (ORAJEL) 10 % mucosal gel Apply 1 application to the mouth or throat 3 (three) times a day as needed for mucositis diphenhydrAMINE (BENADRYL) 50 mg capsule Take 50 mg by mouth every 8 (eight) hours as needed for itching  nitroglycerin (NITROSTAT) 0 4 mg SL tablet Place 0 4 mg under the tongue every 5 (five) minutes as needed for chest pain  oxyCODONE-acetaminophen (PERCOCET) 5-325 mg per tablet Take 1 tablet by mouth every 4 (four) hours as needed for moderate pain      sodium phosphate (FLEET) enema Insert 1 enema into the rectum once follow package directions      sorbitol 70 % solution Take 15 mL by mouth daily as needed (15 ml if no BM on day 3, 30 ml if no BM on day 4)  No discharge procedures on file      ED Provider  Electronically Signed by           Mayra Hare MD  04/01/18 3917

## 2018-04-01 NOTE — ASSESSMENT & PLAN NOTE
-follow patient's blood glucose trend  -check hemoglobin A1c level  -continue the current insulin regimen

## 2018-04-01 NOTE — RESPIRATORY THERAPY NOTE
RT Protocol Note  Armand Mills 80 y o  male MRN: [id number]  Unit/Bed#:  Encounter: [id number]    Assessment    Principal Problem:    SOB (shortness of breath)  Active Problems:    Chronic systolic CHF (congestive heart failure) (HCC)    Acute on chronic kidney failure (HCC)    CAD (coronary artery disease)    Sleep apnea    PVD (peripheral vascular disease) (HCC)    Neuropathy    Hypertension    Hyperlipidemia    GERD (gastroesophageal reflux disease)    Diabetes mellitus (HCC)    Atrial fibrillation (HCC)    Chronic kidney disease    Hyperlipidemia    Urinary retention    Premature ventricular contraction    Anemia      Home Pulmonary Medications:  Patient is not on any home respiratory medicines, he has no wheezing at this time    Past Medical History:   Diagnosis Date    Anemia     Arthralgia     Atrial fibrillation (HCC)     BPH (benign prostatic hypertrophy)     CAD (coronary artery disease)     Cancer (HCC)     thyroid    Cardiac disease     atrial fib      Cardiomegaly     Carpal tunnel syndrome     CHF (congestive heart failure) (HCC)     Chronic kidney disease     Chronic systolic heart failure (HCC)     Ef 25%    Degenerative joint disease     Depression     Diabetes mellitus ([address] )     Disease of thyroid gland     Fracture of left radius     Gastroparesis     GERD (gastroesophageal reflux disease)     Gout     Hyperlipidemia     Hypertension     Hyperthyroidism     Hypokalemia     Hypothyroid     Hypoxia     Incomplete bladder emptying     Neuropathy     Osteoarthritis     Other fluid overload     fluid restriction    Premature ventricular contraction     Psychiatric disorder     depression    PVD (peripheral vascular disease) (HCC)     Renal disorder     stage 3 chronic kidney disease    Sick sinus syndrome (HCC)     Sleep apnea     Thrombocytopenia (HCC)     Tracheobronchitis     Tremor     Unstable angina (HCC)     Urinary retention      Social History Social History    Marital status:      Spouse name: N/A    Number of children: N/A    Years of education: N/A     Social History Main Topics    Smoking status: Former Smoker     Packs/day: 3 00     Years: 30 00     Types: Cigarettes    Smokeless tobacco: Never Used      Comment: quit late 40's yrs old;total tobacco use 30yrs,used 3 pks of cigarettes form 3-4 yrs then changed to 10 cigars/day    Alcohol use No    Drug use: No    Sexual activity: Not Currently     Other Topics Concern    None     Social History Narrative    None       Subjective    Subjective Data: patient just says he is cold    Objective  Continue bipap/oxygen therapy as needed    Physical Exam:   Assessment Type: Assess only  General Appearance: Alert, Awake  Respiratory Pattern: Dyspnea with exertion, Dyspnea at rest  Chest Assessment: Chest expansion symmetrical  Bilateral Breath Sounds: Crackles, Rales  O2 Device: he is on oxygen therapy via bipap therapy    Vitals:  Blood pressure 160/72, pulse 70, temperature 99 6 °F (37 6 °C), temperature source Temporal, resp  rate 21, weight 125 kg (276 lb 3 8 oz), SpO2 100 %  Imaging and other studies: I have personally reviewed pertinent reports        O2 Device: he is on oxygen therapy via bipap therapy     Plan    Respiratory Plan: Vent/NIV/HFNC        Resp Comments: at this time, patient is on bipap therapy, I have been able to lower the FIO2 to 40% and drop inspiratory pressures a little

## 2018-04-01 NOTE — PROGRESS NOTES
Nitroglycerin gtt stopped per order parameters  SCD's not applied at this time r/t weeping blisters b/l lower extremities  Dr Shubham Guerrero notified

## 2018-04-01 NOTE — ASSESSMENT & PLAN NOTE
-check influenza/RSV PCR testing  -check blood cultures x 2 sets  -check urine culture  -observe off antibiotics for now

## 2018-04-01 NOTE — CASE MANAGEMENT
Initial Clinical Review    Admission: Date/Time/Statement: 3/31/18 @ 2142     Orders Placed This Encounter   Procedures    Inpatient Admission (expected length of stay for this patient is greater than two midnights)     Standing Status:   Standing     Number of Occurrences:   1     Order Specific Question:   Admitting Physician     Answer:   Vidya Nevarez     Order Specific Question:   Level of Care     Answer:   Critical Care [15]     Order Specific Question:   Estimated length of stay     Answer:   More than 2 Midnights     Order Specific Question:   Certification     Answer:   I certify that inpatient services are medically necessary for this patient for a duration of greater than two midnights  See H&P and MD Progress Notes for additional information about the patient's course of treatment  ED: Date/Time/Mode of Arrival:   ED Arrival Information     Expected Arrival Acuity Means of Arrival Escorted By Service Admission Type    3/31/2018 19:33 3/31/2018 19:56 [facility]gent Ambulance [facility] Medicine Emergency    Arrival Complaint    shortness of breath        Chief Complaint:   Chief Complaint   Patient presents with    Shortness of Breath     According to BLS staff - pt c/o chills with SOB ~ 1 hr ago w/spO2 of 81% on 3l/min NC and cyanotic - last seen normal @ 1400      History of Illness: Iraj Cui is a 80 y o  male who presents from nursing home with shortness of breath that began this afternoon  He has a history of chronic systolic heart failure with an EF of 25% last seen on echo in December 2016  He had a meal with his family today for the holiday and may have had too much liquids  He has also been a little bit more somnolent lately as well per his daughter and his appetite has not been as robust   His daughter states that he appears a bit confused as well  Has been fatigued and complaining of a slightly increased cough    At the nursing home, he was desaturating to the low 80's and EMS started him on cpap to increase his saturation to 99%  ER staff noted that his weight is significantly increased from prior measurements  Patient also has increased lower extremity edema  In the ED, patient was started on nitro for an elevated systolic bp in the 813'I and given iv bumex  His breathing appears to be much more comfortable now while on bipap  He was also febrile to 102 5 upon arrival and has an indwelling suprapubic catheter    His mental status is also improved now and is oriented x 2-3        ED Vital Signs:   ED Triage Vitals   Temperature Pulse Respirations Blood Pressure SpO2   03/31/18 2001 03/31/18 2001 03/31/18 2001 03/31/18 2001 03/31/18 2001   (!) 102 5 °F (39 2 °C) 91 (!) 26 139/94 98 %      Temp Source Heart Rate Source Patient Position - Orthostatic VS BP Location FiO2 (%)   03/31/18 2001 03/31/18 2001 03/31/18 2001 03/31/18 2001 03/31/18 2356   Temporal Monitor Sitting Left arm 40      Pain Score       03/31/18 2242       No Pain        Wt Readings from Last 1 Encounters:   04/01/18 123 kg (270 lb 15 1 oz)     Vital Signs (abnormal):   04/01/18 0500  --  70   24  123/60  87  98 %  --  --   04/01/18 0200  --  70   25  118/55  79  100 %  --  --   04/01/18 0100  --  70   24  124/60  87  99 %  --  --   04/01/18 0045  99 7 °F (37 6 °C)  --  --  --  --  --  --  --   04/01/18 0000  --  70   26  135/61  88  99 %  --  --   03/31/18 2356  --  --  --  --  --  100 %  Other (comment)  --   03/31/18 2330  --  70   26  137/65  93  100 %  --  --   03/31/18 2315  --  70   27  142/64  92  99 %  --  --   03/31/18 2300  100 1 °F (37 8 °C)  70   25  145/65  94  100 %  --  --   03/31/18 2200  --  69   24  164/77  --  100 %  --  Sitting   03/31/18 2145  --  70   24  167/77  --  100 %  Other (comment)  Sitting   03/31/18 2115  99 7 °F (37 6 °C)  70   23  158/64  --  100 %  Other (comment)  Sitting   03/31/18 2030  --  74   26   193/84  --  99 %  Other (comment)  Sitting   03/31/18 2001 102 5 °F (39 2 °C)  91   26  139/94  --  98 %  Other (comment)  Sitting   O2 Device: bi-pap at 03/31/18 2001     Abnormal Labs:   Ref Range & Units 03/31/18 2355   pH, Arterial 7 350 - 7 450 7 471     pCO2, Arterial 36 0 - 44 0 mm Hg 40 8    pO2, Arterial 75 0 - 129 0 mm Hg 127 5    HCO3, Arterial 22 0 - 28 0 mmol/L 29 1     Base Excess, Arterial mmol/L 5 0    O2 Content, Arterial 16 0 - 23 0 mL/dL 16 0    O2 HGB,Arterial  94 0 - 97 0 % 97 7     SOURCE   Radial, Right    JOSEPH TEST  Yes    Non Vent type BIPAP  BIPAP       3/31 4/1   CO2 33 34   BUN 31 34   Creatinine 2 34 2 43   Glucose 175 129   Alkaline Phosphatase 280 209   Albumin 3 1 2 4     RBC 3 78 3 23   Hemoglobin 11 9 10 0   Hematocrit 37 7 32 1    99   MCHC 31 6 31 2   RDW 18 2 17 8   Platelets 569 85   MPV 13 0 12 6     PT/INR 29 2/2 75, 31 1/2 97  PTT 51  POC glucose 162  TSH  3rd gen 3 762  BNP 3072  Blood and urine cultures pending   Influenza/RSV swab pending    Ref Range & Units 03/31/18 2057   Color, UA  [place]    Clarity, UA   Slightly Cloudy    Specific [place], UA 1 003 - 1 030 1 025    pH, UA 4 5 - 8 0 5 5    Leukocytes, UA Negative Small     Nitrite, UA Negative  Positive     Protein, UA Negative mg/dl  100 (2+)     Glucose, UA Negative mg/dl  Negative    Ketones, UA Negative mg/dl Trace     Urobilinogen, UA 0 2, 1 0 E U /dl E U /dl 0 2    Bilirubin, UA Negative  Negative    Blood, UA Negative, Trace-Intact Large        Ref Range & Units 03/31/18 2057   RBC, UA None Seen, 0-5 /hpf  Innumerable     WBC, UA None Seen, 0-5, 5-55, 5-65 /hpf  Field obscured, unable to enume    Field obscured, unable to enumerate   Epithelial Cells None Seen, Occasional /hpf  Field obscured, unable to enume    Field obscured, unable to enumerate   Bacteria, UA None Seen, Occasional /hpf  Field obscured, unable to enume    Diagnostic Test Results:     3/31 CXR - Mild central vascular prominence    Left basilar atelectasis/infiltrate with possible small effusion      3/31 EKG - A fib, no acute ischemic changes     4/1 Cardiac Echo pending     ED Treatment:   Medication Administration from 03/31/2018 1933 to 03/31/2018 2226       Date/Time Order Dose Route Action Action by Comments     03/31/2018 2038 bumetanide (BUMEX) injection 1 mg 1 mg Intravenous Given Cary Maravilla RN      03/31/2018 2100 levofloxacin (LEVAQUIN) IVPB (premix) 750 mg 750 mg Intravenous [address] Cary Maravilla RN      03/31/2018 2145 nitroGLYcerin (TRIDIL) 50 mg in 250 mL infusion (premix) 20 mcg/min Intravenous Rate/Dose Change Cary Maravilla RN      03/31/2018 2130 nitroGLYcerin (TRIDIL) 50 mg in 250 mL infusion (premix) 15 mcg/min Intravenous Rate/Dose Change Cary Maravilla RN      03/31/2018 2119 nitroGLYcerin (TRIDIL) 50 mg in 250 mL infusion (premix) 10 mcg/min Intravenous New Bag Cary Maravilla RN           Past Medical/Surgical History:    Active Ambulatory Problems     Diagnosis Date Noted    Acute on chronic systolic CHF [facility]congestive heart failure) ([facility] ) 06/09/2016    Pacemaker 06/09/2016    Sleep apnea 06/17/2016    Sick sinus syndrome ([facility] ) 06/17/2016    PVD (peripheral vascular disease) ([facility] ) 06/17/2016    Psychiatric disorder 06/17/2016    Neuropathy 06/17/2016    Essential hypertension 06/17/2016    Hyperlipidemia 06/17/2016    GERD (gastroesophageal reflux disease) 06/17/2016    Gastroparesis 06/17/2016    Type 2 diabetes mellitus with hyperglycemia, with long-term current use of insulin (HCC) 06/17/2016    Cancer ([facility] ) 06/17/2016    Atrial fibrillation ([facility] ) 06/17/2016    Stage 4 chronic kidney disease ([facility] ) 09/06/2016    Fever 12/20/2016    Perioral cyanosis 04/13/2017    Hyperlipidemia 04/13/2017    Thrombocytopenia ([facility] ) 04/13/2017    Urinary retention 04/13/2017    HAP (hospital-acquired pneumonia) 04/13/2017    Dilated cbd, acquired 04/17/2017    Renal disorder     Premature ventricular contraction     [facility]est[facility]) [facility]     SOB (shortness of breath) 03/31/2018     Resolved Ambulatory Problems     Diagnosis Date Noted    Hyperkalemia 06/09/2016    [facility] on [facility] failure [facility] ) 06/09/2016    CAD (coronary artery disease) 06/09/2016    Decreased hemoglobin 09/06/2016    Elevated troponin 09/06/2016    Hypokalemia 09/06/2016    Hyperthyroidism      Past Medical History:   Diagnosis Date    Anemia     Arthralgia     Atrial fibrillation (HCC)     BPH (benign prostatic hypertrophy)     CAD (coronary artery disease)     Cancer (HCC)     Cardiac disease     Cardiomegaly     Carpal tunnel syndrome     CHF [facility]congestive heart failure) ([facility])     Chronic kidney disease     Chronic systolic heart failure (HCC)     Degenerative joint disease     Depression     Diabetes mellitus ([facility] )     Disease of thyroid gland     Fracture of left radius     Gastroparesis     GERD (gastroesophageal reflux disease)     Gout     Hyperlipidemia     Hypertension     Hyperthyroidism     Hypokalemia     Hypothyroid     Hypoxia     Incomplete bladder emptying     Neuropathy     Osteoarthritis     Other fluid overload     Premature ventricular contraction     Psychiatric disorder     PVD (peripheral vascular disease) ([facility])     Renal disorder     Sick sinus syndrome ([facility])     Sleep apnea     Thrombocytopenia ([facility])     Tracheobronchitis     Tremor     Unstable angina ([facility])     Urinary retention      Admitting Diagnosis: CHF (congestive heart failure) ([facility]) [I50 9]  UTI (urinary tract infection) [N39 0]  SOB (shortness of breath) [R06 02]  Fever [R50 9]  Acute respiratory failure with hypoxia ([facility]) [J96 01]    Age/Sex: 80 y o  male    Assessment/Plan:   Plan for the Primary Problem(s):     1   Shortness of breath              - likely 2/2 exacerbation of acute on chronic systolic heart failure              - cont nitro for now, titrate to keep systolic bp <809              - bipap for respiratory distress              - check abg              - iv bumex 2mg bid              - close monitoring of I/o              - tele monitoring              - daily weights                2  Fever              - f/u cultures              - cont levaquin for now              - likely 2/2 uti                3  DM              - cont levemir and sliding scale              - accuchecks              - carb control diet              - hemoglobin a1c     4  HTN              - resume home meds     5  A fib              - rate controlled              - on ac              - cont warfarin     6  GERD              - cont ppi      VTE Prophylaxis: Warfarin (Coumadin)  / sequential compression device   Code Status: full code  Anticipated Length of Stay:  Patient will be admitted on an Inpatient basis with an anticipated length of stay of  Greater than 2 midnights     Justification for Hospital Stay: chf exacerbation    Admission Orders:  Scheduled Meds:   Current Facility-Administered Medications:  acetaminophen 650 mg Oral Q6H PRN Dorothea Go, DO   ascorbic acid 500 mg Oral Daily Juan Jose Rodrigues, DO   aspirin 81 mg Oral Daily Juan Jose Rodrigues, DO   atorvastatin 40 mg Oral Daily With Curt Cates, DO   b complex-vitamin C-folic acid 1 capsule Oral Daily Juan Jose Rodrigues, DO   bumetanide 2 mg Intravenous BID Yeboah Chi, DO   carbidopa-levodopa 1 tablet Oral TID Yeboah Chi, DO   carvedilol 12 5 mg Oral BID With Meals Dorothea Stiles, DO   cholecalciferol 2,000 Units Oral Daily Juan Jose Rodrigues, DO   gabapentin 100 mg Oral BID Juan Jose Rodrigues, DO   hydrALAZINE 50 mg Oral Q8H [address] Juan Jose Rodrigues, DO   insulin detemir 24 Units Subcutaneous HS Juan Jose Rodrigues, DO   insulin lispro 2-12 Units Subcutaneous TID AC Juan Jose Rodrigues, DO   insulin lispro 2-12 Units Subcutaneous HS Juan Jose Rodrigues, DO   isosorbide mononitrate 60 mg Oral Daily Juan Jose Rodrigues, DO   levothyroxine 225 mcg Oral Early Morning Juan Jose Rodrigues, DO   loratadine 10 mg Oral Daily Yeboah Chi, DO oxyCODONE 5 mg Oral Q4H PRN Marcelle Meneses,    polyethylene glycol 17 g Oral Daily PRN Marcelle Meneses,    warfarin 5 mg Oral Daily (warfarin) Marcelle Meneses DO     Continuous Infusions:  Tridil infusion running from admission until d/c 4/1 @ 1015    PRN Meds:   Acetaminophen x1    oxyCODONE x2    polyethylene glycol    CCU   SCDs  Cont pulse oximetry   Urinary retention protocol   Bladder scan q shift and check PVR   Nasal cannula oxygen   POC glucose ac/hs   Diet Dao/CHO Controlled; Consistent Carbohydrate Diet Level 2 (5 carb servings/75 grams CHO/meal)  Cons Cardiology   Cons Nephrology   Cons Nutrition   Airway clearance protocol   Respiratory protocol   PT eval/tx   4/1 Transfer to [place] [id number]   ___________________________  3/31 Nursing Progress Note  SCD's not applied at this time r/t weeping blisters b/l lower extremities    ____________________________  4/1 Medicine Progress Note  * Acute on chronic systolic CHF (congestive heart failure) ([facility])   Assessment & Plan     -continue Bumex 2 mg IV every 12 hours  -strict intake/output measurements  -daily weights  -check an echocardiogram  -trend troponin levels x 3 sets  -consult Cardiology  -continue coreg  -patient has an allergy to ARB's and Ace-inhibitors           Acute respiratory failure with hypoxia ([facility] )   Assessment & Plan     -patient was successfully weaned off BiPAP  -continue supplemental oxygen via nasal cannula to maintain oxygen saturation levels of 92% and above  -continue the respiratory protocol  -initiate airway clearance protocol  -the patient can be transferred to med/surg level of care with telemetry monitoring and continuous pulse oximetry          Fever   Assessment & Plan     -check influenza/RSV PCR testing  -check blood cultures x 2 sets  -check urine culture  -observe off antibiotics for now        Hypothyroidism   Assessment & Plan     -check a TSH level  -continue PO levothyroxine        Macrocytic anemia   Assessment & Plan     -check a vitamin B12 level and folate level  -follow CBC        Thrombocytopenia (HCC)   Assessment & Plan     -monitor the patient for any signs of bleeding  -follow the CBC          Stage 4 chronic kidney disease ([facility] )   Assessment & Plan     -consult Nephrology  -avoid all nephrotoxic agents  -monitor for urinary retention  -initiate the urinary retention protocol  -continue to monitor the patient's renal function and electrolytes        Atrial fibrillation ([address] )   Assessment & Plan     -continue Coreg for heart rate control  -continue Coumadin for full anticoagulation          Type 2 diabetes mellitus with hyperglycemia, with long-term current use of insulin (HCC)   Assessment & Plan     -follow patient's blood glucose trend  -check hemoglobin A1c level  -continue the current insulin regimen        Essential hypertension   Assessment & Plan     -continue coreg, hydralazine, and imdur   -follow the patient's blood pressure trend        VTE Pharmacologic Prophylaxis:   Pharmacologic: Warfarin (Coumadin)  Mechanical VTE Prophylaxis in Place: Yes  Certification Statement: The patient will continue to require additional inpatient hospital stay due to the need for IV Bumex and with a supplemental oxygen requirement  Thank you,  [address] [place] [address] in the [place] by [facility] for 2017  Network Utilization Review Department  Phone: [phone]; Fax [phone]  ATTENTION: The Network Utilization Review Department is now centralized for our 7 Facilities  Make a note that we have a new phone and fax numbers for our Department  Please call with any questions or concerns to [phone] and carefully follow the prompts so that you are directed to the right person  All voicemails are confidential  Fax any determinations, approvals, denials, and requests for initial or continue stay review clinical to [phone]   Due to HIGH CALL volume, it would be easier if you could please send faxed requests to expedite your requests and in part, help us provide discharge notifications faster

## 2018-04-01 NOTE — ASSESSMENT & PLAN NOTE
-consult Nephrology  -avoid all nephrotoxic agents  -monitor for urinary retention  -initiate the urinary retention protocol  -continue to monitor the patient's renal function and electrolytes

## 2018-04-02 PROBLEM — Z93.59 SUPRAPUBIC CATHETER (HCC): Status: ACTIVE | Noted: 2018-01-01

## 2018-04-02 NOTE — ASSESSMENT & PLAN NOTE
-the patient was afebrile overnight  -influenza/RSV PCR testing was negative  -await the results of the blood cultures x 2 sets  -await the results of the urine culture  -continue to observe off antibiotics for now

## 2018-04-02 NOTE — PLAN OF CARE
Problem: Potential for Falls  Goal: Patient will remain free of falls  INTERVENTIONS:  - Assess patient frequently for physical needs  -  Identify cognitive and physical deficits and behaviors that affect risk of falls    -  [place] fall precautions as indicated by assessment   - Educate patient/family on patient safety including physical limitations  - Instruct patient to call for assistance with activity based on assessment  - Modify environment to reduce risk of injury  - Consider OT/PT consult to assist with strengthening/mobility   Outcome: Progressing      Problem: PAIN - ADULT  Goal: Verbalizes/displays adequate comfort level or baseline comfort level  Interventions:  - Encourage patient to monitor pain and request assistance  - Assess pain using appropriate pain scale  - Administer analgesics based on type and severity of pain and evaluate response  - Implement non-pharmacological measures as appropriate and evaluate response  - Consider cultural and social influences on pain and pain management  - Notify physician/advanced practitioner if interventions unsuccessful or patient reports new pain   Outcome: Progressing      Problem: INFECTION - ADULT  Goal: Absence or prevention of progression during hospitalization  INTERVENTIONS:  - Assess and monitor for signs and symptoms of infection  - Monitor lab/diagnostic results  - Monitor all insertion sites, i e  indwelling lines, tubes, and drains  - Monitor endotracheal (as able) and nasal secretions for changes in amount and color  - [place] appropriate cooling/warming therapies per order  - Administer medications as ordered  - Instruct and encourage patient and family to use good hand hygiene technique  - Identify and instruct in appropriate isolation precautions for identified infection/condition   Outcome: Progressing    Goal: Absence of fever/infection during neutropenic period  INTERVENTIONS:  - Monitor WBC  - Implement neutropenic guidelines   Outcome: Progressing      Problem: SAFETY ADULT  Goal: Maintain or return to baseline ADL function  INTERVENTIONS:  -  Assess patient's ability to carry out ADLs; assess patient's baseline for ADL function and identify physical deficits which impact ability to perform ADLs (bathing, care of mouth/teeth, toileting, grooming, dressing, etc )  - Assess/evaluate cause of self-care deficits   - Assess range of motion  - Assess patient's mobility; develop plan if impaired  - Assess patient's need for assistive devices and provide as appropriate  - Encourage maximum independence but intervene and supervise when necessary  ¯ Involve family in performance of ADLs  ¯ Assess for home care needs following discharge   ¯ Request OT consult to assist with ADL evaluation and planning for discharge  ¯ Provide patient education as appropriate   Outcome: Progressing    Goal: Maintain or return mobility status to optimal level  INTERVENTIONS:  - Assess patient's baseline mobility status (ambulation, transfers, stairs, etc )    - Identify cognitive and physical deficits and behaviors that affect mobility  - Identify mobility aids required to assist with transfers and/or ambulation (gait belt, sit-to-stand, lift, walker, cane, etc )  - [place] fall precautions as indicated by assessment  - Record patient progress and toleration of activity level on Mobility SBAR; progress patient to next Phase/Stage  - Instruct patient to call for assistance with activity based on assessment  - Request Rehabilitation consult to assist with strengthening/weightbearing, etc    Outcome: Progressing      Problem: Prexisting or High Potential for Compromised Skin Integrity  Goal: Skin integrity is maintained or improved  INTERVENTIONS:  - Identify patients at risk for skin breakdown  - Assess and monitor skin integrity  - Assess and monitor nutrition and hydration status  - Monitor labs (i e  albumin)  - Assess for incontinence   - Turn and reposition patient  - Assist with mobility/ambulation  - Relieve pressure over bony prominences  - Avoid friction and shearing  - Provide appropriate hygiene as needed including keeping skin clean and dry  - Evaluate need for skin moisturizer/barrier cream  - Collaborate with interdisciplinary team (i e  Nutrition, Rehabilitation, etc )   - Patient/family teaching   Outcome: Progressing      Problem: DISCHARGE PLANNING - CARE MANAGEMENT  Goal: Discharge to post-acute care or home with appropriate resources  INTERVENTIONS:  - Conduct assessment to determine patient/family and health care team treatment goals, and need for post-acute services based on payer coverage, community resources, and patient preferences, and barriers to discharge  - Address psychosocial, clinical, and financial barriers to discharge as identified in assessment in conjunction with the patient/family and health care team  - Arrange appropriate level of post-acute services according to patient's   needs and preference and payer coverage in collaboration with the physician and health care team  - Communicate with and update the patient/family, physician, and health care team regarding progress on the discharge plan  - Arrange appropriate transportation to post-acute venues   Outcome: Progressing

## 2018-04-02 NOTE — ASSESSMENT & PLAN NOTE
-I appreciate Nephrology's input  -avoid all nephrotoxic agents  -continue to monitor the patient's renal function and electrolytes

## 2018-04-02 NOTE — ASSESSMENT & PLAN NOTE
-continue Coreg for heart rate control  -continue Coumadin for full anticoagulation, but the Coumadin dose will be held today for an elevated INR

## 2018-04-02 NOTE — PROGRESS NOTES
No pain, oob to chair w/therapy - stand to pivot to chair w/walker x 1 asst, fair appetite, refused bath

## 2018-04-02 NOTE — CONSULTS
Consultation - Cardiology   Brian Montero 80 y o  male MRN: [id number]  Unit/Bed#:  Encounter: [id number]  Physician Requesting Consult: Tabatha Young DO  Reason for Consult / Principal Problem: SOB    Assessment:  Principal Problem:    Acute on chronic systolic CHF (congestive heart failure) ([address] )  Active Problems:    Essential hypertension    Type 2 diabetes mellitus with hyperglycemia, with long-term current use of insulin (HCC)    Atrial fibrillation (HCC)    Stage 4 chronic kidney disease (HCC)    Fever    Thrombocytopenia (HCC)    Macrocytic anemia    Hypothyroidism    Acute respiratory failure with hypoxia (HCC)    Ischemic CM    CAD / history of CABG    Plan:  I agree with the IV Bumex but will probably be ready to switch to po in the next 24 - 48 hours  I will review his ECHO  I do not recommend stress testing   History of Present Illness     HPI: Brian Montero is a 80y o  year old male with a history of CAD with prior CABG, severe ICM with EF 25-30% by ECHO in 2016, AF, CKD  He was admitted from a NH with SOB  Apparently his weight has been going up at the NH  He did have a temp of 102 5 in the ER  Troponin is normal  Creatinine 2 58  He has had a very good diuresis since admission  Admission creatinine was 2 43  His med list says that he was on Bumex 2 mg daily and Zaroxolyn 5 mg QOD  He denies CP, SOB at this time          Review of Systems:    Alert awake oriented, comfortable, denies any complaints  No fevers chills nausea vomiting  No weakness, dizziness, seizures  no cough, shortness of breath, or wheezing  Denies any palpitations, chest pain, diaphoresis  Denies leg edema, pain or paresthesias  Denies any skin rashes  Denies abdominal pain, bloody stools, masses  Denies any depression or suicidal ideations      Historical Information   Past Medical History:   Diagnosis Date    Anemia     Arthralgia     Atrial fibrillation (HCC)     BPH (benign prostatic hypertrophy)     CAD (coronary artery disease)     Cancer (HCC)     thyroid    Cardiac disease     atrial fib      Cardiomegaly     Carpal tunnel syndrome     CHF (congestive heart failure) (HCC)     Chronic kidney disease     Chronic systolic heart failure (HCC)     Ef 25%    Degenerative joint disease     Depression     Diabetes mellitus (HCC)     Disease of thyroid gland     Fracture of left radius     Gastroparesis     GERD (gastroesophageal reflux disease)     Gout     Hyperlipidemia     Hypertension     Hyperthyroidism     Hypokalemia     Hypothyroid     Hypoxia     Incomplete bladder emptying     Neuropathy     Osteoarthritis     Other fluid overload     fluid restriction    Premature ventricular contraction     Psychiatric disorder     depression    PVD (peripheral vascular disease) (HCC)     Renal disorder     stage 3 chronic kidney disease    Sick sinus syndrome (HCC)     Sleep apnea     Thrombocytopenia (HCC)     Tracheobronchitis     Tremor     Unstable angina (HCC)     Urinary retention      Past Surgical History:   Procedure Laterality Date    ABDOMINAL SURGERY      cholecystectomy    CARDIAC PACEMAKER PLACEMENT      CARPAL TUNNEL RELEASE      bilateral    CHOLECYSTECTOMY      COLONOSCOPY W/ POLYPECTOMY      CORONARY ARTERY BYPASS GRAFT      EYE SURGERY      bilateral CAT EXT W/IOL    HERNIA REPAIR      JOINT REPLACEMENT       History   Alcohol Use No     History   Drug Use No     History   Smoking Status    Former Smoker    Packs/day: 3 00    Years: 30 00    Types: Cigarettes   Smokeless Tobacco    Never Used     Comment: quit late 40's yrs old;total tobacco use 30yrs,used 3 pks of cigarettes form 3-4 yrs then changed to 10 cigars/day     Family History: non-contributory    Meds/Allergies   all current active meds have been reviewed  Allergies   Allergen Reactions    Benicar [Olmesartan] Other (See Comments)     Can't recall    Cardura [Doxazosin]     Codeine Other (See Comments)     confusion    Cozaar [Losartan]     Cymbalta [Duloxetine Hcl]     Dyazide [Hydrochlorothiazide W-Triamterene]     Hctz [Hydrochlorothiazide]     Iodinated Diagnostic Agents     Lasix [Furosemide] Other (See Comments)     Can't recall    Lopressor [Metoprolol] Other (See Comments)     Can't recall    Lozol [Indapamide]     Prinivil [Lisinopril]     Procardia [Nifedipine]     Verapamil     Voltaren [Diclofenac Sodium]     Aldactone [Spironolactone] Other (See Comments)     Tremors;starts as a low reaction then gets worse    Latex Rash       Objective   Vitals: Blood pressure 133/61, pulse 70, temperature 98 1 °F (36 7 °C), temperature source Temporal, resp  rate (!) 26, height 5' 11" (1 803 m), weight 122 kg (268 lb 8 3 oz), SpO2 98 %  , Body mass index is 37 45 kg/m² , Orthostatic Blood Pressures    Flowsheet Row Most Recent Value   Blood Pressure  133/61 filed at 04/02/2018 0800   Patient Position - Orthostatic VS  Sitting filed at 04/02/2018 0800            Intake/Output Summary (Last 24 hours) at 04/02/18 1044  Last data filed at 04/02/18 1018   Gross per 24 hour   Intake             1040 ml   Output             2250 ml   Net            -1210 ml               Physical Exam:  GEN: Armand Ro appears well, alert and oriented x 3, pleasant and cooperative   HEENT: pupils equal, round, and reactive to light; extraocular muscles intact  NECK: supple, no carotid bruits   HEART: regular rhythm, normal S1 and S2, no murmurs, clicks, gallops or rubs   LUNGS: clear to auscultation bilaterally; no wheezes, rales, or rhonchi   ABDOMEN: normal bowel sounds, soft, no tenderness, no distention  EXTREMITIES: peripheral pulses normal; no clubbing, cyanosis, or edema  NEURO: no focal findings   SKIN: normal without suspicious lesions on exposed skin    Lab Results:   Admission on 03/31/2018   Component Date Value Ref Range Status    WBC 03/31/2018 9 82  4 31 - 10 16 Thousand/uL Final    RBC 03/31/2018 3 78* 3 88 - 5 62 Million/uL Final    Hemoglobin 03/31/2018 11 9* 12 0 - 17 0 g/dL Final    Hematocrit 03/31/2018 37 7  36 5 - 49 3 % Final    MCV 03/31/2018 100* 82 - 98 fL Final    MCH 03/31/2018 31 5  26 8 - 34 3 pg Final    MCHC 03/31/2018 31 6  31 4 - 37 4 g/dL Final    RDW 03/31/2018 18 2* 11 6 - 15 1 % Final    MPV 03/31/2018 13 0* 8 9 - 12 7 fL Final    Platelets 84/10/8605 117* 149 - 390 Thousands/uL Final    Neutrophils Relative 03/31/2018 81* 43 - 75 % Final    Lymphocytes Relative 03/31/2018 9* 14 - 44 % Final    Monocytes Relative 03/31/2018 5  4 - 12 % Final    Eosinophils Relative 03/31/2018 5  0 - 6 % Final    Basophils Relative 03/31/2018 0  0 - 1 % Final    Neutrophils Absolute 03/31/2018 7 96* 1 85 - 7 62 Thousands/µL Final    Lymphocytes Absolute 03/31/2018 0 88  0 60 - 4 47 Thousands/µL Final    Monocytes Absolute 03/31/2018 0 46  0 17 - 1 22 Thousand/µL Final    Eosinophils Absolute 03/31/2018 0 48  0 00 - 0 61 Thousand/µL Final    Basophils Absolute 03/31/2018 0 04  0 00 - 0 10 Thousands/µL Final    Protime 03/31/2018 29 2* 12 1 - 14 4 seconds Final    INR 03/31/2018 2 75* 0 86 - 1 16 Final    PTT 03/31/2018 51* 23 - 35 seconds Final    Sodium 03/31/2018 144  136 - 145 mmol/L Final    Potassium 03/31/2018 4 5  3 5 - 5 3 mmol/L Final    Chloride 03/31/2018 103  100 - 108 mmol/L Final    CO2 03/31/2018 33* 21 - 32 mmol/L Final    Anion Gap 03/31/2018 8  4 - 13 mmol/L Final    BUN 03/31/2018 31* 5 - 25 mg/dL Final    Creatinine 03/31/2018 2 34* 0 60 - 1 30 mg/dL Final    Glucose 03/31/2018 175* 65 - 140 mg/dL Final    Calcium 03/31/2018 8 8  8 3 - 10 1 mg/dL Final    AST 03/31/2018 26  5 - 45 U/L Final    ALT 03/31/2018 16  12 - 78 U/L Final    Alkaline Phosphatase 03/31/2018 280* 46 - 116 U/L Final    Total Protein 03/31/2018 7 9  6 4 - 8 2 g/dL Final    Albumin 03/31/2018 3 1* 3 5 - 5 0 g/dL Final    Total Bilirubin 03/31/2018 0 80  0 20 - 1 00 mg/dL Final    eGFR 03/31/2018 24  ml/min/1 73sq m Final    TSH 3RD GENERATON 03/31/2018 3 762* 0 358 - 3 740 uIU/mL Final    Magnesium 03/31/2018 1 8  1 6 - 2 6 mg/dL Final    Troponin I 03/31/2018 0 02  <=0 04 ng/mL Final    NT-proBNP 03/31/2018 3072* <450 pg/mL Final    Color, UA 03/31/2018 Caery   Final    Clarity, UA 03/31/2018 Slightly Cloudy   Final    Specific Gravity, UA 03/31/2018 1 025  1 003 - 1 030 Final    pH, UA 03/31/2018 5 5  4 5 - 8 0 Final    Leukocytes, UA 03/31/2018 Small* Negative Final    Nitrite, UA 03/31/2018 Positive* Negative Final    Protein, UA 03/31/2018 100 (2+)* Negative mg/dl Final    Glucose, UA 03/31/2018 Negative  Negative mg/dl Final    Ketones, UA 03/31/2018 Trace* Negative mg/dl Final    Urobilinogen, UA 03/31/2018 0 2  0 2, 1 0 E U /dl E U /dl Final    Bilirubin, UA 03/31/2018 Negative  Negative Final    Blood, UA 03/31/2018 Large* Negative, Trace-Intact Final    INFLU A PCR 03/31/2018 None Detected  None Detected Final    INFLU B PCR 03/31/2018 None Detected  None Detected Final    RSV PCR 03/31/2018 None Detected  None Detected Final    Extra Tube 03/31/2018 Hold for add-ons     Final    RBC, UA 03/31/2018 Innumerable* None Seen, 0-5 /hpf Final    WBC, UA 03/31/2018 Field obscured, unable to enumerate* None Seen, 0-5, 5-55, 5-65 /hpf Final    Epithelial Cells 03/31/2018 Field obscured, unable to enumerate* None Seen, Occasional /hpf Final    Bacteria, UA 03/31/2018 Field obscured, unable to enumerate* None Seen, Occasional /hpf Final    pH, Arterial 03/31/2018 7 471* 7 350 - 7 450 Final    pCO2, Arterial 03/31/2018 40 8  36 0 - 44 0 mm Hg Final    pO2, Arterial 03/31/2018 127 5  75 0 - 129 0 mm Hg Final    HCO3, Arterial 03/31/2018 29 1* 22 0 - 28 0 mmol/L Final    Base Excess, Arterial 03/31/2018 5 0  mmol/L Final    O2 Content, Arterial 03/31/2018 16 0  16 0 - 23 0 mL/dL Final    O2 HGB,Arterial  03/31/2018 97 7* 94 0 - 97 0 % Final    SOURCE 03/31/2018 Radial, Right   Final    JOSEPH TEST 03/31/2018 Yes   Final    Non Vent type BIPAP 03/31/2018 BIPAP   Final    IPAP 03/31/2018 14   Final    EPAP 03/31/2018 6   Final    BIPAP fio2 03/31/2018 40  % Final    Clarity, UA 04/01/2018 Slightly Cloudy   Final    Color, UA 04/01/2018 Yellow   Final    Specific Gravity, UA 04/01/2018 1 020  1 003 - 1 030 Final    pH, UA 04/01/2018 5 0  4 5 - 8 0 Final    Glucose, UA 04/01/2018 Negative  Negative mg/dl Final    Ketones, UA 04/01/2018 Negative  Negative mg/dl Final    Blood, UA 04/01/2018 Trace-Intact  Negative, Trace-Intact Final    Protein, UA 04/01/2018 Negative  Negative mg/dl Final    Nitrite, UA 04/01/2018 Positive* Negative Final    Bilirubin, UA 04/01/2018 Negative  Negative Final    Urobilinogen, UA 04/01/2018 0 2  0 2, 1 0 E U /dl E U /dl Final    Leukocytes, UA 04/01/2018 Negative  Negative Final    WBC, UA 04/01/2018 2-4* None Seen, 0-5, 5-55, 5-65 /hpf Final    RBC, UA 04/01/2018 1-2* None Seen, 0-5 /hpf Final    Bacteria, UA 04/01/2018 Occasional  None Seen, Occasional /hpf Final    Epithelial Cells 04/01/2018 None Seen  None Seen, Occasional /hpf Final    POC Glucose 03/31/2018 162* 65 - 140 mg/dl Final    Hemoglobin A1C 04/01/2018 6 9* 4 2 - 6 3 % Final    EAG 04/01/2018 151  mg/dl Final    TSH 3RD GENERATON 04/01/2018 1 770  0 358 - 3 740 uIU/mL Final    Sodium 04/01/2018 145  136 - 145 mmol/L Final    Potassium 04/01/2018 4 1  3 5 - 5 3 mmol/L Final    Chloride 04/01/2018 106  100 - 108 mmol/L Final    CO2 04/01/2018 34* 21 - 32 mmol/L Final    Anion Gap 04/01/2018 5  4 - 13 mmol/L Final    BUN 04/01/2018 34* 5 - 25 mg/dL Final    Creatinine 04/01/2018 2 43* 0 60 - 1 30 mg/dL Final    Glucose 04/01/2018 129  65 - 140 mg/dL Final    Calcium 04/01/2018 8 5  8 3 - 10 1 mg/dL Final    AST 04/01/2018 25  5 - 45 U/L Final    ALT 04/01/2018 14  12 - 78 U/L Final    Alkaline Phosphatase 04/01/2018 209* 46 - 116 U/L Final    Total Protein 04/01/2018 6 4  6 4 - 8 2 g/dL Final    Albumin 04/01/2018 2 4* 3 5 - 5 0 g/dL Final    Total Bilirubin 04/01/2018 0 80  0 20 - 1 00 mg/dL Final    eGFR 04/01/2018 23  ml/min/1 73sq m Final    Magnesium 04/01/2018 1 8  1 6 - 2 6 mg/dL Final    Phosphorus 04/01/2018 3 1  2 3 - 4 1 mg/dL Final    WBC 04/01/2018 9 75  4 31 - 10 16 Thousand/uL Final    RBC 04/01/2018 3 23* 3 88 - 5 62 Million/uL Final    Hemoglobin 04/01/2018 10 0* 12 0 - 17 0 g/dL Final    Hematocrit 04/01/2018 32 1* 36 5 - 49 3 % Final    MCV 04/01/2018 99* 82 - 98 fL Final    MCH 04/01/2018 31 0  26 8 - 34 3 pg Final    MCHC 04/01/2018 31 2* 31 4 - 37 4 g/dL Final    RDW 04/01/2018 17 8* 11 6 - 15 1 % Final    Platelets 46/70/8455 85* 149 - 390 Thousands/uL Final    MPV 04/01/2018 12 6  8 9 - 12 7 fL Final    Protime 04/01/2018 31 1* 12 1 - 14 4 seconds Final    INR 04/01/2018 2 97* 0 86 - 1 16 Final    POC Glucose 04/01/2018 103  65 - 140 mg/dl Final    Troponin I 04/01/2018 0 05* <=0 04 ng/mL Final    LACTIC ACID 04/01/2018 1 6  0 5 - 2 0 mmol/L Final    POC Glucose 04/01/2018 150* 65 - 140 mg/dl Final    Troponin I 04/01/2018 0 04  <=0 04 ng/mL Final    POC Glucose 04/01/2018 105  65 - 140 mg/dl Final    POC Glucose 04/01/2018 154* 65 - 140 mg/dl Final    Protime 04/02/2018 33 6* 12 1 - 14 4 seconds Final    INR 04/02/2018 3 28* 0 86 - 1 16 Final    WBC 04/02/2018 7 28  4 31 - 10 16 Thousand/uL Final    RBC 04/02/2018 3 23* 3 88 - 5 62 Million/uL Final    Hemoglobin 04/02/2018 10 1* 12 0 - 17 0 g/dL Final    Hematocrit 04/02/2018 32 2* 36 5 - 49 3 % Final    MCV 04/02/2018 100* 82 - 98 fL Final    MCH 04/02/2018 31 3  26 8 - 34 3 pg Final    MCHC 04/02/2018 31 4  31 4 - 37 4 g/dL Final    RDW 04/02/2018 17 9* 11 6 - 15 1 % Final    MPV 04/02/2018 12 8* 8 9 - 12 7 fL Final    Platelets 21/53/1484 77* 149 - 390 Thousands/uL Final    Neutrophils Relative 04/02/2018 80* 43 - 75 % Final  Lymphocytes Relative 04/02/2018 8* 14 - 44 % Final    Monocytes Relative 04/02/2018 7  4 - 12 % Final    Eosinophils Relative 04/02/2018 5  0 - 6 % Final    Basophils Relative 04/02/2018 0  0 - 1 % Final    Neutrophils Absolute 04/02/2018 5 82  1 85 - 7 62 Thousands/µL Final    Lymphocytes Absolute 04/02/2018 0 59* 0 60 - 4 47 Thousands/µL Final    Monocytes Absolute 04/02/2018 0 49  0 17 - 1 22 Thousand/µL Final    Eosinophils Absolute 04/02/2018 0 37  0 00 - 0 61 Thousand/µL Final    Basophils Absolute 04/02/2018 0 01  0 00 - 0 10 Thousands/µL Final    Sodium 04/02/2018 144  136 - 145 mmol/L Final    Potassium 04/02/2018 2 9* 3 5 - 5 3 mmol/L Final    Chloride 04/02/2018 102  100 - 108 mmol/L Final    CO2 04/02/2018 35* 21 - 32 mmol/L Final    Anion Gap 04/02/2018 7  4 - 13 mmol/L Final    BUN 04/02/2018 35* 5 - 25 mg/dL Final    Creatinine 04/02/2018 2 58* 0 60 - 1 30 mg/dL Final    Glucose 04/02/2018 89  65 - 140 mg/dL Final    Calcium 04/02/2018 8 6  8 3 - 10 1 mg/dL Final    AST 04/02/2018 19  5 - 45 U/L Final    ALT 04/02/2018 8* 12 - 78 U/L Final    Alkaline Phosphatase 04/02/2018 180* 46 - 116 U/L Final    Total Protein 04/02/2018 6 4  6 4 - 8 2 g/dL Final    Albumin 04/02/2018 2 3* 3 5 - 5 0 g/dL Final    Total Bilirubin 04/02/2018 1 20* 0 20 - 1 00 mg/dL Final    eGFR 04/02/2018 21  ml/min/1 73sq m Final    Magnesium 04/02/2018 1 7  1 6 - 2 6 mg/dL Final    Phosphorus 04/02/2018 4 1  2 3 - 4 1 mg/dL Final    LACTIC ACID 04/02/2018 0 9  0 5 - 2 0 mmol/L Final    Total CK 04/02/2018 48  39 - 308 U/L Final    POC Glucose 04/02/2018 80  65 - 140 mg/dl Final    Ventricular Rate 03/31/2018 82  BPM Final    Atrial Rate 03/31/2018 82  BPM Final    QRSD Interval 03/31/2018 120  ms Final    QT Interval 03/31/2018 386  ms Final    QTC Interval 03/31/2018 450  ms Final    QRS Axis 03/31/2018 -62  degrees Final    T Wave Axis 03/31/2018 -66  degrees Final         Results from last 7 days  Lab Units 04/02/18  0533 04/01/18  1625 04/01/18  1052 03/31/18 2018   CK TOTAL U/L 48  --   --   --    TROPONIN I ng/mL  --  0 04 0 05* 0 02     Results from last 7 days  Lab Units 04/02/18  0533 04/01/18  0510 03/31/18 2018   WBC Thousand/uL 7 28 9 75 9 82   HEMOGLOBIN g/dL 10 1* 10 0* 11 9*   HEMATOCRIT % 32 2* 32 1* 37 7   PLATELETS Thousands/uL 77* 85* 117*           Results from last 7 days  Lab Units 04/02/18  0533 04/01/18  0510 03/31/18 2018   SODIUM mmol/L 144 145 144   POTASSIUM mmol/L 2 9* 4 1 4 5   CHLORIDE mmol/L 102 106 103   CO2 mmol/L 35* 34* 33*   BUN mg/dL 35* 34* 31*   CREATININE mg/dL 2 58* 2 43* 2 34*   CALCIUM mg/dL 8 6 8 5 8 8   TOTAL PROTEIN g/dL 6 4 6 4 7 9   BILIRUBIN TOTAL mg/dL 1 20* 0 80 0 80   ALK PHOS U/L 180* 209* 280*   ALT U/L 8* 14 16   AST U/L 19 25 26   GLUCOSE RANDOM mg/dL 89 129 175*     Results from last 7 days  Lab Units 04/02/18 0533 04/01/18  0510 03/31/18  2018   INR  3 28* 2 97* 2 75*           Imaging: I have personally reviewed pertinent reports  Echo: pending - ECHO in 2016, EF 25-30%                Counseling / Coordination of Care  Total floor / unit time spent today 55 minutes  Greater than 50% of total time was spent with the patient and / or family counseling and / or coordination of care

## 2018-04-02 NOTE — ASSESSMENT & PLAN NOTE
-continue Bumex 2 mg IV every 12 hours for now  -strict intake/output measurements  -daily weights  -check an echocardiogram  -consult Cardiology  -continue coreg  -patient has an allergy to ARB's and Ace-inhibitors  -supplemental oxygen via nasal cannula to maintain oxygen saturation levels 92% and above

## 2018-04-02 NOTE — CONSULTS
Consultation - Nephrology   Subhash Martins 80 y o  male MRN: [id number]  Unit/Bed#:  Encounter: [id number]    A/P:  1  Acute kidney injury: possibly due to underlying systolic congestive heart failure  He is being diuresed and will expect worsening of creatinine due to ischemic cardiomyopathy  He may have a left lower lobe pneumonia versus atelectasis and is being evaluted  Will check a urine BUN and urine creatinine to evaluate his volume status more fully, however, he needs diuresis for comfort and improvement in cardiac forces  Check daily labs  2  Chronic kidney disease stage 4: review of labs show a prior creatinine of 1 5 in 2016, however, he had worsening subsequently  3  Acute on chronic systolic CHF: on diuretic therapy  4  Atrial fibrillation: rate controlled  5  History of urinary retention s/p indwelling suprapubic and voiding well  7  Type 2 diabetes mellitus with hyperglycemia, insulin requiring: he has proteinuria and probable underlying diabetic nephropathy  He has allergies to ACEI/ARB and was not on these meds PTA        Thank you for allowing us to participate in the care of your patient  Please feel free to contact us regarding the care of this patient, or any other questions/concerns that may be applicable      Patient Active Problem List   Diagnosis    Acute on chronic systolic CHF (congestive heart failure) (HCC)    Pacemaker    Sleep apnea    Sick sinus syndrome ([facility] )    [facility]    Psychiatric disorder    Neuropathy    Essential hypertension    Hyperlipidemia    GERD (gastroesophageal reflux disease)    Gastroparesis    Type 2 diabetes mellitus with hyperglycemia, with long-term current use of insulin (HCC)    Cancer (HCC)    Atrial fibrillation (HCC)    Stage 4 chronic kidney disease (HCC)    Fever    Perioral cyanosis    Hyperlipidemia    Thrombocytopenia (HCC)    Urinary retention    HAP (hospital-acquired pneumonia)    Dilated cbd, acquired    Renal disorder    Premature ventricular contraction    Macrocytic anemia    CHF (congestive heart failure) (HCC)    SOB (shortness of breath)    Hypothyroidism    Acute respiratory failure with hypoxia (HCC)    Hypoalbuminemia       History of Present Illness   Physician Requesting Consult: Cari Landa DO  Reason for Consult / Principal Problem: acute kidney injury/chronic kidney disease stage 4  Hx and PE limited by:   HPI: Kris Clark is a 80y o  year old male who presents with increasing dyspnea at rest and on exertion with more lethargy over the past few days  He had a fever to 102 5 and was admitted with CHF and possible LLL pneumonia  He has an indwelling suprapubic catheter and the possibility of a UTI with accompanying change in mental status is being evaluated  He has a history of chronic kidney disease stage 4 and severe ischemic cardiomyopathy with an EF of about 25% on prior echo  He has a long history of atrial fibrillation and chronic systolic CHF  History obtained from chart review and the patient    Review of Systems - Negative except as mentioned above in HPI, more specifics as mentioned below    Review of Systems - General ROS: negative  Psychological ROS: negative  Ophthalmic ROS: negative- denies visual change  ENT ROS: negative  Hematological and Lymphatic ROS: negative  Endocrine ROS: negative  Respiratory ROS: positive for - cough, orthopnea and shortness of breath  Cardiovascular ROS: positive for - dyspnea on exertion and orthopnea  Gastrointestinal ROS: no abdominal pain, change in bowel habits, or black or bloody stools  Genito-Urinary ROS: no dysuria, trouble voiding, or hematuria - has an indwelling suprapubic without discomfort  Musculoskeletal ROS: negative - says he walks very little  Neurological ROS: no TIA or stroke symptoms    Historical Information   Past Medical History:   Diagnosis Date    Anemia     Arthralgia     Atrial fibrillation ([address] )     BPH (benign prostatic hypertrophy)     CAD (coronary artery disease)     Cancer (HCC)     thyroid    Cardiac disease     atrial fib      Cardiomegaly     Carpal tunnel syndrome     CHF (congestive heart failure) (HCC)     Chronic kidney disease     Chronic systolic heart failure (HCC)     Ef 25%    Degenerative joint disease     Depression     Diabetes mellitus (HCC)     Disease of thyroid gland     Fracture of left radius     Gastroparesis     GERD (gastroesophageal reflux disease)     Gout     Hyperlipidemia     Hypertension     Hyperthyroidism     Hypokalemia     Hypothyroid     Hypoxia     Incomplete bladder emptying     Neuropathy     Osteoarthritis     Other fluid overload     fluid restriction    Premature ventricular contraction     Psychiatric disorder     depression    PVD (peripheral vascular disease) (HCC)     Renal disorder     stage 3 chronic kidney disease    Sick sinus syndrome (HCC)     Sleep apnea     Thrombocytopenia (HCC)     Tracheobronchitis     Tremor     Unstable angina (HCC)     Urinary retention      Past Surgical History:   Procedure Laterality Date    ABDOMINAL SURGERY      cholecystectomy    CARDIAC PACEMAKER PLACEMENT      CARPAL TUNNEL RELEASE      bilateral    CHOLECYSTECTOMY      COLONOSCOPY W/ POLYPECTOMY      CORONARY ARTERY BYPASS GRAFT      EYE SURGERY      bilateral CAT EXT W/IOL    HERNIA REPAIR      JOINT REPLACEMENT       Social History   History   Alcohol Use No     History   Drug Use No     History   Smoking Status    Former Smoker    Packs/day: 3 00    Years: 30 00    Types: Cigarettes   Smokeless Tobacco    Never Used     Comment: quit late 42's yrs old;total tobacco use 30yrs,used 3 pks of cigarettes form 3-4 yrs then changed to 10 cigars/day     Family History   Problem Relation Age of Onset    Stroke Mother     Hypertension Daughter     Heart disease Brother     Diabetes Brother     Stroke Brother        Meds/Allergies   all current active meds have been reviewed, current meds: Current Facility-Administered Medications   Medication Dose Route Frequency    acetaminophen (TYLENOL) tablet 650 mg  650 mg Oral Q6H PRN    ascorbic acid (VITAMIN C) tablet 500 mg  500 mg Oral Daily    aspirin chewable tablet 81 mg  81 mg Oral Daily    atorvastatin (LIPITOR) tablet 40 mg  40 mg Oral Daily With Dinner    b complex-vitamin C-folic acid (NEPHROCAPS) capsule 1 capsule  1 capsule Oral Daily    bumetanide (BUMEX) injection 2 mg  2 mg Intravenous BID    carbidopa-levodopa (SINEMET)  mg per tablet 1 tablet  1 tablet Oral TID    carvedilol (COREG) tablet 12 5 mg  12 5 mg Oral BID With Meals    cholecalciferol (VITAMIN D3) tablet 2,000 Units  2,000 Units Oral Daily    fluticasone (FLONASE) 50 mcg/act nasal spray 1 spray  1 spray Each Nare Daily    gabapentin (NEURONTIN) capsule 100 mg  100 mg Oral BID    insulin detemir (LEVEMIR) subcutaneous injection 24 Units  24 Units Subcutaneous HS    insulin lispro (HumaLOG) 100 units/mL subcutaneous injection 2-12 Units  2-12 Units Subcutaneous TID AC    insulin lispro (HumaLOG) 100 units/mL subcutaneous injection 2-12 Units  2-12 Units Subcutaneous HS    isosorbide mononitrate (IMDUR) 24 hr tablet 60 mg  60 mg Oral Daily    levothyroxine tablet 225 mcg  225 mcg Oral Early Morning    loratadine (CLARITIN) tablet 10 mg  10 mg Oral Daily    oxyCODONE (ROXICODONE) IR tablet 5 mg  5 mg Oral Q4H PRN    polyethylene glycol (MIRALAX) packet 17 g  17 g Oral Daily PRN    potassium chloride (K-DUR,KLOR-CON) CR tablet 40 mEq  40 mEq Oral BID With Meals    and PTA meds:  Prescriptions Prior to Admission   Medication    acetaminophen (TYLENOL) 325 mg tablet    ascorbic acid (VITAMIN C) 500 mg tablet    aspirin 81 MG tablet    B Complex-C-Folic Acid (MICHEAL CAPS) 1 MG CAPS    bumetanide (BUMEX) 2 mg tablet    carbidopa-levodopa (SINEMET)  mg per tablet    carvedilol (COREG) 12 5 mg tablet    Cholecalciferol (VITAMIN D3) 2000 units TABS    febuxostat (ULORIC) 80 MG TABS    gabapentin (NEURONTIN) 100 mg capsule    hydrALAZINE (APRESOLINE) 50 mg tablet    insulin aspart (NovoLOG) 100 units/mL injection    insulin detemir (LEVEMIR) 100 units/mL subcutaneous injection    isosorbide mononitrate (IMDUR) 60 mg 24 hr tablet    lactulose 20 g/30 mL    levothyroxine 200 mcg tablet    loratadine (CLARITIN) 10 mg tablet    magnesium oxide (MAG-OX) 400 mg    metolazone (ZAROXOLYN) 5 mg tablet    pantoprazole (PROTONIX) 40 mg tablet    polyethylene glycol-propylene glycol (SYSTANE) 0 4-0 3 %    potassium chloride (KLOR-CON) 20 mEq packet    Probiotic Product (JOHNNIE-BID PROBIOTIC PO)    repaglinide (PRANDIN) 0 5 mg tablet    Sennosides-Docusate Sodium (SENEXON-S PO)    WARFARIN SODIUM PO    benzocaine (ORAJEL) 10 % mucosal gel    diphenhydrAMINE (BENADRYL) 50 mg capsule    nitroglycerin (NITROSTAT) 0 4 mg SL tablet    oxyCODONE-acetaminophen (PERCOCET) 5-325 mg per tablet    sodium phosphate (FLEET) enema    sorbitol 70 % solution         Allergies   Allergen Reactions    Benicar [Olmesartan] Other (See Comments)     Can't recall    Cardura [Doxazosin]     Codeine Other (See Comments)     confusion    Cozaar [Losartan]     Cymbalta [Duloxetine Hcl]     Dyazide [Hydrochlorothiazide W-Triamterene]     Hctz [Hydrochlorothiazide]     Iodinated Diagnostic Agents     Lasix [Furosemide] Other (See Comments)     Can't recall    Lopressor [Metoprolol] Other (See Comments)     Can't recall    Lozol [Indapamide]     Prinivil [Lisinopril]     Procardia [Nifedipine]     Verapamil     Voltaren [Diclofenac Sodium]     Aldactone [Spironolactone] Other (See Comments)     Tremors;starts as a low reaction then gets worse    Latex Rash       Objective     Intake/Output Summary (Last 24 hours) at 04/02/18 0937  Last data filed at 04/02/18 0900   Gross per 24 hour Intake             1140 ml   Output             2500 ml   Net            -1360 ml       Invasive Devices:        Physical Exam      I/O last 3 completed shifts: In: 1272 3 [P O :1260; I V :12 3]  Out: 3450 [Urine:3450]    Vitals:    04/02/18 0800   BP: 133/61   Pulse:    Resp:    Temp: 98 1 °F (36 7 °C)   SpO2:        Gen: in NAD, Alert/Awake  HEENT: no sclerous icterus, MMM, neck supple  CV: +S1/S2, irregular rate and rhythm  Lungs: Bilateral exp crackles with a congested cough  Abd: +BS, soft NT/ND distended  Ext: all four extremities are warm has 2+ edema to knees with tenderness on palpation  Skin: no rashes noted  Neuro: appears intact growwly    Current Weight: Weight - Scale: 122 kg (268 lb 8 3 oz)  First Weight: Weight - Scale: 133 kg (293 lb 14 oz)    Lab Results:  I have personally reviewed pertinent labs      CBC: Lab Results   Component Value Date    WBC 7 28 04/02/2018    HGB 10 1 (L) 04/02/2018    HCT 32 2 (L) 04/02/2018     (H) 04/02/2018    PLT 77 (L) 04/02/2018    MCH 31 3 04/02/2018    MCHC 31 4 04/02/2018    RDW 17 9 (H) 04/02/2018    MPV 12 8 (H) 04/02/2018     CMP: Lab Results   Component Value Date     04/02/2018    K 2 9 (L) 04/02/2018     04/02/2018    CO2 35 (H) 04/02/2018    ANIONGAP 7 04/02/2018    BUN 35 (H) 04/02/2018    CREATININE 2 58 (H) 04/02/2018    GLUCOSE 89 04/02/2018    CALCIUM 8 6 04/02/2018    AST 19 04/02/2018    ALT 8 (L) 04/02/2018    ALKPHOS 180 (H) 04/02/2018    PROT 6 4 04/02/2018    BILITOT 1 20 (H) 04/02/2018    EGFR 21 04/02/2018     Phosphorus:   Lab Results   Component Value Date    PHOS 4 1 04/02/2018     Magnesium:   Lab Results   Component Value Date    MG 1 7 04/02/2018     Urinalysis: No results found for: [place], SPECGRAV, PHUR, LEUKOCYTESUR, NITRITE, PROTEINUA, GLUCOSEU, KETONESU, BILIRUBINUR, BLOODU  Ionized Calcium: No results found for: CRISTÓBAL  Coagulation:   Lab Results   Component Value Date    INR 3 28 (H) 04/02/2018 Troponin:   Lab Results   Component Value Date    TROPONINI 0 04 04/01/2018     ABG: No results found for: PHART, BSN2GNU, PO2ART, XNP2IUI, U2EQDQGS, BEART, SOURCE      Results from last 7 days  Lab Units 04/02/18  0533 04/01/18  0510 03/31/18 2018   SODIUM mmol/L 144 145 144   POTASSIUM mmol/L 2 9* 4 1 4 5   CHLORIDE mmol/L 102 106 103   CO2 mmol/L 35* 34* 33*   BUN mg/dL 35* 34* 31*   CREATININE mg/dL 2 58* 2 43* 2 34*   CALCIUM mg/dL 8 6 8 5 8 8   TOTAL PROTEIN g/dL 6 4 6 4 7 9   BILIRUBIN TOTAL mg/dL 1 20* 0 80 0 80   ALK PHOS U/L 180* 209* 280*   ALT U/L 8* 14 16   AST U/L 19 25 26   GLUCOSE RANDOM mg/dL 89 129 175*       Radiology review:  Procedure: Xr Chest 1 View Portable    Result Date: 4/1/2018  Narrative: CHEST INDICATION:   sob  COMPARISON:  4/13/2017 EXAM PERFORMED/VIEWS:  XR CHEST PORTABLE FINDINGS:  Left-sided chest wall pacemaker is identified  Pacemaker leads are intact  Heart shadow is enlarged but unchanged from prior exam  Mild central vascular prominence  Left basilar atelectasis/infiltrate with possible small effusion  No pneumothorax  Osseous structures appear within normal limits for patient age  Impression: Mild central vascular prominence  Left basilar atelectasis/infiltrate with possible small effusion  Workstation performed: [id number]         EKG, Pathology, and Other Studies: reviewed      Counseling / Coordination of Care  Total floor / unit time spent today 40 minutes  Greater than 50% of total time was spent with the patient and / or family counseling and / or coordination of care   A description of the counseling / coordination of care: follows    Angelo Waldrop MD Consults

## 2018-04-02 NOTE — PROGRESS NOTES
Progress Note - Marisela Morris 11/1/1928, 80 y o  male MRN: [id number]    Unit/Bed#:  Encounter: [id number]    Primary Care Provider: Kal Rico DO   Date and time admitted to hospital: 3/31/2018  7:57 PM        * Acute on chronic systolic CHF (congestive heart failure) ([facility] )   Assessment & Plan    -continue Bumex 2 mg IV every 12 hours for now  -strict intake/output measurements  -daily weights  -check an echocardiogram  -consult Cardiology  -continue coreg  -patient has an allergy to ARB's and Ace-inhibitors  -supplemental oxygen via nasal cannula to maintain oxygen saturation levels 92% and above          Acute respiratory failure with hypoxia (HCC)   Assessment & Plan    -patient was successfully weaned off BiPAP  -continue supplemental oxygen via nasal cannula to maintain oxygen saturation levels of 92% and above  -continue the respiratory protocol  -continue the airway clearance protocol  -the patient can be maintained on med/surg level of care with telemetry monitoring and continuous pulse oximetry        Fever   Assessment & Plan    -the patient was afebrile overnight  -influenza/RSV PCR testing was negative  -await the results of the blood cultures x 2 sets  -await the results of the urine culture  -continue to observe off antibiotics for now        Hypothyroidism   Assessment & Plan    -continue PO levothyroxine  Results for Sofie Farrell (MRN [id number]) as of 4/2/2018 09:59   Ref   Range 4/1/2018 05:10   TSH 3RD GENERATON Latest Ref Range: 0 358 - 3 740 uIU/mL 1 770   -        Macrocytic anemia   Assessment & Plan    -check a vitamin B12 level and folate level  -follow CBC        Thrombocytopenia (HCC)   Assessment & Plan    -continue to monitor the patient for any signs of bleeding  -follow the CBC        Stage 4 chronic kidney disease ([facility] )   Assessment & Plan    -I appreciate Nephrology's input  -avoid all nephrotoxic agents  -continue to monitor the patient's renal function and electrolytes        [facility] ([facility] )   Assessment & Plan    -continue Coreg for heart rate control  -continue Coumadin for full anticoagulation, but the Coumadin dose will be held today for an elevated INR        Type 2 diabetes mellitus with hyperglycemia, with long-term current use of insulin ([facility] )   Assessment & Plan    -follow patient's blood glucose trend  -decrease Levemir to 20 units subcutaneously q h s   -Results for Katie Rhoades (MRN [id number]) as of 2018 09:59   Ref  Range 2018 05:10   Hemoglobin A1C Latest Ref Range: 4 2 - 6 3 % 6 9 (H)   EAG Latest Units: mg/dl 151           Essential hypertension   Assessment & Plan    -continue coreg, hydralazine, and imdur   -follow the patient's blood pressure trend            VTE Pharmacologic Prophylaxis:   Pharmacologic: Warfarin (Coumadin)-Coumadin will be held today with elevated INR  Mechanical VTE Prophylaxis in Place: Yes    Patient Centered Rounds: I have performed bedside rounds with nursing staff today  Discussions with Specialists or Other Care Team Provider:  I discussed the case with Nephrology  Time Spent for Care: 30 minutes  More than 50% of total time spent on counseling and coordination of care as described above  Current Length of Stay: 2 day(s)    Current Patient Status: Inpatient   Certification Statement: The patient will continue to require additional inpatient hospital stay due to The need for IV Bumex as well as supplemental oxygen to maintain adequate oxygen saturation levels  Code Status: Level 1 - Full Code      Subjective: The patient was seen and examined  The patient's shortness of breath is improving  No chest pain  The patient was afebrile overnight  Objective:     Vitals:   Temp (24hrs), Av 6 °F (37 °C), Min:98 1 °F (36 7 °C), Max:99 1 °F (37 3 °C)    HR:  [0-74] 70  Resp:  [18-32] 26  BP: ()/(49-61) 133/61  SpO2:  [96 %-98 %] 98 %  Body mass index is 37 45 kg/m²       Input and Output Summary (last 24 hours): Intake/Output Summary (Last 24 hours) at 04/02/18 1007  Last data filed at 04/02/18 0900   Gross per 24 hour   Intake              860 ml   Output             2150 ml   Net            -1290 ml       Physical Exam:     Physical Exam  General:  NAD, awake, alert, follows commands, + conversational dyspnea  HEENT:  NC/AT, mucous membranes moist  Neck:  Supple, No JVP elevation  CV:  + S1, + S2, Irregularly irregular rhythm, Regular rate  Pulm:  Bibasilar crackles  Abd:  Soft, Non-tender, Non-distended, + suprapubic catheter in place  Ext:  No clubbing/cyanosis/edema  Skin:  + venous stasis changes of the bilateral anterior shins      Additional Data:     Labs:      Results from last 7 days  Lab Units 04/02/18  0533   WBC Thousand/uL 7 28   HEMOGLOBIN g/dL 10 1*   HEMATOCRIT % 32 2*   PLATELETS Thousands/uL 77*   NEUTROS PCT % 80*   LYMPHS PCT % 8*   MONOS PCT % 7   EOS PCT % 5       Results from last 7 days  Lab Units 04/02/18  0533   SODIUM mmol/L 144   POTASSIUM mmol/L 2 9*   CHLORIDE mmol/L 102   CO2 mmol/L 35*   BUN mg/dL 35*   CREATININE mg/dL 2 58*   CALCIUM mg/dL 8 6   TOTAL PROTEIN g/dL 6 4   BILIRUBIN TOTAL mg/dL 1 20*   ALK PHOS U/L 180*   ALT U/L 8*   AST U/L 19   GLUCOSE RANDOM mg/dL 89       Results from last 7 days  Lab Units 04/02/18  0533   INR  3 28*       * I Have Reviewed All Lab Data Listed Above  * Additional Pertinent Lab Tests Reviewed:  [address] Admission Reviewed      Recent Cultures (last 7 days):       Results from last 7 days  Lab Units 03/31/18  2018   INFLUENZA A PCR  None Detected   INFLUENZA B PCR  None Detected   RSV PCR  None Detected       Last 24 Hours Medication List:     Current Facility-Administered Medications:  acetaminophen 650 mg Oral Q6H PRN Jewel Zarate,    ascorbic acid 500 mg Oral Daily Joseul Rodrigues, DO   aspirin 81 mg Oral Daily Nikul Rodrigues, DO   atorvastatin 40 mg Oral Daily With Curt Cates DO   b complex-vitamin C-folic acid 1 capsule Oral Daily Juan Jose Rodrigues, DO   bumetanide 2 mg Intravenous BID Mariano Araujo, DO   carbidopa-levodopa 1 tablet Oral TID Mariano Araujo, DO   carvedilol 12 5 mg Oral BID With Meals Aminah Barfield, DO   cholecalciferol 2,000 Units Oral Daily Juan Jose Rodrigues, DO   fluticasone 1 spray Each Nare Daily Aminah Barfield, DO   gabapentin 100 mg Oral BID Juan Jose Rodrigues, DO   insulin detemir 20 Units Subcutaneous HS Aminah Barfield, DO   insulin lispro 2-12 Units Subcutaneous TID AC Juan Jose Rodrigues, DO   insulin lispro 2-12 Units Subcutaneous HS Juan Jose Rodrigues, DO   isosorbide mononitrate 60 mg Oral Daily Juan Jose Rodrigues, DO   levothyroxine 225 mcg Oral Early Morning Juan Jose Rodrigues, DO   loratadine 10 mg Oral Daily Juan Jose Rodrigues, DO   oxyCODONE 5 mg Oral Q4H PRN Aminah Barfield, DO   polyethylene glycol 17 g Oral Daily PRN Aminah Barfield, DO   potassium chloride 40 mEq Oral BID With Meals Aminah Barfield, DO        Today, Patient Was Seen By: Aminah Barifeld DO    ** Please Note: Dictation voice to text software may have been used in the creation of this document   **

## 2018-04-02 NOTE — PHYSICAL THERAPY NOTE
PT EVAL     04/02/18 0841   Note Type   Note type Eval/Treat   Pain Assessment   Pain Assessment 0-10   Pain Score (Pt did not rate pain)   Pain Type Acute pain   Pain Location Scrotum   Pain Orientation Left   Clinical Progression Resolved   Home Living   Type of Home SNF  (Resident of [place])   Additional Comments Pt reports that he walked short distances with RW with assist    Prior Function   Level of [place] Needs assistance with ADLs and functional mobility   Receives Help From Personal care attendant  (Staff at SNF)   ADL Assistance Needs assistance   IADLs Needs assistance   Restrictions/Precautions   Weight Bearing Precautions Per Order No   Other Precautions Contact/isolation;Telemetry;O2;Fall Risk;Multiple lines   Cognition   Arousal/Participation Alert   Orientation Level Oriented to person;Oriented to place   Memory Decreased short term memory   Following Commands Follows multistep commands with increased time or repetition   RUE Assessment   RUE Assessment (See OT eval for details)   LUE Assessment   LUE Assessment (See OT eval for details )   RLE Assessment   RLE Assessment WFL   Strength RLE   RLE Overall Strength 4/5   LLE Assessment   LLE Assessment WFL   Strength LLE   LLE Overall Strength 4/5   Coordination   Movements are Fluid and Coordinated 1   Bed Mobility   Rolling R 2  Maximal assistance   Supine to Sit 2  Maximal assistance   Additional items Assist x 1;Bedrails   Transfers   Sit to Stand 4  Minimal assistance   Additional items Assist x 1; Impulsive;Verbal cues   Stand to Sit 4  Minimal assistance   Additional items Verbal cues; Increased time required   Stand pivot 4  Minimal assistance   Additional items Assist x 1;Verbal cues; Increased time required  (With RW)   Ambulation/Elevation   Gait pattern (Decreased step length, slow)   Assistive Device Rolling walker   Distance (2')   Balance   Static Sitting Good   Dynamic Sitting Good   Static Standing Fair -  (With RW)   Dynamic Standing Fair -  (With RW)   Ambulatory Fair -  (With RW)   Endurance Deficit   Endurance Deficit Yes   Endurance Deficit Description (Limited by fatigue)   Activity Tolerance   Activity Tolerance Patient limited by fatigue   Nurse Made Aware Nsg present during transfer   Assessment   Prognosis Good   Problem List Decreased strength;Decreased endurance; Impaired balance;Decreased mobility; Decreased safety awareness   Assessment Pt is an 79 yo male admitted on 3/31/18 due to acute on chronic CHF  Noted edema in LEs  Pt c/o scrotal pain at start of eval due to positioning  Motivated to get OOB  Pt required max assist x 1 for supine to sit  Able to initiate moving LEs closer to EOB, but required max assist to complete  Pt required min assist x 1 for sit to stand from EOB  Cues needed for safe hand placement  Pt limited by reports of weakness  Pt was able to take several small steps to chair with RW with min assist  Pt reported fear of falling due to LE weakness  Pt required cues for safe hand placement with transfer  Pt on 2L of O2 with sats 94%  When pt transferred to EOB, sats decreased to 86%  With pursed lip breathing and rest, sats increased to 91-92%  While seated in chair, pt completed B hip hikes, LAQs, APs x 10  Pt will benefit from continued PT to progress gait, transfers, balance, endurance, strengthening, and safety in order to maximize function and decrease burden of care  Goals   Patient Goals (To get stronger)   LTG Expiration Date 04/16/18   Long Term Goal #1 Supine <-> sit - min assist; transfers- CGA   Long Term Goal #2 Gait - 48' with RW with CGA   Plan   Treatment/Interventions Functional transfer training;LE strengthening/ROM; Therapeutic exercise;Gait training;Bed mobility; Patient/family training   PT Frequency (3-5 days per week, 1-2 times per day)   Recommendation   Recommendation Long-term skilled PT   PT - OK to Discharge No   Pt OOB in chair at end of eval  Call bell in reach  SCDs donned and functioning

## 2018-04-02 NOTE — ASSESSMENT & PLAN NOTE
-continue PO levothyroxine  Results for Netta Lantigua (MRN [id number]) as of 4/2/2018 09:59   Ref   Range 4/1/2018 05:10   TSH 3RD GENERATON Latest Ref Range: 0 358 - 3 740 uIU/mL 1 770   -

## 2018-04-02 NOTE — ASSESSMENT & PLAN NOTE
-follow patient's blood glucose trend  -decrease Levemir to 20 units subcutaneously q h s   -Results for Sofie Farrell (MRN [id number]) as of 4/2/2018 09:59   Ref   Range 4/1/2018 05:10   Hemoglobin A1C Latest Ref Range: 4 2 - 6 3 % 6 9 (H)   EAG Latest Units: mg/dl 151

## 2018-04-03 PROBLEM — R93.89 ABNORMAL CHEST X-RAY: Status: ACTIVE | Noted: 2018-01-01

## 2018-04-03 PROBLEM — R80.9 MICROALBUMINURIA: Status: ACTIVE | Noted: 2018-01-01

## 2018-04-03 PROBLEM — I35.0 AORTIC STENOSIS: Status: ACTIVE | Noted: 2018-01-01

## 2018-04-03 PROBLEM — I27.20 PULMONARY HYPERTENSION (HCC): Status: ACTIVE | Noted: 2018-01-01

## 2018-04-03 NOTE — ASSESSMENT & PLAN NOTE
-continue PO levothyroxine  Results for Viridiana Sadler (MRN [id number]) as of 4/2/2018 09:59   Ref   Range 4/1/2018 05:10   TSH 3RD GENERATON Latest Ref Range: 0 358 - 3 740 uIU/mL 1 770   -

## 2018-04-03 NOTE — PROGRESS NOTES
Progress Note - Rinku Hernández 11/1/1928, 80 y o  male MRN: [id number]    Unit/Bed#:  Encounter: [id number]    Primary Care Provider: Lissy Whitlock DO   Date and time admitted to hospital: 3/31/2018  7:57 PM        * Acute on chronic systolic CHF (congestive heart failure) ([facility] [address] )   Assessment & Plan    -continue Bumex 2 mg IV every 12 hours for now  -I will discuss with Cardiology in regards to when the patient can be transitioned to PO diuretics   -strict intake/output measurements  -daily weights  -I appreciate Cardiology's input  -continue coreg  -patient has an allergy to ARB's and Ace-inhibitors  -supplemental oxygen via nasal cannula to maintain oxygen saturation levels 92% and above  -PT/OT          Acute respiratory failure with hypoxia (HCC)   Assessment & Plan    -patient was successfully weaned off BiPAP  -continue supplemental oxygen via nasal cannula to maintain oxygen saturation levels of 92% and above  -continue the respiratory protocol  -continue the airway clearance protocol  -the patient can be maintained on med/surg level of care with telemetry monitoring and continuous pulse oximetry        Fever   Assessment & Plan    -the patient continues to be afebrile  -influenza/RSV PCR testing was negative  -follow the culture results  -continue to observe off antibiotics for now  -check a CT scan of the chest without contrast to rule-out a pulmonary infiltrate        Microalbuminuria   Assessment & Plan    -patient has an allergy to ARB's and Ace-inhibitors  -continue to monitor        Pulmonary hypertension   Assessment & Plan    -he will need outpatient follow-up with Pulmonology        Aortic stenosis   Assessment & Plan    -Cardiology is following the patient  -He will need an outpatient evaluation for a possible TAVR        Abnormal chest x-ray   Assessment & Plan    -Check a CT scan of the chest without contrast to rule-out a pulmonary infiltrate        Hypothyroidism   Assessment & Plan -continue PO levothyroxine  Results for Durga Cabrales (MRN [id number]) as of 4/2/2018 09:59   Ref  Range 4/1/2018 05:10   TSH 3RD GENERATON Latest Ref Range: 0 358 - 3 740 uIU/mL 1 770   -        Macrocytic anemia   Assessment & Plan    -follow CBC  Results for Durga Cabrales (MRN [id number]) as of 4/3/2018 09:13   Ref  Range 4/2/2018 05:33   Folate Latest Ref Range: 3 1 - 17 5 ng/mL >20 0 (H)     Results for Durga Cabrales (MRN [id number]) as of 4/3/2018 09:13   Ref  Range 4/2/2018 05:33   Vitamin B-12 Latest Ref Range: 100 - 900 pg/mL 505           Thrombocytopenia (HCC)   Assessment & Plan    -continue to monitor the patient for any signs of bleeding  -follow the CBC        Stage 4 chronic kidney disease ([facility] [address] )   Assessment & Plan    -I appreciate Nephrology's input  -avoid all nephrotoxic agents  -continue to monitor the patient's renal function and electrolytes        Atrial fibrillation ([facility] [address] )   Assessment & Plan    -continue Coreg for heart rate control  -continue Coumadin for full anticoagulation, but the Coumadin dose will be held again today for an elevated INR        Type 2 diabetes mellitus with hyperglycemia, with long-term current use of insulin ([address] )   Assessment & Plan    -follow patient's blood glucose trend  -decrease Levemir to 16 units subcutaneously q h s   -Results for Durga Cabrales (MRN [id number]) as of 4/2/2018 09:59   Ref  Range 4/1/2018 05:10   Hemoglobin A1C Latest Ref Range: 4 2 - 6 3 % 6 9 (H)   EAG Latest Units: mg/dl 151           Essential hypertension   Assessment & Plan    -continue coreg, hydralazine, and imdur   -follow the patient's blood pressure trend            VTE Pharmacologic Prophylaxis:   Pharmacologic: Warfarin (Coumadin)-Continue to hold with elevated INR  Mechanical VTE Prophylaxis in Place: Yes    Patient Centered Rounds: I have performed bedside rounds with nursing staff today  Time Spent for Care: 20 minutes    More than 50% of total time spent on counseling and coordination of care as described above  Current Length of Stay: 3 day(s)    Current Patient Status: Inpatient   Certification Statement: The patient will continue to require additional inpatient hospital stay due to the need for IV Bumex, cardiac monitoring, and the need for supplemental oxygen to maintain adequate oxygen saturation levels  Code Status: Level 1 - Full Code      Subjective: The patient was seen and examined  The patient's shortness of breath is improving  No chest pain  Objective:     Vitals:   Temp (24hrs), Av 8 °F (37 1 °C), Min:98 6 °F (37 °C), Max:99 1 °F (37 3 °C)    HR:  [70] 70  Resp:  [19-27] 20  BP: (133-152)/(60-71) 152/71  SpO2:  [96 %-100 %] 96 %  Body mass index is 37 39 kg/m²  Input and Output Summary (last 24 hours):        Intake/Output Summary (Last 24 hours) at 18 0927  Last data filed at 18 0557   Gross per 24 hour   Intake              720 ml   Output             2500 ml   Net            -1780 ml       Physical Exam:     Physical Exam  General:  NAD, awake, alert, follows commands  HEENT:  NC/AT, mucous membranes moist  Neck:  Supple, No JVP elevation  CV:  + S1, + S2, Irregularly irregular rhythm, Regular rate  Pulm:  Crackles at the left base  Abd:  Soft, Non-tender, Non-distended  Ext:  No clubbing/cyanosis/edema  Skin:  + venous stasis changes of the bilateral anterior shins      Additional Data:     Labs:      Results from last 7 days  Lab Units 18  0541   WBC Thousand/uL 6 13   HEMOGLOBIN g/dL 9 4*   HEMATOCRIT % 30 3*   PLATELETS Thousands/uL 84*   NEUTROS PCT % 77*   LYMPHS PCT % 8*   MONOS PCT % 8   EOS PCT % 7*       Results from last 7 days  Lab Units 18  0541   SODIUM mmol/L 145   POTASSIUM mmol/L 2 8*   CHLORIDE mmol/L 103   CO2 mmol/L 36*   BUN mg/dL 41*   CREATININE mg/dL 2 45*   CALCIUM mg/dL 8 9   TOTAL PROTEIN g/dL 6 6   BILIRUBIN TOTAL mg/dL 0 90   ALK PHOS U/L 191*   ALT U/L 6*   AST U/L 21   GLUCOSE RANDOM mg/dL 75       Results from last 7 days  Lab Units 04/03/18  0541   INR  3 33*       * I Have Reviewed All Lab Data Listed Above  * Additional Pertinent Lab Tests Reviewed: All [facility] Reviewed      Recent Cultures (last 7 days):       Results from last 7 days  Lab Units 03/31/18 2057 03/31/18 2018   BLOOD CULTURE   --  No Growth at 24 hrs  No Growth at 24 hrs  URINE CULTURE  Culture results to follow    --    INFLUENZA A PCR   --  None Detected   INFLUENZA B PCR   --  None Detected   RSV PCR   --  None Detected       Last 24 Hours Medication List:     Current Facility-Administered Medications:  acetaminophen 650 mg Oral Q6H PRN Morenita Burnett, DO   ascorbic acid 500 mg Oral Daily Joseul Rodrigues, DO   aspirin 81 mg Oral Daily Nikul Rodrigues, DO   atorvastatin 40 mg Oral Daily With Curt Cates, DO   b complex-vitamin C-folic acid 1 capsule Oral Daily Joseul Rodrigues, DO   bumetanide 2 mg Intravenous BID Blaise Machuca, DO   carbidopa-levodopa 1 tablet Oral TID Blaise Mcahuca, DO   carvedilol 12 5 mg Oral BID With Meals Morenita Burnett, DO   cholecalciferol 2,000 Units Oral Daily Joseul Rodrigues, DO   fluticasone 1 spray Each Nare Daily Morenita Burnett, DO   gabapentin 100 mg Oral BID Nikul Rodrigues, DO   insulin detemir 16 Units Subcutaneous HS Morenita Burnett, DO   insulin lispro 2-12 Units Subcutaneous TID AC Juan Jose Rodrigues, DO   insulin lispro 2-12 Units Subcutaneous HS Juan Jose Rodrigues, DO   isosorbide mononitrate 60 mg Oral Daily Joseul Rodrigues, DO   levothyroxine 225 mcg Oral Early Morning Juan Jose Rodrigues, DO   loratadine 10 mg Oral Daily Nikul Rodrigues, DO   magnesium oxide 400 mg Oral BID Morenita Burnett, DO   oxyCODONE 5 mg Oral Q4H PRN Morenita Burnett, DO   polyethylene glycol 17 g Oral Daily PRN Morenita Burnett, DO   potassium chloride 40 mEq Oral TID AC Morenita Burnett, DO        Today, Patient Was Seen By: Morenita Burnett DO    ** Please Note: Dictation voice to text software may have been used in the creation of this document   **

## 2018-04-03 NOTE — ASSESSMENT & PLAN NOTE
-follow CBC  Results for Tarun Knight (MRN [id number]) as of 4/3/2018 09:13   Ref  Range 4/2/2018 05:33   Folate Latest Ref Range: 3 1 - 17 5 ng/mL >20 0 (H)     Results for Tarun Knight (MRN [id number]) as of 4/3/2018 09:13   Ref   Range 4/2/2018 05:33   Vitamin B-12 Latest Ref Range: 100 - 900 pg/mL 505

## 2018-04-03 NOTE — ASSESSMENT & PLAN NOTE
-continue Bumex 2 mg IV every 12 hours for now  -I will discuss with Cardiology in regards to when the patient can be transitioned to PO diuretics   -strict intake/output measurements  -daily weights  -I appreciate Cardiology's input  -continue coreg  -patient has an allergy to ARB's and Ace-inhibitors  -supplemental oxygen via nasal cannula to maintain oxygen saturation levels 92% and above  -PT/OT

## 2018-04-03 NOTE — PROGRESS NOTES
Progress Note - Nephrology   Kylah Salas 80 y o  male MRN: [id number]  Unit/Bed#:  Encounter: [id number]    A/P:  1  Acute kidney injury: due to diuresis in a patient with ischemic cardiomyopathy on high dose diuretics  Will have to accept a degree of dysfunction to achieve euvolemia  2  Chronic kidney disease stage 4 by history due to diabetic nephropathy and nephrosclerosis  3  Acute on chronic systolic congestive failure: on IV bumetanide with little change in the weights but improvement on chest xray and symptomatically  Will be transitioned to oral bumetanide soon  4  Hypokalemia: being repleted with KCl 120 meq - will check a magnesium level  5   Diabetes Mellitus: being covered      Follow up reason for today's visit: Chronic kidney disease stage 4    Acute on chronic systolic CHF (congestive heart failure) ([facility])    Patient Active Problem List   Diagnosis    Acute on chronic systolic CHF (congestive heart failure) ([facility])    Pacemaker    Sleep apnea    [facility]    [facility]peripheral vascular disease) [facility] )    Psychiatric disorder    Neuropathy    Essential hypertension    Hyperlipidemia    GERD (gastroesophageal reflux disease)    Gastroparesis    Type 2 diabetes mellitus with hyperglycemia, with long-term current use of insulin ([facility])    Cancer (HCC)    Atrial fibrillation (HCC)    Stage 4 chronic kidney disease ([facility])    Fever    Perioral cyanosis    Hyperlipidemia    Thrombocytopenia ([facility])    Urinary retention    HAP (hospital-acquired pneumonia)    Dilated cbd, acquired    Renal disorder    Premature ventricular contraction    Macrocytic anemia    CHF (congestive heart failure) ([facility])    SOB (shortness of breath)    Hypothyroidism    Acute respiratory failure with hypoxia ([facility])    Hypoalbuminemia    Suprapubic catheter ([facility])    Abnormal chest x-ray         Subjective:   Denies headache, dizziness, chest pain, dyspnea, abdominal pain or NVDC says he feels much better and "was one sick puppy "    Objective:     Vitals: Blood pressure 152/71, pulse 70, temperature 98 7 °F (37 1 °C), temperature source Temporal, resp  rate 20, height 5' 11" (1 803 m), weight 122 kg (268 lb 1 3 oz), SpO2 96 %  ,Body mass index is 37 39 kg/m²  Weight (last 2 days)     Date/Time   Weight    04/03/18 0539  122 (268 08)    04/02/18 1216  122 (268 96)    04/02/18 0532  122 (268 52)    04/01/18 0534  123 (270 95)                Intake/Output Summary (Last 24 hours) at 04/03/18 0856  Last data filed at 04/03/18 0557   Gross per 24 hour   Intake              900 ml   Output             2700 ml   Net            -1800 ml     I/O last 3 completed shifts:   In: 1260 [P O :1260]  Out: 4200 [Urine:4200]         Physical Exam: /71 (BP Location: Left arm)   Pulse 70   Temp 98 7 °F (37 1 °C) (Temporal)   Resp 20   Ht 5' 11" (1 803 m)   Wt 122 kg (268 lb 1 3 oz)   SpO2 96%   BMI 37 39 kg/m²     General Appearance:    Alert, cooperative, no distress, appears stated age   Head:    Normocephalic, without obvious abnormality, atraumatic   Eyes:    Conjunctiva/corneas clear   Ears:    Normal external ears   Nose:   Nares normal, septum midline, mucosa normal, no drainage    or sinus tenderness   Throat:   Lips, mucosa, and tongue normal; teeth and gums normal   Neck:   Supple, symmetrical, trachea midline, no adenopathy;        thyroid:  No enlargement/tenderness/nodules; no carotid    bruit or JVD   Back:     Symmetric, no curvature, ROM normal, no CVA tenderness   Lungs:     Dec at the bases bilaterally, respirations unlabored   Chest wall:    No tenderness or deformity   Heart:    Irreg rate and rhythm, S1 and S2 normal,   Abdomen:     Soft, non-tender, bowel sounds active   Extremities:   Extremities normal, atraumatic, no cyanosis trace edema   Skin:   Skin color, texture, turgor normal, no rashes or lesions   Lymph nodes:   Cervical normal   Neurologic:   CNII-XII intact            Lab, Imaging and other studies: I have personally reviewed pertinent labs  CBC: Lab Results   Component Value Date    WBC 6 13 04/03/2018    HGB 9 4 (L) 04/03/2018    HCT 30 3 (L) 04/03/2018     (H) 04/03/2018    PLT 84 (L) 04/03/2018    MCH 30 9 04/03/2018    MCHC 31 0 (L) 04/03/2018    RDW 17 7 (H) 04/03/2018    MPV 12 8 (H) 04/03/2018     CMP: Lab Results   Component Value Date     04/03/2018    K 2 8 (L) 04/03/2018     04/03/2018    CO2 36 (H) 04/03/2018    ANIONGAP 6 04/03/2018    BUN 41 (H) 04/03/2018    CREATININE 2 45 (H) 04/03/2018    GLUCOSE 75 04/03/2018    CALCIUM 8 9 04/03/2018    AST 21 04/03/2018    ALT 6 (L) 04/03/2018    ALKPHOS 191 (H) 04/03/2018    PROT 6 6 04/03/2018    BILITOT 0 90 04/03/2018    EGFR 22 04/03/2018           Results from last 7 days  Lab Units 04/03/18  0541 04/02/18  0533 04/01/18  0510   SODIUM mmol/L 145 144 145   POTASSIUM mmol/L 2 8* 2 9* 4 1   CHLORIDE mmol/L 103 102 106   CO2 mmol/L 36* 35* 34*   BUN mg/dL 41* 35* 34*   CREATININE mg/dL 2 45* 2 58* 2 43*   CALCIUM mg/dL 8 9 8 6 8 5   TOTAL PROTEIN g/dL 6 6 6 4 6 4   BILIRUBIN TOTAL mg/dL 0 90 1 20* 0 80   ALK PHOS U/L 191* 180* 209*   ALT U/L 6* 8* 14   AST U/L 21 19 25   GLUCOSE RANDOM mg/dL 75 89 129         Phosphorus: No results found for: PHOS  Magnesium:   Lab Results   Component Value Date    MG 1 7 04/03/2018     Urinalysis: No results found for: Marthann End, SPECGRAV, PHUR, LEUKOCYTESUR, NITRITE, PROTEINUA, GLUCOSEU, KETONESU, BILIRUBINUR, BLOODU  Ionized Calcium: No results found for: CAION  Coagulation: Lab Results   Component Value Date    INR 3 33 (H) 04/03/2018     Troponin: No results found for: TROPONINI  ABG: No results found for: PHART, TLR4VAN, PO2ART, MBU4EGX, B2GCPXGH, BEART, SOURCE  Radiology review:     IMAGING  Procedure: Xr Chest Portable    Result Date: 4/2/2018  Narrative: CHEST portable INDICATION: Shortness of breath, CHF COMPARISON: March 31, 2018 VIEWS:  AP semierect; IMAGES:  1 FINDINGS: Midline sternotomy wires are present from previous cardiac surgery  The cardiomediastinal silhouette is unremarkable  Dual lead cardiac pacemaker overlies left chest wall, unchanged in position  Patchy density developing at the left lung base  Right lung well aerated  No pneumothorax  Bony thorax is otherwise unremarkable  Electrode (EKG) monitoring devices overlie the chest      Impression: Developing density at left lung base suspicious for pneumonia or asymmetric edema  Cardiac pacemaker status post CABG    Workstation performed: [id number]   Personally reviewed by me  Procedure: Xr Chest 1 View Portable    Result Date: 4/1/2018  Narrative: CHEST INDICATION:   sob  COMPARISON:  4/13/2017 EXAM PERFORMED/VIEWS:  XR CHEST PORTABLE FINDINGS:  Left-sided chest wall pacemaker is identified  Pacemaker leads are intact  Heart shadow is enlarged but unchanged from prior exam  Mild central vascular prominence  Left basilar atelectasis/infiltrate with possible small effusion  No pneumothorax  Osseous structures appear within normal limits for patient age  Impression: Mild central vascular prominence  Left basilar atelectasis/infiltrate with possible small effusion   Workstation performed: [id number]   Personally reviewed by me      Current Facility-Administered Medications   Medication Dose Route Frequency    acetaminophen (TYLENOL) tablet 650 mg  650 mg Oral Q6H PRN    ascorbic acid (VITAMIN C) tablet 500 mg  500 mg Oral Daily    aspirin chewable tablet 81 mg  81 mg Oral Daily    atorvastatin (LIPITOR) tablet 40 mg  40 mg Oral Daily With Dinner    b complex-vitamin C-folic acid (NEPHROCAPS) capsule 1 capsule  1 capsule Oral Daily    bumetanide (BUMEX) injection 2 mg  2 mg Intravenous BID    carbidopa-levodopa (SINEMET)  mg per tablet 1 tablet  1 tablet Oral TID    carvedilol (COREG) tablet 12 5 mg  12 5 mg Oral BID With Meals    cholecalciferol (VITAMIN D3) tablet 2,000 Units 2,000 Units Oral Daily    fluticasone (FLONASE) 50 mcg/act nasal spray 1 spray  1 spray Each Nare Daily    gabapentin (NEURONTIN) capsule 100 mg  100 mg Oral BID    insulin detemir (LEVEMIR) subcutaneous injection 20 Units  20 Units Subcutaneous HS    insulin lispro (HumaLOG) 100 units/mL subcutaneous injection 2-12 Units  2-12 Units Subcutaneous TID AC    insulin lispro (HumaLOG) 100 units/mL subcutaneous injection 2-12 Units  2-12 Units Subcutaneous HS    isosorbide mononitrate (IMDUR) 24 hr tablet 60 mg  60 mg Oral Daily    levothyroxine tablet 225 mcg  225 mcg Oral Early Morning    loratadine (CLARITIN) tablet 10 mg  10 mg Oral Daily    magnesium oxide (MAG-OX) tablet 400 mg  400 mg Oral BID    oxyCODONE (ROXICODONE) IR tablet 5 mg  5 mg Oral Q4H PRN    polyethylene glycol (MIRALAX) packet 17 g  17 g Oral Daily PRN    potassium chloride (K-DUR,KLOR-CON) CR tablet 40 mEq  40 mEq Oral TID AC     Medications Discontinued During This Encounter   Medication Reason    warfarin (COUMADIN) 4 mg tablet Dose adjustment    Probiotic Product (JOHNNIE-BID PROBIOTIC PO) Therapy completed    Probiotic Product (BACID) TABS Therapy completed    loperamide (IMODIUM) 2 mg capsule Therapy completed    levofloxacin (LEVAQUIN) 250 mg tablet Therapy completed    Insulin Syringes, Disposable, (MONOJECT INS SYR 1CC) U-100 1 ML MISC Error    febuxostat (ULORIC) 40 mg tablet Therapy completed    Cholecalciferol (VITAMIN D3) 2000 UNITS capsule Therapy completed    allopurinol (ZYLOPRIM) 300 mg tablet Formulary change    amiodarone 200 mg tablet Therapy completed    brimonidine (ALPHAGAN P) 0 1 % Therapy completed    ascorbic acid (VITAMIN C) 500 mg tablet     levofloxacin (LEVAQUIN) IVPB (premix) 750 mg     febuxostat (ULORIC) tablet 80 mg     lactulose 20 g/30 mL oral solution 20 g     levofloxacin (LEVAQUIN) IVPB (premix) 750 mg     magnesium oxide (MAG-OX) tablet 400 mg     metolazone (ZAROXOLYN) tablet 5 mg  nitroGLYcerin (TRIDIL) 50 mg in 250 mL infusion (premix)     warfarin (COUMADIN) tablet 6 5 mg     pantoprazole (PROTONIX) EC tablet 40 mg     senna-docusate sodium (SENOKOT S) 8 6-50 mg per tablet 1 tablet     lactulose 20 g/30 mL oral solution 10 g     nitroglycerin (NITROSTAT) SL tablet 0 4 mg     oxyCODONE-acetaminophen (PERCOCET) 5-325 mg per tablet 1 tablet     sodium phosphate-biphosphate (FLEET) enema 1 enema     acetaminophen (TYLENOL) tablet 650 mg     hydrALAZINE (APRESOLINE) tablet 50 mg     warfarin (COUMADIN) tablet 5 mg     insulin detemir (LEVEMIR) subcutaneous injection 24 Units        Judi Costello MD

## 2018-04-03 NOTE — OCCUPATIONAL THERAPY NOTE
Occupational Therapy Evaluation     Patient Name: Gisella Barrett  FTNDY'C Date: 4/3/2018  Problem List  Patient Active Problem List   Diagnosis    Acute on chronic systolic [facility]F (congestive heart failure) ([facility]    Pacemaker    Sleep apnea    Sick sinus syndrome ([facility] )    [email or web address]    Psychiatric disorder    Neuropathy    Essential hypertension    Hyperlipidemia    GERD (gastroesophageal reflux disease)    Gastroparesis    Type 2 diabetes mellitus with hyperglycemia, with long-term current use of insulin (HCC)    Cancer (HCC)    Atrial fibrillation (HCC)    Stage 4 chronic kidney disease (HCC)    Fever    Perioral cyanosis    Hyperlipidemia    Thrombocytopenia (HCC)    Urinary retention    HAP (hospital-acquired pneumonia)    Dilated cbd, acquired    Renal disorder    Premature ventricular contraction    Macrocytic anemia    [facility]    SOB (shortness of breath)    Hypothyroidism    Acute respiratory failure with hypoxia (HCC)    Hypoalbuminemia    Suprapubic catheter ([facility]    Abnormal chest x-ray    Aortic stenosis    Pulmonary hypertension    Microalbuminuria     Past Medical History  Past Medical History:   Diagnosis Date    Anemia     Arthralgia     Atrial fibrillation (HCC)     BPH (benign prostatic hypertrophy)     CAD (coronary artery disease)     Cancer (HCC)     thyroid    Cardiac disease     atrial fib      Cardiomegaly     Carpal tunnel syndrome     CHF (congestive heart failure) (HCC)     Chronic kidney disease     Chronic systolic heart failure (HCC)     Ef 25%    Degenerative joint disease     Depression     Diabetes mellitus ([facility] )     Disease of thyroid gland     Fracture of left radius     Gastroparesis     GERD (gastroesophageal reflux disease)     Gout     Hyperlipidemia     Hypertension     Hyperthyroidism     Hypokalemia     Hypothyroid     Hypoxia     Incomplete bladder emptying     Neuropathy     Osteoarthritis     Other fluid overload     fluid restriction    Premature ventricular contraction     Psychiatric disorder     depression    PVD (peripheral vascular disease) (HCC)     Renal disorder     stage 3 chronic kidney disease    [facility]     Sleep apnea     Thrombocytopenia (HCC)     Tracheobronchitis     Tremor     Unstable angina ([facility]     Urinary retention      Past Surgical History  Past Surgical History:   Procedure Laterality Date    ABDOMINAL SURGERY      cholecystectomy    CARDIAC PACEMAKER PLACEMENT      CARPAL TUNNEL RELEASE      bilateral    CHOLECYSTECTOMY      COLONOSCOPY W/ POLYPECTOMY      CORONARY ARTERY BYPASS GRAFT      EYE SURGERY      bilateral CAT EXT W/IOL    HERNIA REPAIR      JOINT REPLACEMENT               04/03/18 0746   Note Type   Note type Eval/Treat   Restrictions/Precautions   Weight Bearing Precautions Per Order No   Other Precautions Contact/isolation; Chair Alarm; Bed Alarm;Multiple lines;Telemetry; Fall Risk   Pain Assessment   Pain Assessment No/denies pain   Home Living   Type of Home SNF   Additional Comments pt resides at [facility]    Prior Function   Level of [place] Needs assistance with ADLs and functional mobility; Needs assistance with IADLs   Lives With Facility staff   Receives Help From Personal care attendant   ADL Assistance Needs assistance   IADLs Needs assistance   Comments pt reports he performs FM c RW but not as much as he would like at nursing facility; pt utilizes w/c as well at baseline    Psychosocial   Psychosocial (WDL) WDL   ADL   Where Assessed Supine, bed   LB Dressing Assistance 1  Total Assistance   LB Dressing Deficit Don/doff R sock; Don/doff L sock   Bed Mobility   Supine to Sit 3  Moderate assistance   Additional items Assist x 2;Bedrails; Increased time required;Verbal cues;LE management   Sit to Supine (seated in chair at end of session)   Additional Comments pt required mod (A) to scoot EOB as well    Transfers   Sit to Stand 4  Minimal assistance   Additional items Assist x 2;Verbal cues  (RW)   Stand to Sit 4  Minimal assistance   Additional items Assist x 2;Verbal cues  (RW)   Functional Mobility   Functional Mobility 4  Minimal assistance   Additional Comments x2; pt performed FM to chair ~2ft with use of RW; pt required cues to complete task throughout for safety and guidance with RW    Additional items Rolling walker   Balance   Static Sitting Good   Dynamic Sitting Good   Static Standing Fair +   Dynamic Standing Fair   Ambulatory Fair -   Activity Tolerance   Activity Tolerance Patient limited by fatigue   RUE Assessment   RUE Assessment X  (4/5 grossly; WFL AROM )   LUE Assessment   LUE Assessment X  (4/5 grossly; WFL AROM )   Hand Function   Gross Motor Coordination Functional   Fine Motor Coordination Functional   Sensation   Light Touch No apparent deficits   Sharp/Dull No apparent deficits   Cognition   Arousal/Participation Alert   Attention Within functional limits   Orientation Level Oriented to person;Oriented to place   Memory Decreased long term memory;Decreased short term memory   Following Commands Follows one step commands without difficulty   Assessment   Limitation Decreased ADL status; Decreased UE strength;Decreased Safe judgement during ADL;Decreased cognition;Decreased endurance;Decreased high-level ADLs; Decreased self-care trans   Assessment pt presents at evaluation performing at min-mod (A) x2 with use of RW during FM; at this time, pt presents with poor endurance during FM and able to complete minimal amount of FM; pt will benefit from continued OT intervention and recommend return to SNF and therapy at SNF on d/c   Goals   Short Term Goal  pt will perform UE strengthening exercises    Long Term Goal #1 pt will perform UB grooming tasks at min (A) level    Long Term Goal #2 pt will perform functional transfers at (S) level with use of RW and decreased cues    Long Term Goal pt will be A&Ox4 during session   Plan   Treatment Interventions ADL retraining;Functional transfer training;UE strengthening/ROM; Endurance training;Cognitive reorientation;Patient/family training; Activityengagement   Goal Expiration Date 04/17/18   OT Frequency 3-5x/wk   Recommendation   OT Discharge Recommendation 24 hour supervision/assist   OT - OK to Discharge No   Barthel Index   Feeding 5   Bathing 0   Grooming Score 0   Dressing Score 5   Bladder Score 0   Bowels Score 5   Toilet Use Score 5   Transfers (Bed/Chair) Score 10   Mobility (Level Surface) Score 10   Stairs Score 0   Barthel Index Score 40     Pt will benefit from continued OT services in order to maximize (I) c ADL performance, FM c RW, and improve overall endurance/strength required to complete functional tasks in preparation for d/c  Pt left seated in chair at end of session; all needs within reach; all lines intact; scds connected and turned on

## 2018-04-03 NOTE — ASSESSMENT & PLAN NOTE
-continue Coreg for heart rate control  -continue Coumadin for full anticoagulation, but the Coumadin dose will be held again today for an elevated INR

## 2018-04-03 NOTE — ASSESSMENT & PLAN NOTE
-patient was successfully weaned off BiPAP  -continue supplemental oxygen via nasal cannula to maintain oxygen saturation levels of 92% and above  -continue the respiratory protocol  -continue the airway clearance protocol  -the patient can be maintained on med/surg level of care with telemetry monitoring and continuous pulse oximetry

## 2018-04-03 NOTE — PLAN OF CARE
Problem: Potential for Falls  Goal: Patient will remain free of falls  INTERVENTIONS:  - Assess patient frequently for physical needs  -  Identify cognitive and physical deficits and behaviors that affect risk of falls    -  [place] fall precautions as indicated by assessment   - Educate patient/family on patient safety including physical limitations  - Instruct patient to call for assistance with activity based on assessment  - Modify environment to reduce risk of injury  - Consider OT/PT consult to assist with strengthening/mobility   Outcome: Progressing      Problem: PAIN - ADULT  Goal: Verbalizes/displays adequate comfort level or baseline comfort level  Interventions:  - Encourage patient to monitor pain and request assistance  - Assess pain using appropriate pain scale  - Administer analgesics based on type and severity of pain and evaluate response  - Implement non-pharmacological measures as appropriate and evaluate response  - Consider cultural and social influences on pain and pain management  - Notify physician/advanced practitioner if interventions unsuccessful or patient reports new pain   Outcome: Progressing      Problem: INFECTION - ADULT  Goal: Absence or prevention of progression during hospitalization  INTERVENTIONS:  - Assess and monitor for signs and symptoms of infection  - Monitor lab/diagnostic results  - Monitor all insertion sites, i e  indwelling lines, tubes, and drains  - Monitor endotracheal (as able) and nasal secretions for changes in amount and color  - [place] appropriate cooling/warming therapies per order  - Administer medications as ordered  - Instruct and encourage patient and family to use good hand hygiene technique  - Identify and instruct in appropriate isolation precautions for identified infection/condition   Outcome: Progressing    Goal: Absence of fever/infection during neutropenic period  INTERVENTIONS:  - Monitor WBC  - Implement neutropenic guidelines   Outcome: Progressing      Problem: SAFETY ADULT  Goal: Maintain or return to baseline ADL function  INTERVENTIONS:  -  Assess patient's ability to carry out ADLs; assess patient's baseline for ADL function and identify physical deficits which impact ability to perform ADLs (bathing, care of mouth/teeth, toileting, grooming, dressing, etc )  - Assess/evaluate cause of self-care deficits   - Assess range of motion  - Assess patient's mobility; develop plan if impaired  - Assess patient's need for assistive devices and provide as appropriate  - Encourage maximum independence but intervene and supervise when necessary  ¯ Involve family in performance of ADLs  ¯ Assess for home care needs following discharge   ¯ Request OT consult to assist with ADL evaluation and planning for discharge  ¯ Provide patient education as appropriate   Outcome: Progressing    Goal: Maintain or return mobility status to optimal level  INTERVENTIONS:  - Assess patient's baseline mobility status (ambulation, transfers, stairs, etc )    - Identify cognitive and physical deficits and behaviors that affect mobility  - Identify mobility aids required to assist with transfers and/or ambulation (gait belt, sit-to-stand, lift, walker, cane, etc )  - [place] fall precautions as indicated by assessment  - Record patient progress and toleration of activity level on Mobility SBAR; progress patient to next Phase/Stage  - Instruct patient to call for assistance with activity based on assessment  - Request Rehabilitation consult to assist with strengthening/weightbearing, etc    Outcome: Progressing      Problem: Prexisting or High Potential for Compromised Skin Integrity  Goal: Skin integrity is maintained or improved  INTERVENTIONS:  - Identify patients at risk for skin breakdown  - Assess and monitor skin integrity  - Assess and monitor nutrition and hydration status  - Monitor labs (i e  albumin)  - Assess for incontinence   - Turn and reposition patient  - Assist with mobility/ambulation  - Relieve pressure over bony prominences  - Avoid friction and shearing  - Provide appropriate hygiene as needed including keeping skin clean and dry  - Evaluate need for skin moisturizer/barrier cream  - Collaborate with interdisciplinary team (i e  Nutrition, Rehabilitation, etc )   - Patient/family teaching   Outcome: Progressing      Problem: DISCHARGE PLANNING - CARE MANAGEMENT  Goal: Discharge to post-acute care or home with appropriate resources  INTERVENTIONS:  - Conduct assessment to determine patient/family and health care team treatment goals, and need for post-acute services based on payer coverage, community resources, and patient preferences, and barriers to discharge  - Address psychosocial, clinical, and financial barriers to discharge as identified in assessment in conjunction with the patient/family and health care team  - Arrange appropriate level of post-acute services according to patient's   needs and preference and payer coverage in collaboration with the physician and health care team  - Communicate with and update the patient/family, physician, and health care team regarding progress on the discharge plan  - Arrange appropriate transportation to post-acute venues   Outcome: Progressing      Problem: Nutrition/Hydration-ADULT  Goal: Nutrient/Hydration intake appropriate for improving, restoring or maintaining nutritional needs  Monitor and assess patient's nutrition/hydration status for malnutrition (ex- brittle hair, bruises, dry skin, pale skin and conjunctiva, muscle wasting, smooth red tongue, and disorientation)  Collaborate with interdisciplinary team and initiate plan and interventions as ordered  Monitor patient's weight and dietary intake as ordered or per policy  Utilize nutrition screening tool and intervene per policy  Determine patient's food preferences and provide high-protein, high-caloric foods as appropriate       INTERVENTIONS:  - Monitor oral intake, urinary output, labs, and treatment plans  - Assess nutrition and hydration status and recommend course of action  - Evaluate amount of meals eaten  - Assist patient with eating if necessary   - Allow adequate time for meals  - Recommend/ encourage appropriate diets, oral nutritional supplements, and vitamin/mineral supplements  - Order, calculate, and assess calorie counts as needed  - Recommend, monitor, and adjust tube feedings and TPN/PPN based on assessed needs  - Assess need for intravenous fluids  - Provide specific nutrition/hydration education as appropriate  - Include patient/family/caregiver in decisions related to nutrition   Outcome: Progressing

## 2018-04-03 NOTE — ASSESSMENT & PLAN NOTE
-follow patient's blood glucose trend  -decrease Levemir to 16 units subcutaneously q h s   -Results for Lida Leyden (MRN [id number]) as of 4/2/2018 09:59   Ref   Range 4/1/2018 05:10   Hemoglobin A1C Latest Ref Range: 4 2 - 6 3 % 6 9 (H)   EAG Latest Units: mg/dl 151

## 2018-04-03 NOTE — PHYSICAL THERAPY NOTE
PT Treatment Note      04/03/18 0800   Pain Assessment   Pain Assessment No/denies pain   Restrictions/Precautions   Weight Bearing Precautions Per Order No   Other Precautions (Contact/isolation; Chair Alarm; Bed Alarm;Multiple lines;Telem)   Bed Mobility   Supine to Sit 3  Moderate assistance   Additional items Assist x 2;Bedrails; Increased time required;Verbal cues;LE management   Transfers   Sit to Stand 4  Minimal assistance   Additional items Assist x 2;Verbal cues   Stand to Sit 4  Minimal assistance   Additional items Assist x 2;Verbal cues   Stand pivot 4  Minimal assistance   Additional items Assist x 2;Verbal cues   Ambulation/Elevation   Gait pattern Short stride   Gait Assistance 4  Minimal assist   Additional items Assist x 2   Assistive Device Rolling walker   Distance 2' bed to cahir   Balance   Static Sitting Good   Dynamic Sitting Good   Static Standing Fair +   Dynamic Standing Fair   Ambulatory Fair -   Endurance Deficit   Endurance Deficit Yes   Activity Tolerance   Activity Tolerance Patient limited by fatigue   Assessment   Prognosis Good   Problem List Decreased strength;Decreased endurance; Impaired balance;Decreased mobility; Decreased safety awareness   Assessment Performing functional mobility at (Mod-min) x2 level of function  Limited distance due to weakness and fatigue  Increased time to complete task and cues for safety  Pt will benefit from continued PT to progress gait, transfers, balance, endurance, strengthening, and safety in order to maximize function and decrease burden of care  Plan   Treatment/Interventions Functional transfer training;LE strengthening/ROM; Therapeutic exercise; Endurance training;Bed mobility;Gait training   Progress Slow progress, decreased activity tolerance   Recommendation   Recommendation Long-term skilled PT; Short-term skilled PT   Pt  OOB with call bell within reach, scd's connected and turned on and alarm on at end of PT session

## 2018-04-03 NOTE — ASSESSMENT & PLAN NOTE
-the patient continues to be afebrile  -influenza/RSV PCR testing was negative  -follow the culture results  -continue to observe off antibiotics for now  -check a CT scan of the chest without contrast to rule-out a pulmonary infiltrate

## 2018-04-04 PROBLEM — R93.89 ABNORMAL CHEST X-RAY: Status: RESOLVED | Noted: 2018-01-01 | Resolved: 2018-01-01

## 2018-04-04 PROBLEM — N30.00 ACUTE CYSTITIS: Status: ACTIVE | Noted: 2018-01-01

## 2018-04-04 PROBLEM — J18.9 HEALTHCARE-ASSOCIATED PNEUMONIA: Status: ACTIVE | Noted: 2018-01-01

## 2018-04-04 NOTE — ASSESSMENT & PLAN NOTE
-patient was successfully weaned off BiPAP  -continue supplemental oxygen via nasal cannula to maintain oxygen saturation levels of 92% and above  -continue the respiratory protocol  -continue the airway clearance protocol  -the patient can be maintained on med/surg level of care with continuous pulse oximetry  -discontinue telemetry

## 2018-04-04 NOTE — ASSESSMENT & PLAN NOTE
-follow CBC  Results for Kris Walton (MRN [id number]) as of 4/3/2018 09:13   Ref  Range 4/2/2018 05:33   Folate Latest Ref Range: 3 1 - 17 5 ng/mL >20 0 (H)     Results for Kris Walton (MRN [id number]) as of 4/3/2018 09:13   Ref   Range 4/2/2018 05:33   Vitamin B-12 Latest Ref Range: 100 - 900 pg/mL 505

## 2018-04-04 NOTE — ASSESSMENT & PLAN NOTE
-associated with a suprapubic catheter   -secondary to Pseudomonas aeruginosa and Enterococcus faecalis  -IV cefepime and IV vancomycin

## 2018-04-04 NOTE — ASSESSMENT & PLAN NOTE
-transition to Bumex 2 mg PO Qdaily  -strict intake/output measurements  -daily weights  -I appreciate Cardiology's input  -continue coreg  -patient has an allergy to ARB's and Ace-inhibitors  -supplemental oxygen via nasal cannula to maintain oxygen saturation levels 92% and above  -PT/OT

## 2018-04-04 NOTE — PHYSICAL THERAPY NOTE
PT INITIAL EVALUATION      04/04/18 1045   Restrictions/Precautions   Other Precautions Contact/isolation;O2;Multiple lines;Telemetry; Fall Risk   General   Family/Caregiver Present No   Cognition   Arousal/Participation Cooperative   Attention Within functional limits   Following Commands Follows one step commands without difficulty   Subjective   Subjective "I don't know if I can get out of bed "   Bed Mobility   Supine to Sit 3  Moderate assistance   Additional items Assist x 2   Sit to Supine (NT secondary to pt OOB in chair)   Transfers   Sit to Stand 4  Minimal assistance   Additional items Assist x 2;Verbal cues; Bedrails   Stand to Sit 4  Minimal assistance   Additional items Assist x 2; Increased time required;Verbal cues   Stand pivot 4  Minimal assistance   Additional items Assist x 2;Verbal cues  (with RW)   Balance   Static Sitting Good   Dynamic Sitting Good   Static Standing Fair +   Dynamic Standing Fair   Ambulatory Fair -   Activity Tolerance   Activity Tolerance Patient limited by fatigue   Assessment   Prognosis Fair   Problem List Decreased strength;Decreased endurance; Impaired balance;Decreased mobility; Decreased safety awareness   Assessment The patient is completing bed mobility and transfers with min to mod A x 2 with verbal cues needed for safety  He completed SPT from bed to chair with use of RW  Verbal cues are needed to improve safety  He was OOB in chair at end of session BLE elevated, SCDs on and call bell within reach  He would benefit from continued PT to address deficits and improve function  Goals   Long Term Goal #1 Continue per POC  Plan   Treatment/Interventions LE strengthening/ROM; Therapeutic exercise;Patient/family training;Bed mobility;Gait training   PT Frequency (Continue per POC )   Patient was OOB in chair at end of session, BLE elevated, SCDs on and call bell within reach

## 2018-04-04 NOTE — ASSESSMENT & PLAN NOTE
-continue PO levothyroxine  Results for Leroy Lee (MRN [id number]) as of 4/2/2018 09:59   Ref   Range 4/1/2018 05:10   TSH 3RD GENERATON Latest Ref Range: 0 358 - 3 740 uIU/mL 1 770

## 2018-04-04 NOTE — ASSESSMENT & PLAN NOTE
-follow patient's blood glucose trend  -continue current insulin regimen  -Results for Sun Patricia (MRN [id number]) as of 4/2/2018 09:59   Ref   Range 4/1/2018 05:10   Hemoglobin A1C Latest Ref Range: 4 2 - 6 3 % 6 9 (H)   EAG Latest Units: mg/dl 151

## 2018-04-04 NOTE — OCCUPATIONAL THERAPY NOTE
Occupational Therapy Progress Note     Patient Name: Reva CRENSHAWX Date: 4/4/2018  Problem List  Patient Active Problem List   Diagnosis    Acute on chronic systolic CHF (congestive heart failure) ([facility] )    Pacemaker    Sleep apnea    Sick sinus syndrome ([facility] )    PVD (peripheral vascular disease) ([facility] )    Psychiatric disorder    Neuropathy    Essential hypertension    Hyperlipidemia    GERD (gastroesophageal reflux disease)    Gastroparesis    Type 2 diabetes mellitus with hyperglycemia, with long-term current use of insulin (HCC)    Cancer (HCC)    Atrial fibrillation (HCC)    Stage 4 chronic kidney disease (HCC)    Fever    Perioral cyanosis    Hyperlipidemia    Thrombocytopenia (HCC)    Urinary retention    HAP (hospital-acquired pneumonia)    Dilated cbd, acquired    Renal disorder    Premature ventricular contraction    Macrocytic anemia    [facility]est[facility]) [facility]    SOB (shortness of breath)    Hypothyroidism    Acute respiratory failure with hypoxia ([facility])    Hypoalbuminemia    Suprapubic catheter ([facility])    Abnormal chest x-ray    Aortic stenosis    Pulmonary hypertension    Microalbuminuria           04/04/18 1031   Restrictions/Precautions   Weight Bearing Precautions Per Order No   Other Precautions Contact/isolation;Multiple lines;Telemetry;O2;Fall Risk;Pain; Chair Alarm; Bed Alarm   Pain Assessment   Pain Assessment 0-10   Pain Score 7   Pain Location Hip   Pain Orientation Right   ADL   Where Assessed Supine, bed   LB Dressing Assistance 1  Total Assistance   LB Dressing Deficit Don/doff R sock; Don/doff L sock   Bed Mobility   Supine to Sit 3  Moderate assistance   Additional items Assist x 2;Bedrails; Increased time required;Verbal cues;LE management   Additional Comments pt was seated in chair at end of session; pt required increased (A)-mod (A) X2 in order to scoot EOB for feet on floor in order to produce stand; pt appeared moderately confused throughout session during tasks   Transfers   Additional Comments see PT evaluation for transfers and FM   Cognition   Overall Cognitive Status Impaired   Arousal/Participation Cooperative; Alert   Attention Difficulty attending to directions   Orientation Level Oriented to person;Disoriented to place; Disoriented to time;Disoriented to situation   Memory Decreased long term memory;Decreased short term memory;Decreased recall of recent events   Following Commands Follows one step commands with increased time or repetition   Activity Tolerance   Activity Tolerance Patient limited by fatigue;Patient limited by pain   Assessment   Assessment continued OT intervention         Pt left seated in chair at end of session; all needs within reach; all lines intact; scds connected and turned on

## 2018-04-04 NOTE — PROGRESS NOTES
Progress Note - Khari Maravilla 11/1/1928, 80 y o  male MRN: [id number]    Unit/Bed#:  Encounter: [id number]    Primary Care Provider: Rosita Wang DO   Date and time admitted to hospital: 3/31/2018  7:57 PM        Healthcare-associated pneumonia   Assessment & Plan    -possible gram-negative pneumonia  -IV cefepime        * Acute on chronic systolic CHF (congestive heart failure) ([facility] )   Assessment & Plan    -transition to Bumex 2 mg PO Qdaily  -strict intake/output measurements  -daily weights  -I appreciate Cardiology's input  -continue coreg  -patient has an allergy to ARB's and Ace-inhibitors  -supplemental oxygen via nasal cannula to maintain oxygen saturation levels 92% and above  -PT/OT          Acute cystitis   Assessment & Plan    -associated with a suprapubic catheter   -secondary to Pseudomonas aeruginosa and Enterococcus faecalis  -IV cefepime and IV vancomycin        Acute respiratory failure with hypoxia (HCC)   Assessment & Plan    -patient was successfully weaned off BiPAP  -continue supplemental oxygen via nasal cannula to maintain oxygen saturation levels of 92% and above  -continue the respiratory protocol  -continue the airway clearance protocol  -the patient can be maintained on med/surg level of care with continuous pulse oximetry  -discontinue telemetry        Microalbuminuria   Assessment & Plan    -patient has an allergy to ARB's and Ace-inhibitors  -continue to monitor        Pulmonary hypertension   Assessment & Plan    -he will need outpatient follow-up with Pulmonology        Aortic stenosis   Assessment & Plan    -Cardiology is following the patient  -He will need an outpatient evaluation for a possible TAVR        Hypothyroidism   Assessment & Plan    -continue PO levothyroxine  Results for Tarun Knight (MRN [id number]) as of 4/2/2018 09:59   Ref   Range 4/1/2018 05:10   TSH 3RD GENERATON Latest Ref Range: 0 358 - 3 740 uIU/mL 1 770           Macrocytic anemia   Assessment & Plan    -follow CBC  Results for Luanne Castelan (MRN [id number]) as of 4/3/2018 09:13   Ref  Range 4/2/2018 05:33   Folate Latest Ref Range: 3 1 - 17 5 ng/mL >20 0 (H)     Results for Luanne Castelan (MRN [id number]) as of 4/3/2018 09:13   Ref  Range 4/2/2018 05:33   Vitamin B-12 Latest Ref Range: 100 - 900 pg/mL 505           Thrombocytopenia (HCC)   Assessment & Plan    -continue to monitor the patient for any signs of bleeding  -follow the CBC        Stage 4 chronic kidney disease ([facility] )   Assessment & Plan    -I appreciate Nephrology's input  -avoid all nephrotoxic agents  -continue to monitor the patient's renal function and electrolytes        Atrial fibrillation ([place] [address] )   Assessment & Plan    -continue Coreg for heart rate control  -continue Coumadin for full anticoagulation, but the Coumadin dose will be held again today for an elevated INR        Type 2 diabetes mellitus with hyperglycemia, with long-term current use of insulin ([place] [address] )   Assessment & Plan    -follow patient's blood glucose trend  -continue current insulin regimen  -Results for Luanne Castelan (MRN [id number]) as of 4/2/2018 09:59   Ref  Range 4/1/2018 05:10   Hemoglobin A1C Latest Ref Range: 4 2 - 6 3 % 6 9 (H)   EAG Latest Units: mg/dl 151           Essential hypertension   Assessment & Plan    -continue coreg, hydralazine, and imdur   -follow the patient's blood pressure trend            VTE Pharmacologic Prophylaxis:   Pharmacologic: Warfarin (Coumadin) continue to hold with elevated INR  Mechanical VTE Prophylaxis in Place: Yes    Patient Centered Rounds: I have performed bedside rounds with nursing staff today  Time Spent for Care: 20 minutes  More than 50% of total time spent on counseling and coordination of care as described above      Current Length of Stay: 4 day(s)    Current Patient Status: Inpatient   Certification Statement: The patient will continue to require additional inpatient hospital stay due to the need for IV antibiotics  Code Status: Level 1 - Full Code      Subjective: The patient was seen and examined  The patient continues to complain of shortness of breath  Objective:     Vitals:   Temp (24hrs), Av 7 °F (37 1 °C), Min:98 1 °F (36 7 °C), Max:99 3 °F (37 4 °C)    HR:  [70] 70  Resp:  [16-21] 17  BP: (119-130)/(56-60) 130/60  SpO2:  [95 %-99 %] 97 %  Body mass index is 37 17 kg/m²  Input and Output Summary (last 24 hours): Intake/Output Summary (Last 24 hours) at 18 1655  Last data filed at 18 0619   Gross per 24 hour   Intake              600 ml   Output             1625 ml   Net            -1025 ml       Physical Exam:     Physical Exam  General:  NAD, awake, alert, conversational dyspnea is present  HEENT:  NC/AT, mucous membranes moist  Neck:  Supple, No JVP elevation  CV:  + S1, + S2, irregularly irregular rhythm, regular  Pulm:  Bibasilar crackles  Abd:  Soft, Non-tender, Non-distended  Ext:  No clubbing/cyanosis/edema  Skin:  Chronic venous stasis changes of the bilateral anterior shins    Additional Data:     Labs:      Results from last 7 days  Lab Units 18  0523   WBC Thousand/uL 4 44   HEMOGLOBIN g/dL 9 6*   HEMATOCRIT % 31 4*   PLATELETS Thousands/uL 76*   NEUTROS PCT % 69   LYMPHS PCT % 13*   MONOS PCT % 8   EOS PCT % 9*       Results from last 7 days  Lab Units 18  0523   SODIUM mmol/L 146*   POTASSIUM mmol/L 3 3*   CHLORIDE mmol/L 105   CO2 mmol/L 37*   BUN mg/dL 42*   CREATININE mg/dL 2 44*   CALCIUM mg/dL 8 5   TOTAL PROTEIN g/dL 6 5   BILIRUBIN TOTAL mg/dL 0 90   ALK PHOS U/L 234*   ALT U/L 8*   AST U/L 22   GLUCOSE RANDOM mg/dL 103       Results from last 7 days  Lab Units 18  0523   INR  3 03*       * I Have Reviewed All Lab Data Listed Above  * Additional Pertinent Lab Tests Reviewed:  [facility] Admission Reviewed    Recent Cultures (last 7 days):       Results from last 7 days  Lab Units 18   BLOOD CULTURE   --  No Growth at 48 hrs  No Growth at 48 hrs  URINE CULTURE  >100,000 cfu/ml Pseudomonas aeruginosa*  >100,000 cfu/ml Enterococcus faecalis*  --    INFLUENZA A PCR   --  None Detected   INFLUENZA B PCR   --  None Detected   RSV PCR   --  None Detected       Last 24 Hours Medication List:     Current Facility-Administered Medications:  acetaminophen 650 mg Oral Q6H PRN Cari Landa,     ascorbic acid 500 mg Oral Daily Juan Jose Rodrigues, DO    aspirin 81 mg Oral Daily Juan Jose Rodrigues, DO    atorvastatin 40 mg Oral Daily With Curt Cates, DO    b complex-vitamin C-folic acid 1 capsule Oral Daily Catrachito Melo DO    [START ON 4/5/2018] bumetanide 2 mg Oral Daily Cari Landa,     carbidopa-levodopa 1 tablet Oral TID Catrachito Melo, DO    carvedilol 12 5 mg Oral BID With Meals Cari Landa,     cefepime 1,000 mg Intravenous Q24H Cari Landa DO Last Rate: 1,000 mg (04/03/18 1603)   cholecalciferol 2,000 Units Oral Daily Juan Jose Rodrigues, DO    fluticasone 1 spray Each Nare Daily Cari Landa DO    gabapentin 100 mg Oral BID Juan Jose Rodrigues, DO    insulin detemir 16 Units Subcutaneous HS Cari Landa,     insulin lispro 2-12 Units Subcutaneous TID AC Juan Jose Rodrigues, DO    insulin lispro 2-12 Units Subcutaneous HS Juan Jose Rodrigues, DO    isosorbide mononitrate 60 mg Oral Daily Juan Jose Rodrigues, DO    levothyroxine 225 mcg Oral Early Morning Juan Jose Rodrigues, DO    loratadine 10 mg Oral Daily Juan Jose Rodrigues, DO    magnesium oxide 400 mg Oral BID Loi Whitman MD    oxyCODONE 5 mg Oral Q4H PRN Cari Landa,     polyethylene glycol 17 g Oral Daily PRN Cari Landa,     potassium chloride 40 mEq Oral Once Cari Landa, DO    vancomycin 10 mg/kg Intravenous Q24H Cari Landa DO         Today, Patient Was Seen By: Cari Landa DO    ** Please Note: Dictation voice to text software may have been used in the creation of this document   **

## 2018-04-04 NOTE — PROGRESS NOTES
Progress Note - Nephrology   Reva Wilkins 80 y o  male MRN: [id number]  Unit/Bed#:  Encounter: [id number]    A/P:  1  Acute kidney injury: due to diuresis in a patient with ischemic cardiomyopathy on high dose diuretics  Will have to accept a degree of dysfunction to achieve euvolemia      4/4/18: Kidney function about the same with high dose diuretics  Will continue to monitor  2  Chronic kidney disease stage 4 by history due to diabetic nephropathy and nephrosclerosis  3  Acute on chronic systolic congestive failure: on IV bumetanide with little change in the weights but improvement on chest xray and symptomatically  Will be transitioned to oral bumetanide soon      4/4: Becoming hypernatremic  Would transition to oral bumetamide  4  Hypokalemia: being repleted with KCl 120 meq - will check a magnesium level  4/4: will replete magnesium  5   Diabetes Mellitus: being covered      Follow up reason for today's visit: Chronic kidney disease stage 4    Acute on chronic systolic CHF (congestive heart failure) ([facility])    Patient Active Problem List   Diagnosis    Acute on chronic systolic CHF (congestive heart failure) ([facility])    Pacemaker    Sleep apnea    Sick sinus syndrome ([facility] [address] [facility]    PVD (peripheral vascular disease) ([facility] [address] [facility]    Psychiatric disorder    Neuropathy    Essential hypertension    Hyperlipidemia    GERD (gastroesophageal reflux disease)    Gastroparesis    Type 2 diabetes mellitus with hyperglycemia, with long-term current use of insulin (HCC)    Cancer (HCC)    Atrial fibrillation (HCC)    Stage 4 chronic kidney disease (HCC)    Fever    Perioral cyanosis    Hyperlipidemia    Thrombocytopenia (HCC)    Urinary retention    HAP (hospital-acquired pneumonia)    Dilated cbd, acquired    Renal disorder    Premature ventricular contraction    Macrocytic anemia    CHF (congestive heart failure) (HCC)    SOB (shortness of breath)    Hypothyroidism    Acute respiratory failure with hypoxia (HCC)    Hypoalbuminemia    Suprapubic catheter (HCC)    Abnormal chest x-ray    Aortic stenosis    Pulmonary hypertension    Microalbuminuria         Subjective:   Denies headache, dizziness, chest pain, dyspnea, abdominal pain or NVDC says he feels much better and "was one sick puppy "  4/4: Denies chest pain, dyspnea, abdominal pain, NVD No BM voiding well  Complaining of right hip pain and appears in distress    Objective:     Vitals: Blood pressure 119/56, pulse 70, temperature 99 3 °F (37 4 °C), temperature source Temporal, resp  rate 17, height 5' 11" (1 803 m), weight 121 kg (266 lb 8 6 oz), SpO2 95 %  ,Body mass index is 37 17 kg/m²  Weight (last 2 days)     Date/Time   Weight    04/04/18 0600  121 (266 54)    04/04/18 0526  121 (266 54)    04/03/18 0539  122 (268 08)    04/02/18 1216  122 (268 96)    04/02/18 0532  122 (268 52)                Intake/Output Summary (Last 24 hours) at 04/04/18 0847  Last data filed at 04/04/18 0619   Gross per 24 hour   Intake              840 ml   Output             2025 ml   Net            -1185 ml     I/O last 3 completed shifts:   In: 12 [P O :960]  Out: 3875 [Urine:3875]         Physical Exam: /56 (BP Location: Left arm)   Pulse 70   Temp 99 3 °F (37 4 °C) (Temporal)   Resp 17   Ht 5' 11" (1 803 m)   Wt 121 kg (266 lb 8 6 oz)   SpO2 95%   BMI 37 17 kg/m²     General Appearance:    Alert, cooperative, has distress, appears stated age   Head:    Normocephalic, without obvious abnormality, atraumatic   Eyes:    Conjunctiva/corneas clear   Ears:    Normal external ears   Nose:   Nares normal, septum midline, mucosa normal, no drainage    or sinus tenderness   Throat:   Lips, mucosa, and tongue normal; teeth and gums normal   Neck:   Supple, symmetrical, trachea midline, no adenopathy;        thyroid:  No enlargement/tenderness/nodules; no carotid    bruit or JVD   Back:     Symmetric, no curvature, ROM normal, no CVA tenderness   Lungs:     Dec at the bases bilaterally, respirations unlabored   Chest wall:    No tenderness or deformity   Heart:    Irreg rate and rhythm, S1 and S2 normal,   Abdomen:     Soft, non-tender, bowel sounds active   Extremities:   Extremities normal, atraumatic, no cyanosis trace edema   Skin:   Skin color, texture, turgor normal, no rashes or lesions   Lymph nodes:   Cervical normal   Neurologic:   CNII-XII intact            Lab, Imaging and other studies: I have personally reviewed pertinent labs  CBC:   Lab Results   Component Value Date    WBC 4 44 04/04/2018    HGB 9 6 (L) 04/04/2018    HCT 31 4 (L) 04/04/2018     (H) 04/04/2018    PLT 76 (L) 04/04/2018    MCH 30 8 04/04/2018    MCHC 30 6 (L) 04/04/2018    RDW 17 8 (H) 04/04/2018    MPV 12 9 (H) 04/04/2018     CMP:   Lab Results   Component Value Date     (H) 04/04/2018    K 3 3 (L) 04/04/2018     04/04/2018    CO2 37 (H) 04/04/2018    ANIONGAP 4 04/04/2018    BUN 42 (H) 04/04/2018    CREATININE 2 44 (H) 04/04/2018    GLUCOSE 103 04/04/2018    CALCIUM 8 5 04/04/2018    AST 22 04/04/2018    ALT 8 (L) 04/04/2018    ALKPHOS 234 (H) 04/04/2018    PROT 6 5 04/04/2018    BILITOT 0 90 04/04/2018    EGFR 23 04/04/2018             Results from last 7 days  Lab Units 04/04/18  0523 04/03/18  0541 04/02/18  0533   SODIUM mmol/L 146* 145 144   POTASSIUM mmol/L 3 3* 2 8* 2 9*   CHLORIDE mmol/L 105 103 102   CO2 mmol/L 37* 36* 35*   BUN mg/dL 42* 41* 35*   CREATININE mg/dL 2 44* 2 45* 2 58*   CALCIUM mg/dL 8 5 8 9 8 6   TOTAL PROTEIN g/dL 6 5 6 6 6 4   BILIRUBIN TOTAL mg/dL 0 90 0 90 1 20*   ALK PHOS U/L 234* 191* 180*   ALT U/L 8* 6* 8*   AST U/L 22 21 19   GLUCOSE RANDOM mg/dL 103 75 89         Phosphorus:   Lab Results   Component Value Date    PHOS 3 8 04/04/2018     Magnesium:   Lab Results   Component Value Date    MG 1 6 04/04/2018     Urinalysis: No results found for: Paulina Mederos SPECGRAV, [facility], LEUKOCYTESUR, NITRITE, PROTEINUA, GLUCOSEU, KETONESU, Lori Zamarripa  Ionized Calcium: No results found for: CRISTÓBAL  Coagulation:   Lab Results   Component Value Date    INR 3 03 (H) 04/04/2018     Troponin: No results found for: TROPONINI  ABG: No results found for: PHART, LFX8KCE, PO2ART, FSR8RAA, G0RUBWYH, BEART, SOURCE  Radiology review:     IMAGING  Procedure: Xr Chest Portable    Result Date: 4/2/2018  Narrative: CHEST portable INDICATION: Shortness of breath, CHF COMPARISON: March 31, 2018 VIEWS:  AP semierect; IMAGES:  1 FINDINGS: Midline sternotomy wires are present from previous cardiac surgery  The cardiomediastinal silhouette is unremarkable  Dual lead cardiac pacemaker overlies left chest wall, unchanged in position  Patchy density developing at the left lung base  Right lung well aerated  No pneumothorax  Bony thorax is otherwise unremarkable  Electrode (EKG) monitoring devices overlie the chest      Impression: Developing density at left lung base suspicious for pneumonia or asymmetric edema  Cardiac pacemaker status post CABG    Workstation performed: [id number]   Personally reviewed by me  Procedure: Xr Chest 1 View Portable    Result Date: 4/1/2018  Narrative: CHEST INDICATION:   sob  COMPARISON:  4/13/2017 EXAM PERFORMED/VIEWS:  XR CHEST PORTABLE FINDINGS:  Left-sided chest wall pacemaker is identified  Pacemaker leads are intact  Heart shadow is enlarged but unchanged from prior exam  Mild central vascular prominence  Left basilar atelectasis/infiltrate with possible small effusion  No pneumothorax  Osseous structures appear within normal limits for patient age  Impression: Mild central vascular prominence  Left basilar atelectasis/infiltrate with possible small effusion   Workstation performed: [id number]   Personally reviewed by me      Current Facility-Administered Medications   Medication Dose Route Frequency    acetaminophen (TYLENOL) tablet 650 mg  650 mg Oral Q6H PRN    ascorbic acid (VITAMIN C) tablet 500 mg  500 mg Oral Daily    aspirin chewable tablet 81 mg  81 mg Oral Daily    atorvastatin (LIPITOR) tablet 40 mg  40 mg Oral Daily With Dinner    b complex-vitamin C-folic acid (NEPHROCAPS) capsule 1 capsule  1 capsule Oral Daily    bumetanide (BUMEX) injection 2 mg  2 mg Intravenous BID    carbidopa-levodopa (SINEMET)  mg per tablet 1 tablet  1 tablet Oral TID    carvedilol (COREG) tablet 12 5 mg  12 5 mg Oral BID With Meals    cefepime (MAXIPIME) IVPB (premix) 1,000 mg  1,000 mg Intravenous Q24H    cholecalciferol (VITAMIN D3) tablet 2,000 Units  2,000 Units Oral Daily    fluticasone (FLONASE) 50 mcg/act nasal spray 1 spray  1 spray Each Nare Daily    gabapentin (NEURONTIN) capsule 100 mg  100 mg Oral BID    insulin detemir (LEVEMIR) subcutaneous injection 16 Units  16 Units Subcutaneous HS    insulin lispro (HumaLOG) 100 units/mL subcutaneous injection 2-12 Units  2-12 Units Subcutaneous TID AC    insulin lispro (HumaLOG) 100 units/mL subcutaneous injection 2-12 Units  2-12 Units Subcutaneous HS    isosorbide mononitrate (IMDUR) 24 hr tablet 60 mg  60 mg Oral Daily    levothyroxine tablet 225 mcg  225 mcg Oral Early Morning    loratadine (CLARITIN) tablet 10 mg  10 mg Oral Daily    oxyCODONE (ROXICODONE) IR tablet 5 mg  5 mg Oral Q4H PRN    polyethylene glycol (MIRALAX) packet 17 g  17 g Oral Daily PRN     Medications Discontinued During This Encounter   Medication Reason    warfarin (COUMADIN) 4 mg tablet Dose adjustment    Probiotic Product (JOHNNIE-BID PROBIOTIC PO) Therapy completed    Probiotic Product (BACID) TABS Therapy completed    loperamide (IMODIUM) 2 mg capsule Therapy completed    levofloxacin (LEVAQUIN) 250 mg tablet Therapy completed    Insulin Syringes, Disposable, (MONOJECT INS SYR 1CC) U-100 1 ML MISC Error    febuxostat (ULORIC) 40 mg tablet Therapy completed    Cholecalciferol (VITAMIN D3) 2000 UNITS capsule Therapy completed    allopurinol (ZYLOPRIM) 300 mg tablet Formulary change    amiodarone 200 mg tablet Therapy completed    brimonidine (ALPHAGAN P) 0 1 % Therapy completed    ascorbic acid (VITAMIN C) 500 mg tablet     levofloxacin (LEVAQUIN) IVPB (premix) 750 mg     febuxostat (ULORIC) tablet 80 mg     lactulose 20 g/30 mL oral solution 20 g     levofloxacin (LEVAQUIN) IVPB (premix) 750 mg     magnesium oxide (MAG-OX) tablet 400 mg     metolazone (ZAROXOLYN) tablet 5 mg     nitroGLYcerin (TRIDIL) 50 mg in 250 mL infusion (premix)     warfarin (COUMADIN) tablet 6 5 mg     pantoprazole (PROTONIX) EC tablet 40 mg     senna-docusate sodium (SENOKOT S) 8 6-50 mg per tablet 1 tablet     lactulose 20 g/30 mL oral solution 10 g     nitroglycerin (NITROSTAT) SL tablet 0 4 mg     oxyCODONE-acetaminophen (PERCOCET) 5-325 mg per tablet 1 tablet     sodium phosphate-biphosphate (FLEET) enema 1 enema     acetaminophen (TYLENOL) tablet 650 mg     hydrALAZINE (APRESOLINE) tablet 50 mg     warfarin (COUMADIN) tablet 5 mg     insulin detemir (LEVEMIR) subcutaneous injection 24 Units     insulin detemir (LEVEMIR) subcutaneous injection 20 Units        Louise Durham MD

## 2018-04-05 NOTE — PLAN OF CARE
Problem: Potential for Falls  Goal: Patient will remain free of falls  INTERVENTIONS:  - Assess patient frequently for physical needs  -  Identify cognitive and physical deficits and behaviors that affect risk of falls    -  [place] fall precautions as indicated by assessment   - Educate patient/family on patient safety including physical limitations  - Instruct patient to call for assistance with activity based on assessment  - Modify environment to reduce risk of injury  - Consider OT/PT consult to assist with strengthening/mobility   Outcome: Progressing      Problem: PAIN - ADULT  Goal: Verbalizes/displays adequate comfort level or baseline comfort level  Interventions:  - Encourage patient to monitor pain and request assistance  - Assess pain using appropriate pain scale  - Administer analgesics based on type and severity of pain and evaluate response  - Implement non-pharmacological measures as appropriate and evaluate response  - Consider cultural and social influences on pain and pain management  - Notify physician/advanced practitioner if interventions unsuccessful or patient reports new pain   Outcome: Progressing      Problem: INFECTION - ADULT  Goal: Absence or prevention of progression during hospitalization  INTERVENTIONS:  - Assess and monitor for signs and symptoms of infection  - Monitor lab/diagnostic results  - Monitor all insertion sites, i e  indwelling lines, tubes, and drains  - Monitor endotracheal (as able) and nasal secretions for changes in amount and color  - [place] appropriate cooling/warming therapies per order  - Administer medications as ordered  - Instruct and encourage patient and family to use good hand hygiene technique  - Identify and instruct in appropriate isolation precautions for identified infection/condition   Outcome: Progressing      Problem: Prexisting or High Potential for Compromised Skin Integrity  Goal: Skin integrity is maintained or improved  INTERVENTIONS:  - Identify patients at risk for skin breakdown  - Assess and monitor skin integrity  - Assess and monitor nutrition and hydration status  - Monitor labs (i e  albumin)  - Assess for incontinence   - Turn and reposition patient  - Assist with mobility/ambulation  - Relieve pressure over bony prominences  - Avoid friction and shearing  - Provide appropriate hygiene as needed including keeping skin clean and dry  - Evaluate need for skin moisturizer/barrier cream  - Collaborate with interdisciplinary team (i e  Nutrition, Rehabilitation, etc )   - Patient/family teaching   Outcome: Progressing

## 2018-04-05 NOTE — PROGRESS NOTES
Progress Note - Nephrology   Branden Mccullough 80 y o  male MRN: [id number]  Unit/Bed#:  Encounter: [id number]    A/P:  1  Acute kidney injury: due to diuresis in a patient with ischemic cardiomyopathy on high dose diuretics  Will have to accept a degree of dysfunction to achieve euvolemia      4/4/18: Kidney function about the same with high dose diuretics  Will continue to monitor      4/5: renal function stable  2  Chronic kidney disease stage 4 by history due to diabetic nephropathy and nephrosclerosis  3  Acute on chronic systolic congestive failure: on IV bumetanide with little change in the weights but improvement on chest xray and symptomatically  Will be transitioned to oral bumetanide soon      4/4: Becoming hypernatremic  Would transition to oral bumetamide      4/5: transitioned to oral bumetamide  4  Hypokalemia: being repleted with KCl 120 meq - will check a magnesium level  4/4: will replete magnesium  4/5: Potassium lower - will give one dose of IV KCl  5   Diabetes Mellitus: being covered  6: LLL pneumonia on antibiotics      Follow up reason for today's visit: Chronic kidney disease stage 4    Acute on chronic systolic CHF (congestive heart failure) ([facility])    Patient Active Problem List   Diagnosis    Acute on chronic systolic CHF (congestive heart failure) ([facility])    Pacemaker    Sleep apnea    Sick sinus syndrome [facility]    PVD (peripheral vascular disease) ([facility]    Psychiatric disorder    Neuropathy    Essential hypertension    Hyperlipidemia    GERD (gastroesophageal reflux disease)    Gastroparesis    Type 2 diabetes mellitus with hyperglycemia, with long-term current use of insulin (HCC)    Cancer (HCC)    Atrial fibrillation (HCC)    Stage 4 chronic kidney disease (HCC)    Perioral cyanosis    Hyperlipidemia    Thrombocytopenia (HCC)    Urinary retention    HAP (hospital-acquired pneumonia)    Dilated cbd, acquired    Renal disorder    Premature ventricular contraction  Macrocytic anemia    [facility]est[facility]    SOB (shortness of breath)    Hypothyroidism    Acute respiratory failure with hypoxia ([facility])    Hypoalbuminemia    Suprapubic catheter ([facility])    Aortic stenosis    Pulmonary hypertension    Microalbuminuria    Healthcare-associated pneumonia    Acute cystitis         Subjective:   Denies headache, dizziness, chest pain, dyspnea, abdominal pain or NVDC says he feels much better and "was one sick puppy "  4/4: Denies chest pain, dyspnea, abdominal pain, NVD No BM voiding well  Complaining of right hip pain and appears in distress  4/5: Denies chest pain, dyspnea, abdominal pain NVD but has little appetite  Says he feels better    Objective:     Vitals: Blood pressure 160/75, pulse 72, temperature 98 °F (36 7 °C), temperature source Temporal, resp  rate 18, height 5' 11" (1 803 m), weight 121 kg (265 lb 10 5 oz), SpO2 95 %  ,Body mass index is 37 05 kg/m²  Weight (last 2 days)     Date/Time   Weight    04/05/18 0600  121 (265 65)    04/04/18 0600  121 (266 54)    04/04/18 0526  121 (266 54)    04/03/18 0539  122 (268 08)                Intake/Output Summary (Last 24 hours) at 04/05/18 0921  Last data filed at 04/05/18 0612   Gross per 24 hour   Intake             1010 ml   Output              700 ml   Net              310 ml     I/O last 3 completed shifts:   In: 1943 [P O :1420; IV Piggyback:250]  Out: 2325 [Urine:2325]         Physical Exam: /75 (BP Location: Right arm)   Pulse 72   Temp 98 °F (36 7 °C) (Temporal)   Resp 18   Ht 5' 11" (1 803 m)   Wt 121 kg (265 lb 10 5 oz)   SpO2 95%   BMI 37 05 kg/m²     General Appearance:    Alert, cooperative, has distress, appears stated age   Head:    Normocephalic, without obvious abnormality, atraumatic   Eyes:    Conjunctiva/corneas clear   Ears:    Normal external ears   Nose:   Nares normal, septum midline, mucosa normal, no drainage    or sinus tenderness   Throat:   Lips, mucosa, and tongue normal; teeth and gums normal   Neck:   Supple, symmetrical, trachea midline, no adenopathy;        thyroid:  No enlargement/tenderness/nodules; no carotid    bruit or JVD   Back:     Symmetric, no curvature, ROM normal, no CVA tenderness   Lungs:     Dec at the bases bilaterally, respirations unlabored Exp rhonchi   Chest wall:    No tenderness or deformity   Heart:    Irreg rate and rhythm, S1 and S2 normal,   Abdomen:     Soft, non-tender, bowel sounds active   Extremities:   Extremities normal, atraumatic, no cyanosis trace edema   Skin:   Skin color, texture, turgor normal, no rashes or lesions   Lymph nodes:   Cervical normal   Neurologic:   CNII-XII intact            Lab, Imaging and other studies: I have personally reviewed pertinent labs  CBC:   Lab Results   Component Value Date    WBC 4 66 04/05/2018    HGB 9 9 (L) 04/05/2018    HCT 31 8 (L) 04/05/2018    MCV 99 (H) 04/05/2018    PLT 69 (L) 04/05/2018    MCH 30 9 04/05/2018    MCHC 31 1 (L) 04/05/2018    RDW 17 4 (H) 04/05/2018    MPV 12 5 04/05/2018     CMP:   Lab Results   Component Value Date     04/05/2018    K 3 0 (L) 04/05/2018     04/05/2018    CO2 39 (H) 04/05/2018    ANIONGAP 4 04/05/2018    BUN 44 (H) 04/05/2018    CREATININE 2 39 (H) 04/05/2018    GLUCOSE 91 04/05/2018    CALCIUM 8 7 04/05/2018    AST 24 04/05/2018    ALT 7 (L) 04/05/2018    ALKPHOS 237 (H) 04/05/2018    PROT 6 5 04/05/2018    BILITOT 1 10 (H) 04/05/2018    EGFR 23 04/05/2018             Results from last 7 days  Lab Units 04/05/18  0441 04/04/18  0523 04/03/18  0541   SODIUM mmol/L 145 146* 145   POTASSIUM mmol/L 3 0* 3 3* 2 8*   CHLORIDE mmol/L 102 105 103   CO2 mmol/L 39* 37* 36*   BUN mg/dL 44* 42* 41*   CREATININE mg/dL 2 39* 2 44* 2 45*   CALCIUM mg/dL 8 7 8 5 8 9   TOTAL PROTEIN g/dL 6 5 6 5 6 6   BILIRUBIN TOTAL mg/dL 1 10* 0 90 0 90   ALK PHOS U/L 237* 234* 191*   ALT U/L 7* 8* 6*   AST U/L 24 22 21   GLUCOSE RANDOM mg/dL 91 103 75 Phosphorus:   No results found for: PHOS  Magnesium:   Lab Results   Component Value Date    MG 1 7 04/05/2018     Urinalysis: No results found for: Kranthi Cedar, SPECGRAV, PHUR, LEUKOCYTESUR, NITRITE, PROTEINUA, GLUCOSEU, KETONESU, BILIRUBINUR, BLOODU  Ionized Calcium: No results found for: CAION  Coagulation:   Lab Results   Component Value Date    INR 2 58 (H) 04/05/2018     Troponin: No results found for: TROPONINI  ABG: No results found for: PHART, PCQ2ZUU, PO2ART, CSX5ADV, P0KNYTZV, BEART, SOURCE  Radiology review:     IMAGING  Procedure: Xr Chest Portable    Result Date: 4/2/2018  Narrative: CHEST portable INDICATION: Shortness of breath, CHF COMPARISON: March 31, 2018 VIEWS:  AP semierect; IMAGES:  1 FINDINGS: Midline sternotomy wires are present from previous cardiac surgery  The cardiomediastinal silhouette is unremarkable  Dual lead cardiac pacemaker overlies left chest wall, unchanged in position  Patchy density developing at the left lung base  Right lung well aerated  No pneumothorax  Bony thorax is otherwise unremarkable  Electrode (EKG) monitoring devices overlie the chest      Impression: Developing density at left lung base suspicious for pneumonia or asymmetric edema  Cardiac pacemaker status post CABG    Workstation performed: [id number]   Personally reviewed by me  Procedure: Xr Chest 1 View Portable    Result Date: 4/1/2018  Narrative: CHEST INDICATION:   sob  COMPARISON:  4/13/2017 EXAM PERFORMED/VIEWS:  XR CHEST PORTABLE FINDINGS:  Left-sided chest wall pacemaker is identified  Pacemaker leads are intact  Heart shadow is enlarged but unchanged from prior exam  Mild central vascular prominence  Left basilar atelectasis/infiltrate with possible small effusion  No pneumothorax  Osseous structures appear within normal limits for patient age  Impression: Mild central vascular prominence  Left basilar atelectasis/infiltrate with possible small effusion   Workstation performed: [id number]   Personally reviewed by me      Current Facility-Administered Medications   Medication Dose Route Frequency    acetaminophen (TYLENOL) tablet 650 mg  650 mg Oral Q6H PRN    albuterol inhalation solution 2 5 mg  2 5 mg Nebulization Q4H PRN    ascorbic acid (VITAMIN C) tablet 500 mg  500 mg Oral Daily    aspirin chewable tablet 81 mg  81 mg Oral Daily    atorvastatin (LIPITOR) tablet 40 mg  40 mg Oral Daily With Dinner    b complex-vitamin C-folic acid (NEPHROCAPS) capsule 1 capsule  1 capsule Oral Daily    benzonatate (TESSALON PERLES) capsule 100 mg  100 mg Oral TID PRN    bumetanide (BUMEX) tablet 2 mg  2 mg Oral Daily    carbidopa-levodopa (SINEMET)  mg per tablet 1 tablet  1 tablet Oral TID    carvedilol (COREG) tablet 12 5 mg  12 5 mg Oral BID With Meals    cefepime (MAXIPIME) IVPB (premix) 1,000 mg  1,000 mg Intravenous Q24H    cholecalciferol (VITAMIN D3) tablet 2,000 Units  2,000 Units Oral Daily    fluticasone (FLONASE) 50 mcg/act nasal spray 1 spray  1 spray Each Nare Daily    gabapentin (NEURONTIN) capsule 100 mg  100 mg Oral BID    insulin detemir (LEVEMIR) subcutaneous injection 16 Units  16 Units Subcutaneous HS    insulin lispro (HumaLOG) 100 units/mL subcutaneous injection 2-12 Units  2-12 Units Subcutaneous TID AC    insulin lispro (HumaLOG) 100 units/mL subcutaneous injection 2-12 Units  2-12 Units Subcutaneous HS    ipratropium (ATROVENT) 0 02 % inhalation solution 0 5 mg  0 5 mg Nebulization BID    isosorbide mononitrate (IMDUR) 24 hr tablet 60 mg  60 mg Oral Daily    levalbuterol (XOPENEX) inhalation solution 1 25 mg  1 25 mg Nebulization BID    levothyroxine tablet 225 mcg  225 mcg Oral Early Morning    loratadine (CLARITIN) tablet 10 mg  10 mg Oral Daily    oxyCODONE (ROXICODONE) IR tablet 5 mg  5 mg Oral Q4H PRN    polyethylene glycol (MIRALAX) packet 17 g  17 g Oral Daily PRN    vancomycin (VANCOCIN) 1,250 mg in sodium chloride 0 9 % 250 mL IVPB  10 mg/kg Intravenous Q24H     Medications Discontinued During This Encounter   Medication Reason    warfarin (COUMADIN) 4 mg tablet Dose adjustment    Probiotic Product (JOHNNIE-BID PROBIOTIC PO) Therapy completed    Probiotic Product (BACID) TABS Therapy completed    loperamide (IMODIUM) 2 mg capsule Therapy completed    levofloxacin (LEVAQUIN) 250 mg tablet Therapy completed    Insulin Syringes, Disposable, (MONOJECT INS SYR 1CC) U-100 1 ML MISC Error    febuxostat (ULORIC) 40 mg tablet Therapy completed    Cholecalciferol (VITAMIN D3) 2000 UNITS capsule Therapy completed    allopurinol (ZYLOPRIM) 300 mg tablet Formulary change    amiodarone 200 mg tablet Therapy completed    brimonidine (ALPHAGAN P) 0 1 % Therapy completed    ascorbic acid (VITAMIN C) 500 mg tablet     levofloxacin (LEVAQUIN) IVPB (premix) 750 mg     febuxostat (ULORIC) tablet 80 mg     lactulose 20 g/30 mL oral solution 20 g     levofloxacin (LEVAQUIN) IVPB (premix) 750 mg     magnesium oxide (MAG-OX) tablet 400 mg     metolazone (ZAROXOLYN) tablet 5 mg     nitroGLYcerin (TRIDIL) 50 mg in 250 mL infusion (premix)     warfarin (COUMADIN) tablet 6 5 mg     pantoprazole (PROTONIX) EC tablet 40 mg     senna-docusate sodium (SENOKOT S) 8 6-50 mg per tablet 1 tablet     lactulose 20 g/30 mL oral solution 10 g     nitroglycerin (NITROSTAT) SL tablet 0 4 mg     oxyCODONE-acetaminophen (PERCOCET) 5-325 mg per tablet 1 tablet     sodium phosphate-biphosphate (FLEET) enema 1 enema     acetaminophen (TYLENOL) tablet 650 mg     hydrALAZINE (APRESOLINE) tablet 50 mg     warfarin (COUMADIN) tablet 5 mg     insulin detemir (LEVEMIR) subcutaneous injection 24 Units     insulin detemir (LEVEMIR) subcutaneous injection 20 Units     magnesium sulfate 2 g/50 mL IVPB (premix) 2 g     bumetanide (BUMEX) injection 2 mg        Louise Durham MD

## 2018-04-05 NOTE — PROGRESS NOTES
No pain, states that he didn't sleep well last night and feels very tired, reported from night shift that patient was on bi-pap for maybe an hour and wanted it off - was placed back on 2L oxygen, ROM done with therapy, appears fatigued this am

## 2018-04-05 NOTE — ASSESSMENT & PLAN NOTE
-follow patient's blood glucose trend  -continue current insulin regimen  -Results for Netta Lantigua (MRN [id number]) as of 4/2/2018 09:59   Ref   Range 4/1/2018 05:10   Hemoglobin A1C Latest Ref Range: 4 2 - 6 3 % 6 9 (H)   EAG Latest Units: mg/dl 151

## 2018-04-05 NOTE — PROGRESS NOTES
Patient placed back on nasal cannula O2 at this time  Unable to tolerate Bipap therapy, continually removing mask and stating he felt like he was suffocating

## 2018-04-05 NOTE — RESPIRATORY THERAPY NOTE
bipap therapy     Nurse had asked me about bipap therapy for Mr Nimo Hutson, she stated Dr Clover Pretty ordered the patient to be on HS for SIRISHA, last night he had refused  Nurse stated that the patient didn't seem right to her, she has been taking care of him for a few nights, he seemed more groggy and more confused   I placed him on bipap at this time

## 2018-04-05 NOTE — ASSESSMENT & PLAN NOTE
-continue Bumex 2 mg PO Qdaily  -strict intake/output measurements  -daily weights  -I appreciate Cardiology's input  -continue coreg  -patient has an allergy to ARB's and Ace-inhibitors  -supplemental oxygen via nasal cannula to maintain oxygen saturation levels 92% and above  -PT/OT

## 2018-04-05 NOTE — ASSESSMENT & PLAN NOTE
-associated with a suprapubic catheter   -secondary to Pseudomonas aeruginosa and Enterococcus faecalis  -continue IV cefepime and IV vancomycin

## 2018-04-05 NOTE — ASSESSMENT & PLAN NOTE
-the patient appears to be at his baseline renal function  -I appreciate Nephrology's input  -avoid all nephrotoxic agents  -continue to monitor the patient's renal function and electrolytes

## 2018-04-05 NOTE — PROGRESS NOTES
Progress Note - Elizabet Payer 11/1/1928, 80 y o  male MRN: [id number]    Unit/Bed#:  Encounter: [id number]    Primary Care Provider: José Manuel Ferris DO   Date and time admitted to hospital: 3/31/2018  7:57 PM        Healthcare-associated pneumonia   Assessment & Plan    -possible gram-negative pneumonia  -continue IV cefepime        * Acute on chronic systolic CHF (congestive heart failure) (HCC)   Assessment & Plan    -continue Bumex 2 mg PO Qdaily  -strict intake/output measurements  -daily weights  -I appreciate Cardiology's input  -continue coreg  -patient has an allergy to ARB's and Ace-inhibitors  -supplemental oxygen via nasal cannula to maintain oxygen saturation levels 92% and above  -PT/OT          Acute cystitis   Assessment & Plan    -associated with a suprapubic catheter   -secondary to Pseudomonas aeruginosa and Enterococcus faecalis  -continue IV cefepime and IV vancomycin        Acute respiratory failure with hypoxia (HCC)   Assessment & Plan    -patient was successfully weaned off BiPAP  -continue supplemental oxygen via nasal cannula to maintain oxygen saturation levels of 92% and above  -continue the respiratory protocol  -continue the airway clearance protocol  -the patient can be maintained on med/surg level of care with continuous pulse oximetry          Microalbuminuria   Assessment & Plan    -patient has an allergy to ARB's and Ace-inhibitors  -continue to monitor        Pulmonary hypertension   Assessment & Plan    -he will need outpatient follow-up with Pulmonology        Aortic stenosis   Assessment & Plan    -Cardiology is following the patient  -He will need an outpatient evaluation for a possible TAVR        Hypothyroidism   Assessment & Plan    -continue PO levothyroxine  Results for Kris Walton (MRN [id number]) as of 4/2/2018 09:59   Ref   Range 4/1/2018 05:10   TSH 3RD GENERATON Latest Ref Range: 0 358 - 3 740 uIU/mL 1 770           Macrocytic anemia   Assessment & Plan -follow CBC  Results for Debbie Thorpe (MRN [id number]) as of 4/3/2018 09:13   Ref  Range 4/2/2018 05:33   Folate Latest Ref Range: 3 1 - 17 5 ng/mL >20 0 (H)     Results for Debbie Thorpe (MRN [id number]) as of 4/3/2018 09:13   Ref  Range 4/2/2018 05:33   Vitamin B-12 Latest Ref Range: 100 - 900 pg/mL 505           Thrombocytopenia (HCC)   Assessment & Plan    -continue to monitor the patient for any signs of bleeding  -follow the CBC        Hypokalemia   Assessment & Plan    -replete with p o  and IV potassium supplementation  -follow the patient's potassium level        Stage 4 chronic kidney disease ([facility] [address] )   Assessment & Plan    -the patient appears to be at his baseline renal function  -I appreciate Nephrology's input  -avoid all nephrotoxic agents  -continue to monitor the patient's renal function and electrolytes        Atrial fibrillation ([facility] [address] )   Assessment & Plan    -continue Coreg for heart rate control  -continue Coumadin for full anticoagulation        Type 2 diabetes mellitus with hyperglycemia, with long-term current use of insulin ([facility] [address] )   Assessment & Plan    -follow patient's blood glucose trend  -continue current insulin regimen  -Results for Debbie Thorpe (MRN [id number]) as of 4/2/2018 09:59   Ref  Range 4/1/2018 05:10   Hemoglobin A1C Latest Ref Range: 4 2 - 6 3 % 6 9 (H)   EAG Latest Units: mg/dl 151           Essential hypertension   Assessment & Plan    -continue coreg, hydralazine, and imdur   -follow the patient's blood pressure trend            VTE Pharmacologic Prophylaxis:   Pharmacologic: Warfarin (Coumadin)  Mechanical VTE Prophylaxis in Place: Yes    Patient Centered Rounds: I have performed bedside rounds with nursing staff today  Time Spent for Care: 20 minutes  More than 50% of total time spent on counseling and coordination of care as described above      Current Length of Stay: 5 day(s)    Current Patient Status: Inpatient   Certification Statement: The patient will continue to require additional inpatient hospital stay due to the need for IV antibiotics  Code Status: Level 1 - Full Code      Subjective: The patient was seen and examined  The patient continues to complain of shortness of breath  Objective:     Vitals:   Temp (24hrs), Av 3 °F (36 8 °C), Min:98 °F (36 7 °C), Max:98 7 °F (37 1 °C)    HR:  [70-80] 72  Resp:  [18-20] 18  BP: (146-165)/(72-76) 160/75  SpO2:  [95 %-98 %] 95 %  Body mass index is 37 05 kg/m²  Input and Output Summary (last 24 hours): Intake/Output Summary (Last 24 hours) at 18 1508  Last data filed at 18 1429   Gross per 24 hour   Intake              950 ml   Output             1450 ml   Net             -500 ml       Physical Exam:     Physical Exam  General:  NAD, awake, alert, follows commands  HEENT:  NC/AT, mucous membranes moist  Neck:  Supple, No JVP elevation  CV:  + S1, + S2, Irregularly irregular rhythm, Regular rate  Pulm:  Bibasilar crackles  Abd:  Soft, Non-tender, Non-distended  Ext:  No clubbing/cyanosis/edema  Skin:  + chronic venous stasis changes of the bilateral anterior shins      Additional Data:     Labs:      Results from last 7 days  Lab Units 18  0441   WBC Thousand/uL 4 66   HEMOGLOBIN g/dL 9 9*   HEMATOCRIT % 31 8*   PLATELETS Thousands/uL 69*   NEUTROS PCT % 70   LYMPHS PCT % 12*   MONOS PCT % 6   EOS PCT % 11*       Results from last 7 days  Lab Units 18  0441   SODIUM mmol/L 145   POTASSIUM mmol/L 3 0*   CHLORIDE mmol/L 102   CO2 mmol/L 39*   BUN mg/dL 44*   CREATININE mg/dL 2 39*   CALCIUM mg/dL 8 7   TOTAL PROTEIN g/dL 6 5   BILIRUBIN TOTAL mg/dL 1 10*   ALK PHOS U/L 237*   ALT U/L 7*   AST U/L 24   GLUCOSE RANDOM mg/dL 91       Results from last 7 days  Lab Units 18  0441   INR  2 58*       * I Have Reviewed All Lab Data Listed Above  * Additional Pertinent Lab Tests Reviewed:  All [facility] Admission Reviewed      Recent Cultures (last 7 days):       Results from last 7 days  Lab Units 03/31/18 2057 03/31/18 2018   BLOOD CULTURE   --  No Growth at 72 hrs  No Growth at 72 hrs     URINE CULTURE  >100,000 cfu/ml Pseudomonas aeruginosa*  >100,000 cfu/ml Enterococcus faecalis*  --    INFLUENZA A PCR   --  None Detected   INFLUENZA B PCR   --  None Detected   RSV PCR   --  None Detected       Last 24 Hours Medication List:     Current Facility-Administered Medications:  acetaminophen 650 mg Oral Q6H PRN Josafat Pradeep, DO    albuterol 2 5 mg Nebulization Q4H PRN Josafat Pradeep, DO    ascorbic acid 500 mg Oral Daily Juan Jose Rodrigues, DO    aspirin 81 mg Oral Daily Joseul Rodrigues, DO    atorvastatin 40 mg Oral Daily With [employer], DO    b complex-vitamin C-folic acid 1 capsule Oral Daily Juan Jose Rodrigues, DO    benzonatate 100 mg Oral TID PRN Josafat Fernández, DO    bumetanide 2 mg Oral Daily Josafat Fernández, DO    carbidopa-levodopa 1 tablet Oral TID Dhaval Hansen, DO    carvedilol 12 5 mg Oral BID With Meals Josafat Fernández, DO    cefepime 1,000 mg Intravenous Q24H Josafat Fernández, DO Last Rate: 1,000 mg (04/04/18 1650)   cholecalciferol 2,000 Units Oral Daily Juan Jose Rodrigues, DO    fluticasone 1 spray Each Nare Daily Josafat Fernández, DO    gabapentin 100 mg Oral BID Juan Jose Rodrigues, DO    insulin detemir 16 Units Subcutaneous HS Josafat Fernández, DO    insulin lispro 2-12 Units Subcutaneous TID AC Juan Jose Rodrigues, DO    insulin lispro 2-12 Units Subcutaneous HS Juan Jose Rodrigues, DO    ipratropium 0 5 mg Nebulization BID Josafat Rpadeep, DO    isosorbide mononitrate 60 mg Oral Daily Joseul Rodrigues, DO    levalbuterol 1 25 mg Nebulization BID Josafat Fernández, DO    levothyroxine 225 mcg Oral Early Morning Joseul Rodrigues, DO    loratadine 10 mg Oral Daily Nikul Rodrigues, DO    magnesium sulfate 2 g Intravenous Once Josafat Ariascal, DO    oxyCODONE 5 mg Oral Q4H PRN Josafat Pradeep, DO    polyethylene glycol 17 g Oral Daily PRN Josafat Ariascal, DO    potassium chloride 40 mEq Oral Once Nimo Sharma DO    vancomycin 10 mg/kg Intravenous Q24H Nimo Sharma DO Last Rate: Stopped (04/04/18 2110)   warfarin 3 mg Oral Daily (warfarin) Nimo Sharma DO         Today, Patient Was Seen By: Nimo Sharma DO    ** Please Note: Dictation voice to text software may have been used in the creation of this document   **

## 2018-04-05 NOTE — PLAN OF CARE
Problem: Potential for Falls  Goal: Patient will remain free of falls  INTERVENTIONS:  - Assess patient frequently for physical needs  -  Identify cognitive and physical deficits and behaviors that affect risk of falls    -  [place] fall precautions as indicated by assessment   - Educate patient/family on patient safety including physical limitations  - Instruct patient to call for assistance with activity based on assessment  - Modify environment to reduce risk of injury  - Consider OT/PT consult to assist with strengthening/mobility   Outcome: Progressing      Problem: PAIN - ADULT  Goal: Verbalizes/displays adequate comfort level or baseline comfort level  Interventions:  - Encourage patient to monitor pain and request assistance  - Assess pain using appropriate pain scale  - Administer analgesics based on type and severity of pain and evaluate response  - Implement non-pharmacological measures as appropriate and evaluate response  - Consider cultural and social influences on pain and pain management  - Notify physician/advanced practitioner if interventions unsuccessful or patient reports new pain   Outcome: Progressing      Problem: INFECTION - ADULT  Goal: Absence or prevention of progression during hospitalization  INTERVENTIONS:  - Assess and monitor for signs and symptoms of infection  - Monitor lab/diagnostic results  - Monitor all insertion sites, i e  indwelling lines, tubes, and drains  - Monitor endotracheal (as able) and nasal secretions for changes in amount and color  - [place] appropriate cooling/warming therapies per order  - Administer medications as ordered  - Instruct and encourage patient and family to use good hand hygiene technique  - Identify and instruct in appropriate isolation precautions for identified infection/condition   Outcome: Progressing    Goal: Absence of fever/infection during neutropenic period  INTERVENTIONS:  - Monitor WBC  - Implement neutropenic guidelines   Outcome: Progressing      Problem: SAFETY ADULT  Goal: Maintain or return to baseline ADL function  INTERVENTIONS:  -  Assess patient's ability to carry out ADLs; assess patient's baseline for ADL function and identify physical deficits which impact ability to perform ADLs (bathing, care of mouth/teeth, toileting, grooming, dressing, etc )  - Assess/evaluate cause of self-care deficits   - Assess range of motion  - Assess patient's mobility; develop plan if impaired  - Assess patient's need for assistive devices and provide as appropriate  - Encourage maximum independence but intervene and supervise when necessary  ¯ Involve family in performance of ADLs  ¯ Assess for home care needs following discharge   ¯ Request OT consult to assist with ADL evaluation and planning for discharge  ¯ Provide patient education as appropriate   Outcome: Progressing    Goal: Maintain or return mobility status to optimal level  INTERVENTIONS:  - Assess patient's baseline mobility status (ambulation, transfers, stairs, etc )    - Identify cognitive and physical deficits and behaviors that affect mobility  - Identify mobility aids required to assist with transfers and/or ambulation (gait belt, sit-to-stand, lift, walker, cane, etc )  - [place] fall precautions as indicated by assessment  - Record patient progress and toleration of activity level on Mobility SBAR; progress patient to next Phase/Stage  - Instruct patient to call for assistance with activity based on assessment  - Request Rehabilitation consult to assist with strengthening/weightbearing, etc    Outcome: Progressing      Problem: Prexisting or High Potential for Compromised Skin Integrity  Goal: Skin integrity is maintained or improved  INTERVENTIONS:  - Identify patients at risk for skin breakdown  - Assess and monitor skin integrity  - Assess and monitor nutrition and hydration status  - Monitor labs (i e  albumin)  - Assess for incontinence   - Turn and reposition patient  - Assist with mobility/ambulation  - Relieve pressure over bony prominences  - Avoid friction and shearing  - Provide appropriate hygiene as needed including keeping skin clean and dry  - Evaluate need for skin moisturizer/barrier cream  - Collaborate with interdisciplinary team (i e  Nutrition, Rehabilitation, etc )   - Patient/family teaching   Outcome: Progressing      Problem: DISCHARGE PLANNING - CARE MANAGEMENT  Goal: Discharge to post-acute care or home with appropriate resources  INTERVENTIONS:  - Conduct assessment to determine patient/family and health care team treatment goals, and need for post-acute services based on payer coverage, community resources, and patient preferences, and barriers to discharge  - Address psychosocial, clinical, and financial barriers to discharge as identified in assessment in conjunction with the patient/family and health care team  - Arrange appropriate level of post-acute services according to patient's   needs and preference and payer coverage in collaboration with the physician and health care team  - Communicate with and update the patient/family, physician, and health care team regarding progress on the discharge plan  - Arrange appropriate transportation to post-acute venues   Outcome: Progressing      Problem: Nutrition/Hydration-ADULT  Goal: Nutrient/Hydration intake appropriate for improving, restoring or maintaining nutritional needs  Monitor and assess patient's nutrition/hydration status for malnutrition (ex- brittle hair, bruises, dry skin, pale skin and conjunctiva, muscle wasting, smooth red tongue, and disorientation)  Collaborate with interdisciplinary team and initiate plan and interventions as ordered  Monitor patient's weight and dietary intake as ordered or per policy  Utilize nutrition screening tool and intervene per policy  Determine patient's food preferences and provide high-protein, high-caloric foods as appropriate       INTERVENTIONS:  - Monitor oral intake, urinary output, labs, and treatment plans  - Assess nutrition and hydration status and recommend course of action  - Evaluate amount of meals eaten  - Assist patient with eating if necessary   - Allow adequate time for meals  - Recommend/ encourage appropriate diets, oral nutritional supplements, and vitamin/mineral supplements  - Order, calculate, and assess calorie counts as needed  - Recommend, monitor, and adjust tube feedings and TPN/PPN based on assessed needs  - Assess need for intravenous fluids  - Provide specific nutrition/hydration education as appropriate  - Include patient/family/caregiver in decisions related to nutrition   Outcome: Progressing

## 2018-04-05 NOTE — PHYSICAL THERAPY NOTE
PT treatment Note      04/05/18 0825   Restrictions/Precautions   Other Precautions (Performing functional mobility at (min) x1-2 level of functi)   Subjective   Subjective reports he didn't sleep well last night  Bed Mobility   Additional Comments deferred OOB due to fatigue and minimal particpation with TE   Endurance Deficit   Endurance Deficit Yes   Activity Tolerance   Activity Tolerance Patient limited by fatigue   Exercises   Heelslides Supine;10 reps;AAROM;PROM   Hip Abduction Supine;15 reps;AAROM;PROM   Hip Adduction Supine;15 reps;PROM;AAROM   Ankle Pumps Supine;20 reps;AAROM   Assessment   Prognosis Fair   Problem List Decreased strength;Decreased endurance; Impaired balance;Decreased mobility; Decreased safety awareness   Assessment Performed therapeutic exercises as above  Pt  very fatigued with limited participation  He would benefit from continued PT to address deficits and improve function  Plan   Treatment/Interventions Functional transfer training;LE strengthening/ROM; Therapeutic exercise; Endurance training;Bed mobility;Gait training   Progress Slow progress, decreased activity tolerance   Recommendation   Recommendation Long-term skilled PT   Pt  In bed  with call bell within reach, scd's connected and turned on and alarm on at end of PT session

## 2018-04-05 NOTE — ASSESSMENT & PLAN NOTE
-follow CBC  Results for Phil Campbell Apa (MRN [id number]) as of 4/3/2018 09:13   Ref  Range 4/2/2018 05:33   Folate Latest Ref Range: 3 1 - 17 5 ng/mL >20 0 (H)     Results for Phil Campbell Apa (MRN [id number]) as of 4/3/2018 09:13   Ref   Range 4/2/2018 05:33   Vitamin B-12 Latest Ref Range: 100 - 900 pg/mL 505

## 2018-04-05 NOTE — ASSESSMENT & PLAN NOTE
-patient was successfully weaned off BiPAP  -continue supplemental oxygen via nasal cannula to maintain oxygen saturation levels of 92% and above  -continue the respiratory protocol  -continue the airway clearance protocol  -the patient can be maintained on med/surg level of care with continuous pulse oximetry

## 2018-04-05 NOTE — ASSESSMENT & PLAN NOTE
-continue PO levothyroxine  Results for Viridiana Sadler (MRN [id number]) as of 4/2/2018 09:59   Ref   Range 4/1/2018 05:10   TSH 3RD GENERATON Latest Ref Range: 0 358 - 3 740 uIU/mL 1 770

## 2018-04-06 PROBLEM — E87.0 HYPERNATREMIA: Status: ACTIVE | Noted: 2018-01-01

## 2018-04-06 PROBLEM — R13.10 DYSPHAGIA: Status: ACTIVE | Noted: 2018-01-01

## 2018-04-06 NOTE — ASSESSMENT & PLAN NOTE
-follow patient's blood glucose trend  -continue current insulin regimen  -Results for Tarun Knight (MRN [id number]) as of 4/2/2018 09:59   Ref   Range 4/1/2018 05:10   Hemoglobin A1C Latest Ref Range: 4 2 - 6 3 % 6 9 (H)   EAG Latest Units: mg/dl 151

## 2018-04-06 NOTE — ASSESSMENT & PLAN NOTE
-possible gram-negative pneumonia  -continue IV cefepime  -consult Infectious Disease  -I will discuss with Infectious Disease the length of antibiotic therapy

## 2018-04-06 NOTE — PROGRESS NOTES
Progress Note - Nephrology   Ashley Merlyn 80 y o  male MRN: [id number]  Unit/Bed#: [facility] Encounter: [id number]    A/P:  1  Acute kidney injury: due to diuresis in a patient with ischemic cardiomyopathy on high dose diuretics  Will have to accept a degree of dysfunction to achieve euvolemia      4/4/18: Kidney function about the same with high dose diuretics  Will continue to monitor      4/5: renal function stable      4/6: Renal function stable  2  Chronic kidney disease stage 4 by history due to diabetic nephropathy and nephrosclerosis  3  Acute on chronic systolic congestive failure: on IV bumetanide with little change in the weights but improvement on chest xray and symptomatically  Will be transitioned to oral bumetanide soon      4/4: Becoming hypernatremic  Would transition to oral bumetamide      4/5: transitioned to oral bumetanide      4/6: on oral diuretic and weight slightly decreased  4  Hypokalemia: being repleted with KCl 120 meq - will check a magnesium level  4/4: will replete magnesium  4/5: Potassium lower - will give one dose of IV KCl  5   Diabetes Mellitus: being covered  6: LLL pneumonia on antibiotics      Follow up reason for today's visit: Chronic kidney disease stage 4    Acute on chronic systolic CHF (congestive heart failure) ([facility])    Patient Active Problem List   Diagnosis    Acute on chronic systolic CHF (congestive heart failure) (HCC)    Pacemaker    Sleep apnea    S[facility] sinus syndrome [facility] [facility]    PVD (peripheral vascular disease) ([facility] [address] [facility]    Psychiatric disorder    Neuropathy    Essential hypertension    Hyperlipidemia    GERD (gastroesophageal reflux disease)    Gastroparesis    Type 2 diabetes mellitus with hyperglycemia, with long-term current use of insulin (HCC)    Cancer (HCC)    Atrial fibrillation (HCC)    Stage 4 chronic kidney disease (HCC)    Hypokalemia    Perioral cyanosis    Hyperlipidemia    Thrombocytopenia (HCC)    Urinary retention    HAP (hospital-acquired pneumonia)    Dilated cbd, acquired    Renal disorder    Premature ventricular contraction    Macrocytic anemia    CHF [facility]congestive heart failure) ([facility])    SOB (shortness of breath)    Hypothyroidism    Acute respiratory failure with hypoxia ([facility])    Hypoalbuminemia    Suprapubic catheter ([facility])    Aortic stenosis    Pulmonary hypertension    Microalbuminuria    Healthcare-associated pneumonia    Acute cystitis         Subjective:   Denies headache, dizziness, chest pain, dyspnea, abdominal pain or NVDC says he feels much better and "was one sick puppy "  4/4: Denies chest pain, dyspnea, abdominal pain, NVD No BM voiding well  Complaining of right hip pain and appears in distress  4/5: Denies chest pain, dyspnea, abdominal pain NVD but has little appetite  Says he feels better  4/6: complains of dry mouth  No dyspnea, chest pain or abdominal pain    Objective:     Vitals: Blood pressure 128/63, pulse 86, temperature 98 8 °F (37 1 °C), temperature source Temporal, resp  rate 18, height 5' 11" (1 803 m), weight 120 kg (264 lb 8 8 oz), SpO2 94 %  ,Body mass index is 36 9 kg/m²  Weight (last 2 days)     Date/Time   Weight    04/06/18 0600  120 (264 55)    04/05/18 0600  121 (265 65)    04/04/18 0600  121 (266 54)    04/04/18 0526  121 (266 54)                Intake/Output Summary (Last 24 hours) at 04/06/18 0844  Last data filed at 04/06/18 8807   Gross per 24 hour   Intake          1355 83 ml   Output             1600 ml   Net          -244 17 ml     I/O last 3 completed shifts:   In: 2085 8 [P O :1320; IV Piggyback:765 8]  Out: 2300 [Urine:2300]         Physical Exam: /63 (BP Location: Right arm)   Pulse 86   Temp 98 8 °F (37 1 °C) (Temporal)   Resp 18   Ht 5' 11" (1 803 m)   Wt 120 kg (264 lb 8 8 oz)   SpO2 94%   BMI 36 90 kg/m²     General Appearance:    Alert, cooperative, has distress, appears stated age   Head:    Normocephalic, without obvious abnormality, atraumatic   Eyes:    Conjunctiva/corneas clear   Ears:    Normal external ears   Nose:   Nares normal, septum midline, mucosa normal, no drainage    or sinus tenderness   Throat:   Lips, mucosa, and tongue normal; teeth and gums normal   Neck:   Supple, symmetrical, trachea midline, no adenopathy;        thyroid:  No enlargement/tenderness/nodules; no carotid    bruit or JVD   Back:     Symmetric, no curvature, ROM normal, no CVA tenderness   Lungs:     Dec at the bases bilaterally, respirations unlabored Exp rhonchi   Chest wall:    No tenderness or deformity   Heart:    Irreg rate and rhythm, S1 and S2 normal,   Abdomen:     Soft, non-tender, bowel sounds active   Extremities:   Extremities normal, atraumatic, no cyanosis trace edema   Skin:   Skin color, texture, turgor normal, no rashes or lesions   Lymph nodes:   Cervical normal   Neurologic:   CNII-XII intact            Lab, Imaging and other studies: I have personally reviewed pertinent labs  CBC:   Lab Results   Component Value Date    WBC 4 06 (L) 04/06/2018    HGB 9 9 (L) 04/06/2018    HCT 32 1 (L) 04/06/2018    MCV 99 (H) 04/06/2018    PLT 74 (L) 04/06/2018    MCH 30 7 04/06/2018    MCHC 30 8 (L) 04/06/2018    RDW 17 2 (H) 04/06/2018    MPV 12 8 (H) 04/06/2018     CMP:   Lab Results   Component Value Date     (H) 04/06/2018    K 3 0 (L) 04/06/2018     04/06/2018    CO2 39 (H) 04/06/2018    ANIONGAP 3 (L) 04/06/2018    BUN 45 (H) 04/06/2018    CREATININE 2 22 (H) 04/06/2018    GLUCOSE 79 04/06/2018    CALCIUM 8 9 04/06/2018    EGFR 25 04/06/2018             Results from last 7 days  Lab Units 04/06/18  0618 04/05/18  0441 04/04/18  0523 04/03/18  0541   SODIUM mmol/L 146* 145 146* 145   POTASSIUM mmol/L 3 0* 3 0* 3 3* 2 8*   CHLORIDE mmol/L 104 102 105 103   CO2 mmol/L 39* 39* 37* 36*   BUN mg/dL 45* 44* 42* 41*   CREATININE mg/dL 2 22* 2 39* 2 44* 2 45*   CALCIUM mg/dL 8 9 8 7 8 5 8 9   TOTAL PROTEIN g/dL  --  6 5 6 5 6 6   BILIRUBIN TOTAL mg/dL  --  1 10* 0 90 0 90   ALK PHOS U/L  --  237* 234* 191*   ALT U/L  --  7* 8* 6*   AST U/L  --  24 22 21   GLUCOSE RANDOM mg/dL 79 91 103 75         Phosphorus:   No results found for: PHOS  Magnesium:   Lab Results   Component Value Date    MG 2 1 04/06/2018     Urinalysis: No results found for: Dedra Ink, SPECGRAV, PHUR, LEUKOCYTESUR, NITRITE, PROTEINUA, GLUCOSEU, KETONESU, BILIRUBINUR, BLOODU  Ionized Calcium: No results found for: CAION  Coagulation:   Lab Results   Component Value Date    INR 2 28 (H) 04/06/2018     Troponin: No results found for: TROPONINI  ABG: No results found for: PHART, QYY2NKZ, PO2ART, PDF9VEV, F4HJDDJK, BEART, SOURCE  Radiology review:     IMAGING  Procedure: Xr Chest Portable    Result Date: 4/2/2018  Narrative: CHEST portable INDICATION: Shortness of breath, CHF COMPARISON: March 31, 2018 VIEWS:  AP semierect; IMAGES:  1 FINDINGS: Midline sternotomy wires are present from previous cardiac surgery  The cardiomediastinal silhouette is unremarkable  Dual lead cardiac pacemaker overlies left chest wall, unchanged in position  Patchy density developing at the left lung base  Right lung well aerated  No pneumothorax  Bony thorax is otherwise unremarkable  Electrode (EKG) monitoring devices overlie the chest      Impression: Developing density at left lung base suspicious for pneumonia or asymmetric edema  Cardiac pacemaker status post CABG    Workstation performed: [id number]   Personally reviewed by me  Procedure: Xr Chest 1 View Portable    Result Date: 4/1/2018  Narrative: CHEST INDICATION:   sob  COMPARISON:  4/13/2017 EXAM PERFORMED/VIEWS:  XR CHEST PORTABLE FINDINGS:  Left-sided chest wall pacemaker is identified  Pacemaker leads are intact  Heart shadow is enlarged but unchanged from prior exam  Mild central vascular prominence  Left basilar atelectasis/infiltrate with possible small effusion  No pneumothorax   Osseous structures appear within normal limits for patient age  Impression: Mild central vascular prominence  Left basilar atelectasis/infiltrate with possible small effusion   Workstation performed: [id number]   Personally reviewed by me      Current Facility-Administered Medications   Medication Dose Route Frequency    acetaminophen (TYLENOL) tablet 650 mg  650 mg Oral Q6H PRN    albuterol inhalation solution 2 5 mg  2 5 mg Nebulization Q4H PRN    ascorbic acid (VITAMIN C) tablet 500 mg  500 mg Oral Daily    aspirin chewable tablet 81 mg  81 mg Oral Daily    atorvastatin (LIPITOR) tablet 40 mg  40 mg Oral Daily With Dinner    b complex-vitamin C-folic acid (NEPHROCAPS) capsule 1 capsule  1 capsule Oral Daily    benzonatate (TESSALON PERLES) capsule 100 mg  100 mg Oral TID PRN    bumetanide (BUMEX) tablet 2 mg  2 mg Oral Daily    carbidopa-levodopa (SINEMET)  mg per tablet 1 tablet  1 tablet Oral TID    carvedilol (COREG) tablet 12 5 mg  12 5 mg Oral BID With Meals    cefepime (MAXIPIME) IVPB (premix) 1,000 mg  1,000 mg Intravenous Q24H    cholecalciferol (VITAMIN D3) tablet 2,000 Units  2,000 Units Oral Daily    fluticasone (FLONASE) 50 mcg/act nasal spray 1 spray  1 spray Each Nare Daily    gabapentin (NEURONTIN) capsule 100 mg  100 mg Oral BID    insulin detemir (LEVEMIR) subcutaneous injection 16 Units  16 Units Subcutaneous HS    insulin lispro (HumaLOG) 100 units/mL subcutaneous injection 2-12 Units  2-12 Units Subcutaneous TID AC    insulin lispro (HumaLOG) 100 units/mL subcutaneous injection 2-12 Units  2-12 Units Subcutaneous HS    ipratropium (ATROVENT) 0 02 % inhalation solution 0 5 mg  0 5 mg Nebulization BID    isosorbide mononitrate (IMDUR) 24 hr tablet 60 mg  60 mg Oral Daily    levalbuterol (XOPENEX) inhalation solution 1 25 mg  1 25 mg Nebulization BID    levothyroxine tablet 225 mcg  225 mcg Oral Early Morning    loratadine (CLARITIN) tablet 10 mg  10 mg Oral Daily    oxyCODONE (ROXICODONE) IR tablet 5 mg  5 mg Oral Q4H PRN    polyethylene glycol (MIRALAX) packet 17 g  17 g Oral Daily PRN    sodium chloride (OCEAN) 0 65 % nasal spray 1 spray  1 spray Each Nare TID    vancomycin (VANCOCIN) 1,250 mg in sodium chloride 0 9 % 250 mL IVPB  10 mg/kg Intravenous Q24H    warfarin (COUMADIN) tablet 3 mg  3 mg Oral Daily (warfarin)     Medications Discontinued During This Encounter   Medication Reason    warfarin (COUMADIN) 4 mg tablet Dose adjustment    Probiotic Product (JOHNNIE-BID PROBIOTIC PO) Therapy completed    Probiotic Product (BACID) TABS Therapy completed    loperamide (IMODIUM) 2 mg capsule Therapy completed    levofloxacin (LEVAQUIN) 250 mg tablet Therapy completed    Insulin Syringes, Disposable, (MONOJECT INS SYR 1CC) U-100 1 ML MISC Error    febuxostat (ULORIC) 40 mg tablet Therapy completed    Cholecalciferol (VITAMIN D3) 2000 UNITS capsule Therapy completed    allopurinol (ZYLOPRIM) 300 mg tablet Formulary change    amiodarone 200 mg tablet Therapy completed    brimonidine (ALPHAGAN P) 0 1 % Therapy completed    ascorbic acid (VITAMIN C) 500 mg tablet     levofloxacin (LEVAQUIN) IVPB (premix) 750 mg     febuxostat (ULORIC) tablet 80 mg     lactulose 20 g/30 mL oral solution 20 g     levofloxacin (LEVAQUIN) IVPB (premix) 750 mg     magnesium oxide (MAG-OX) tablet 400 mg     metolazone (ZAROXOLYN) tablet 5 mg     nitroGLYcerin (TRIDIL) 50 mg in 250 mL infusion (premix)     warfarin (COUMADIN) tablet 6 5 mg     pantoprazole (PROTONIX) EC tablet 40 mg     senna-docusate sodium (SENOKOT S) 8 6-50 mg per tablet 1 tablet     lactulose 20 g/30 mL oral solution 10 g     nitroglycerin (NITROSTAT) SL tablet 0 4 mg     oxyCODONE-acetaminophen (PERCOCET) 5-325 mg per tablet 1 tablet     sodium phosphate-biphosphate (FLEET) enema 1 enema     acetaminophen (TYLENOL) tablet 650 mg     hydrALAZINE (APRESOLINE) tablet 50 mg     warfarin (COUMADIN) tablet 5 mg     insulin detemir (LEVEMIR) subcutaneous injection 24 Units     insulin detemir (LEVEMIR) subcutaneous injection 20 Units     magnesium sulfate 2 g/50 mL IVPB (premix) 2 g     bumetanide (BUMEX) injection 2 mg        Basilia Tolliver MD

## 2018-04-06 NOTE — TELEMEDICINE
Consultation - Infectious Disease   Armand Ro 80 y o  male MRN: [id number]  Unit/Bed#:  Encounter: [id number]      Inpatient consult to Infectious Diseases  Consult performed by: Nhi Chance ordered by: Ivette Sy:     1  This service was provided via Telemedicine  2  Provider located at Home  3  TeleMed provider: Ivana Torrez MD   4  Identify all parties in room with patient during tele consult:  n/a  5  After connecting through [employer], patient was identified by name and date of birth and assistant checked wristband  Patient was then informed that this was a Telemedicine visit and that the exam was being conducted confidentially over secure lines  My office door was closed  No one else was in the room  Patient acknowledged consent and understanding of privacy and security of the Telemedicine visit, and gave us permission to have the assistant stay in the room in order to assist with the history and to conduct the exam   I informed the patient that I have reviewed their record in Epic and presented the opportunity for them to ask any questions regarding the visit today  The patient agreed to participate  Assessment/Recommendations     66-year-old male with chronic systolic congestive heart failure presents with acute hypoxic respiratory failure, acute on chronic heart failure, sepsis secondary to suspected healthcare associated pneumonia and suspected catheter associated urinary tract infection    1   Sepsis present on admission secondary to suspected health care associated pneumonia, suspected gram negative pneumonia  - Pneumonia suspected based on fever, suspicious infiltrate on imaging  - Etiology suspected to be a gram-negative organism, MRSA nasal screen and Flu PCR negative  - Now afebrile, no leukocytosis but with persistent dyspnea which is likely multifactorial    · Check sputum culture if available  · Continue Cefepime 1g q24h, adjust dose renally  · Continue treatment through 4/8/18    2  Possible catheter associated UTI (acute cystitis) secondary to Pseudomonas, E fecalis  - Diagnosis suspected based on fever, pyuria  - Currently asymptomatic    · Continue Cefepime 1g q24h as above  · Discontinue Vancomycin and switch to Ampicillin, renally dosed, 2g q12h through 4/8/18  · Exchange suprapubic catheter if not done so since initial placement    3  Acute hypoxic respiratory failure, likely multifactorial with acute on chronic systolic heart failure, stage 4 kidney disease, diabetes    · Antibiotic management as above  · Management per primary team, nephrology    Thank you for involving me in the care of your patient  Please call with questions, change in clinical status or if tests recommended above are abnormal      Discussed with the primary service  History     Reason for Consult: Pneumonia, polymicrobial UTI  HPI: Brooks Wade is a 80y o  year old male with multiple medical comorbidities  Patient is a poor historian with poor recall and history is obtained mainly from medical records  He is a resident of [address]  He has a history of chronic systolic congestive heart failure with an EF of 25%, chronic kidney disease, atrial fibrillation s/p dual lead pacer, type 2 diabetes  He also has a history of urinary retention and recurrent urinary tract infections and has had a chronic indwelling Motta catheter  He underwent a suprapubic catheter placement on 03/02 by Interventional Radiology  He reportedly has confusion, burning with urination and occasional back pain with urinary tract infections  He presented to the emergency room on 03/31 with complaints of somnolence, confusion, poor appetite, increased weight gain and increased lower extremity edema, fatigue, increased cough and shortness of breath from baseline  He was also febrile for 1 day  He was hypoxic when EMS first arrived    In the emergency room he was febrile, tachycardic, tachypneic and in respiratory distress  Urinalysis showed pyuria and positive nitrites  Creatinine was 2 34  White count was normal  EKG showed no acute changes  He was placed on BiPAP  He was admitted and started on levofloxacin and diuretics  Since admission his fever has resolved  He has been weaned off BiPAP and requires supplemental oxygen  He has had no leukocytosis  Blood cultures have been negative  Flu PCR was negative  MRSA nasal screen has been negative  Urine cultures from admission have grown >100,000 Pseudomonas and E fecalis  CT chest 4/3 showed left greater than right bibasilar airspace disease which was thought to represent pneumonia and an element of mild vascular congestion  He was switched to Cefepime on 4/3 and Vancomycin was added 4/4  He has had no further confusion per his report but continues to be short of breath at rest and has a persistent cough which is productive of thick and white sputum  His lower extremity swelling has resolved  The  Denies nausea, vomiting, diarrhea or rash and is tolerating antibiotics well  Infectious disease is being consulted for diagnostic work up and antibiotic management  Review of Systems  A [id number] point system-based review of systems is otherwise negative  PAST MEDICAL HISTORY:  Past Medical History:   Diagnosis Date    Anemia     Arthralgia     Atrial fibrillation (HCC)     BPH (benign prostatic hypertrophy)     CAD (coronary artery disease)     Cancer (HCC)     thyroid    Cardiac disease     atrial fib      Cardiomegaly     Carpal tunnel syndrome     CHF (congestive heart failure) (HCC)     Chronic kidney disease     Chronic systolic heart failure (HCC)     Ef 25%    Degenerative joint disease     Depression     Diabetes mellitus ([facility] [address] )     Disease of thyroid gland     Fracture of left radius     Gastroparesis     GERD (gastroesophageal reflux disease)     Gout     Hyperlipidemia     Hypertension     Hyperthyroidism     Hypokalemia     Hypothyroid     Hypoxia     Incomplete bladder emptying     Neuropathy     Osteoarthritis     Other fluid overload     fluid restriction    Premature ventricular contraction     Psychiatric disorder     depression    PVD (peripheral vascular disease) (HCC)     Renal disorder     stage 3 chronic kidney disease    Sick sinus syndrome (HCC)     Sleep apnea     Thrombocytopenia (HCC)     Tracheobronchitis     Tremor     Unstable angina (HCC)     Urinary retention      Past Surgical History:   Procedure Laterality Date    ABDOMINAL SURGERY      cholecystectomy    CARDIAC PACEMAKER PLACEMENT      CARPAL TUNNEL RELEASE      bilateral    CHOLECYSTECTOMY      COLONOSCOPY W/ POLYPECTOMY      CORONARY ARTERY BYPASS GRAFT      EYE SURGERY      bilateral CAT EXT W/IOL    HERNIA REPAIR      JOINT REPLACEMENT         FAMILY HISTORY:  Non-contributory    SOCIAL HISTORY:  Social History     History   Alcohol Use No     History   Drug Use No     History   Smoking Status    Former Smoker    Packs/day: 3 00    Years: 30 00    Types: Cigarettes   Smokeless Tobacco    Never Used     Comment: quit late 40's yrs old;total tobacco use 30yrs,used 3 pks of cigarettes form 3-4 yrs then changed to 10 cigars/day       ALLERGIES:  Allergies   Allergen Reactions    Benicar [Olmesartan] Other (See Comments)     Can't recall    Cardura [Doxazosin]     Codeine Other (See Comments)     confusion    Cozaar [Losartan]     Cymbalta [Duloxetine Hcl]     Dyazide [Hydrochlorothiazide W-Triamterene]     Hctz [Hydrochlorothiazide]     Iodinated Diagnostic Agents     Lasix [Furosemide] Other (See Comments)     Can't recall    Lopressor [Metoprolol] Other (See Comments)     Can't recall    Lozol [Indapamide]     Prinivil [Lisinopril]     Procardia [Nifedipine]     Verapamil     Voltaren [Diclofenac Sodium]     Aldactone [Spironolactone] Other (See Comments)     Tremors;starts as a low reaction then gets worse    Latex Rash       MEDICATIONS:  All current active medications have been reviewed  Physical Exam     HR:  [70-86] 70  Resp:  [18-20] 20  BP: (128-145)/(63-80) 130/80  SpO2:  [94 %-97 %] 97 %  Temp (24hrs), Av 4 °F (36 9 °C), Min:97 6 °F (36 4 °C), Max:98 9 °F (37 2 °C)  Current: Temperature: 97 6 °F (36 4 °C)    Intake/Output Summary (Last 24 hours) at 18 1025  Last data filed at 18 0337   Gross per 24 hour   Intake          1355 83 ml   Output             1250 ml   Net           105 83 ml       Physical exam findings reported by bedside and primary medial team staff    General Appearance:  Appears stated age, alert, hard of hearing, mild dyspnea with conversation   Head:  Normocephalic, without obvious abnormality, atraumatic   Eyes:  PERRL, conjunctiva pink and sclera anicteric, both eyes   Nose: Nares normal, mucosa normal, no drainage   Throat: Oropharynx moist without lesions; lips, mucosa, and tongue normal; teeth and gums normal   Neck: Supple, symmetrical, trachea midline, no adenopathy, no tenderness/mass/nodules   Back:   Symmetric, no curvature, ROM normal, no CVA tenderness   Lungs:   Bibasilar rales and diffuse rhonchi   Chest Wall:  No tenderness or deformity   Heart:  Irregular rhythm   Abdomen:   Soft, non-tender, non-distended, positive bowel sounds, no masses, no organomegaly    No CVA tenderness   Extremities: Trace pitting edema b/l  Chronic venous stasis changes over bilateral lower extremities   Skin: Skin color, texture, turgor normal, no rashes or lesions  No draining wounds noted  Lymph nodes: Cervical, supraclavicular, and axillary nodes normal   Neurologic: Alert and oriented times 3, extremity strength 5/5 and symmetric       Invasive Devices:   Peripheral IV 18 Left Wrist (Active)   Site Assessment Clean;Dry; Intact 2018 12:00 AM   Dressing Type Transparent;Securing device 2018 12:00 AM Line Status Blood return noted; Flushed;Saline locked 4/6/2018 12:00 AM   Dressing Status Clean;Dry; Intact 4/6/2018 12:00 AM       Suprapubic Catheter Non-latex 18 Fr  (Active)   Site Assessment Intact; Pink 4/6/2018 12:39 AM   Dressing Status Clean;Dry; Intact 4/6/2018 12:39 AM   Dressing Type Split gauze 4/6/2018 12:39 AM   Collection Container Standard drainage bag 4/6/2018 12:39 AM   Reason for Continuing Urinary Catheterization past POD 1 Other (Comment) 4/6/2018 12:39 AM   Output (mL) 450 mL 4/6/2018  6:21 AM       Labs, Imaging, & Other Studies     Lab Results:    I have personally reviewed pertinent labs  Results from last 7 days  Lab Units 04/06/18 0618 04/05/18 0441 04/04/18  0523   WBC Thousand/uL 4 06* 4 66 4 44   HEMOGLOBIN g/dL 9 9* 9 9* 9 6*   PLATELETS Thousands/uL 74* 69* 76*       Results from last 7 days  Lab Units 04/06/18 0618 04/05/18 0441 04/04/18  0523 04/03/18  0541   SODIUM mmol/L 146* 145 146* 145   POTASSIUM mmol/L 3 0* 3 0* 3 3* 2 8*   CHLORIDE mmol/L 104 102 105 103   CO2 mmol/L 39* 39* 37* 36*   ANION GAP mmol/L 3* 4 4 6   BUN mg/dL 45* 44* 42* 41*   CREATININE mg/dL 2 22* 2 39* 2 44* 2 45*   EGFR ml/min/1 73sq m 25 23 23 22   GLUCOSE RANDOM mg/dL 79 91 103 75   CALCIUM mg/dL 8 9 8 7 8 5 8 9   AST U/L  --  24 22 21   ALT U/L  --  7* 8* 6*   ALK PHOS U/L  --  237* 234* 191*   TOTAL PROTEIN g/dL  --  6 5 6 5 6 6   BILIRUBIN TOTAL mg/dL  --  1 10* 0 90 0 90       Results from last 7 days  Lab Units 04/01/18  0510 03/31/18 2057 03/31/18 2018   BLOOD CULTURE   --   --  No Growth After 4 Days  No Growth After 4 Days     URINE CULTURE   --  >100,000 cfu/ml Pseudomonas aeruginosa*  >100,000 cfu/ml Enterococcus faecalis*  --    MRSA CULTURE ONLY  No Methicillin Resistant Staphlyococcus aureus (MRSA) isolated  --   --    INFLUENZA A PCR   --   --  None Detected   INFLUENZA B PCR   --   --  None Detected   RSV PCR   --   --  None Detected       Imaging Studies:   I have personally reviewed pertinent imaging study reports and images in PACS  EKG, Pathology, and Other Studies:   I have personally reviewed pertinent reports  Counseling/Coordination of care: Total floor / unit time spent today 80 minutes  Greater than 50% of total time was spent with the patient and / or family counseling and / or coordination of care  A description of the counseling / coordination of care: Discussed in depth diagnosis, prognosis and work up of pneumonia,UTI with patient  I also educated the patient about the importance of  compliance with medications  Labs, medical tests and imaging studies were independently reviewed by me as noted above in HPI and old records were obtained and summarized as noted above in HPI

## 2018-04-06 NOTE — ASSESSMENT & PLAN NOTE
-associated with a suprapubic catheter   -secondary to Pseudomonas aeruginosa and Enterococcus faecalis  -continue IV cefepime and IV vancomycin for now  -I will discuss with Infectious Disease if the patient needs a full antibiotic treatment course for the complicated UTI

## 2018-04-06 NOTE — PROGRESS NOTES
I was notified by the nursing staff that the patient was experiencing dysphagia as well as regurgitation of solids and liquids  I will consult speech therapy for swallow evaluation  The patient has an outpatient appointment scheduled with Gastroenterology  The patient will be placed on aspiration precautions

## 2018-04-06 NOTE — ASSESSMENT & PLAN NOTE
-decrease Bumex to 1 mg PO Qdaily with the elevated sodium bicarbonate levels  -strict intake/output measurements  -daily weights  -I appreciate Cardiology's input  -continue coreg  -patient has an allergy to ARB's and Ace-inhibitors  -supplemental oxygen via nasal cannula to maintain oxygen saturation levels 92% and above  -PT/OT

## 2018-04-06 NOTE — PROGRESS NOTES
Progress Note - Kylah Salas 11/1/1928, 80 y o  male MRN: [id number]    Unit/Bed#:  Encounter: [id number]    Primary Care Provider: Ramesh Henley DO   Date and time admitted to hospital: 3/31/2018  7:57 PM        Healthcare-associated pneumonia   Assessment & Plan    -possible gram-negative pneumonia  -continue IV cefepime  -consult Infectious Disease  -I will discuss with Infectious Disease the length of antibiotic therapy        * Acute on chronic systolic CHF (congestive heart failure) ([facility])   Assessment & Plan    -decrease Bumex to 1 mg PO Qdaily with the elevated sodium bicarbonate levels  -strict intake/output measurements  -daily weights  -I appreciate Cardiology's input  -continue coreg  -patient has an allergy to ARB's and Ace-inhibitors  -supplemental oxygen via nasal cannula to maintain oxygen saturation levels 92% and above  -PT/OT          Acute cystitis   Assessment & Plan    -associated with a suprapubic catheter   -secondary to Pseudomonas aeruginosa and Enterococcus faecalis  -continue IV cefepime and IV vancomycin for now  -I will discuss with Infectious Disease if the patient needs a full antibiotic treatment course for the complicated UTI        Acute respiratory failure with hypoxia ([address] )   Assessment & Plan    -patient was successfully weaned off BiPAP  -continue supplemental oxygen via nasal cannula to maintain oxygen saturation levels of 92% and above  -continue the respiratory protocol  -continue the airway clearance protocol  -the patient can be maintained on med/surg level of care with continuous pulse oximetry  -telemetry is not needed          Hypernatremia   Assessment & Plan    -decrease Bumex to 1 mg PO Qdaily  -follow the sodium level        Microalbuminuria   Assessment & Plan    -patient has an allergy to ARB's and Ace-inhibitors  -continue to monitor        Pulmonary hypertension   Assessment & Plan    -he will need outpatient follow-up with Pulmonology        Aortic stenosis   Assessment & Plan    -Cardiology is following the patient  -He will need an outpatient evaluation for a possible TAVR        Hypothyroidism   Assessment & Plan    -continue PO levothyroxine  Results for Debbie Thorpe (MRN [id number]) as of 4/2/2018 09:59   Ref  Range 4/1/2018 05:10   TSH 3RD GENERATON Latest Ref Range: 0 358 - 3 740 uIU/mL 1 770           Macrocytic anemia   Assessment & Plan    -follow CBC  Results for Debbie Thorpe (MRN [id number]) as of 4/3/2018 09:13   Ref  Range 4/2/2018 05:33   Folate Latest Ref Range: 3 1 - 17 5 ng/mL >20 0 (H)     Results for Debbie Thorpe (MRN [id number]) as of 4/3/2018 09:13   Ref  Range 4/2/2018 05:33   Vitamin B-12 Latest Ref Range: 100 - 900 pg/mL 505           Thrombocytopenia (HCC)   Assessment & Plan    -continue to monitor the patient for any signs of bleeding  -follow the CBC        Hypokalemia   Assessment & Plan    -replete with p o  and IV potassium supplementation  -follow the patient's potassium level        Stage 4 chronic kidney disease ([address] )   Assessment & Plan    -the patient appears to be at his baseline renal function  -I appreciate Nephrology's input  -avoid all nephrotoxic agents  -continue to monitor the patient's renal function and electrolytes        Atrial fibrillation ([address] )   Assessment & Plan    -continue Coreg for heart rate control  -continue Coumadin for full anticoagulation        Type 2 diabetes mellitus with hyperglycemia, with long-term current use of insulin ([address] )   Assessment & Plan    -follow patient's blood glucose trend  -continue current insulin regimen  -Results for Debbie Thorpe (MRN [id number]) as of 4/2/2018 09:59   Ref   Range 4/1/2018 05:10   Hemoglobin A1C Latest Ref Range: 4 2 - 6 3 % 6 9 (H)   EAG Latest Units: mg/dl 151           Essential hypertension   Assessment & Plan    -continue coreg, hydralazine, and imdur   -follow the patient's blood pressure trend        Obstructive sleep apnea   Assessment & Plan -Continue CPAP QHS and anytime while sleeping            VTE Pharmacologic Prophylaxis:   Pharmacologic: Warfarin (Coumadin)  Mechanical VTE Prophylaxis in Place: Yes    Patient Centered Rounds: I have performed bedside rounds with nursing staff today  Time Spent for Care: 20 minutes  More than 50% of total time spent on counseling and coordination of care as described above  Current Length of Stay: 6 day(s)    Current Patient Status: Inpatient   Certification Statement: The patient will continue to require additional inpatient hospital stay due to the need for IV antibiotics  Code Status: Level 1 - Full Code      Subjective: The patient was seen and examined  The patient complains of worsening shortness of breath this a m  Objective:     Vitals:   Temp (24hrs), Av 4 °F (36 9 °C), Min:97 6 °F (36 4 °C), Max:98 9 °F (37 2 °C)    HR:  [70-86] 70  Resp:  [18-20] 20  BP: (128-145)/(63-80) 130/80  SpO2:  [94 %-97 %] 97 %  Body mass index is 36 9 kg/m²  Input and Output Summary (last 24 hours):        Intake/Output Summary (Last 24 hours) at 18 1052  Last data filed at 18 6807   Gross per 24 hour   Intake          1355 83 ml   Output             1250 ml   Net           105 83 ml       Physical Exam:     Physical Exam  General:  NAD, awake, alert, conversational dyspnea is present  HEENT:  NC/AT, mucous membranes moist  Neck:  Supple, No JVP elevation  CV:  + S1, + S2, Irregularly irregular rhythm  Pulm:  Crackles at the left base  Abd:  Soft, Non-tender, Non-distended  Ext:  No clubbing/cyanosis  Skin:  + venous stasis dermatitis changes of the bilateral lower extremities        Additional Data:     Labs:      Results from last 7 days  Lab Units 18  0618 18  0441   WBC Thousand/uL 4 06* 4 66   HEMOGLOBIN g/dL 9 9* 9 9*   HEMATOCRIT % 32 1* 31 8*   PLATELETS Thousands/uL 74* 69*   NEUTROS PCT %  --  70   LYMPHS PCT %  --  12*   MONOS PCT %  --  6   EOS PCT %  --  11* Results from last 7 days  Lab Units 04/06/18  0618 04/05/18  0441   SODIUM mmol/L 146* 145   POTASSIUM mmol/L 3 0* 3 0*   CHLORIDE mmol/L 104 102   CO2 mmol/L 39* 39*   BUN mg/dL 45* 44*   CREATININE mg/dL 2 22* 2 39*   CALCIUM mg/dL 8 9 8 7   TOTAL PROTEIN g/dL  --  6 5   BILIRUBIN TOTAL mg/dL  --  1 10*   ALK PHOS U/L  --  237*   ALT U/L  --  7*   AST U/L  --  24   GLUCOSE RANDOM mg/dL 79 91       Results from last 7 days  Lab Units 04/06/18  0618   INR  2 28*       * I Have Reviewed All Lab Data Listed Above  * Additional Pertinent Lab Tests Reviewed: [address] Admission Reviewed      Recent Cultures (last 7 days):       Results from last 7 days  Lab Units 03/31/18 2057 03/31/18 2018   BLOOD CULTURE   --  No Growth After 4 Days  No Growth After 4 Days     URINE CULTURE  >100,000 cfu/ml Pseudomonas aeruginosa*  >100,000 cfu/ml Enterococcus faecalis*  --    INFLUENZA A PCR   --  None Detected   INFLUENZA B PCR   --  None Detected   RSV PCR   --  None Detected       Last 24 Hours Medication List:     Current Facility-Administered Medications:  acetaminophen 650 mg Oral Q6H PRN [facility], DO    albuterol 2 5 mg Nebulization Q4H PRN [facility], DO    ascorbic acid 500 mg Oral Daily Nikul Rodrigues, DO    aspirin 81 mg Oral Daily Nikul Rodrigues, DO    atorvastatin 40 mg Oral Daily [employer], DO    b complex-vitamin C-folic acid 1 capsule Oral Daily Joseul Rodrigues, DO    benzonatate 100 mg Oral TID PRN [facility]    [START ON 4/7/2018] bumetanide 1 mg Oral Daily [facility], DO    carbidopa-levodopa 1 tablet Oral TID Viv Prost, DO    carvedilol 12 5 mg Oral BID With Meals [facility], DO    cefepime 1,000 mg Intravenous Q24H [facility] Last Rate: 1,000 mg (04/05/18 1659)   cholecalciferol 2,000 Units Oral Daily Nikul Rodrigues, DO    fluticasone 1 spray Each Nare Daily [facility], DO    gabapentin 100 mg Oral BID Viv Prost, DO    insulin detemir 16 Units Subcutaneous HS [employer], DO    insulin lispro 2-12 Units Subcutaneous TID AC Juan Jose Rodrigues, DO    insulin lispro 2-12 Units Subcutaneous HS Juan Jose Rodrigues, DO    ipratropium 0 5 mg Nebulization BID [employer], DO    isosorbide mononitrate 60 mg Oral Daily Juan Jose Rodrigues, DO    levalbuterol 1 25 mg Nebulization BID [employer], DO    levothyroxine 225 mcg Oral Early Morning Juan Jose Rodrigues, DO    loratadine 10 mg Oral Daily Juan Jose Rodrigues, DO    oxyCODONE 5 mg Oral Q4H PRN [employer], DO    polyethylene glycol 17 g Oral Daily PRN [employer], DO    potassium chloride 40 mEq Oral BID With Meals [employer], DO    potassium chloride 20 mEq Intravenous Once Carlyle Padron MD Last Rate: 20 mEq (04/06/18 1011)   sodium chloride 1 spray Each Nare TID [employer], DO    vancomycin 10 mg/kg Intravenous Q24H [employer] DO Last Rate: 1,250 mg (04/05/18 4149)   warfarin 5 mg Oral Daily (warfarin) [employer] DO         Today, Patient Was Seen By: Lefty Lake DO    ** Please Note: Dictation voice to text software may have been used in the creation of this document   **

## 2018-04-06 NOTE — ASSESSMENT & PLAN NOTE
-continue PO levothyroxine  Results for Hilbert Gilford (MRN [id number]) as of 4/2/2018 09:59   Ref   Range 4/1/2018 05:10   TSH 3RD GENERATON Latest Ref Range: 0 358 - 3 740 uIU/mL 1 770

## 2018-04-06 NOTE — ASSESSMENT & PLAN NOTE
-patient was successfully weaned off BiPAP  -continue supplemental oxygen via nasal cannula to maintain oxygen saturation levels of 92% and above  -continue the respiratory protocol  -continue the airway clearance protocol  -the patient can be maintained on med/surg level of care with continuous pulse oximetry  -telemetry is not needed

## 2018-04-06 NOTE — CASE MANAGEMENT
Continued Stay Review  Date: 4/6/18  Vital Signs: /63 (BP Location: Right arm)   Pulse 86   Temp 98 8 °F (37 1 °C) (Temporal)   Resp 18   Ht 5' 11" (1 803 m)   Wt 120 kg (264 lb 8 8 oz)   SpO2 94%   BMI 36 90 kg/m²   Medications:   Scheduled Meds:   Current Facility-Administered Medications:  acetaminophen 650 mg Oral Q6H PRN Caio Francisca, DO    albuterol 2 5 mg Nebulization Q4H PRN Caio Sueier, DO    ascorbic acid 500 mg Oral Daily Juan Jose Rodrigues, DO    aspirin 81 mg Oral Daily Juan Jose Rodrigues, DO    atorvastatin 40 mg Oral Daily With [employer], DO    b complex-vitamin C-folic acid 1 capsule Oral Daily Juan Jose Rodrigues, DO    benzonatate 100 mg Oral TID PRN Caio Espinosa, DO    bumetanide 2 mg Oral Daily Caio Espinosa, DO    carbidopa-levodopa 1 tablet Oral TID Melanie Luu, DO    carvedilol 12 5 mg Oral BID With Meals Caio Espinosa, DO    cefepime 1,000 mg Intravenous Q24H Caio Espinosa, DO Last Rate: 1,000 mg (04/05/18 1659)   cholecalciferol 2,000 Units Oral Daily Juan Jose Rodrigues, DO    fluticasone 1 spray Each Nare Daily Caio Espinosa, DO    gabapentin 100 mg Oral BID Juan Jose Rodrigues, DO    insulin detemir 16 Units Subcutaneous HS Caio Espinosa, DO    insulin lispro 2-12 Units Subcutaneous TID AC Juan Jose Rodrigues, DO    insulin lispro 2-12 Units Subcutaneous HS Juan Jose Rodrigues, DO    ipratropium 0 5 mg Nebulization BID Caio Espinosa, DO    isosorbide mononitrate 60 mg Oral Daily Juan Jose Rodrigues, DO    levalbuterol 1 25 mg Nebulization BID Caio Espinosa, DO    levothyroxine 225 mcg Oral Early Morning Juan Jose Rodrigues, DO    loratadine 10 mg Oral Daily Juan Jose Rodrigues, DO    oxyCODONE 5 mg Oral Q4H PRN Caio Sueier, DO    polyethylene glycol 17 g Oral Daily PRN Caio Espinosa, DO    potassium chloride 20 mEq Intravenous Once Yeny Cruz MD    sodium chloride 1 spray Each Nare TID Caio Espinosa, DO    vancomycin 10 mg/kg Intravenous Q24H Caio Espinosa, DO Last Rate: 1,250 mg (04/05/18 9334)   warfarin 3 mg Oral Daily (warfarin) Rodriguez, DO    Continuous Infusions:    PRN Meds:   acetaminophen    albuterol    benzonatate    oxyCODONE    polyethylene glycol  Abnormal Labs/Diagnostic Results:   WBC 4 06 HGB 9 9 PLT 74  K 3 0 Co2 39 ANION GAP 3 BUN 45 CR 2 22 GFR 25 PT 25 2 INR 2 28   Age/Sex: 80 y o  male   Assessment/Plan:   Healthcare-associated pneumonia   Assessment & Plan     -possible gram-negative pneumonia  -continue IV cefepime  -consult Infectious Disease   * Acute on chronic systolic CHF (congestive heart failure) (HCC)   Assessment & Plan     -decrease Bumex to 1 mg PO Qdaily with the elevated sodium bicarbonate levels  -strict intake/output measurements  -daily weights  -continue coreg  -patient has an allergy to ARB's and Ace-inhibitors  -supplemental oxygen via nasal cannula to maintain oxygen saturation levels 92% and above   Acute cystitis   Assessment & Plan     -associated with a suprapubic catheter   -secondary to Pseudomonas aeruginosa and Enterococcus faecalis  -continue IV cefepime and IV vancomycin    Acute respiratory failure with hypoxia (HCC)   Assessment & Plan     -continue supplemental oxygen via nasal cannula to maintain oxygen saturation levels of 92% and above  -continue the respiratory protocol  -continue the airway clearance protocol  -the patient can be maintained on med/surg level of care with continuous pulse oximetry   Thrombocytopenia (HCC)   Assessment & Plan     -continue to monitor the patient for any signs of bleeding  -follow the CBC      Hypokalemia   Assessment & Plan     -replete with p o  and IV potassium supplementation  -follow the patient's potassium level       Stage 4 chronic kidney disease (HCC)   Assessment & Plan     -avoid all nephrotoxic agents  -continue to monitor the patient's renal function and electrolytes       Atrial fibrillation (HCC)   Assessment & Plan     -continue Coreg for heart rate control  -continue Coumadin for full anticoagulation       Type 2 diabetes mellitus with hyperglycemia, with long-term current use of insulin (HCC)   Assessment & Plan     -continue current insulin regimen  -Results for Tarun Knight (MRN [id number]) as of 4/2/2018 09:59    Ref  Range 4/1/2018 05:10   Hemoglobin A1C Latest Ref Range: 4 2 - 6 3 % 6 9 (H)   EAG Latest Units: mg/dl 151              Essential hypertension   Assessment & Plan     -continue coreg, hydralazine, and imdur       Obstructive sleep apnea   Assessment & Plan     -Continue CPAP QHS and anytime while sleeping   Discharge Plan: TBD  Thank you,  [address] [facility] in the [facility] [place] for 2017  Network Utilization Review Department  Phone: [phone]; Fax [phone]  ATTENTION: The Network Utilization Review Department is now centralized for our 7 Facilities  Make a note that we have a new phone and fax numbers for our Department  Please call with any questions or concerns to [phone] and carefully follow the prompts so that you are directed to the right person  All voicemails are confidential  Fax any determinations, approvals, denials, and requests for initial or continue stay review clinical to [phone]  Due to HIGH CALL volume, it would be easier if you could please send faxed requests to expedite your requests and in part, help us provide discharge notifications faster

## 2018-04-06 NOTE — PHYSICAL THERAPY NOTE
PT treatment Note      04/06/18 0810   Restrictions/Precautions   Other Precautions (Contact/isolation;Telemetry;O2;Fall Risk;Multiple lines)   Subjective   Subjective c/o weakness B LE's   Bed Mobility   Supine to Sit 3  Moderate assistance   Additional items Assist x 2;HOB elevated; Increased time required;Verbal cues;LE management   Transfers   Sit to Stand 4  Minimal assistance   Additional items Assist x 2;Bedrails;Verbal cues   Stand to Sit 4  Minimal assistance   Additional items Assist x 2;Armrests; Verbal cues   Stand pivot 4  Minimal assistance   Additional items Assist x 2;Verbal cues   Ambulation/Elevation   Gait pattern Shuffling   Gait Assistance 4  Minimal assist   Additional items Assist x 2   Assistive Device Rolling walker   Distance 2' bed to chair   Balance   Static Sitting Fair +   Dynamic Sitting Fair +   Static Standing Fair   Dynamic Standing Esther Rendon 1565 -  (with RW)   Endurance Deficit   Endurance Deficit Yes   Activity Tolerance   Activity Tolerance Patient limited by fatigue   Assessment   Prognosis Fair   Problem List Decreased strength;Decreased endurance; Impaired balance;Decreased mobility; Decreased safety awareness   Assessment Performing functional mobility at (mod-min) x 2 level of function  Pt has difficulty with transfers and scoot to edge of bed  Weakness LE's with knees buckling when ambulating short distance to chair  Plan   Treatment/Interventions Functional transfer training;LE strengthening/ROM; Therapeutic exercise; Endurance training;Bed mobility;Gait training   Progress Slow progress, decreased activity tolerance   Recommendation   Recommendation Long-term skilled PT   Pt  OOB with call bell within reach, scd's connected and turned on and alarm on at end of PT session

## 2018-04-07 NOTE — ASSESSMENT & PLAN NOTE
-follow patient's blood glucose trend  -continue current insulin regimen  -Results for Chaitanya Jean (MRN [id number]) as of 4/2/2018 09:59   Ref   Range 4/1/2018 05:10   Hemoglobin A1C Latest Ref Range: 4 2 - 6 3 % 6 9 (H)   EAG Latest Units: mg/dl 151 INTERNAL MEDICINE PROGRESS NOTE    Patient: Coco Olmedo Date: 2017   : 1948 Attending: Choco Elias MD   68 year old female      Chief Complaint: Pulmonary embolism    Subjective: Patient seen and examined, c/o jaw pain, likely S/E from veletri, denies shortness of breath    Problem List:   Patient Active Problem List   Diagnosis   • Pulmonary embolus   • Pulmonary hypertension   • Anemia        ALLERGIES:   Allergen Reactions   • Tetanus Immune Globulin ANAPHYLAXIS   • Latex HIVES       Review of Systems: A 10 point review of systems is negative except as in the HPI    Vital Last Value 24 Hour Range   Temperature 97.8 °F (36.6 °C) (17 0500) Temp  Min: 97.8 °F (36.6 °C)  Max: 98.5 °F (36.9 °C)   Pulse 98 (17 0500) Pulse  Min: 88  Max: 104   Respiratory 16 (17 0500) Resp  Min: 16  Max: 16   Non-Invasive  Blood Pressure 128/77 (17 0500) BP  Min: 110/65  Max: 128/77   Pulse Oximetry 96 % (17 0500) SpO2  Min: 90 %  Max: 96 %     Vital Today Admitted   Weight 72.7 kg (17 0500) Weight: 72.6 kg (17 1037)   Height N/A Height: 5' 3.5\" (161.3 cm) (17 1037)   BMI N/A BMI (Calculated): 27.96 (17 1037)     Intake and Output:    Intake/Output Summary (Last 24 hours) at 17 1328  Last data filed at 17 0649   Gross per 24 hour   Intake          1344.93 ml   Output             1900 ml   Net          -555.07 ml         Physical Examination :  General:  Alert, awake, no acute distress  HENT:  Atraumatic, normocephalic, moist oral mucosa  Neck:  Supple, no thyromegaly   Respiratory:  Breath sounds clear on both sides, no wheezing   Cardiovascular:  RRR, S1 and S2 normal, no murmurs  GI:  Soft, nontender, nondistended, normal bowel sounds  Musculoskeletal:  No clubbing, cyanosis, nor edema in all extremities  Skin:  No rash  Neurologic:  Alert, oriented x 3, no focal deficits noted       Medications :   Scheduled  • predniSONE  5 mg Oral Daily with breakfast    • [START ON 5/11/2017] predniSONE  2.5 mg Oral Daily with breakfast   • sodium chloride (PF)  10 mL Injection 3 times per day   • aspirin  81 mg Oral Daily at noon   • clonazePAM  0.5 mg Oral Nightly   • OLANZapine  0.625 mg Oral Nightly   • PARoxetine  20 mg Oral Daily at noon   • sodium chloride (PF)  2 mL Injection 2 times per day   • pantoprazole  40 mg Oral Nightly       Continuous infusion  • epoprostenol 1.5 mg in sodium chloride (PF) 0.9% 100 mL VELETRI infusion 7 ng/kg/min (05/09/17 0745)   • heparin (porcine) 50 units/mL in dextrose 5% standard concentration infusion 11 Units/kg/hr (05/09/17 0745)   • dextrose 5 % infusion         Laboratory Results:     Recent Labs  Lab 05/09/17  0510 05/08/17  0535 05/07/17  0530 05/06/17  0620 05/05/17  0620   WBC  --  6.0 6.0 6.6 7.6   HCT  --  27.5* 30.0* 28.0* 28.1*   HGB  --  8.5* 9.4* 8.5* 8.6*   PLT  --  245 249 243 243   INR 1.4 1.6 1.3 1.2 1.0   PTT 66* 68* 62* 56* 60*   SODIUM  --   --   --   --  138   POTASSIUM  --   --   --   --  4.0   CHLORIDE  --   --   --   --  104   CO2  --   --   --   --  29   CALCIUM  --   --   --   --  8.6   GLUCOSE  --   --   --   --  101*   BUN  --   --   --   --  16   CREATININE  --   --   --   --  0.69   GFRNA  --   --   --   --  89       Imaging Studies Reviewed Include:  CT ANGIOGRAM CHEST PE 4/30/17  ECHO LIMITED 4/30/2017    ASSESSMENT :  --Acute on chronic pulmonary embolism  --Pulmonary HTN with RV failure  --Chronic iron deficiency anemia, s/p IV iron  --Bronchiolitis  --Anxiety disorder    PLAN:  -Continue heparin gtt, INR 1.4. restart Coumadin after Elizabeth  placement today  -Prednisone taper  -Veletri infusion, dose titrated by heart failure team  -Cardiologist and pulmonologist on board  -On clonazepam, olanzapine and paroxetine for anxiety    Discussed with patient, pulmonologist       Code status:  Full Resuscitation    Discharge  YAW Expected Discharge Date:  (TBD)  Destination: Home    Choco Elias  MD  [phone]    Contact the Hospitalist caring for the patient until 7pm.   From 7pm to 7am contact the Hospitalist on call

## 2018-04-07 NOTE — PROGRESS NOTES
Progress Note - Nevaeh Phelan 11/1/1928, 80 y o  male MRN: [id number]    Unit/Bed#:  Encounter: [id number]    Primary Care Provider: Lelon Favre, DO   Date and time admitted to hospital: 3/31/2018  7:57 PM        Healthcare-associated pneumonia   Assessment & Plan    -possible gram-negative pneumonia  -continue IV cefepime through 4/8/18 per ID recommendations  * Acute on chronic systolic CHF (congestive heart failure) (HCC)   Assessment & Plan    -Continue with decreased Bumex at 1 mg PO Qdaily with the elevated sodium bicarbonate levels  -strict intake/output measurements  -daily weights  -I appreciate Cardiology's input  -continue coreg  -patient has an allergy to ARB's and Ace-inhibitors  -supplemental oxygen via nasal cannula to maintain oxygen saturation levels 92% and above  -PT/OT          Acute respiratory failure with hypoxia (HCC)   Assessment & Plan    -patient was successfully weaned off BiPAP  -continue supplemental oxygen via nasal cannula to maintain oxygen saturation levels of 92% and above  -continue the respiratory protocol  -continue the airway clearance protocol  -the patient can be maintained on med/surg level of care with continuous pulse oximetry  -telemetry is not needed          Acute cystitis   Assessment & Plan    -associated with a suprapubic catheter   -secondary to Pseudomonas aeruginosa and Enterococcus faecalis  -continue IV cefepime  IV vancomycin discontinued per ID recommendations  -IV Ampicillin initiated on 4/6/18 through 4/8/18 per ID recommendations  Hypernatremia   Assessment & Plan    -continue with decrease Bumex at 1 mg PO Qdaily  -follow the sodium level        Macrocytic anemia   Assessment & Plan    -follow CBC  Results for Bee Aburto (MRN [id number]) as of 4/3/2018 09:13   Ref  Range 4/2/2018 05:33   Folate Latest Ref Range: 3 1 - 17 5 ng/mL >20 0 (H)     Results for Bee Aburto (MRN [id number]) as of 4/3/2018 09:13   Ref   Range 4/2/2018 05:33   Vitamin B-12 Latest Ref Range: 100 - 900 pg/mL 505           Pulmonary hypertension (HCC)   Assessment & Plan    -he will need outpatient follow-up with Pulmonology        Aortic stenosis   Assessment & Plan    -Cardiology is following the patient  -He will need an outpatient evaluation for a possible TAVR        Hypothyroidism   Assessment & Plan    -continue PO levothyroxine  Results for Kris Walton (MRN [id number]) as of 4/2/2018 09:59   Ref  Range 4/1/2018 05:10   TSH 3RD GENERATON Latest Ref Range: 0 358 - 3 740 uIU/mL 1 770           Microalbuminuria   Assessment & Plan    -patient has an allergy to ARB's and Ace-inhibitors  -continue to monitor        Thrombocytopenia ([address] )   Assessment & Plan    -continue to monitor the patient for any signs of bleeding  -follow the CBC        Hypokalemia   Assessment & Plan    -replete with p o  and IV potassium supplementation  -follow the patient's potassium level        Dysphagia   Assessment & Plan    Speech evaluation pending  The patient has an outpatient appointment scheduled with Gastroenterology  Obstructive sleep apnea   Assessment & Plan    -Continue CPAP QHS and anytime while sleeping        Atrial fibrillation ([address] )   Assessment & Plan    -continue Coreg for heart rate control  -continue Coumadin for full anticoagulation        Stage 4 chronic kidney disease ([address] )   Assessment & Plan    -the patient appears to be at his baseline renal function  -I appreciate Nephrology's input  -avoid all nephrotoxic agents  -continue to monitor the patient's renal function and electrolytes        Type 2 diabetes mellitus with hyperglycemia, with long-term current use of insulin ([address] )   Assessment & Plan    -follow patient's blood glucose trend  -continue current insulin regimen  -Results for Kris Walton (MRN [id number]) as of 4/2/2018 09:59   Ref   Range 4/1/2018 05:10   Hemoglobin A1C Latest Ref Range: 4 2 - 6 3 % 6 9 (H)   EAG Latest Units: mg/dl 151 Essential hypertension   Assessment & Plan    -continue coreg, hydralazine, and imdur   -follow the patient's blood pressure trend            VTE Pharmacologic Prophylaxis:   Pharmacologic: Warfarin (Coumadin)  Mechanical VTE Prophylaxis in Place: Yes      Discussions with Specialists or Other Care Team Provider: nursing, CM, and attending    Education and Discussions with Family / Patient: daughter updated at bedside    Time Spent for Care: 30 minutes  More than 50% of total time spent on counseling and coordination of care as described above  Current Length of Stay: 7 day(s)    Current Patient Status: Inpatient   Certification Statement: The patient will continue to require additional inpatient hospital stay due to continued need for IV antibiotics    Code Status: Level 1 - Full Code      Subjective: The patient was seen and examined  The patient denies any complaints and is asking when he can go back to [address]  Objective:     Vitals:   Temp (24hrs), Av 3 °F (36 8 °C), Min:97 9 °F (36 6 °C), Max:98 6 °F (37 °C)    HR:  [70-72] 70  Resp:  [16] 16  BP: (134-151)/(60-70) 134/60  SpO2:  [96 %-100 %] 100 %  Body mass index is 36 9 kg/m²  Input and Output Summary (last 24 hours): Intake/Output Summary (Last 24 hours) at 18 1658  Last data filed at 18 1546   Gross per 24 hour   Intake              833 ml   Output             1650 ml   Net             -817 ml       Physical Exam:     Physical Exam   Constitutional: He is oriented to person, place, and time  Vital signs are normal  He appears well-developed  He is active and cooperative  Cardiovascular: Normal rate  An irregularly irregular rhythm present  Pulmonary/Chest: Effort normal  He has no wheezes  He has no rhonchi  He has rales in the left lower field  Abdominal: Soft  Normal appearance and bowel sounds are normal  There is no tenderness     Neurological: He is alert and oriented to person, place, and time  No cranial nerve deficit  Skin: Skin is warm, dry and intact  Nursing note and vitals reviewed  Additional Data:     Labs:      Results from last 7 days  Lab Units 04/07/18  0523  04/05/18  0441   WBC Thousand/uL 3 83*  < > 4 66   HEMOGLOBIN g/dL 9 8*  < > 9 9*   HEMATOCRIT % 32 2*  < > 31 8*   PLATELETS Thousands/uL 71*  < > 69*   NEUTROS PCT %  --   --  70   LYMPHS PCT %  --   --  12*   MONOS PCT %  --   --  6   EOS PCT %  --   --  11*   < > = values in this interval not displayed  Results from last 7 days  Lab Units 04/07/18  0523   SODIUM mmol/L 146*   POTASSIUM mmol/L 3 0*   CHLORIDE mmol/L 103   CO2 mmol/L 39*   BUN mg/dL 43*   CREATININE mg/dL 2 05*   CALCIUM mg/dL 8 8   TOTAL PROTEIN g/dL 6 5   BILIRUBIN TOTAL mg/dL 0 90   ALK PHOS U/L 262*   ALT U/L 7*   AST U/L 25   GLUCOSE RANDOM mg/dL 79       Results from last 7 days  Lab Units 04/07/18  0523   INR  2 09*       * I Have Reviewed All Lab Data Listed Above  * Additional Pertinent Lab Tests Reviewed: All Labs Within Last 24 Hours Reviewed    Imaging:    Imaging Reports Reviewed Today Include: none  Imaging Personally Reviewed by Myself Includes:  none    Recent Cultures (last 7 days):       Results from last 7 days  Lab Units 03/31/18 2057 03/31/18 2018   BLOOD CULTURE   --  No Growth After 5 Days  No Growth After 5 Days     URINE CULTURE  >100,000 cfu/ml Pseudomonas aeruginosa*  >100,000 cfu/ml Enterococcus faecalis*  --    INFLUENZA A PCR   --  None Detected   INFLUENZA B PCR   --  None Detected   RSV PCR   --  None Detected       Last 24 Hours Medication List:     Current Facility-Administered Medications:  acetaminophen 650 mg Oral Q6H PRN Manish Knappot, DO    albuterol 2 5 mg Nebulization Q4H PRN Manish Raot, DO    ampicillin 2,000 mg Intravenous Q12H Manish Wasserman, DO Last Rate: Stopped (04/07/18 5318)   ascorbic acid 500 mg Oral Daily Nikul Rodrigues, DO    aspirin 81 mg Oral Daily Nikul Rodrigues, DO    atorvastatin 40 mg Oral Daily With Curt Cates, DO    b complex-vitamin C-folic acid 1 capsule Oral Daily Nava Means, DO    benzonatate 100 mg Oral TID PRN Littie Gemma, DO    bumetanide 1 mg Oral Daily Littie Gemma, DO    carbidopa-levodopa 1 tablet Oral TID Nava Means, DO    carvedilol 12 5 mg Oral BID With Meals Littie Gemma, DO    cefepime 1,000 mg Intravenous Q24H Littie Gemma, DO Last Rate: 1,000 mg (04/07/18 9645)   cholecalciferol 2,000 Units Oral Daily Nikul Rodrigues, DO    fluticasone 1 spray Each Nare Daily Littie Gemma, DO    gabapentin 100 mg Oral BID Nikul Rodrigues, DO    insulin detemir 16 Units Subcutaneous HS Littie Gemma, DO    insulin lispro 2-12 Units Subcutaneous TID AC Nikul Rodrigues, DO    insulin lispro 2-12 Units Subcutaneous HS Nikul Rodrigues, DO    ipratropium 0 5 mg Nebulization BID Littie Gemma, DO    isosorbide mononitrate 60 mg Oral Daily Nikul Rodrigues, DO    levalbuterol 1 25 mg Nebulization BID Littie Gemma, DO    levothyroxine 225 mcg Oral Early Morning Nikul Rodrigues, DO    loratadine 10 mg Oral Daily Nikul Rodrigues, DO    oxyCODONE 5 mg Oral Q4H PRN Littie Gemma, DO    polyethylene glycol 17 g Oral Daily PRN Littie Gemma, DO    sodium chloride 1 spray Each Nare TID Littie Gemma, DO    warfarin 5 mg Oral Daily (warfarin) José Dukes, DO         Today, Patient Was Seen By: Llana Lefort, PA-C    ** Please Note: Dictation voice to text software may have been used in the creation of this document   **

## 2018-04-07 NOTE — PLAN OF CARE
Problem: Potential for Falls  Goal: Patient will remain free of falls  INTERVENTIONS:  - Assess patient frequently for physical needs  -  Identify cognitive and physical deficits and behaviors that affect risk of falls    -  [place] fall precautions as indicated by assessment   - Educate patient/family on patient safety including physical limitations  - Instruct patient to call for assistance with activity based on assessment  - Modify environment to reduce risk of injury  - Consider OT/PT consult to assist with strengthening/mobility   Outcome: Progressing      Problem: PAIN - ADULT  Goal: Verbalizes/displays adequate comfort level or baseline comfort level  Interventions:  - Encourage patient to monitor pain and request assistance  - Assess pain using appropriate pain scale  - Administer analgesics based on type and severity of pain and evaluate response  - Implement non-pharmacological measures as appropriate and evaluate response  - Consider cultural and social influences on pain and pain management  - Notify physician/advanced practitioner if interventions unsuccessful or patient reports new pain   Outcome: Progressing      Problem: INFECTION - ADULT  Goal: Absence or prevention of progression during hospitalization  INTERVENTIONS:  - Assess and monitor for signs and symptoms of infection  - Monitor lab/diagnostic results  - Monitor all insertion sites, i e  indwelling lines, tubes, and drains  - Monitor endotracheal (as able) and nasal secretions for changes in amount and color  - [place] appropriate cooling/warming therapies per order  - Administer medications as ordered  - Instruct and encourage patient and family to use good hand hygiene technique  - Identify and instruct in appropriate isolation precautions for identified infection/condition   Outcome: Progressing    Goal: Absence of fever/infection during neutropenic period  INTERVENTIONS:  - Monitor WBC  - Implement neutropenic guidelines   Outcome: Progressing      Problem: SAFETY ADULT  Goal: Maintain or return to baseline ADL function  INTERVENTIONS:  -  Assess patient's ability to carry out ADLs; assess patient's baseline for ADL function and identify physical deficits which impact ability to perform ADLs (bathing, care of mouth/teeth, toileting, grooming, dressing, etc )  - Assess/evaluate cause of self-care deficits   - Assess range of motion  - Assess patient's mobility; develop plan if impaired  - Assess patient's need for assistive devices and provide as appropriate  - Encourage maximum independence but intervene and supervise when necessary  ¯ Involve family in performance of ADLs  ¯ Assess for home care needs following discharge   ¯ Request OT consult to assist with ADL evaluation and planning for discharge  ¯ Provide patient education as appropriate   Outcome: Progressing    Goal: Maintain or return mobility status to optimal level  INTERVENTIONS:  - Assess patient's baseline mobility status (ambulation, transfers, stairs, etc )    - Identify cognitive and physical deficits and behaviors that affect mobility  - Identify mobility aids required to assist with transfers and/or ambulation (gait belt, sit-to-stand, lift, walker, cane, etc )  - [place] fall precautions as indicated by assessment  - Record patient progress and toleration of activity level on Mobility SBAR; progress patient to next Phase/Stage  - Instruct patient to call for assistance with activity based on assessment  - Request Rehabilitation consult to assist with strengthening/weightbearing, etc    Outcome: Progressing      Problem: Prexisting or High Potential for Compromised Skin Integrity  Goal: Skin integrity is maintained or improved  INTERVENTIONS:  - Identify patients at risk for skin breakdown  - Assess and monitor skin integrity  - Assess and monitor nutrition and hydration status  - Monitor labs (i e  albumin)  - Assess for incontinence   - Turn and reposition patient  - Assist with mobility/ambulation  - Relieve pressure over bony prominences  - Avoid friction and shearing  - Provide appropriate hygiene as needed including keeping skin clean and dry  - Evaluate need for skin moisturizer/barrier cream  - Collaborate with interdisciplinary team (i e  Nutrition, Rehabilitation, etc )   - Patient/family teaching   Outcome: Progressing      Problem: DISCHARGE PLANNING - CARE MANAGEMENT  Goal: Discharge to post-acute care or home with appropriate resources  INTERVENTIONS:  - Conduct assessment to determine patient/family and health care team treatment goals, and need for post-acute services based on payer coverage, community resources, and patient preferences, and barriers to discharge  - Address psychosocial, clinical, and financial barriers to discharge as identified in assessment in conjunction with the patient/family and health care team  - Arrange appropriate level of post-acute services according to patient's   needs and preference and payer coverage in collaboration with the physician and health care team  - Communicate with and update the patient/family, physician, and health care team regarding progress on the discharge plan  - Arrange appropriate transportation to post-acute venues   Outcome: Progressing      Problem: Nutrition/Hydration-ADULT  Goal: Nutrient/Hydration intake appropriate for improving, restoring or maintaining nutritional needs  Monitor and assess patient's nutrition/hydration status for malnutrition (ex- brittle hair, bruises, dry skin, pale skin and conjunctiva, muscle wasting, smooth red tongue, and disorientation)  Collaborate with interdisciplinary team and initiate plan and interventions as ordered  Monitor patient's weight and dietary intake as ordered or per policy  Utilize nutrition screening tool and intervene per policy  Determine patient's food preferences and provide high-protein, high-caloric foods as appropriate       INTERVENTIONS:  - Monitor oral intake, urinary output, labs, and treatment plans  - Assess nutrition and hydration status and recommend course of action  - Evaluate amount of meals eaten  - Assist patient with eating if necessary   - Allow adequate time for meals  - Recommend/ encourage appropriate diets, oral nutritional supplements, and vitamin/mineral supplements  - Order, calculate, and assess calorie counts as needed  - Recommend, monitor, and adjust tube feedings and TPN/PPN based on assessed needs  - Assess need for intravenous fluids  - Provide specific nutrition/hydration education as appropriate  - Include patient/family/caregiver in decisions related to nutrition   Outcome: Not Progressing

## 2018-04-07 NOTE — ASSESSMENT & PLAN NOTE
Speech evaluation pending  The patient has an outpatient appointment scheduled with Gastroenterology

## 2018-04-07 NOTE — PROGRESS NOTES
No pain, unable to get oob this am - patient upset r/t this - explained to patient that we will try later after breakfast

## 2018-04-07 NOTE — ASSESSMENT & PLAN NOTE
-follow CBC  Results for Phuong Garcia (MRN [id number]) as of 4/3/2018 09:13   Ref  Range 4/2/2018 05:33   Folate Latest Ref Range: 3 1 - 17 5 ng/mL >20 0 (H)     Results for Phuong Garcia (MRN [id number]) as of 4/3/2018 09:13   Ref   Range 4/2/2018 05:33   Vitamin B-12 Latest Ref Range: 100 - 900 pg/mL 505

## 2018-04-07 NOTE — ASSESSMENT & PLAN NOTE
-continue PO levothyroxine  Results for Alanis Escalante (MRN [id number]) as of 4/2/2018 09:59   Ref   Range 4/1/2018 05:10   TSH 3RD GENERATON Latest Ref Range: 0 358 - 3 740 uIU/mL 1 770

## 2018-04-07 NOTE — ASSESSMENT & PLAN NOTE
-associated with a suprapubic catheter   -secondary to Pseudomonas aeruginosa and Enterococcus faecalis  -continue IV cefepime  IV vancomycin discontinued per ID recommendations  -IV Ampicillin initiated on 4/6/18 through 4/8/18 per ID recommendations

## 2018-04-07 NOTE — PROGRESS NOTES
Progress Note - Nephrology   Kris Clark 80 y o  male MRN: [id number]  Unit/Bed#: [facility] Encounter: [id number]    A/P:  1  Acute kidney injury: due to diuresis in a patient with ischemic cardiomyopathy on high dose diuretics  Will have to accept a degree of dysfunction to achieve euvolemia      4/4/18: Kidney function about the same with high dose diuretics  Will continue to monitor      4/5: renal function stable      4/6: Renal function stable      4/7: kidney function improved  2  Chronic kidney disease stage 4 by history due to diabetic nephropathy and nephrosclerosis  3  Acute on chronic systolic congestive failure: on IV bumetanide with little change in the weights but improvement on chest xray and symptomatically  Will be transitioned to oral bumetanide soon      4/4: Becoming hypernatremic  Would transition to oral bumetamide      4/5: transitioned to oral bumetanide      4/6: on oral diuretic and weight slightly decreased  4  Hypokalemia: being repleted with KCl 120 meq - will check a magnesium level  4/4: will replete magnesium  4/5: Potassium lower - will give one dose of IV KCl  5  Diabetes Mellitus: being covered  6: LLL pneumonia on antibiotics  7   Possible aspiration: he vomited up liquids      Follow up reason for today's visit: Chronic kidney disease stage 4    Acute on chronic systolic CHF (congestive heart failure) ([facility])    Patient Active Problem List   Diagnosis    Acute on chronic systolic CHF (congestive heart failure) ([facility] [address] [facility]    Pacemaker    Obstructive sleep apnea    Sick sinus syndrome ([facility] [address] [facility]    PVD (peripheral vascular disease) ([facility] [address] [facility]    Psychiatric disorder    Neuropathy    Essential hypertension    Hyperlipidemia    GERD (gastroesophageal reflux disease)    Gastroparesis    Type 2 diabetes mellitus with hyperglycemia, with long-term current use of insulin (HCC)    Cancer (HCC)    Atrial fibrillation (HCC)    Stage 4 chronic kidney disease ([facility] [address] )    Hypokalemia    Perioral cyanosis    Hyperlipidemia    Thrombocytopenia (HCC)    Urinary retention    HAP (hospital-acquired pneumonia)    Dilated cbd, acquired    Renal disorder    Premature ventricular contraction    Macrocytic anemia    CHF (congestive heart failure) (HCC)    SOB (shortness of breath)    Hypothyroidism    Acute respiratory failure with hypoxia (HCC)    Hypoalbuminemia    Suprapubic catheter (HCC)    Aortic stenosis    Pulmonary hypertension (HCC)    Microalbuminuria    Healthcare-associated pneumonia    Acute cystitis    Hypernatremia    Dysphagia         Subjective:   Denies headache, dizziness, chest pain, dyspnea, abdominal pain or NVDC says he feels much better and "was one sick puppy "  4/4: Denies chest pain, dyspnea, abdominal pain, NVD No BM voiding well  Complaining of right hip pain and appears in distress  4/5: Denies chest pain, dyspnea, abdominal pain NVD but has little appetite  Says he feels better  4/6: complains of dry mouth  No dyspnea, chest pain or abdominal pain    Objective:     Vitals: Blood pressure 151/70, pulse 72, temperature 97 9 °F (36 6 °C), temperature source Temporal, resp  rate 16, height 5' 11" (1 803 m), weight 120 kg (264 lb 8 8 oz), SpO2 100 %  ,Body mass index is 36 9 kg/m²  Weight (last 2 days)     Date/Time   Weight    04/07/18 0528  120 (264 55)    04/06/18 0600  120 (264 55)    04/05/18 0600  121 (265 65)                Intake/Output Summary (Last 24 hours) at 04/07/18 1506  Last data filed at 04/07/18 1254   Gross per 24 hour   Intake              673 ml   Output             1150 ml   Net             -477 ml     I/O last 3 completed shifts:   In: 1240 8 [P O :990; IV Piggyback:250 8]  Out: 2500 [Urine:2500]         Physical Exam: /70 (BP Location: Right arm)   Pulse 72   Temp 97 9 °F (36 6 °C) (Temporal)   Resp 16   Ht 5' 11" (1 803 m)   Wt 120 kg (264 lb 8 8 oz)   SpO2 100% Comment: 2 5 l/m  BMI 36 90 kg/m²     General Appearance: Alert, cooperative, has distress, appears stated age   Head:    Normocephalic, without obvious abnormality, atraumatic   Eyes:    Conjunctiva/corneas clear   Ears:    Normal external ears   Nose:   Nares normal, septum midline, mucosa normal, no drainage    or sinus tenderness   Throat:   Lips, mucosa, and tongue normal; teeth and gums normal   Neck:   Supple, symmetrical, trachea midline, no adenopathy;        thyroid:  No enlargement/tenderness/nodules; no carotid    bruit or JVD   Back:     Symmetric, no curvature, ROM normal, no CVA tenderness   Lungs:     Dec at the bases bilaterally, respirations unlabored Exp rhonchi   Chest wall:    No tenderness or deformity   Heart:    Irreg rate and rhythm, S1 and S2 normal,   Abdomen:     Soft, non-tender, bowel sounds active   Extremities:   Extremities normal, atraumatic, no cyanosis trace edema   Skin:   Skin color, texture, turgor normal, no rashes or lesions   Lymph nodes:   Cervical normal   Neurologic:   CNII-XII intact            Lab, Imaging and other studies: I have personally reviewed pertinent labs  CBC:   Lab Results   Component Value Date    WBC 3 83 (L) 04/07/2018    HGB 9 8 (L) 04/07/2018    HCT 32 2 (L) 04/07/2018    MCV 99 (H) 04/07/2018    PLT 71 (L) 04/07/2018    MCH 30 2 04/07/2018    MCHC 30 4 (L) 04/07/2018    RDW 17 4 (H) 04/07/2018    MPV 13 5 (H) 04/07/2018     CMP:   Lab Results   Component Value Date     (H) 04/07/2018    K 3 0 (L) 04/07/2018     04/07/2018    CO2 39 (H) 04/07/2018    ANIONGAP 4 04/07/2018    BUN 43 (H) 04/07/2018    CREATININE 2 05 (H) 04/07/2018    GLUCOSE 79 04/07/2018    CALCIUM 8 8 04/07/2018    AST 25 04/07/2018    ALT 7 (L) 04/07/2018    ALKPHOS 262 (H) 04/07/2018    PROT 6 5 04/07/2018    BILITOT 0 90 04/07/2018    EGFR 28 04/07/2018             Results from last 7 days  Lab Units 04/07/18  0523 04/06/18  0618 04/05/18  0441 04/04/18  0523   SODIUM mmol/L 146* 146* 145 146*   POTASSIUM mmol/L 3 0* 3 0* 3  0* 3 3*   CHLORIDE mmol/L 103 104 102 105   CO2 mmol/L 39* 39* 39* 37*   BUN mg/dL 43* 45* 44* 42*   CREATININE mg/dL 2 05* 2 22* 2 39* 2 44*   CALCIUM mg/dL 8 8 8 9 8 7 8 5   TOTAL PROTEIN g/dL 6 5  --  6 5 6 5   BILIRUBIN TOTAL mg/dL 0 90  --  1 10* 0 90   ALK PHOS U/L 262*  --  237* 234*   ALT U/L 7*  --  7* 8*   AST U/L 25  --  24 22   GLUCOSE RANDOM mg/dL 79 79 91 103         Phosphorus:   Lab Results   Component Value Date    PHOS 3 4 04/07/2018     Magnesium:   Lab Results   Component Value Date    MG 1 8 04/07/2018     Urinalysis: No results found for: Marzetta Hemp, SPECGRAV, PHUR, LEUKOCYTESUR, NITRITE, PROTEINUA, GLUCOSEU, KETONESU, BILIRUBINUR, BLOODU  Ionized Calcium: No results found for: CAION  Coagulation:   Lab Results   Component Value Date    INR 2 09 (H) 04/07/2018     Troponin: No results found for: TROPONINI  ABG: No results found for: PHART, KNK6RNG, PO2ART, MFX0ZKE, D7EXIALP, BEART, SOURCE  Radiology review:     IMAGING  Procedure: Xr Chest Portable    Result Date: 4/2/2018  Narrative: CHEST portable INDICATION: Shortness of breath, CHF COMPARISON: March 31, 2018 VIEWS:  AP semierect; IMAGES:  1 FINDINGS: Midline sternotomy wires are present from previous cardiac surgery  The cardiomediastinal silhouette is unremarkable  Dual lead cardiac pacemaker overlies left chest wall, unchanged in position  Patchy density developing at the left lung base  Right lung well aerated  No pneumothorax  Bony thorax is otherwise unremarkable  Electrode (EKG) monitoring devices overlie the chest      Impression: Developing density at left lung base suspicious for pneumonia or asymmetric edema  Cardiac pacemaker status post CABG    Workstation performed: [id number]   Personally reviewed by me  Procedure: Xr Chest 1 View Portable    Result Date: 4/1/2018  Narrative: CHEST INDICATION:   sob   COMPARISON:  4/13/2017 EXAM PERFORMED/VIEWS:  XR CHEST PORTABLE FINDINGS:  Left-sided chest wall pacemaker is identified  Pacemaker leads are intact  Heart shadow is enlarged but unchanged from prior exam  Mild central vascular prominence  Left basilar atelectasis/infiltrate with possible small effusion  No pneumothorax  Osseous structures appear within normal limits for patient age  Impression: Mild central vascular prominence  Left basilar atelectasis/infiltrate with possible small effusion   Workstation performed: [id number]   Personally reviewed by me      Current Facility-Administered Medications   Medication Dose Route Frequency    acetaminophen (TYLENOL) tablet 650 mg  650 mg Oral Q6H PRN    albuterol inhalation solution 2 5 mg  2 5 mg Nebulization Q4H PRN    ampicillin (OMNIPEN) 2,000 mg in sodium chloride 0 9 % 100 mL IVPB  2,000 mg Intravenous Q12H    ascorbic acid (VITAMIN C) tablet 500 mg  500 mg Oral Daily    aspirin chewable tablet 81 mg  81 mg Oral Daily    atorvastatin (LIPITOR) tablet 40 mg  40 mg Oral Daily With Dinner    b complex-vitamin C-folic acid (NEPHROCAPS) capsule 1 capsule  1 capsule Oral Daily    benzonatate (TESSALON PERLES) capsule 100 mg  100 mg Oral TID PRN    bumetanide (BUMEX) tablet 1 mg  1 mg Oral Daily    carbidopa-levodopa (SINEMET)  mg per tablet 1 tablet  1 tablet Oral TID    carvedilol (COREG) tablet 12 5 mg  12 5 mg Oral BID With Meals    cefepime (MAXIPIME) IVPB (premix) 1,000 mg  1,000 mg Intravenous Q24H    cholecalciferol (VITAMIN D3) tablet 2,000 Units  2,000 Units Oral Daily    fluticasone (FLONASE) 50 mcg/act nasal spray 1 spray  1 spray Each Nare Daily    gabapentin (NEURONTIN) capsule 100 mg  100 mg Oral BID    insulin detemir (LEVEMIR) subcutaneous injection 16 Units  16 Units Subcutaneous HS    insulin lispro (HumaLOG) 100 units/mL subcutaneous injection 2-12 Units  2-12 Units Subcutaneous TID AC    insulin lispro (HumaLOG) 100 units/mL subcutaneous injection 2-12 Units  2-12 Units Subcutaneous HS    ipratropium (ATROVENT) 0 02 % inhalation solution 0 5 mg  0 5 mg Nebulization BID    isosorbide mononitrate (IMDUR) 24 hr tablet 60 mg  60 mg Oral Daily    levalbuterol (XOPENEX) inhalation solution 1 25 mg  1 25 mg Nebulization BID    levothyroxine tablet 225 mcg  225 mcg Oral Early Morning    loratadine (CLARITIN) tablet 10 mg  10 mg Oral Daily    oxyCODONE (ROXICODONE) IR tablet 5 mg  5 mg Oral Q4H PRN    polyethylene glycol (MIRALAX) packet 17 g  17 g Oral Daily PRN    sodium chloride (OCEAN) 0 65 % nasal spray 1 spray  1 spray Each Nare TID    warfarin (COUMADIN) tablet 5 mg  5 mg Oral Daily (warfarin)     Medications Discontinued During This Encounter   Medication Reason    warfarin (COUMADIN) 4 mg tablet Dose adjustment    Probiotic Product (JOHNNIE-BID PROBIOTIC PO) Therapy completed    Probiotic Product (BACID) TABS Therapy completed    loperamide (IMODIUM) 2 mg capsule Therapy completed    levofloxacin (LEVAQUIN) 250 mg tablet Therapy completed    Insulin Syringes, Disposable, (MONOJECT INS SYR 1CC) U-100 1 ML MISC Error    febuxostat (ULORIC) 40 mg tablet Therapy completed    Cholecalciferol (VITAMIN D3) 2000 UNITS capsule Therapy completed    allopurinol (ZYLOPRIM) 300 mg tablet Formulary change    amiodarone 200 mg tablet Therapy completed    brimonidine (ALPHAGAN P) 0 1 % Therapy completed    ascorbic acid (VITAMIN C) 500 mg tablet     levofloxacin (LEVAQUIN) IVPB (premix) 750 mg     febuxostat (ULORIC) tablet 80 mg     lactulose 20 g/30 mL oral solution 20 g     levofloxacin (LEVAQUIN) IVPB (premix) 750 mg     magnesium oxide (MAG-OX) tablet 400 mg     metolazone (ZAROXOLYN) tablet 5 mg     nitroGLYcerin (TRIDIL) 50 mg in 250 mL infusion (premix)     warfarin (COUMADIN) tablet 6 5 mg     pantoprazole (PROTONIX) EC tablet 40 mg     senna-docusate sodium (SENOKOT S) 8 6-50 mg per tablet 1 tablet     lactulose 20 g/30 mL oral solution 10 g     nitroglycerin (NITROSTAT) SL tablet 0 4 mg     oxyCODONE-acetaminophen (PERCOCET) 5-325 mg per tablet 1 tablet     sodium phosphate-biphosphate (FLEET) enema 1 enema     acetaminophen (TYLENOL) tablet 650 mg     hydrALAZINE (APRESOLINE) tablet 50 mg     warfarin (COUMADIN) tablet 5 mg     insulin detemir (LEVEMIR) subcutaneous injection 24 Units     insulin detemir (LEVEMIR) subcutaneous injection 20 Units     magnesium sulfate 2 g/50 mL IVPB (premix) 2 g     bumetanide (BUMEX) injection 2 mg     warfarin (COUMADIN) tablet 3 mg     bumetanide (BUMEX) tablet 2 mg     vancomycin (VANCOCIN) 1,250 mg in sodium chloride 0 9 % 250 mL Alycia Ulrich MD

## 2018-04-07 NOTE — ASSESSMENT & PLAN NOTE
-Continue with decreased Bumex at 1 mg PO Qdaily with the elevated sodium bicarbonate levels  -strict intake/output measurements  -daily weights  -I appreciate Cardiology's input  -continue coreg  -patient has an allergy to ARB's and Ace-inhibitors  -supplemental oxygen via nasal cannula to maintain oxygen saturation levels 92% and above  -PT/OT

## 2018-04-08 NOTE — PROGRESS NOTES
Complaints of discomfort in groin r/t swelling - elevated scrotum, was oob to chair - now back to bed, in general patient has very negative attitude today, doing well on 1L NC

## 2018-04-08 NOTE — PROGRESS NOTES
Progress Note - Annabel Sapp 11/1/1928, 80 y o  male MRN: [id number]    Unit/Bed#:  Encounter: [id number]    Primary Care Provider: Lane Roca DO   Date and time admitted to hospital: 3/31/2018  7:57 PM        Healthcare-associated pneumonia   Assessment & Plan    -possible gram-negative pneumonia  -continue IV cefepime through 4/8/18 per ID recommendations   -Possible discharge back to [facility] tomorrow 4/9/18  * Acute on chronic systolic CHF (congestive heart failure) (HCC)   Assessment & Plan    -Continue with Bumex at 1 mg PO Qdaily   -strict intake/output measurements  -daily weights  -I appreciate Cardiology's input  -continue coreg  -patient has an allergy to ARB's and Ace-inhibitors  -supplemental oxygen via nasal cannula to maintain oxygen saturation levels 92% and above  -PT/OT          Acute respiratory failure with hypoxia (HCC)   Assessment & Plan    -patient was successfully weaned off BiPAP on 4/1/18  -continue supplemental oxygen via nasal cannula to maintain oxygen saturation levels of 92% and above  -continue the respiratory protocol  -continue the airway clearance protocol  -the patient can be maintained on med/surg level of care with continuous pulse oximetry  -telemetry is not needed          Acute cystitis   Assessment & Plan    -associated with a suprapubic catheter   -secondary to Pseudomonas aeruginosa and Enterococcus faecalis  -continue IV cefepime  IV vancomycin discontinued per ID recommendations  -IV Ampicillin initiated on 4/6/18 through 4/8/18 per ID recommendations  Hypernatremia   Assessment & Plan    -resolved  -follow the sodium level        Macrocytic anemia   Assessment & Plan    -follow CBC  Results for Robert Waterman (MRN [id number]) as of 4/3/2018 09:13   Ref  Range 4/2/2018 05:33   Folate Latest Ref Range: 3 1 - 17 5 ng/mL >20 0 (H)     Results for Robert Waterman (MRN [id number]) as of 4/3/2018 09:13   Ref   Range 4/2/2018 05:33   Vitamin B-12 Latest Ref Range: 100 - 900 pg/mL 505           Pulmonary hypertension (HCC)   Assessment & Plan    -he will need outpatient follow-up with Pulmonology        Aortic stenosis   Assessment & Plan    -Cardiology is following the patient  -He will need an outpatient evaluation for a possible TAVR        Hypothyroidism   Assessment & Plan    -continue PO levothyroxine  Results for Mihaela Shepherd (MRN [id number]) as of 4/2/2018 09:59   Ref  Range 4/1/2018 05:10   TSH 3RD GENERATON Latest Ref Range: 0 358 - 3 740 uIU/mL 1 770           Microalbuminuria   Assessment & Plan    -patient has an allergy to ARB's and Ace-inhibitors  -continue to monitor        Hypokalemia   Assessment & Plan    -replete with p o  and IV potassium supplementation  -follow the patient's potassium level        Dysphagia   Assessment & Plan    Speech evaluation pending  The patient has an outpatient appointment scheduled with Gastroenterology  Obstructive sleep apnea   Assessment & Plan    -Continue CPAP QHS and anytime while sleeping        Atrial fibrillation ([facility] )   Assessment & Plan    -continue Coreg for heart rate control  -continue Coumadin for full anticoagulation        Stage 4 chronic kidney disease ([facility] )   Assessment & Plan    -the patient appears to be at his baseline renal function  -I appreciate Nephrology's input  -avoid all nephrotoxic agents  -continue to monitor the patient's renal function and electrolytes        Type 2 diabetes mellitus with hyperglycemia, with long-term current use of insulin ([facility] )   Assessment & Plan    -follow patient's blood glucose trend  -continue current insulin regimen  -Results for Mihaela Shepherd (MRN [id number]) as of 4/2/2018 09:59   Ref   Range 4/1/2018 05:10   Hemoglobin A1C Latest Ref Range: 4 2 - 6 3 % 6 9 (H)   EAG Latest Units: mg/dl 151           Essential hypertension   Assessment & Plan    -continue coreg, hydralazine, and imdur   -follow the patient's blood pressure trend            VTE Pharmacologic Prophylaxis:   Pharmacologic: Warfarin (Coumadin)  Mechanical VTE Prophylaxis in Place: Yes      Discussions with Specialists or Other Care Team Provider: Nursing, CM, and attending    Education and Discussions with Family / Patient: n/a    Time Spent for Care: 30 minutes  More than 50% of total time spent on counseling and coordination of care as described above  Current Length of Stay: 8 day(s)    Current Patient Status: Inpatient   Certification Statement: The patient will continue to require additional inpatient hospital stay due to continued need for IV antibiotics  Discharge Plan: possibly tomorrow 18 back to [facility]  Code Status: Level 1 - Full Code      Subjective: The patient was seen and examined  The patient offers no complaints  No acute events overnight  Objective:     Vitals:   Temp (24hrs), Av 1 °F (36 7 °C), Min:97 8 °F (36 6 °C), Max:98 6 °F (37 °C)    HR:  [70] 70  Resp:  [16-22] 18  BP: (134-143)/(60-67) 143/67  SpO2:  [90 %-100 %] 94 %  Body mass index is 37 14 kg/m²  Input and Output Summary (last 24 hours): Intake/Output Summary (Last 24 hours) at 18 1530  Last data filed at 18 1101   Gross per 24 hour   Intake           611 67 ml   Output             2025 ml   Net         -1413 33 ml       Physical Exam:     Physical Exam   Constitutional: He is oriented to person, place, and time  He appears well-developed and well-nourished  He is active and cooperative  Cardiovascular: Normal rate  An irregularly irregular rhythm present  Pulmonary/Chest: He has decreased breath sounds in the right lower field and the left lower field  He has no wheezes  He has no rhonchi  He has no rales  Abdominal: Soft  Normal appearance and bowel sounds are normal  There is no tenderness  Neurological: He is alert and oriented to person, place, and time  No cranial nerve deficit  Skin: Skin is warm, dry and intact     Nursing note and vitals reviewed  Additional Data:     Labs:      Results from last 7 days  Lab Units 04/08/18  0628   WBC Thousand/uL 3 46*   HEMOGLOBIN g/dL 10 1*   HEMATOCRIT % 32 4*   PLATELETS Thousands/uL 76*   NEUTROS PCT % 63   LYMPHS PCT % 15   MONOS PCT % 8   EOS PCT % 13*       Results from last 7 days  Lab Units 04/08/18  0628 04/07/18  0523   SODIUM mmol/L 145 146*   POTASSIUM mmol/L 2 8* 3 0*   CHLORIDE mmol/L 103 103   CO2 mmol/L 39* 39*   BUN mg/dL 42* 43*   CREATININE mg/dL 1 99* 2 05*   CALCIUM mg/dL 8 9 8 8   TOTAL PROTEIN g/dL  --  6 5   BILIRUBIN TOTAL mg/dL  --  0 90   ALK PHOS U/L  --  262*   ALT U/L  --  7*   AST U/L  --  25   GLUCOSE RANDOM mg/dL 89 79       Results from last 7 days  Lab Units 04/08/18  0628   INR  2 01*       * I Have Reviewed All Lab Data Listed Above  * Additional Pertinent Lab Tests Reviewed:  All Labs Within Last 24 Hours Reviewed    Imaging:    Imaging Reports Reviewed Today Include: none  Imaging Personally Reviewed by Myself Includes:  none    Recent Cultures (last 7 days):           Last 24 Hours Medication List:     Current Facility-Administered Medications:  acetaminophen 650 mg Oral Q6H PRN Vera Geovanna, DO    albuterol 2 5 mg Nebulization Q4H PRN Vera Geovanna, DO    ampicillin 2,000 mg Intravenous Q12H Vera Geovanna, DO Last Rate: 2,000 mg (04/08/18 0622)   ascorbic acid 500 mg Oral Daily Nikul Rodrigues, DO    aspirin 81 mg Oral Daily Nikul Rodriguse, DO    atorvastatin 40 mg Oral Daily With [employer], DO    b complex-vitamin C-folic acid 1 capsule Oral Daily Danita Foster, DO    benzonatate 100 mg Oral TID PRN Vera Geovanna, DO    bumetanide 1 mg Oral Daily Vera Geovanna, DO    carbidopa-levodopa 1 tablet Oral TID Danita Foster, DO    carvedilol 12 5 mg Oral BID With Meals Vera Geovanna, DO    cefepime 2,000 mg Intravenous Q24H Vera Geovanna, DO    cholecalciferol 2,000 Units Oral Daily Nikul Rodrigues, DO    fluticasone 1 spray Each Nare Daily [employer], DO    gabapentin 100 mg Oral BID Viv Ramirez, DO    insulin detemir 16 Units Subcutaneous HS [employer], DO    insulin lispro 2-12 Units Subcutaneous TID AC Niksofie Rodrigues, DO    insulin lispro 2-12 Units Subcutaneous HS Nikul Rodrigues, DO    ipratropium 0 5 mg Nebulization BID [employer], DO    isosorbide mononitrate 60 mg Oral Daily Nikul Rodrigues, DO    levalbuterol 1 25 mg Nebulization BID [employer], DO    levothyroxine 225 mcg Oral Early Morning Nikul Rodrigues, DO    loratadine 10 mg Oral Daily Joseul Rodrigues, DO    oxyCODONE 5 mg Oral Q4H PRN [employer], DO    polyethylene glycol 17 g Oral Daily PRN [employer], DO    potassium chloride 40 mEq Oral BID Jerry Peters PA-C    potassium chloride 20 mEq Intravenous Q3H Navid Escalona PA-C Last Rate: 20 mEq (04/08/18 1357)   sodium chloride 1 spray Each Nare TID [employer], DO    warfarin 5 mg Oral Daily (warfarin) [employer], DO         Today, Patient Was Seen By: Jerry Peters PA-C    ** Please Note: Dictation voice to text software may have been used in the creation of this document   **

## 2018-04-08 NOTE — PLAN OF CARE
Problem: Potential for Falls  Goal: Patient will remain free of falls  INTERVENTIONS:  - Assess patient frequently for physical needs  -  Identify cognitive and physical deficits and behaviors that affect risk of falls    -  McDonald fall precautions as indicated by assessment   - Educate patient/family on patient safety including physical limitations  - Instruct patient to call for assistance with activity based on assessment  - Modify environment to reduce risk of injury  - Consider OT/PT consult to assist with strengthening/mobility   Outcome: Progressing      Problem: PAIN - ADULT  Goal: Verbalizes/displays adequate comfort level or baseline comfort level  Interventions:  - Encourage patient to monitor pain and request assistance  - Assess pain using appropriate pain scale  - Administer analgesics based on type and severity of pain and evaluate response  - Implement non-pharmacological measures as appropriate and evaluate response  - Consider cultural and social influences on pain and pain management  - Notify physician/advanced practitioner if interventions unsuccessful or patient reports new pain   Outcome: Progressing      Problem: INFECTION - ADULT  Goal: Absence or prevention of progression during hospitalization  INTERVENTIONS:  - Assess and monitor for signs and symptoms of infection  - Monitor lab/diagnostic results  - Monitor all insertion sites, i e  indwelling lines, tubes, and drains  - Monitor endotracheal (as able) and nasal secretions for changes in amount and color  - McDonald appropriate cooling/warming therapies per order  - Administer medications as ordered  - Instruct and encourage patient and family to use good hand hygiene technique  - Identify and instruct in appropriate isolation precautions for identified infection/condition   Outcome: Progressing    Goal: Absence of fever/infection during neutropenic period  INTERVENTIONS:  - Monitor WBC  - Implement neutropenic guidelines   Outcome: Progressing      Problem: SAFETY ADULT  Goal: Maintain or return to baseline ADL function  INTERVENTIONS:  -  Assess patient's ability to carry out ADLs; assess patient's baseline for ADL function and identify physical deficits which impact ability to perform ADLs (bathing, care of mouth/teeth, toileting, grooming, dressing, etc )  - Assess/evaluate cause of self-care deficits   - Assess range of motion  - Assess patient's mobility; develop plan if impaired  - Assess patient's need for assistive devices and provide as appropriate  - Encourage maximum independence but intervene and supervise when necessary  ¯ Involve family in performance of ADLs  ¯ Assess for home care needs following discharge   ¯ Request OT consult to assist with ADL evaluation and planning for discharge  ¯ Provide patient education as appropriate   Outcome: Progressing    Goal: Maintain or return mobility status to optimal level  INTERVENTIONS:  - Assess patient's baseline mobility status (ambulation, transfers, stairs, etc )    - Identify cognitive and physical deficits and behaviors that affect mobility  - Identify mobility aids required to assist with transfers and/or ambulation (gait belt, sit-to-stand, lift, walker, cane, etc )  - Morton fall precautions as indicated by assessment  - Record patient progress and toleration of activity level on Mobility SBAR; progress patient to next Phase/Stage  - Instruct patient to call for assistance with activity based on assessment  - Request Rehabilitation consult to assist with strengthening/weightbearing, etc    Outcome: Progressing      Problem: Prexisting or High Potential for Compromised Skin Integrity  Goal: Skin integrity is maintained or improved  INTERVENTIONS:  - Identify patients at risk for skin breakdown  - Assess and monitor skin integrity  - Assess and monitor nutrition and hydration status  - Monitor labs (i e  albumin)  - Assess for incontinence   - Turn and reposition patient  - Assist with mobility/ambulation  - Relieve pressure over bony prominences  - Avoid friction and shearing  - Provide appropriate hygiene as needed including keeping skin clean and dry  - Evaluate need for skin moisturizer/barrier cream  - Collaborate with interdisciplinary team (i e  Nutrition, Rehabilitation, etc )   - Patient/family teaching   Outcome: Progressing      Problem: DISCHARGE PLANNING - CARE MANAGEMENT  Goal: Discharge to post-acute care or home with appropriate resources  INTERVENTIONS:  - Conduct assessment to determine patient/family and health care team treatment goals, and need for post-acute services based on payer coverage, community resources, and patient preferences, and barriers to discharge  - Address psychosocial, clinical, and financial barriers to discharge as identified in assessment in conjunction with the patient/family and health care team  - Arrange appropriate level of post-acute services according to patient's   needs and preference and payer coverage in collaboration with the physician and health care team  - Communicate with and update the patient/family, physician, and health care team regarding progress on the discharge plan  - Arrange appropriate transportation to post-acute venues   Outcome: Progressing      Problem: Nutrition/Hydration-ADULT  Goal: Nutrient/Hydration intake appropriate for improving, restoring or maintaining nutritional needs  Monitor and assess patient's nutrition/hydration status for malnutrition (ex- brittle hair, bruises, dry skin, pale skin and conjunctiva, muscle wasting, smooth red tongue, and disorientation)  Collaborate with interdisciplinary team and initiate plan and interventions as ordered  Monitor patient's weight and dietary intake as ordered or per policy  Utilize nutrition screening tool and intervene per policy  Determine patient's food preferences and provide high-protein, high-caloric foods as appropriate       INTERVENTIONS:  - Monitor oral intake, urinary output, labs, and treatment plans  - Assess nutrition and hydration status and recommend course of action  - Evaluate amount of meals eaten  - Assist patient with eating if necessary   - Allow adequate time for meals  - Recommend/ encourage appropriate diets, oral nutritional supplements, and vitamin/mineral supplements  - Order, calculate, and assess calorie counts as needed  - Recommend, monitor, and adjust tube feedings and TPN/PPN based on assessed needs  - Assess need for intravenous fluids  - Provide specific nutrition/hydration education as appropriate  - Include patient/family/caregiver in decisions related to nutrition   Outcome: Progressing

## 2018-04-08 NOTE — ASSESSMENT & PLAN NOTE
-patient was successfully weaned off BiPAP on 4/1/18  -continue supplemental oxygen via nasal cannula to maintain oxygen saturation levels of 92% and above  -continue the respiratory protocol  -continue the airway clearance protocol  -the patient can be maintained on med/surg level of care with continuous pulse oximetry  -telemetry is not needed

## 2018-04-08 NOTE — PROGRESS NOTES
Adam Sena in [facility] called and left message to please call Trixie Ruiz regarding any paperwork and tentative  time for transport back to [facility], Speech called and message left for them to see patient earlier in day regarding swallow eval prior to transfer back to [facility]

## 2018-04-08 NOTE — ASSESSMENT & PLAN NOTE
-follow patient's blood glucose trend  -continue current insulin regimen  -Results for Durga Cabrales (MRN [id number]) as of 4/2/2018 09:59   Ref   Range 4/1/2018 05:10   Hemoglobin A1C Latest Ref Range: 4 2 - 6 3 % 6 9 (H)   EAG Latest Units: mg/dl 151

## 2018-04-08 NOTE — ASSESSMENT & PLAN NOTE
-Continue with Bumex at 1 mg PO Qdaily   -strict intake/output measurements  -daily weights  -I appreciate Cardiology's input  -continue coreg  -patient has an allergy to ARB's and Ace-inhibitors  -supplemental oxygen via nasal cannula to maintain oxygen saturation levels 92% and above  -PT/OT

## 2018-04-08 NOTE — ASSESSMENT & PLAN NOTE
-continue PO levothyroxine  Results for Kristyn Remedies (MRN [id number]) as of 4/2/2018 09:59   Ref   Range 4/1/2018 05:10   TSH 3RD GENERATON Latest Ref Range: 0 358 - 3 740 uIU/mL 1 770

## 2018-04-08 NOTE — RESPIRATORY THERAPY NOTE
RT Protocol Note  Annabel Sapp 80 y o  male MRN: [id number]  Unit/Bed#: MS 80 Encounter: [id number]    Assessment    Principal Problem:    Acute on chronic systolic CHF (congestive heart failure) ([facility] )  Active Problems:    Obstructive sleep apnea    Essential hypertension    Type 2 diabetes mellitus with hyperglycemia, with long-term current use of insulin (HCC)    Atrial fibrillation (HCC)    Stage 4 chronic kidney disease (HCC)    Hypokalemia    Thrombocytopenia (HCC)    Macrocytic anemia    Hypothyroidism    Acute respiratory failure with hypoxia (HCC)    Aortic stenosis    Pulmonary hypertension (HCC)    Microalbuminuria    Healthcare-associated pneumonia    Acute cystitis    Hypernatremia    Dysphagia      Home Pulmonary Medications:  NO CPAP for SIRISHA    Past Medical History:   Diagnosis Date    Anemia     Arthralgia     Atrial fibrillation (HCC)     BPH (benign prostatic hypertrophy)     CAD (coronary artery disease)     Cancer (HCC)     thyroid    Cardiac disease     atrial fib      Cardiomegaly     Carpal tunnel syndrome     CHF (congestive heart failure) (HCC)     Chronic kidney disease     Chronic systolic heart failure (HCC)     Ef 25%    Degenerative joint disease     Depression     Diabetes mellitus ([facility] )     Disease of thyroid gland     Fracture of left radius     Gastroparesis     GERD (gastroesophageal reflux disease)     Gout     Hyperlipidemia     Hypertension     Hyperthyroidism     Hypokalemia     Hypothyroid     Hypoxia     Incomplete bladder emptying     Neuropathy     Osteoarthritis     Other fluid overload     fluid restriction    Premature ventricular contraction     Psychiatric disorder     depression    [facility]peripheral vascular [facility]) [facility]     Renal disorder     stage 3 chronic kidney disease    Sick sinus syndrome (HCC)     Sleep apnea     Thrombocytopenia (HCC)     Tracheobronchitis     Tremor     Unstable angina (HCC)     Urinary retention Social History     Social History    Marital status:      Spouse name: N/A    Number of children: N/A    Years of education: N/A     Social History Main Topics    Smoking status: Former Smoker     Packs/day: 3 00     Years: 30 00     Types: Cigarettes    Smokeless tobacco: Never Used      Comment: quit late 40's yrs old;total tobacco use 30yrs,used 3 pks of cigarettes form 3-4 yrs then changed to 10 cigars/day    Alcohol use No    Drug use: No    Sexual activity: Not Currently     Other Topics Concern    None     Social History Narrative    None       Subjective      Objective    Physical Exam:        Vitals:  Blood pressure 134/60, pulse 70, temperature 98 6 °F (37 °C), temperature source Temporal, resp  rate 16, height 5' 11" (1 803 m), weight 120 kg (264 lb 8 8 oz), SpO2 100 %  Results from last 7 days  Lab Units 04/06/18  1202   PH ART  7 517*   PCO2 ART mm Hg 46 6*   PO2 ART mm Hg 63 6*   HCO3 ART mmol/L 36 9*   BASE EXC ART mmol/L 12 6   O2 CONTENT ART mL/dL 13 3*   O2 HGB, ARTERIAL % 91 7*   ABG SOURCE  Radial, Right   JOSEPH TEST  Yes       Imaging and other studies: FINDINGS:     Heart shadow is enlarged but unchanged from prior exam      Persistent, but improving left lower lobe consolidation  No effusion or pneumothorax      Dual-lead pacer is unchanged  Status post median sternotomy      Osseous structures appear within normal limits for patient age      IMPRESSION:     Improving left lower lobe consolidation       Plan  Cont o2  Resp Comments: Pt  refused BiPAP will not wear it

## 2018-04-08 NOTE — ASSESSMENT & PLAN NOTE
-follow CBC  Results for Phuong Garcia (MRN [id number]) as of 4/3/2018 09:13   Ref  Range 4/2/2018 05:33   Folate Latest Ref Range: 3 1 - 17 5 ng/mL >20 0 (H)     Results for Phuong Garcia (MRN [id number]) as of 4/3/2018 09:13   Ref   Range 4/2/2018 05:33   Vitamin B-12 Latest Ref Range: 100 - 900 pg/mL 505

## 2018-04-08 NOTE — PROGRESS NOTES
Family present and requested to talk to , will call Shahana SEARS to come to floor  Patient coughing after eating, had about 25 ml emesis of food substance

## 2018-04-08 NOTE — ASSESSMENT & PLAN NOTE
-possible gram-negative pneumonia  -continue IV cefepime through 4/8/18 per ID recommendations   -Possible discharge back to [facility] tomorrow 4/9/18

## 2018-04-09 NOTE — ASSESSMENT & PLAN NOTE
-creatinine has improved and is better than is baseline   -I appreciate Nephrology's input  -avoid all nephrotoxic agents  -continue to monitor the patient's renal function and electrolytes

## 2018-04-09 NOTE — ASSESSMENT & PLAN NOTE
-patient was successfully weaned off BiPAP on 4/1/18  -continue supplemental oxygen via nasal cannula to maintain oxygen saturation levels of 92% and above  -continue the respiratory protocol  -continue the airway clearance protocol  -the patient can be maintained on med/surg level of care with continuous pulse oximetry

## 2018-04-09 NOTE — PLAN OF CARE
Problem: SLP ADULT - SWALLOWING, IMPAIRED  Goal: Initial SLP swallow eval performed  Outcome: Completed Date Met: 04/09/18

## 2018-04-09 NOTE — ASSESSMENT & PLAN NOTE
-Patient evaluated by speech and was cleared for a regular diet   -Patient had a report of vomiting  Will ordered barium swallow if this continues  The patient has an outpatient appointment scheduled with Gastroenterology

## 2018-04-09 NOTE — ASSESSMENT & PLAN NOTE
-follow patient's blood glucose trend  -continue current insulin regimen  -Results for Mihaela Shepherd (MRN [id number]) as of 4/2/2018 09:59   Ref   Range 4/1/2018 05:10   Hemoglobin A1C Latest Ref Range: 4 2 - 6 3 % 6 9 (H)   EAG Latest Units: mg/dl 151

## 2018-04-09 NOTE — ASSESSMENT & PLAN NOTE
-associated with a suprapubic catheter   -secondary to Pseudomonas aeruginosa and Enterococcus faecalis  -patient completed course of IV cefepime on 4/8/18  IV vancomycin discontinued per ID recommendations  -IV Ampicillin initiated on 4/6/18 through 4/8/18 per ID recommendations   -Consult placed to Urology for suprapubic catheter exchange tomorrow per ID recommendations

## 2018-04-09 NOTE — ASSESSMENT & PLAN NOTE
-follow CBC  Results for Perla Lozano (MRN [id number]) as of 4/3/2018 09:13   Ref  Range 4/2/2018 05:33   Folate Latest Ref Range: 3 1 - 17 5 ng/mL >20 0 (H)     Results for Perla Lozano (MRN [id number]) as of 4/3/2018 09:13   Ref   Range 4/2/2018 05:33   Vitamin B-12 Latest Ref Range: 100 - 900 pg/mL 505

## 2018-04-09 NOTE — OCCUPATIONAL THERAPY NOTE
Occupational Therapy         Patient Name: Anuradha Moreira  EZYPT'Y Date: 4/9/2018 04/09/18 1116   Note Type   Note type Progress   Restrictions/Precautions   Weight Bearing Precautions Per Order No   Other Precautions Contact/isolation;O2;Fall Risk;Multiple lines; Bed Alarm   Pain Assessment   Pain Assessment No/denies pain   Pain Score No Pain   Bed Mobility   Supine to Sit 2  Maximal assistance   Additional items Assist x 2;Bedrails; Increased time required;Verbal cues;LE management   Sit to Supine 2  Maximal assistance   Additional items Assist x 2;Verbal cues;LE management; Increased time required; Bedrails   Transfers   Sit to Stand 2  Maximal assistance   Additional items Assist x 2; Increased time required; Bedrails;Verbal cues   Functional Mobility   Functional Mobility 2  Maximal assistance  (x 2 side stepping )   Assessment   Assessment Attempted stand pivot transfer bed > chair w/ RW however pt w/ knees buckling during standing stance  Pt unable to safely complete functional mobility w/ RW and Max x 2  Pt provided w/ max v/c and Max A x 2 for side stepping to R  Pt Max A x 2 to complete sit > supine in bed  Pt lying supine in bed at end of session w/ SCDs on, call bell w/in reach, and all needs met

## 2018-04-09 NOTE — ASSESSMENT & PLAN NOTE
-continue PO levothyroxine  Results for Keny Shore (MRN [id number]) as of 4/2/2018 09:59   Ref   Range 4/1/2018 05:10   TSH 3RD GENERATON Latest Ref Range: 0 358 - 3 740 uIU/mL 1 770

## 2018-04-09 NOTE — SPEECH THERAPY NOTE
Speech Language/Pathology  Speech/Language Pathology  Assessment    Patient Name: Armand Mills  QJOEU'F Date: 4/9/2018     Problem List  Patient Active Problem List   Diagnosis    Acute on chronic systolic CHF (congestive heart failure) [facility]    Pacemaker    Obstructive sleep apnea    Sick sinus syndrome ([facility]    [facility]    Psychiatric disorder    Neuropathy    Essential hypertension    Hyperlipidemia    GERD (gastroesophageal reflux disease)    Gastroparesis    Type 2 diabetes mellitus with hyperglycemia, with long-term current use of insulin (HCC)    Cancer (HCC)    Atrial fibrillation (HCC)    Stage 4 chronic kidney disease (HCC)    Hypokalemia    Perioral cyanosis    Hyperlipidemia    Thrombocytopenia (HCC)    Urinary retention    HAP (hospital-acquired pneumonia)    Dilated cbd, acquired    Renal disorder    Premature ventricular contraction    Macrocytic anemia    CHF (congestive heart failure) (HCC)    SOB (shortness of breath)    Hypothyroidism    Acute respiratory failure with hypoxia (HCC)    Hypoalbuminemia    Suprapubic catheter ([facility]    Aortic stenosis    Pulmonary hypertension (HCC)    Microalbuminuria    Healthcare-associated pneumonia    Acute cystitis    Hypernatremia    Dysphagia     Past Medical History  Past Medical History:   Diagnosis Date    Anemia     Arthralgia     Atrial fibrillation (HCC)     BPH (benign prostatic hypertrophy)     CAD (coronary artery disease)     Cancer (HCC)     thyroid    Cardiac disease     atrial fib      Cardiomegaly     Carpal tunnel syndrome     CHF (congestive heart failure) (HCC)     Chronic kidney disease     Chronic systolic heart failure (HCC)     Ef 25%    Degenerative joint disease     Depression     Diabetes mellitus ([facility]     Disease of thyroid gland     Fracture of left radius     Gastroparesis     GERD (gastroesophageal reflux disease)     Gout     Hyperlipidemia  Hypertension     Hyperthyroidism     Hypokalemia     Hypothyroid     Hypoxia     Incomplete bladder emptying     Neuropathy     Osteoarthritis     Other fluid overload     fluid restriction    Premature ventricular contraction     Psychiatric disorder     depression    PVD (peripheral vascular disease) (HCC)     Renal disorder     stage 3 chronic kidney disease    Sick sinus syndrome (HCC)     Sleep apnea     Thrombocytopenia (HCC)     Tracheobronchitis     Tremor     Unstable angina ([address] )     Urinary retention      Past Surgical History  Past Surgical History:   Procedure Laterality Date    ABDOMINAL SURGERY      cholecystectomy    CARDIAC PACEMAKER PLACEMENT      CARPAL TUNNEL RELEASE      bilateral    CHOLECYSTECTOMY      COLONOSCOPY W/ POLYPECTOMY      CORONARY ARTERY BYPASS GRAFT      EYE SURGERY      bilateral CAT EXT W/IOL    HERNIA REPAIR      JOINT REPLACEMENT          04/09/18 1313   Swallow Information   Current Risks for Dysphagia & Aspiration Respiratory compromise   Current Symptoms/Concerns Current Pneumonia; Decreased oral intake  (Patient is vomiting after he's eating)   Current Diet Regular; Thin liquid   Baseline Diet Regular; Thin liquids  (spoke with [facility]; stated no dysphagia or vomiting)   Baseline Assessment   Behavior/Cognition Cooperative; Interactive; Alert; Requires cueing  (Pt states doesn't feel good; but not nausea )   Speech/Language Status WFL   Patient Positioning Upright in bed   Swallow Mechanism Exam   Labial Symmetry WFL   Labial Strength WFL   Labial ROM WFL   Labial Sensation WFL   Facial Symmetry WFL   Facial Strength WFL   Facial ROM WFL   Facial Sensation WFL   Lingual Symmetry WFL   Lingual Strength WFL   Lingual ROM WFL   Lingual Sensation WFL   Velum WFL   Gag Unable to assess   Mandible WFL   Dentition Nearly edentulous   Volitional Cough Strong   Consistencies Assessed and Performance   Materials Admnistered Regular/Solid;Soft/Level 3;Puree/Level 1; Thin liquid   Materials Adminstered Comment Lunch tray regular diet level   Oral Stage WFL   Phargngeal Stage WFL   Swallow Mechanics WFL;Swallow initation; Appears prompt;Good Larygneal rise   Esophageal Concerns Hx GERDS;Food regurg  (h/o dilatation)   Summary   Swallow Summary Patient was admitted with SOB and stated increased coughing  Patient's daughter stated he has had reduced appetite recently  Patient is alert and awake and upright in bed  SLP is evaluating patient with his lunch tray  He is with normal oral mech exam; although almost edentulous  Patient is able to feed himself beef noodle soup, pork bbq, cooked carrots, and he drinks thin liquids via cup (coffee) and via straw (water)  Patient presents without oral or s/s for pharyngeal dysphagia throughout  Approximately MCC through the meal eaten; the patient stated "that is enough, can't take more"  The SLP asked if he felt nauseous or pain; and patient stated "no"  A few minutes later the patient asked for plastic tray and vomited (x2)  Did not appear to be everything he ate/drank, but difficult to tell  Patient still did not have any wet voice quality, throat clearing or coughing during or after vomiting  No oral or suspected pharyngeal dysphagia; possible esophageal dysphagia since stated in history  Recommendations   Risk for Aspiration None   Recommendations Consider oral diet   Diet Solid Recommendation Regular consistency   Diet Liquid Recommendation Thin liquid   Recommended Form of Meds As desired; As tolerated   General Precautions Feed only when alert;Upright as possible for all oral intake;Remain upright for 45 mins after meals; Supervision with meals   Further Evaluations Gastroenterology; Dietician   Results Reviewed with PAC/CRNP;RN;PT/Family/Caregiver   Treatment Recommendations   Duration of treatment IP stay   Follow up treatments Assure diet tolerance   Dysphagia Goals Patient will tolerate recommended diet without observed clinical signs of oral/pharngeal dysphagia   Speech Therapy Prognosis   Prognosis Guarded   Prognosis Considerations Co-Morbidities        04/09/18 1313   Swallow Information   Current Risks for Dysphagia & Aspiration Respiratory compromise   Current Symptoms/Concerns Current Pneumonia; Decreased oral intake  (Patient is vomiting after he's eating)   Current Diet Regular; Thin liquid   Baseline Diet Regular; Thin liquids  (spoke with [facility]; stated no dysphagia or vomiting)   Baseline Assessment   Behavior/Cognition Cooperative; Interactive; Alert; Requires cueing  (Pt states doesn't feel good; but not nausea )   Speech/Language Status WFL   Patient Positioning Upright in bed   Swallow Mechanism Exam   Labial Symmetry WFL   Labial Strength WFL   Labial ROM WFL   Labial Sensation WFL   Facial Symmetry WFL   Facial Strength WFL   Facial ROM WFL   Facial Sensation WFL   Lingual Symmetry WFL   Lingual Strength WFL   Lingual ROM WFL   Lingual Sensation WFL   Velum WFL   Gag Unable to assess   Mandible WFL   Dentition Nearly edentulous   Volitional Cough Strong   Consistencies Assessed and Performance   Materials Admnistered Regular/Solid;Soft/Level 3;Puree/Level 1; Thin liquid   Materials Adminstered Comment Lunch tray regular diet level   Oral Stage WFL   Phargngeal Stage WFL   Swallow Mechanics WFL;Swallow initation; Appears prompt;Good Larygneal rise   Esophageal Concerns Hx GERDS;Food regurg  (h/o dilatation)   Summary   Swallow Summary Patient was admitted with SOB and stated increased coughing  Patient's daughter stated he has had reduced appetite recently  Patient is alert and awake and upright in bed  SLP is evaluating patient with his lunch tray  He is with normal oral mech exam; although almost edentulous  Patient is able to feed himself beef noodle soup, pork bbq, cooked carrots, and he drinks thin liquids via cup (coffee) and via straw (water)   Patient presents without oral or s/s for pharyngeal dysphagia throughout  Approximately retirement through the meal eaten; the patient stated "that is enough, can't take more"  The SLP asked if he felt nauseous or pain; and patient stated "no"  A few minutes later the patient asked for plastic tray and vomited (x2)  Did not appear to be everything he ate/drank, but difficult to tell  Patient still did not have any wet voice quality, throat clearing or coughing during or after vomiting  No oral or suspected pharyngeal dysphagia; possible esophageal dysphagia since stated in history  Recommendations   Risk for Aspiration None   Recommendations Consider oral diet   Diet Solid Recommendation Regular consistency   Diet Liquid Recommendation Thin liquid   Recommended Form of Meds As desired; As tolerated   General Precautions Feed only when alert;Upright as possible for all oral intake;Remain upright for 45 mins after meals; Supervision with meals   Further Evaluations Gastroenterology; Dietician   Results Reviewed with PAC/CRNP;RN;PT/Family/Caregiver   Treatment Recommendations   Duration of treatment IP stay   Follow up treatments Assure diet tolerance   Dysphagia Goals Patient will tolerate recommended diet without observed clinical signs of oral/pharngeal dysphagia   Speech Therapy Prognosis   Prognosis Guarded   Prognosis Considerations Co-Morbidities

## 2018-04-09 NOTE — PHYSICAL THERAPY NOTE
PT treatment Note      04/09/18 1125   Pain Assessment   Pain Score No Pain   Restrictions/Precautions   Other Precautions (Contact/isolation;O2;Fall Risk;Multiple lines; Bed Alarm)   General   Family/Caregiver Present No   Subjective   Subjective Agreeable to therapy  Bed Mobility   Rolling R 2  Maximal assistance   Additional items Assist x 2;HOB elevated; Bedrails;Verbal cues   Supine to Sit 2  Maximal assistance   Additional items Assist x 2; Increased time required;Verbal cues;LE management   Sit to Supine 2  Maximal assistance   Additional items Assist x 2; Increased time required;Verbal cues;LE management   Transfers   Sit to Stand 2  Maximal assistance   Additional items Assist x 2;Bedrails; Increased time required;Verbal cues   Stand to Sit 3  Moderate assistance   Additional items Assist x 2;Verbal cues   Stand pivot (unable knees buckling)   Ambulation/Elevation   Gait pattern Shuffling   Gait Assistance 2  Maximal assist   Additional items Assist x 2   Distance 3' sidestepping to head of bed   Balance   Static Sitting Fair +   Dynamic Sitting Fair +   Static Standing Fair -   Dynamic Standing Poor   Ambulatory Poor   Endurance Deficit   Endurance Deficit Yes   Activity Tolerance   Activity Tolerance Patient limited by fatigue   Assessment   Prognosis Fair   Problem List Decreased strength;Decreased endurance; Impaired balance;Decreased mobility; Decreased safety awareness   Assessment Pt performing functional mobility at (max) x2 level of function  LE weakness with knees buckling limiting safe  tx OOB to chair  Pt  was able to sidestep 3' to [address] [facility] [address] with max x 2 to reposition in bed  Pt will benefit from continued PT to progress gait, transfers, balance, endurance, strengthening, and safety in order to maximize function and decrease burden of care  Plan   Treatment/Interventions Functional transfer training;LE strengthening/ROM; Therapeutic exercise; Endurance training;Bed mobility;Gait training   Progress Slow progress, decreased activity tolerance   Recommendation   Recommendation Long-term skilled PT   Pt  In bed  with call bell within reach, scd's connected and turned on and alarm on at end of PT session

## 2018-04-09 NOTE — ASSESSMENT & PLAN NOTE
-possible gram-negative pneumonia  -continue IV cefepime through 4/8/18 per ID recommendations   -Possible discharge back to [facility] tomorrow 4/10/18

## 2018-04-09 NOTE — MALNUTRITION/BMI
This medical record reflects one or more clinical indicators suggestive of malnutrition  Malnutrition Findings:   Malnutrition type: Acute illness  Degree of Malnutrition: Malnutrition of moderate degree  Malnutrition Characteristics: Muscle loss, Inadequate energy (moderate PCM due to prolong inadequate po intake for >9 days with muscle fat wasting/increase muscle weakness)    BMI Findings: Body mass index is 36 87 kg/m²  See Nutrition note dated 4/9/2018 for additional details  Completed nutrition assessment is viewable in the nutrition documentation

## 2018-04-09 NOTE — PROGRESS NOTES
Progress Note - Tory Salcedo 11/1/1928, 80 y o  male MRN: [id number]    Unit/Bed#:  Encounter: [id number]    Primary Care Provider: Shannon Mcneal DO   Date and time admitted to hospital: 3/31/2018  7:57 PM        Healthcare-associated pneumonia   Assessment & Plan    -possible gram-negative pneumonia  -continue IV cefepime through 4/8/18 per ID recommendations   -Possible discharge back to [facility] tomorrow 4/10/18  * Acute on chronic systolic CHF (congestive heart failure) (HCC)   Assessment & Plan    -Continue with Bumex at 1 mg PO Qdaily   -strict intake/output measurements  -daily weights  -I appreciate Cardiology's input  -continue coreg  -patient has an allergy to ARB's and Ace-inhibitors  -supplemental oxygen via nasal cannula to maintain oxygen saturation levels 92% and above  -PT/OT          Acute respiratory failure with hypoxia (HCC)   Assessment & Plan    -patient was successfully weaned off BiPAP on 4/1/18  -continue supplemental oxygen via nasal cannula to maintain oxygen saturation levels of 92% and above  -continue the respiratory protocol  -continue the airway clearance protocol  -the patient can be maintained on med/surg level of care with continuous pulse oximetry            Acute cystitis   Assessment & Plan    -associated with a suprapubic catheter   -secondary to Pseudomonas aeruginosa and Enterococcus faecalis  -patient completed course of IV cefepime on 4/8/18  IV vancomycin discontinued per ID recommendations  -IV Ampicillin initiated on 4/6/18 through 4/8/18 per ID recommendations   -Consult placed to Urology for suprapubic catheter exchange tomorrow per ID recommendations  Hypernatremia   Assessment & Plan    -follow the sodium level        Macrocytic anemia   Assessment & Plan    -follow CBC  Results for Zoemariusz Shore (MRN [id number]) as of 4/3/2018 09:13   Ref   Range 4/2/2018 05:33   Folate Latest Ref Range: 3 1 - 17 5 ng/mL >20 0 (H)     Results for SILVANA Annabella Chaney (MRN [id number]) as of 4/3/2018 09:13   Ref  Range 4/2/2018 05:33   Vitamin B-12 Latest Ref Range: 100 - 900 pg/mL 505           Pulmonary hypertension (HCC)   Assessment & Plan    -he will need outpatient follow-up with Pulmonology        Aortic stenosis   Assessment & Plan    -Cardiology is following the patient  -He will need an outpatient evaluation for a possible TAVR        Hypothyroidism   Assessment & Plan    -continue PO levothyroxine  Results for Lona Mcburney (MRN [id number]) as of 4/2/2018 09:59   Ref  Range 4/1/2018 05:10   TSH 3RD GENERATON Latest Ref Range: 0 358 - 3 740 uIU/mL 1 770           Microalbuminuria   Assessment & Plan    -patient has an allergy to ARB's and Ace-inhibitors  -continue to monitor        Thrombocytopenia ([address] )   Assessment & Plan    -stable  -continue to monitor the patient for any signs of bleeding  -follow the CBC        Hypokalemia   Assessment & Plan    -replete with p o  and IV potassium supplementation  -follow the patient's potassium level        Dysphagia   Assessment & Plan    -Patient evaluated by speech and was cleared for a regular diet   -Patient had a report of vomiting  Will ordered barium swallow if this continues  The patient has an outpatient appointment scheduled with Gastroenterology          Obstructive sleep apnea   Assessment & Plan    -Continue CPAP QHS and anytime while sleeping        Atrial fibrillation ([address] )   Assessment & Plan    -continue Coreg for heart rate control  -continue Coumadin for full anticoagulation        Stage 4 chronic kidney disease (HCC)   Assessment & Plan    -creatinine has improved and is better than is baseline   -I appreciate Nephrology's input  -avoid all nephrotoxic agents  -continue to monitor the patient's renal function and electrolytes        Type 2 diabetes mellitus with hyperglycemia, with long-term current use of insulin (HCC)   Assessment & Plan    -follow patient's blood glucose trend  -continue current insulin regimen  -Results for Durga Cabrales (MRN [id number]) as of 2018 09:59   Ref  Range 2018 05:10   Hemoglobin A1C Latest Ref Range: 4 2 - 6 3 % 6 9 (H)   EAG Latest Units: mg/dl 151           Essential hypertension   Assessment & Plan    -continue coreg, hydralazine, and imdur   -follow the patient's blood pressure trend            VTE Pharmacologic Prophylaxis:   Pharmacologic: Warfarin (Coumadin)  Mechanical VTE Prophylaxis in Place: Yes      Discussions with Specialists or Other Care Team Provider: Nursing, CM, and attending    Education and Discussions with Family / Patient: n/a    Time Spent for Care: 30 minutes  More than 50% of total time spent on counseling and coordination of care as described above  Current Length of Stay: 9 day(s)    Current Patient Status: Inpatient   Certification Statement: The patient will continue to require additional inpatient hospital stay due to urology consult for suprapubic catheter exchange  Discharge Plan: possibly tomorrow 4/10/18    Code Status: Level 1 - Full Code      Subjective: The patient was seen and examined  The patient denies any complaints  Nursing reported the patient having an episode of vomiting after being evaluated by speech  The patient denies nausea or abdominal pain  Objective:     Vitals:   Temp (24hrs), Av 8 °F (37 1 °C), Min:98 8 °F (37 1 °C), Max:98 8 °F (37 1 °C)    HR:  [70] 70  Resp:  [16] 16  BP: (138-162)/(68-74) 138/68  SpO2:  [93 %-100 %] 93 %  Body mass index is 36 87 kg/m²  Input and Output Summary (last 24 hours): Intake/Output Summary (Last 24 hours) at 18 1627  Last data filed at 18 0901   Gross per 24 hour   Intake          7864 16 ml   Output             1025 ml   Net          6839 16 ml       Physical Exam:     Physical Exam   Constitutional: He is oriented to person, place, and time  Vital signs are normal  He appears well-developed and well-nourished  He is active and cooperative  Cardiovascular: Normal rate and regular rhythm  Pulmonary/Chest: Effort normal and breath sounds normal  He has no wheezes  He has no rhonchi  He has no rales  Abdominal: Soft  Normal appearance and bowel sounds are normal  There is no tenderness  Neurological: He is alert and oriented to person, place, and time  No cranial nerve deficit  Skin: Skin is warm, dry and intact  Nursing note and vitals reviewed  Additional Data:     Labs:      Results from last 7 days  Lab Units 04/09/18  0545   WBC Thousand/uL 3 35*   HEMOGLOBIN g/dL 10 2*   HEMATOCRIT % 32 5*   PLATELETS Thousands/uL 75*   NEUTROS PCT % 62   LYMPHS PCT % 19   MONOS PCT % 8   EOS PCT % 9*       Results from last 7 days  Lab Units 04/09/18  0545  04/07/18  0523   SODIUM mmol/L 147*  < > 146*   POTASSIUM mmol/L 3 4*  < > 3 0*   CHLORIDE mmol/L 105  < > 103   CO2 mmol/L 38*  < > 39*   BUN mg/dL 40*  < > 43*   CREATININE mg/dL 1 95*  < > 2 05*   CALCIUM mg/dL 9 0  < > 8 8   TOTAL PROTEIN g/dL  --   --  6 5   BILIRUBIN TOTAL mg/dL  --   --  0 90   ALK PHOS U/L  --   --  262*   ALT U/L  --   --  7*   AST U/L  --   --  25   GLUCOSE RANDOM mg/dL 86  < > 79   < > = values in this interval not displayed  Results from last 7 days  Lab Units 04/09/18  0545   INR  2 18*       * I Have Reviewed All Lab Data Listed Above  * Additional Pertinent Lab Tests Reviewed:  All Labs Within Last 24 Hours Reviewed    Imaging:    Imaging Reports Reviewed Today Include: none  Imaging Personally Reviewed by Myself Includes:  none    Recent Cultures (last 7 days):           Last 24 Hours Medication List:     Current Facility-Administered Medications:  acetaminophen 650 mg Oral Q6H PRN Rhys Risen, DO   albuterol 2 5 mg Nebulization Q4H PRN Rhys Risen, DO   ascorbic acid 500 mg Oral Daily Juan Jose Rodrigues, DO   aspirin 81 mg Oral Daily Juan Jose Rodrigues, DO   atorvastatin 40 mg Oral Daily With Curt Cates DO   b complex-vitamin C-folic acid 1 capsule Oral Daily Tyson Mendes, DO   benzonatate 100 mg Oral TID PRN [employer], DO   bumetanide 1 mg Oral Daily [employer], DO   carbidopa-levodopa 1 tablet Oral TID Tyson Mendes, DO   carvedilol 12 5 mg Oral BID With Meals [employer], DO   cholecalciferol 2,000 Units Oral Daily Nikul Rodrigues, DO   fluticasone 1 spray Each Nare Daily [employer], DO   gabapentin 100 mg Oral BID Tyson Mendes, DO   guaiFENesin 600 mg Oral Q12H [address] Navid sEcalona PA-C   insulin detemir 16 Units Subcutaneous HS [employer], DO   insulin lispro 2-12 Units Subcutaneous TID AC Nikul Rodrigues, DO   insulin lispro 2-12 Units Subcutaneous HS Nikul Rodrigues, DO   isosorbide mononitrate 60 mg Oral Daily Nikul Rodrigues, DO   levothyroxine 225 mcg Oral Early Morning Nikul Rodrigues, DO   loratadine 10 mg Oral Daily Nikul Rodrigues, DO   polyethylene glycol 17 g Oral Daily PRN [employer], DO   sodium chloride 1 spray Each Nare TID [employer], DO   warfarin 5 mg Oral Daily (warfarin) [employer], DO        Today, Patient Was Seen By: Rolly Barr PA-C    ** Please Note: Dictation voice to text software may have been used in the creation of this document   **

## 2018-04-10 NOTE — PLAN OF CARE
Problem: Potential for Falls  Goal: Patient will remain free of falls  INTERVENTIONS:  - Assess patient frequently for physical needs  -  Identify cognitive and physical deficits and behaviors that affect risk of falls    -  [place] fall precautions as indicated by assessment   - Educate patient/family on patient safety including physical limitations  - Instruct patient to call for assistance with activity based on assessment  - Modify environment to reduce risk of injury  - Consider OT/PT consult to assist with strengthening/mobility   Outcome: Progressing      Problem: PAIN - ADULT  Goal: Verbalizes/displays adequate comfort level or baseline comfort level  Interventions:  - Encourage patient to monitor pain and request assistance  - Assess pain using appropriate pain scale  - Administer analgesics based on type and severity of pain and evaluate response  - Implement non-pharmacological measures as appropriate and evaluate response  - Consider cultural and social influences on pain and pain management  - Notify physician/advanced practitioner if interventions unsuccessful or patient reports new pain   Outcome: Progressing      Problem: INFECTION - ADULT  Goal: Absence or prevention of progression during hospitalization  INTERVENTIONS:  - Assess and monitor for signs and symptoms of infection  - Monitor lab/diagnostic results  - Monitor all insertion sites, i e  indwelling lines, tubes, and drains  - Monitor endotracheal (as able) and nasal secretions for changes in amount and color  - [place] appropriate cooling/warming therapies per order  - Administer medications as ordered  - Instruct and encourage patient and family to use good hand hygiene technique  - Identify and instruct in appropriate isolation precautions for identified infection/condition   Outcome: Progressing    Goal: Absence of fever/infection during neutropenic period  INTERVENTIONS:  - Monitor WBC  - Implement neutropenic guidelines   Outcome: Progressing      Problem: SAFETY ADULT  Goal: Maintain or return to baseline ADL function  INTERVENTIONS:  -  Assess patient's ability to carry out ADLs; assess patient's baseline for ADL function and identify physical deficits which impact ability to perform ADLs (bathing, care of mouth/teeth, toileting, grooming, dressing, etc )  - Assess/evaluate cause of self-care deficits   - Assess range of motion  - Assess patient's mobility; develop plan if impaired  - Assess patient's need for assistive devices and provide as appropriate  - Encourage maximum independence but intervene and supervise when necessary  ¯ Involve family in performance of ADLs  ¯ Assess for home care needs following discharge   ¯ Request OT consult to assist with ADL evaluation and planning for discharge  ¯ Provide patient education as appropriate   Outcome: Progressing    Goal: Maintain or return mobility status to optimal level  INTERVENTIONS:  - Assess patient's baseline mobility status (ambulation, transfers, stairs, etc )    - Identify cognitive and physical deficits and behaviors that affect mobility  - Identify mobility aids required to assist with transfers and/or ambulation (gait belt, sit-to-stand, lift, walker, cane, etc )  - [place] fall precautions as indicated by assessment  - Record patient progress and toleration of activity level on Mobility SBAR; progress patient to next Phase/Stage  - Instruct patient to call for assistance with activity based on assessment  - Request Rehabilitation consult to assist with strengthening/weightbearing, etc    Outcome: Progressing      Problem: Prexisting or High Potential for Compromised Skin Integrity  Goal: Skin integrity is maintained or improved  INTERVENTIONS:  - Identify patients at risk for skin breakdown  - Assess and monitor skin integrity  - Assess and monitor nutrition and hydration status  - Monitor labs (i e  albumin)  - Assess for incontinence   - Turn and reposition patient  - Assist with mobility/ambulation  - Relieve pressure over bony prominences  - Avoid friction and shearing  - Provide appropriate hygiene as needed including keeping skin clean and dry  - Evaluate need for skin moisturizer/barrier cream  - Collaborate with interdisciplinary team (i e  Nutrition, Rehabilitation, etc )   - Patient/family teaching   Outcome: Progressing      Problem: DISCHARGE PLANNING - CARE MANAGEMENT  Goal: Discharge to post-acute care or home with appropriate resources  INTERVENTIONS:  - Conduct assessment to determine patient/family and health care team treatment goals, and need for post-acute services based on payer coverage, community resources, and patient preferences, and barriers to discharge  - Address psychosocial, clinical, and financial barriers to discharge as identified in assessment in conjunction with the patient/family and health care team  - Arrange appropriate level of post-acute services according to patient's   needs and preference and payer coverage in collaboration with the physician and health care team  - Communicate with and update the patient/family, physician, and health care team regarding progress on the discharge plan  - Arrange appropriate transportation to post-acute venues   Outcome: Progressing      Problem: Nutrition/Hydration-ADULT  Goal: Nutrient/Hydration intake appropriate for improving, restoring or maintaining nutritional needs  Monitor and assess patient's nutrition/hydration status for malnutrition (ex- brittle hair, bruises, dry skin, pale skin and conjunctiva, muscle wasting, smooth red tongue, and disorientation)  Collaborate with interdisciplinary team and initiate plan and interventions as ordered  Monitor patient's weight and dietary intake as ordered or per policy  Utilize nutrition screening tool and intervene per policy  Determine patient's food preferences and provide high-protein, high-caloric foods as appropriate       INTERVENTIONS:  - Monitor oral intake, urinary output, labs, and treatment plans  - Assess nutrition and hydration status and recommend course of action  - Evaluate amount of meals eaten  - Assist patient with eating if necessary   - Allow adequate time for meals  - Recommend/ encourage appropriate diets, oral nutritional supplements, and vitamin/mineral supplements  - Order, calculate, and assess calorie counts as needed  - Recommend, monitor, and adjust tube feedings and TPN/PPN based on assessed needs  - Assess need for intravenous fluids  - Provide specific nutrition/hydration education as appropriate  - Include patient/family/caregiver in decisions related to nutrition   Outcome: Progressing

## 2018-04-10 NOTE — SPEECH THERAPY NOTE
Speech Language/Pathology    Speech/Language Pathology Progress Note    Patient Name: Tory Salcedo  [facility]L Date: 4/10/2018     Problem List  Patient Active Problem List   Diagnosis    Acute on chronic systolic CHF (congestive heart failure) (HCC)    Pacemaker    Obstructive sleep apnea    Sick sinus syndrome ([address] )    PVD (peripheral vascular disease) [facility] [address] [facility]    Psychiatric disorder    Neuropathy    Essential hypertension    Hyperlipidemia    GERD (gastroesophageal reflux disease)    Gastroparesis    Type 2 diabetes mellitus with hyperglycemia, with long-term current use of insulin (HCC)    Cancer (HCC)    Atrial fibrillation (HCC)    Stage 4 chronic kidney disease (HCC)    Hypokalemia    Perioral cyanosis    Hyperlipidemia    Thrombocytopenia (HCC)    Urinary retention    HAP (hospital-acquired pneumonia)    Dilated cbd, acquired    Renal disorder    Premature ventricular contraction    Macrocytic anemia    CHF (congestive heart failure) (HCC)    SOB (shortness of breath)    Hypothyroidism    Acute respiratory failure with hypoxia (HCC)    Hypoalbuminemia    Suprapubic catheter ([facility] [address] [facility]    Aortic stenosis    Pulmonary hypertension (HCC)    Microalbuminuria    Healthcare-associated pneumonia    Acute cystitis    Hypernatremia    Dysphagia        Past Medical History  Past Medical History:   Diagnosis Date    Anemia     Arthralgia     Atrial fibrillation (HCC)     BPH (benign prostatic hypertrophy)     CAD (coronary artery disease)     Cancer (HCC)     thyroid    Cardiac disease     atrial fib      Cardiomegaly     Carpal tunnel syndrome     CHF (congestive heart failure) (HCC)     Chronic kidney disease     Chronic systolic heart failure (HCC)     Ef 25%    Degenerative joint disease     Depression     Diabetes mellitus ([address] )     Disease of thyroid gland     Fracture of left radius     Gastroparesis     GERD (gastroesophageal reflux disease)     Gout     Hyperlipidemia     Hypertension     Hyperthyroidism     Hypokalemia     Hypothyroid     Hypoxia     Incomplete bladder emptying     Neuropathy     Osteoarthritis     Other fluid overload     fluid restriction    Premature ventricular contraction     Psychiatric disorder     depression    PVD (peripheral vascular disease) (HCC)     Renal disorder     stage 3 chronic kidney disease    Sick sinus syndrome (HCC)     Sleep apnea     Thrombocytopenia (HCC)     Tracheobronchitis     Tremor     Unstable angina (HCC)     Urinary retention         Past Surgical History  Past Surgical History:   Procedure Laterality Date    ABDOMINAL SURGERY      cholecystectomy    CARDIAC PACEMAKER PLACEMENT      CARPAL TUNNEL RELEASE      bilateral    CHOLECYSTECTOMY      COLONOSCOPY W/ POLYPECTOMY      CORONARY ARTERY BYPASS GRAFT      EYE SURGERY      bilateral CAT EXT W/IOL    HERNIA REPAIR      JOINT REPLACEMENT           Subjective:  Patient is awake and alert  He is oriented and knows that he is going back to the SNF today  Objective:  Patient states he is "ok" today  Nursing states he is eating without s/s; and no vomiting  Assessment:  Patient refused any trials for f/u tx today; and he said he's fine  He had no s/s with thin liquids via straw  Patient continues with adequate oral control and transfer, and no s/s for pharyngeal dysphagia  Plan/Recommendations:  Discharge from this service  He is [facility] on current diet level  Patient is going back to SNF today

## 2018-04-10 NOTE — ASSESSMENT & PLAN NOTE
-On admission the patient required BiPAP which he was successfully weaned off ofon 4/1/18  -continue supplemental oxygen via nasal cannula to maintain oxygen saturation levels of 92% and above  -continue the respiratory protocol  -continue the airway clearance protocol

## 2018-04-10 NOTE — NURSING NOTE
Patient left via stretcher accompanied by transport team, patient information packet given to transport team, will call report, patient has glasses, and transported on 1L oxygen via [place]  Discharge instructions also faxed to [facility]

## 2018-04-10 NOTE — ASSESSMENT & PLAN NOTE
The patient was noted be hypokalemic and received p o  and IV potassium supplementation during his hospitalization

## 2018-04-10 NOTE — ASSESSMENT & PLAN NOTE
-Patient evaluated by speech and was cleared for a regular diet  The patient has an outpatient appointment scheduled with Gastroenterology

## 2018-04-10 NOTE — ASSESSMENT & PLAN NOTE
-follow patient's blood glucose trend  -continue current insulin regimen  -Results for Sun Patricia (MRN [id number]) as of 4/2/2018 09:59   Ref   Range 4/1/2018 05:10   Hemoglobin A1C Latest Ref Range: 4 2 - 6 3 % 6 9 (H)   EAG Latest Units: mg/dl 151

## 2018-04-10 NOTE — PROGRESS NOTES
Report called to Ghislaine Longoria at [facility]

## 2018-04-10 NOTE — ASSESSMENT & PLAN NOTE
-associated with a suprapubic catheter   -secondary to Pseudomonas aeruginosa and Enterococcus faecalis  -patient completed course of IV cefepime on 4/8/18  IV vancomycin discontinued  On 4/6/18 per ID recommendations  -IV Ampicillin initiated on 4/6/18 through 4/8/18 per ID recommendations  - Urology consulted and exchanged suprapubic catheter 4/10/18 per ID recommendations

## 2018-04-10 NOTE — SOCIAL WORK
The patient is for d/c to the skilled nursing today and he is agreeable  I spoke with faraz the patients daughter Primitivo Sequeira and she is aware of the  time   I built a case with brandie for the patient to return to the skilled nursing home   And I faxed to the reviewer last evening and I am awaiting a call from them ref # for [facility] is [id number]

## 2018-04-10 NOTE — PLAN OF CARE

## 2018-04-10 NOTE — ASSESSMENT & PLAN NOTE
-The patient was noted to have thrombocytopenia which remained stable during his hospitalization and he did not have any signs of bleeding

## 2018-04-10 NOTE — ASSESSMENT & PLAN NOTE
-continue PO levothyroxine  Results for Arnetha Sensor (MRN [id number]) as of 4/2/2018 09:59   Ref   Range 4/1/2018 05:10   TSH 3RD GENERATON Latest Ref Range: 0 358 - 3 740 uIU/mL 1 770

## 2018-04-10 NOTE — PLAN OF CARE
Problem: SLP ADULT - SWALLOWING, IMPAIRED  Goal: Advance to least restrictive diet without signs or symptoms of aspiration for planned discharge setting  See evaluation for individualized goals  Patient will:    1  Tolerate regular diet level and thin liquids with no S/S of oral/pharyngeal dysphagia  across meals           Outcome: Completed Date Met: 04/10/18

## 2018-04-10 NOTE — CASE MANAGEMENT
Notification of Discharge  This is a Notification of Discharge from our facility [address]  Please be advised that this patient has been discharge from our facility  Below you will find the admission and discharge date and time including the patients disposition  PRESENTATION DATE: 3/31/2018  7:57 PM  IP ADMISSION DATE: 3/31/18 2142  DISCHARGE DATE: 4/10/2018  2:24 PM  DISPOSITION: Non [facility] SNF/TCU/SNU    [address] [facility] in the [facility] by [place] for 2017  Network Utilization Review Department  Phone: [phone]; Fax [phone]  ATTENTION: The Network Utilization Review Department is now centralized for our 7 Facilities  Make a note that we have a new phone and fax numbers for our Department  Please call with any questions or concerns to [phone] and carefully follow the prompts so that you are directed to the right person  All voicemails are confidential  Fax any determinations, approvals, denials, and requests for initial or continue stay review clinical to [phone]  Due to HIGH CALL volume, it would be easier if you could please send faxed requests to expedite your requests and in part, help us provide discharge notifications faster

## 2018-04-10 NOTE — CONSULTS
UROLOGY CONSULTATION NOTE     Patient Identifiers: Hazel Calero (MRN [id number])  Service Requesting Consultation: ICU  Service Providing Consultation:  Urology, Edgar Mandel MD    Date of Service: 4/10/2018    Reason for Consultation: SPT change      ASSESSMENT:     80 y o  old male with  chronic retention and suprapubic tube placement  PROCEDURE:     The patient's 25 [place] catheter was removed  Area was prepped with a Betadine solution  New 18 Emir[place] catheter was placed been catheter was irrigated with 60 cc of saline with easy irrigation and good return  Catheter was hooked to gravity drainage  Patient tolerated procedure well  PLAN:     Will maintain suprapubic tube to gravity drainage  The patient will require monthly changes  We will attempt to avoid overtreatment of bacteriuria unless the patient is truly symptomatic of infection  It is common to expect colonization given the chronic catheterization  Will optimize pulmonary status  Defer this management to the ICU team   Outpatient urologic follow-up  History of Present Illness:     Hazel Calero is a 80 y o  old male with chronic urinary retention  Had been managed with chronic urethral catheter however in February of 2018, the patient consented to placement of a suprapubic tube  This was performed in interventional Radiology in March  The patient was admitted to [facility] on the 31st of March with COPD exacerbation  Urine culture shows bacteriuria  There is a question as to whether this is colonization from chronic catheter or true infection  Patient has been on antibiotics  Request for suprapubic tube change has been placed  Past Medical, Past Surgical History:     Past Medical History:   Diagnosis Date    Anemia     Arthralgia     Atrial fibrillation (HCC)     BPH (benign prostatic hypertrophy)     CAD (coronary artery disease)     Cancer (HCC)     thyroid    Cardiac disease     atrial fib      Cardiomegaly     Carpal tunnel syndrome     CHF (congestive heart failure) (HCC)     Chronic kidney disease     Chronic systolic heart failure (HCC)     Ef 25%    Degenerative joint disease     Depression     Diabetes mellitus ([facility] [address] )     Disease of thyroid gland     Fracture of left radius     Gastroparesis     GERD (gastroesophageal reflux disease)     Gout     Hyperlipidemia     Hypertension     Hyperthyroidism     Hypokalemia     Hypothyroid     Hypoxia     Incomplete bladder emptying     Neuropathy     Osteoarthritis     Other fluid overload     fluid restriction    Premature ventricular contraction     Psychiatric disorder     depression    PVD (peripheral vascular disease) (HCC)     Renal disorder     stage 3 chronic kidney disease    Sick sinus syndrome (HCC)     Sleep apnea     Thrombocytopenia (HCC)     Tracheobronchitis     Tremor     Unstable angina (HCC)     Urinary retention    :    Past Surgical History:   Procedure Laterality Date    ABDOMINAL SURGERY      cholecystectomy    CARDIAC PACEMAKER PLACEMENT      CARPAL TUNNEL RELEASE      bilateral    CHOLECYSTECTOMY      COLONOSCOPY W/ POLYPECTOMY      CORONARY ARTERY BYPASS GRAFT      EYE SURGERY      bilateral CAT EXT W/IOL    HERNIA REPAIR      JOINT REPLACEMENT     :    Medications, Allergies:     Current Facility-Administered Medications:     acetaminophen (TYLENOL) tablet 650 mg, 650 mg, Oral, Q6H PRN, Ave Blackgum, DO, 650 mg at 04/10/18 0610    albuterol inhalation solution 2 5 mg, 2 5 mg, Nebulization, Q4H PRN, Ave Blackgum, DO    ascorbic acid (VITAMIN C) tablet 500 mg, 500 mg, Oral, Daily, Nikul Rodrigues, DO, 500 mg at 04/09/18 0849    aspirin chewable tablet 81 mg, 81 mg, Oral, Daily, Nikul Rodrigues, DO, 81 mg at 04/09/18 0849    atorvastatin (LIPITOR) tablet 40 mg, 40 mg, Oral, Daily With Adrián Randall, DO, 40 mg at 04/09/18 1742    b complex-vitamin C-folic acid (NEPHROCAPS) capsule 1 capsule, 1 capsule, Oral, Daily, Juan Jose Rodrigues DO, 1 capsule at 04/09/18 1743    benzonatate (TESSALON PERLES) capsule 100 mg, 100 mg, Oral, TID PRN, Doug Pardo DO, 100 mg at 04/06/18 0917    bumetanide (BUMEX) tablet 1 mg, 1 mg, Oral, Daily, Doug Pardo DO, 1 mg at 04/09/18 0849    carbidopa-levodopa (SINEMET)  mg per tablet 1 tablet, 1 tablet, Oral, TID, Raegan Prior, DO, 1 tablet at 04/09/18 2151    carvedilol (COREG) tablet 12 5 mg, 12 5 mg, Oral, BID With Meals, Doug Pardo DO, 12 5 mg at 04/09/18 1743    cholecalciferol (VITAMIN D3) tablet 2,000 Units, 2,000 Units, Oral, Daily, Juan Jose Rodrigues DO, 2,000 Units at 04/09/18 0849    fluticasone (FLONASE) 50 mcg/act nasal spray 1 spray, 1 spray, Each Nare, Daily, Doug Pardo DO, 1 spray at 04/09/18 0849    gabapentin (NEURONTIN) capsule 100 mg, 100 mg, Oral, BID, Juan Jose Rodrigues DO, 100 mg at 04/09/18 1742    guaiFENesin (MUCINEX) 12 hr tablet 600 mg, 600 mg, Oral, Q12H [facility], 600 mg at 04/09/18 2151    insulin detemir (LEVEMIR) subcutaneous injection 16 Units, 16 Units, Subcutaneous, HS, Doug Pardo DO, 16 Units at 04/09/18 2151    insulin lispro (HumaLOG) 100 units/mL subcutaneous injection 2-12 Units, 2-12 Units, Subcutaneous, TID AC, 2 Units at 04/09/18 1743 **AND** Fingerstick Glucose (POCT), , , TID AC, Juan Jose Rodrigues DO    insulin lispro (HumaLOG) 100 units/mL subcutaneous injection 2-12 Units, 2-12 Units, Subcutaneous, HS, Juan Jose Rodrigues DO, 2 Units at 04/09/18 2149    isosorbide mononitrate (IMDUR) 24 hr tablet 60 mg, 60 mg, Oral, Daily, Juan Jose Rodrigues DO, 60 mg at 04/09/18 0849    levothyroxine tablet 225 mcg, 225 mcg, Oral, Early Morning, Juan Jose Rodrigues DO, 225 mcg at 04/10/18 0508    loratadine (CLARITIN) tablet 10 mg, 10 mg, Oral, Daily, Juan Jose Rodrigues DO, 10 mg at 04/09/18 0849    polyethylene glycol (MIRALAX) packet 17 g, 17 g, Oral, Daily PRN, Doug Pardo DO, 17 g at 04/10/18 0017    sodium chloride (OCEAN) 0 65 % nasal spray 1 spray, 1 spray, Each Nare, TID, [employer], DO, 1 spray at 04/09/18 2151    warfarin (COUMADIN) tablet 5 mg, 5 mg, Oral, Daily (warfarin), [employer], DO, 5 mg at 04/09/18 1742    Allergies: Allergies   Allergen Reactions    Benicar [Olmesartan] Other (See Comments)     Can't recall    Cardura [Doxazosin]     Codeine Other (See Comments)     confusion    Cozaar [Losartan]     Cymbalta [Duloxetine Hcl]     Dyazide [Hydrochlorothiazide W-Triamterene]     Hctz [Hydrochlorothiazide]     Iodinated Diagnostic Agents     Lasix [Furosemide] Other (See Comments)     Can't recall    Lopressor [Metoprolol] Other (See Comments)     Can't recall    Lozol [Indapamide]     Prinivil [Lisinopril]     Procardia [Nifedipine]     Verapamil     Voltaren [Diclofenac Sodium]     Aldactone [Spironolactone] Other (See Comments)     Tremors;starts as a low reaction then gets worse    Latex Rash   :    Social and Family History:   Social History:   Social History   Substance Use Topics    Smoking status: Former Smoker     Packs/day: 3 00     Years: 30 00     Types: Cigarettes    Smokeless tobacco: Never Used      Comment: quit late 40's yrs old;total tobacco use 30yrs,used 3 pks of cigarettes form 3-4 yrs then changed to 10 cigars/day    Alcohol use No        History   Smoking Status    Former Smoker    Packs/day: 3 00    Years: 30 00    Types: Cigarettes   Smokeless Tobacco    Never Used     Comment: quit late 40's yrs old;total tobacco use 30yrs,used 3 pks of cigarettes form 3-4 yrs then changed to 10 cigars/day       Family History:  Family History   Problem Relation Age of Onset    Stroke Mother     Hypertension Daughter     Heart disease Brother     Diabetes Brother     Stroke Brother    :     Review of Systems:     General: Fever, chills, or night sweats: Negative  Cardiac: Negative for chest pain      Pulmonary: Positive for shortness of breath  Gastrointestinal: Abdominal pain negative  Nausea, vomiting, or diarrhea negative,  Genitourinary: See HPI above  Patient does not have hematuria  All other systems queried were negative  Physical Exam:   General: Patient is pleasant and in NAD  Awake and alert  /66 (BP Location: Left arm)   Pulse 70   Temp 99 5 °F (37 5 °C) (Temporal)   Resp 20   Ht 5' 11" (1 803 m)   Wt 116 kg (256 lb 13 4 oz)   SpO2 96%   BMI 35 82 kg/m²   Cardiac: Peripheral edema: positive  Pulmonary: Non-labored breathing  Abdomen: Soft, non-tender, non-distended  No surgical scars  No masses, tenderness, hernias noted  Genitourinary: Negative CVA tenderness, negative suprapubic tenderness  (Male):  Penis is edematous, phallus normal, meatus patent  Testicles descended into scrotum bilaterally without masses nor tenderness  No inguinal hernias bilaterally        SPT: purulent output    Labs:     Lab Results   Component Value Date    HGB 10 7 (L) 04/10/2018    HCT 33 8 (L) 04/10/2018    WBC 5 28 04/10/2018    PLT 97 (L) 04/10/2018   ]    Lab Results   Component Value Date     (H) 04/10/2018    K 3 4 (L) 04/10/2018     04/10/2018    CO2 35 (H) 04/10/2018    BUN 35 (H) 04/10/2018    CREATININE 1 93 (H) 04/10/2018    CALCIUM 9 0 04/10/2018    GLUCOSE 106 04/10/2018     Urine culture   Order: [id number] - Reflex for Order [id number]   Status:  Final result   Visible to patient:  No (Inaccessible in [address])   Next appt:  05/15/2018 at 03:40 PM in Gastroenterology ([facility], DO)   Specimen Information: Urine, Suprapubic catheter; Urine        Culture     >100,000 cfu/ml Pseudomonas aeruginosa        >100,000 cfu/ml Enterococcus faecalis        Susceptibility      Pseudomonas aeruginosa     MOR     Aztreonam ($$$)  16 ug/ml Intermediate     Cefepime ($) 16 00 ug/ml Intermediate     Ceftazidime ($$) >16 ug/ml Resistant     Ciprofloxacin ($)  <=1 00 ug/ml"><=1 00 ug/ml Susceptible Gentamicin ($$) 2 ug/ml Susceptible     Imipenem 1 ug/ml Susceptible     Levofloxacin ($) 2 00 ug/ml Susceptible     Meropenem ($$) 2 00 ug/ml Susceptible     Piperacillin + Tazobactam ($$$) >64 ug/ml Resistant     Tobramycin ($) <=1 ug/ml"><=1 ug/ml Susceptible     ZID Performed Yes             Susceptibility      Enterococcus faecalis     MOR     Ampicillin ($$) <=2 00 ug/ml"><=2 00 ug/ml Susceptible     Levofloxacin ($) >4 00 ug/ml Resistant     Nitrofurantoin <=32 ug/ml"><=32 ug/ml Susceptible     Tetracycline >8 ug/ml Resistant     Vancomycin ($) 1 00 ug/ml Susceptible     ZID Performed Yes                Specimen Collected: 03/31/18 20:57                  Imaging:   I personally reviewed the images and report of the following studies, and reviewed them with the patient:    Image guided suprapubic catheter insertion     Clinical history: Urinary retention  Unable to tolerate Motta catheter      Contrast: 34 mL of iodixanol (VISIPAQUE)     Fluoroscopy time: 1 4 minutes     Images: 3     Procedure: After explaining the risks and benefits of the procedure to the patient, informed consent was obtained  The patient was identified verbally and by wristband  Ultrasound was used to localize the bladder      Under Mac anesthesia, the patient was placed supine on the fluoroscopy skin  The skin overlying the bladder was prepped and draped in usual sterile fashion and local anesthesia obtained with 1% lidocaine solution  Under ultrasound guidance, a 19-gauge   needle was advanced in the suprapubic region into the bladder  Once urine was aspirated, a 0 035 Amplatz wire was inserted under fluoroscopic guidance  The tract was dilated in order to place a 22-[place] peel-away sheath through which an 18-[place]   Motta catheter was inserted  Under fluoroscopy, the peel-away sheath was removed, the retention balloon was filled with 4 mL of normal saline, and contrast was injected confirming proper positioning    The catheter was within the proximal urethra and was   repositioned into the bladder      A sterile dressing was applied  The patient tolerated the procedure well and left the department in stable condition      IMPRESSION:  Impression:     Successful suprapubic catheter insertion  Thank you for allowing me to participate in this patients care  Please do not hesitate to call with any additional questions    Marielos Wolfe MD

## 2018-04-10 NOTE — ASSESSMENT & PLAN NOTE
-possible gram-negative pneumonia  -continue IV cefepime through 4/8/18 per ID recommendations  - the patient will need repeat imaging in 1 month to ensure resolution on pneumonia

## 2018-04-10 NOTE — ASSESSMENT & PLAN NOTE
On admission the patient was started on IV Bumex 2 mg twice day  Cardiology was consulted and agreed with IV Bumex  On 4/5/18 he was switched from IV Bumex to PO bumex 2 mg PO daily and then on 4/7/18 his Bumex was decreased to 1 mg PO daily due to hypernatremia   He was discharged on this dose and he was instructed to continue to hold his zaroxolyn    -strict intake/output measurements  -daily weights  -continue coreg  -patient has an allergy to ARB's and Ace-inhibitors  -supplemental oxygen via nasal cannula to maintain oxygen saturation levels 92% and above  -PT/OT

## 2018-04-10 NOTE — DISCHARGE SUMMARY
Discharge- Jimmy Evie 11/1/1928, 80 y o  male MRN: [id number]    Unit/Bed#:  Encounter: [id number]    Primary Care Provider: Winston Tenorio DO   Date and time admitted to hospital: 3/31/2018  7:57 PM        Healthcare-associated pneumonia   Assessment & Plan    -possible gram-negative pneumonia  -continue IV cefepime through 4/8/18 per ID recommendations  - the patient will need repeat imaging in 1 month to ensure resolution on pneumonia  * Acute on chronic systolic CHF (congestive heart failure) ([facility] [address] )   Assessment & Plan    On admission the patient was started on IV Bumex 2 mg twice day  Cardiology was consulted and agreed with IV Bumex  On 4/5/18 he was switched from IV Bumex to PO bumex 2 mg PO daily and then on 4/7/18 his Bumex was decreased to 1 mg PO daily due to hypernatremia  He was discharged on this dose and he was instructed to continue to hold his zaroxolyn    -strict intake/output measurements  -daily weights  -continue coreg  -patient has an allergy to ARB's and Ace-inhibitors  -supplemental oxygen via nasal cannula to maintain oxygen saturation levels 92% and above  -PT/OT          Acute respiratory failure with hypoxia (HCC)   Assessment & Plan    -On admission the patient required BiPAP which he was successfully weaned off ofon 4/1/18  -continue supplemental oxygen via nasal cannula to maintain oxygen saturation levels of 92% and above  -continue the respiratory protocol  -continue the airway clearance protocol              Acute cystitis   Assessment & Plan    -associated with a suprapubic catheter   -secondary to Pseudomonas aeruginosa and Enterococcus faecalis  -patient completed course of IV cefepime on 4/8/18  IV vancomycin discontinued  On 4/6/18 per ID recommendations  -IV Ampicillin initiated on 4/6/18 through 4/8/18 per ID recommendations  - Urology consulted and exchanged suprapubic catheter 4/10/18 per ID recommendations          Pulmonary hypertension ([facility] [address] ) Assessment & Plan    -he will need outpatient follow-up with Pulmonology        Aortic stenosis   Assessment & Plan    -Cardiology is following the patient  -He will need an outpatient evaluation for a possible TAVR        Hypothyroidism   Assessment & Plan    -continue PO levothyroxine  Results for Nat Gomez (MRN [id number]) as of 4/2/2018 09:59   Ref  Range 4/1/2018 05:10   TSH 3RD GENERATON Latest Ref Range: 0 358 - 3 740 uIU/mL 1 770           Microalbuminuria   Assessment & Plan    -patient has an allergy to ARB's and Ace-inhibitors          Thrombocytopenia ([address] )   Assessment & Plan    -The patient was noted to have thrombocytopenia which remained stable during his hospitalization and he did not have any signs of bleeding  Hypokalemia   Assessment & Plan    The patient was noted be hypokalemic and received p o  and IV potassium supplementation during his hospitalization  Dysphagia   Assessment & Plan    -Patient evaluated by speech and was cleared for a regular diet  The patient has an outpatient appointment scheduled with Gastroenterology  Obstructive sleep apnea   Assessment & Plan    -Continue CPAP QHS and anytime while sleeping        Atrial fibrillation ([facility] [address] )   Assessment & Plan    -continue Coreg for heart rate control  -continue Coumadin for full anticoagulation        Stage 4 chronic kidney disease ([address] )   Assessment & Plan    By the day discharge the patient's creatinine was better than his baseline  Type 2 diabetes mellitus with hyperglycemia, with long-term current use of insulin ([address] )   Assessment & Plan    -follow patient's blood glucose trend  -continue current insulin regimen  -Results for Nat Gomez (MRN [id number]) as of 4/2/2018 09:59   Ref   Range 4/1/2018 05:10   Hemoglobin A1C Latest Ref Range: 4 2 - 6 3 % 6 9 (H)   EAG Latest Units: mg/dl 151           Essential hypertension   Assessment & Plan    -continue coreg, hydralazine, and imdur   The patient's blood pressure remained stable  Resolved Problems  Date Reviewed: 4/8/2018          Resolved    Acute on chronic kidney failure ([facility] [address] ) 4/1/2018     Resolved by  Toma Sparks,     CAD (coronary artery disease) 4/1/2018     Resolved by  Toma Sparks,     Fever 4/4/2018     Resolved by  Toma Sparks, DO    Abnormal chest x-ray 4/4/2018     Resolved by  Toma Sparks,           Admission Date: 3/31/2018     Admitting Diagnosis: CHF (congestive heart failure) ([facility]) [I50 9]  UTI (urinary tract infection) [N39 0]  SOB (shortness of breath) [R06 02]  Fever [R50 9]  Acute respiratory failure with hypoxia ([facility] [address] ) [J96 01]    HPI: Reva Wilkins is a 80 y o  male who presents from nursing home with shortness of breath that began this afternoon  He has a history of chronic systolic heart failure with an EF of 25% last seen on echo in December 2016  He had a meal with his family today for the holiday and may have had too much liquids  He has also been a little bit more somnolent lately as well per his daughter and his appetite has not been as robust   His daughter states that he appears a bit confused as well  Has been fatigued and complaining of a slightly increased cough  At the nursing home, he was desaturating to the low 80's and EMS started him on cpap to increase his saturation to 99%  ER staff noted that his weight is significantly increased from prior measurements  Patient also has increased lower extremity edema  In the ED, patient was started on nitro for an elevated systolic bp in the 033'I and given iv bumex  His breathing appears to be much more comfortable now while on bipap  He was also febrile to 102 5 upon arrival and has an indwelling suprapubic catheter  His mental status is also improved now and is oriented x 2-3        Procedures Performed:   Orders Placed This Encounter   Procedures    ED ECG Documentation Only       Hospital Course: please see above assessment and plan  Significant Findings, Care, Treatment and Services Provided: Creatinine 2 58, potassium 2 8    Complications: none    Condition at Discharge: good     Discharge instructions/Information to patient and family:   See after visit summary for information provided to patient and family  Provisions for Follow-Up Care:  See after visit summary for information related to follow-up care and any pertinent home health orders  Disposition: Skilled nursing facility at [facility]  Planned Readmission: No    Discharge Statement   I spent 25 minutes discharging the patient  This time was spent on the day of discharge  I had direct contact with the patient on the day of discharge  Additional documentation is required if more than 30 minutes were spent on discharge  Discharge Medications:  See after visit summary for reconciled discharge medications provided to patient and family

## 2018-04-12 NOTE — TELEPHONE ENCOUNTER
Spoke with Candi Bazzi at [facility]  Order given to Armando Anuj for monthly SPT changes  Spoke with Shannan Lamb re 6 month follow up with [facility] prior to visit  Pt scheduled 10/15/2018 at 1000 with Hank SEARS in the [place] office  Script for Jake Singh to Shannan Zainab [phone] will schedule prior to Oct 15th visit     ----- Message from Amanda Robles MD sent at 4/10/2018  7:37 AM EDT -----  I performed first SPT change in the hospital today  Inna Obregon can see him back in 6 mos with retroperitoneal US and cancel any sooner appts  Can we make sure patient is set up for outside catheter changes monthly?

## 2018-05-15 NOTE — PROGRESS NOTES
[facility] - Outpatient Consultation  Melonie Osorio 80 y o  male MRN: [id number]  Encounter: [id number]          ASSESSMENT AND PLAN:      80year old male     3  Dysphagia to solids  -given his advanced age and frailty will start with barium esophagram     2   Mild normocytic anemia  -this is likely due to anemia of chronic disease  -his advanced age precludes safe colonoscopy    His daughter accompanies to his appointment and she is in agreement to do noninvasive testing    Will have him follow up in a few months time  ______________________________________________________________________    HPI:  80year old male referred by PCP Dr Judy Bolivar  He has multiple medical problems including CHF, Afib, tremor  Pt lives in a NH at [facility] in [place]  He is wheelchair bound  Daughter reports he has had prior botox injections to the esophagus at [facility] a few years ago  Daughter states about 4 months ago when he ate mandarin oranges and then after eating them, she saw him regurgitate undigested mandarins  Weight is stable  Denies any nausea or vomiting  He takes lactulose for constipation  He has had speech eval which has recommended a regular diet with thin liquids  REVIEW OF SYSTEMS:    CONSTITUTIONAL: Denies any fever, chills, rigors, and weight loss  HEENT: No earache or tinnitus  Denies hearing loss or visual disturbances  CARDIOVASCULAR: No chest pain or palpitations  RESPIRATORY: Denies any cough, hemoptysis, shortness of breath or dyspnea on exertion  GASTROINTESTINAL: As noted in the History of Present Illness  GENITOURINARY: No problems with urination  Denies any hematuria or dysuria  NEUROLOGIC: No dizziness or vertigo, denies headaches  MUSCULOSKELETAL: Denies any muscle or joint pain  SKIN: Denies skin rashes or itching  ENDOCRINE: Denies excessive thirst  Denies intolerance to heat or cold    PSYCHOSOCIAL: Denies depression or anxiety  Denies any recent memory loss  Historical Information   Past Medical History:   Diagnosis Date    Anemia     Arthralgia     Atrial fibrillation (HCC)     BPH (benign prostatic hypertrophy)     CAD (coronary artery disease)     Cancer (HCC)     thyroid    Cardiac disease     atrial fib      Cardiomegaly     Carpal tunnel syndrome     CHF (congestive heart failure) (HCC)     Chronic kidney disease     Chronic systolic heart failure (HCC)     Ef 25%    Degenerative joint disease     Depression     Diabetes mellitus (HCC)     Disease of thyroid gland     Fracture of left radius     Gastroparesis     GERD (gastroesophageal reflux disease)     Gout     Hyperlipidemia     Hypertension     Hyperthyroidism     Hypokalemia     Hypothyroid     Hypoxia     Incomplete bladder emptying     Neuropathy     Osteoarthritis     Other fluid overload     fluid restriction    Premature ventricular contraction     Psychiatric disorder     depression    PVD (peripheral vascular disease) (HCC)     Renal disorder     stage 3 chronic kidney disease    Sick sinus syndrome (HCC)     Sleep apnea     Thrombocytopenia (HCC)     Tracheobronchitis     Tremor     Unstable angina (HCC)     Urinary retention      Past Surgical History:   Procedure Laterality Date    ABDOMINAL SURGERY      cholecystectomy    CARDIAC PACEMAKER PLACEMENT      CARPAL TUNNEL RELEASE      bilateral    CHOLECYSTECTOMY      COLONOSCOPY W/ POLYPECTOMY      CORONARY ARTERY BYPASS GRAFT      EYE SURGERY      bilateral CAT EXT W/IOL    HERNIA REPAIR      JOINT REPLACEMENT       Social History   History   Alcohol Use No     History   Drug Use No     History   Smoking Status    Former Smoker    Packs/day: 3 00    Years: 30 00    Types: Cigarettes   Smokeless Tobacco    Never Used     Comment: quit late 40's yrs old;total tobacco use 30yrs,used 3 pks of cigarettes form 3-4 yrs then changed to 10 cigars/day     Family History   Problem Relation Age of Onset    Stroke Mother     Hypertension Daughter     Heart disease Brother     Diabetes Brother     Stroke Brother        Meds/Allergies       Current Outpatient Prescriptions:     acetaminophen (TYLENOL) 325 mg tablet    ascorbic acid (VITAMIN C) 500 mg tablet    aspirin 81 MG tablet    atorvastatin (LIPITOR) 40 mg tablet    B Complex-C-Folic Acid (MICHEAL CAPS) 1 MG CAPS    benzocaine (ORAJEL) 10 % mucosal gel    bumetanide (BUMEX) 1 mg tablet    carbidopa-levodopa (SINEMET)  mg per tablet    carvedilol (COREG) 12 5 mg tablet    Cholecalciferol (VITAMIN D3) 2000 units TABS    diphenhydrAMINE (BENADRYL) 50 mg capsule    febuxostat (ULORIC) 80 MG TABS    gabapentin (NEURONTIN) 100 mg capsule    guaiFENesin (MUCINEX) 600 mg 12 hr tablet    hydrALAZINE (APRESOLINE) 50 mg tablet    insulin aspart (NovoLOG) 100 units/mL injection    insulin detemir (LEVEMIR) 100 units/mL subcutaneous injection    isosorbide mononitrate (IMDUR) 60 mg 24 hr tablet    lactulose 20 g/30 mL    levothyroxine 200 mcg tablet    loratadine (CLARITIN) 10 mg tablet    magnesium oxide (MAG-OX) 400 mg    nitroglycerin (NITROSTAT) 0 4 mg SL tablet    oxyCODONE-acetaminophen (PERCOCET) 5-325 mg per tablet    pantoprazole (PROTONIX) 40 mg tablet    polyethylene glycol-propylene glycol (SYSTANE) 0 4-0 3 %    potassium chloride (KLOR-CON) 20 mEq packet    Probiotic Product (JOHNNIE-BID PROBIOTIC PO)    repaglinide (PRANDIN) 0 5 mg tablet    Sennosides-Docusate Sodium (SENEXON-S PO)    sodium phosphate (FLEET) enema    sorbitol 70 % solution    WARFARIN SODIUM PO    Allergies   Allergen Reactions    Benicar [Olmesartan] Other (See Comments)     Can't recall    Cardura [Doxazosin]     Codeine Other (See Comments)     confusion    Cozaar [Losartan]     Cymbalta [Duloxetine Hcl]     Dyazide [Hydrochlorothiazide W-Triamterene]     Hctz [Hydrochlorothiazide]  Iodinated Diagnostic Agents     Lasix [Furosemide] Other (See Comments)     Can't recall    Lopressor [Metoprolol] Other (See Comments)     Can't recall    Lozol [Indapamide]     Prinivil [Lisinopril]     Procardia [Nifedipine]     Verapamil     Voltaren [Diclofenac Sodium]     Aldactone [Spironolactone] Other (See Comments)     Tremors;starts as a low reaction then gets worse    Latex Rash           Objective     Blood pressure 152/72, pulse 70, temperature 98 6 °F (37 °C), temperature source Tympanic, height 5' 11" (1 803 m), weight 122 kg (269 lb)  Body mass index is 37 52 kg/m²  PHYSICAL EXAM:      General Appearance:   Alert, cooperative, no distress   HEENT:   Normocephalic, atraumatic, anicteric      Neck:  Supple, symmetrical, trachea midline   Lungs:   Clear to auscultation bilaterally; no rales, rhonchi or wheezing; respirations unlabored    Heart[de-identified]   Regular rate and rhythm; no murmur, rub, or gallop  Abdomen:   Soft, non-tender, non-distended; normal bowel sounds; no masses, no organomegaly    Genitalia:   Deferred    Rectal:   Deferred    Extremities:  No cyanosis, clubbing or edema    Pulses:  2+ and symmetric    Skin:  No jaundice, rashes, or lesions    Lymph nodes:  No palpable cervical lymphadenopathy        Lab Results:   No visits with results within 1 Day(s) from this visit  Latest known visit with results is:   Admission on 03/31/2018, Discharged on 04/10/2018   No results displayed because visit has over 200 results  Radiology Results:   No results found

## 2018-05-15 NOTE — LETTER
May 17, 2018     Modesto Brittle, DO  [facility] [address]    Patient: Reva Wilkins   YOB: 1928   Date of Visit: 5/15/2018       Dear Dr Susan Clarke:    Thank you for referring Reva Wilkins to me for evaluation  Below are my notes for this consultation  If you have questions, please do not hesitate to call me  I look forward to following your patient along with you  Sincerely,        Carin Ro DO        CC: No Recipients  Carin Ro,   5/17/2018 12:28 PM  Sign at close encounter  [facility] - Outpatient Consultation  Reva Wilkins 80 y o  male MRN: [id number]  Encounter: [id number]          ASSESSMENT AND PLAN:      80year old male     1  Dysphagia to solids  -given his advanced age and frailty will start with barium esophagram     2   Mild normocytic anemia  -this is likely due to anemia of chronic disease  -his advanced age precludes safe colonoscopy    His daughter accompanies to his appointment and she is in agreement to do noninvasive testing    Will have him follow up in a few months time  ______________________________________________________________________    HPI:  80year old male referred by PCP Dr Susan Clarke  He has multiple medical problems including CHF, Afib, tremor  Pt lives in a NH at [facility] in [place]  He is wheelchair bound  Daughter reports he has had prior botox injections to the esophagus at [facility] a few years ago  Daughter states about 4 months ago when he ate mandarin oranges and then after eating them, she saw him regurgitate undigested mandarins  Weight is stable  Denies any nausea or vomiting  He takes lactulose for constipation  He has had speech eval which has recommended a regular diet with thin liquids  REVIEW OF SYSTEMS:    CONSTITUTIONAL: Denies any fever, chills, rigors, and weight loss  HEENT: No earache or tinnitus   Denies hearing loss or visual disturbances  CARDIOVASCULAR: No chest pain or palpitations  RESPIRATORY: Denies any cough, hemoptysis, shortness of breath or dyspnea on exertion  GASTROINTESTINAL: As noted in the History of Present Illness  GENITOURINARY: No problems with urination  Denies any hematuria or dysuria  NEUROLOGIC: No dizziness or vertigo, denies headaches  MUSCULOSKELETAL: Denies any muscle or joint pain  SKIN: Denies skin rashes or itching  ENDOCRINE: Denies excessive thirst  Denies intolerance to heat or cold  PSYCHOSOCIAL: Denies depression or anxiety  Denies any recent memory loss  Historical Information   Past Medical History:   Diagnosis Date    Anemia     Arthralgia     Atrial fibrillation (HCC)     BPH (benign prostatic hypertrophy)     CAD (coronary artery disease)     Cancer (HCC)     thyroid    Cardiac disease     atrial fib      Cardiomegaly     Carpal tunnel syndrome     CHF (congestive heart failure) (HCC)     Chronic kidney disease     Chronic systolic heart failure (HCC)     Ef 25%    Degenerative joint disease     Depression     Diabetes mellitus ([address] )     Disease of thyroid gland     Fracture of left radius     Gastroparesis     GERD (gastroesophageal reflux disease)     Gout     Hyperlipidemia     Hypertension     Hyperthyroidism     Hypokalemia     Hypothyroid     Hypoxia     Incomplete bladder emptying     Neuropathy     Osteoarthritis     Other fluid overload     fluid restriction    Premature ventricular contraction     Psychiatric disorder     depression    PVD (peripheral vascular disease) (HCC)     Renal disorder     stage 3 chronic kidney disease    Sick sinus syndrome (HCC)     Sleep apnea     Thrombocytopenia (HCC)     Tracheobronchitis     Tremor     Unstable angina (HCC)     Urinary retention      Past Surgical History:   Procedure Laterality Date    ABDOMINAL SURGERY      cholecystectomy    CARDIAC PACEMAKER PLACEMENT      CARPAL TUNNEL RELEASE      bilateral    CHOLECYSTECTOMY      COLONOSCOPY W/ POLYPECTOMY      CORONARY ARTERY BYPASS GRAFT      EYE SURGERY      bilateral CAT EXT W/IOL    HERNIA REPAIR      JOINT REPLACEMENT       Social History   History   Alcohol Use No     History   Drug Use No     History   Smoking Status    Former Smoker    Packs/day: 3 00    Years: 30 00    Types: Cigarettes   Smokeless Tobacco    Never Used     Comment: quit late 42's yrs old;total tobacco use 30yrs,used 3 pks of cigarettes form 3-4 yrs then changed to 10 cigars/day     Family History   Problem Relation Age of Onset    Stroke Mother     Hypertension Daughter     Heart disease Brother     Diabetes Brother     Stroke Brother        Meds/Allergies       Current Outpatient Prescriptions:     acetaminophen (TYLENOL) 325 mg tablet    ascorbic acid (VITAMIN C) 500 mg tablet    aspirin 81 MG tablet    atorvastatin (LIPITOR) 40 mg tablet    B Complex-C-Folic Acid (MICHEAL CAPS) 1 MG CAPS    benzocaine (ORAJEL) 10 % mucosal gel    bumetanide (BUMEX) 1 mg tablet    carbidopa-levodopa (SINEMET)  mg per tablet    carvedilol (COREG) 12 5 mg tablet    Cholecalciferol (VITAMIN D3) 2000 units TABS    diphenhydrAMINE (BENADRYL) 50 mg capsule    febuxostat (ULORIC) 80 MG TABS    gabapentin (NEURONTIN) 100 mg capsule    guaiFENesin (MUCINEX) 600 mg 12 hr tablet    hydrALAZINE (APRESOLINE) 50 mg tablet    insulin aspart (NovoLOG) 100 units/mL injection    insulin detemir (LEVEMIR) 100 units/mL subcutaneous injection    isosorbide mononitrate (IMDUR) 60 mg 24 hr tablet    lactulose 20 g/30 mL    levothyroxine 200 mcg tablet    loratadine (CLARITIN) 10 mg tablet    magnesium oxide (MAG-OX) 400 mg    nitroglycerin (NITROSTAT) 0 4 mg SL tablet    oxyCODONE-acetaminophen (PERCOCET) 5-325 mg per tablet    pantoprazole (PROTONIX) 40 mg tablet    polyethylene glycol-propylene glycol (SYSTANE) 0 4-0 3 %    potassium chloride (KLOR-CON) 20 mEq packet    Probiotic Product (JOHNNIE-BID PROBIOTIC PO)    repaglinide (PRANDIN) 0 5 mg tablet    Sennosides-Docusate Sodium (SENEXON-S PO)    sodium phosphate (FLEET) enema    sorbitol 70 % solution    WARFARIN SODIUM PO    Allergies   Allergen Reactions    Benicar [Olmesartan] Other (See Comments)     Can't recall    Cardura [Doxazosin]     Codeine Other (See Comments)     confusion    Cozaar [Losartan]     Cymbalta [Duloxetine Hcl]     Dyazide [Hydrochlorothiazide W-Triamterene]     Hctz [Hydrochlorothiazide]     Iodinated Diagnostic Agents     Lasix [Furosemide] Other (See Comments)     Can't recall    Lopressor [Metoprolol] Other (See Comments)     Can't recall    Lozol [Indapamide]     Prinivil [Lisinopril]     Procardia [Nifedipine]     Verapamil     Voltaren [Diclofenac Sodium]     Aldactone [Spironolactone] Other (See Comments)     Tremors;starts as a low reaction then gets worse    Latex Rash           Objective     Blood pressure 152/72, pulse 70, temperature 98 6 °F (37 °C), temperature source Tympanic, height 5' 11" (1 803 m), weight 122 kg (269 lb)  Body mass index is 37 52 kg/m²  PHYSICAL EXAM:      General Appearance:   Alert, cooperative, no distress   HEENT:   Normocephalic, atraumatic, anicteric      Neck:  Supple, symmetrical, trachea midline   Lungs:   Clear to auscultation bilaterally; no rales, rhonchi or wheezing; respirations unlabored    Heart[de-identified]   Regular rate and rhythm; no murmur, rub, or gallop  Abdomen:   Soft, non-tender, non-distended; normal bowel sounds; no masses, no organomegaly    Genitalia:   Deferred    Rectal:   Deferred    Extremities:  No cyanosis, clubbing or edema    Pulses:  2+ and symmetric    Skin:  No jaundice, rashes, or lesions    Lymph nodes:  No palpable cervical lymphadenopathy        Lab Results:   No visits with results within 1 Day(s) from this visit     Latest known visit with results is:   Admission on 03/31/2018, Discharged on 04/10/2018   No results displayed because visit has over 200 results  Radiology Results:   No results found

## 2018-06-07 NOTE — TELEPHONE ENCOUNTER
Dr Wilkerson Kinds pt    Please call the patients daughter, Cassandra Lynn, back she has general questions in regards to a possible future procedure   [phone]

## 2018-06-13 NOTE — TELEPHONE ENCOUNTER
----- Message from Ashlyn Parekh MA sent at 6/13/2018  8:08 AM EDT -----  Regarding: FW: EGD      ----- Message -----  From: [employer], DO  Sent: 6/12/2018   6:18 PM  To: [facility]  Subject: FW: EGD                                          I can discuss it with them in the office when they come back  I wouldn't put on the schedule just yet  ----- Message -----  From: Dena Mcbride MA  Sent: 6/8/2018   4:37 PM  To: [employer]  Subject: EGD                                              Dr Thompson,    Pt daughter Boston Rinne called states pt had a barrium swallow recommendations were for pt to have EGD with dilation  She states when pt eats ice cream,softer foods or fruits he reaches for a cup fluids & sometimes mucus comes up & even when swallowing pills  Please let me know if pt will need EGD? She will follow up with us next wk

## 2018-07-05 NOTE — TELEPHONE ENCOUNTER
Dr Marian Chu pt    Per Kristin Garcia from [facility] called to request a sooner appt due to the patient not being able to keep anything down and constantly vomiting  No sooner appts showing in [place] please assist in scheduling  Thank you [phone] ext   2

## 2018-07-06 PROBLEM — R23.0 PERIORAL CYANOSIS: Status: RESOLVED | Noted: 2017-04-13 | Resolved: 2018-01-01

## 2018-07-06 PROBLEM — N17.9 AKI (ACUTE KIDNEY INJURY) (HCC): Status: ACTIVE | Noted: 2018-01-01

## 2018-07-06 PROBLEM — R82.71 BACTERIURIA: Status: ACTIVE | Noted: 2018-01-01

## 2018-07-06 NOTE — ASSESSMENT & PLAN NOTE
Hemoglobin A1c in April was 6 7  Continue Lantus  Accu-Cheks AC and HS with algorithm for sliding scale coverage

## 2018-07-06 NOTE — H&P
H&P Exam - Khari Maravilla 80 y o  male MRN: [id number]    Unit/Bed#: Hall01 Encounter: [id number]      Acute on chronic systolic CHF (congestive heart failure) ([facility])   Assessment & Plan    Bumex 1 mg IV b i d  Low-salt diet  Monitor intake and output  Daily weights  Check troponin x2  Echocardiogram from April revealed an ejection fraction of 35% with moderate diffuse hypokinesis        GERARDO (acute kidney injury) ([facility] )   Assessment & Plan    Possibly due to cardiorenal syndrome  Check renal ultrasound  Check urine sodium and creatinine  Nephrology consult  Monitor intake and output        Stage 4 chronic kidney disease ([facility])   Assessment & Plan    Baseline creatinine is 2-2 5        Bacteriuria   Assessment & Plan    I suspect patient is colonized  Will hold off on antibiotics for now and follow up urine culture  Check a pro-calcitonin now and in the morning         GERD (gastroesophageal reflux disease)   Assessment & Plan    Continue Protonix        Suprapubic catheter [facility])   Assessment & Plan    Chronic  See treatment plan for bacteriuria        Chronic atrial fibrillation ([facility] )   Assessment & Plan    Rate controlled on Coreg  INR is therapeutic  Continue Coumadin, Coreg and check daily INR  Tele monitoring        Type 2 diabetes mellitus with hyperglycemia, with long-term current use of insulin ([facility])   Assessment & Plan    Hemoglobin A1c in April was 6 7  Continue Lantus  Accu-Cheks AC and HS with algorithm for sliding scale coverage              History of Present Illness      The patient is an 80year old male known to me from previous hospitalizations with a past medical history significant for atrial fibrillation, chronic systolic congestive heart failure, type 2 diabetes mellitus who presents today to the emergency room from [facility] with a chief complaint of weakness, fatigue, and shortness of breath  The patient states that he is having worsening abdominal distension    He states that he has been receiving metolazone for the past 2 days without improvement of his symptoms  He has a slight dry cough  No fevers or chills  He denies any chest pain / nausea / vomiting / diarrhea  The patient has a chronic SPT and offers no complaints of fevers/chills or flank pain  Review of Systems   Constitutional: Negative for chills and fever  Respiratory: Positive for cough and shortness of breath  Cardiovascular: Positive for leg swelling  Negative for chest pain and palpitations  Gastrointestinal: Positive for abdominal distention  Genitourinary: Negative for flank pain  All other systems reviewed and are negative  Historical Information   Past Medical History:   Diagnosis Date    Anemia     Arthralgia     Atrial fibrillation (HCC)     BPH (benign prostatic hypertrophy)     CAD (coronary artery disease)     Cancer (HCC)     thyroid    Cardiac disease     atrial fib      Cardiomegaly     Carpal tunnel syndrome     CHF (congestive heart failure) (HCC)     Chronic kidney disease     Chronic systolic heart failure (HCC)     Ef 25%    Degenerative joint disease     Depression     Diabetes mellitus ([facility] )     Disease of thyroid gland     Fracture of left radius     Gastroparesis     GERD (gastroesophageal reflux disease)     Gout     Hyperlipidemia     Hypertension     Hyperthyroidism     Hypokalemia     Hypothyroid     Hypoxia     Incomplete bladder emptying     Neuropathy     Osteoarthritis     Other fluid overload     fluid restriction    Premature ventricular contraction     Psychiatric disorder     depression    PVD (peripheral vascular disease) (HCC)     Renal disorder     stage 3 chronic kidney disease    Sick sinus syndrome (HCC)     Sleep apnea     Thrombocytopenia (HCC)     Tracheobronchitis     Tremor     Unstable angina (HCC)     Urinary retention      Past Surgical History:   Procedure Laterality Date    ABDOMINAL SURGERY      cholecystectomy    CARDIAC PACEMAKER PLACEMENT      CARPAL TUNNEL RELEASE      bilateral    CHOLECYSTECTOMY      COLONOSCOPY W/ POLYPECTOMY      CORONARY ARTERY BYPASS GRAFT      EYE SURGERY      bilateral CAT EXT W/IOL    HERNIA REPAIR      JOINT REPLACEMENT       Social History   History   Alcohol Use No     History   Drug Use No     History   Smoking Status    Former Smoker    Packs/day: 3 00    Years: 30 00    Types: Cigarettes   Smokeless Tobacco    Never Used     Comment: quit late 40's yrs old;total tobacco use 30yrs,used 3 pks of cigarettes form 3-4 yrs then changed to 10 cigars/day     Family History: non-contributory    Meds/Allergies   all medications and allergies reviewed  Allergies   Allergen Reactions    Benicar [Olmesartan] Other (See Comments)     Can't recall    Cardura [Doxazosin]     Codeine Other (See Comments)     confusion    Cozaar [Losartan]     Cymbalta [Duloxetine Hcl]     Dyazide [Hydrochlorothiazide W-Triamterene]     Hctz [Hydrochlorothiazide]     Iodinated Diagnostic Agents     Lasix [Furosemide] Other (See Comments)     Can't recall    Lopressor [Metoprolol] Other (See Comments)     Can't recall    Lozol [Indapamide]     Prinivil [Lisinopril]     Procardia [Nifedipine]     Verapamil     Voltaren [Diclofenac Sodium]     Aldactone [Spironolactone] Other (See Comments)     Tremors;starts as a low reaction then gets worse    Latex Rash       Objective   First Vitals:   Blood Pressure: 124/58 (07/06/18 1338)  Pulse: 69 (07/06/18 1338)  Temperature: 98 1 °F (36 7 °C) (07/06/18 1338)  Temp Source: Temporal (07/06/18 1338)  Respirations: 18 (07/06/18 1338)  Height: 5' 11" (180 3 cm) (07/06/18 1338)  Weight - Scale: 122 kg (268 lb 15 4 oz) (07/06/18 1338)  SpO2: 98 % (07/06/18 1338)    Current Vitals:   Blood Pressure: 144/66 (07/06/18 1630)  Pulse: 69 (07/06/18 1630)  Temperature: 98 1 °F (36 7 °C) (07/06/18 1338)  Temp Source: Temporal (07/06/18 1338)  Respirations: 20 (07/06/18 1630)  Height: 5' 11" (180 3 cm) (07/06/18 1338)  Weight - Scale: 122 kg (268 lb 15 4 oz) (07/06/18 1338)  SpO2: 98 % (07/06/18 1630)    No intake or output data in the 24 hours ending 07/06/18 1649    Invasive Devices     Peripheral Intravenous Line            Peripheral IV 07/06/18 Left Antecubital less than 1 day          Drain            Suprapubic Catheter Non-latex 18 Fr  126 days                Physical Exam   Constitutional: He is oriented to person, place, and time  He appears well-developed  HENT:   Head: Normocephalic  Mouth/Throat: No oropharyngeal exudate  Eyes: Pupils are equal, round, and reactive to light  No scleral icterus  Neck: Neck supple  No JVD present  Cardiovascular: Normal rate  Paced rhythm    Pulmonary/Chest: He has no wheezes  He has rales  Abdominal: Soft  Bowel sounds are normal  He exhibits distension  There is no tenderness  There is no rebound and no guarding  Musculoskeletal: Normal range of motion  He exhibits edema  He exhibits no tenderness  Neurological: He is alert and oriented to person, place, and time  No cranial nerve deficit  Skin: Skin is warm and dry  He is not diaphoretic  Lab Results:   Lab Results   Component Value Date    WBC 9 73 07/06/2018    HGB 11 3 (L) 07/06/2018    HCT 36 5 07/06/2018     (H) 07/06/2018    PLT 81 (L) 07/06/2018     Lab Results   Component Value Date    GLUCOSE 123 07/06/2018    CALCIUM 8 7 07/06/2018     07/06/2018    K 4 6 07/06/2018    CO2 30 07/06/2018     07/06/2018    BUN 56 (H) 07/06/2018    CREATININE 2 98 (H) 07/06/2018       Imaging:   XR chest 2 views   Final Result      Bilateral basilar opacities and small effusions suggestive of mild pulmonary edema  Superimposed infection not excluded              Workstation performed: [id number]             EKG, Pathology, and Other Studies:   Paced    Code Status: Prior  Advance Directive and Living Will: Yes    Power of :    POLST: Counseling / Coordination of Care:

## 2018-07-06 NOTE — ED NOTES
[facility]  07/06/18 [id number]

## 2018-07-06 NOTE — ASSESSMENT & PLAN NOTE
Possibly due to cardiorenal syndrome  Renal function improving  FENA is 3 8%, will await Nephrology input  Continue to monitor intake and output

## 2018-07-06 NOTE — PLAN OF CARE
INFECTION - ADULT     Absence or prevention of progression during hospitalization Progressing        Knowledge Deficit     Patient/family/caregiver demonstrates understanding of disease process, treatment plan, medications, and discharge instructions Progressing        PAIN - ADULT     Verbalizes/displays adequate comfort level or baseline comfort level Progressing        Potential for Falls     Patient will remain free of falls Progressing        Prexisting or High Potential for Compromised Skin Integrity     Skin integrity is maintained or improved Progressing        SAFETY ADULT     Maintain or return to baseline ADL function Progressing     Maintain or return mobility status to optimal level Progressing

## 2018-07-06 NOTE — ED PROVIDER NOTES
History  Chief Complaint   Patient presents with    Weakness - Generalized     generalized weakness x "a few days"     80 yr male BIBA from [facility] for "i feel shitty" yesterday and today  SNF reports general weakness fatigue unable to stand today  Pt reports he does not know what is wrong just "feel shitty"  Denies headache dizziness  Denies vomiting diarrhea  Denies belly pain  Denies chest pain  Denies shortness of breath  Mild cough but unchanged  Has suprapubic cath urine flowing no pain  Did have recent medication change added metolazone per daughter, unsure dose thinks it is daily frequency  History provided by:  Patient      Prior to Admission Medications   Prescriptions Last Dose Informant Patient Reported? Taking? B Complex-C-Folic Acid (MICHEAL CAPS) 1 MG CAPS 7/6/2018 at 0900 Other (Specify) Yes No   Sig: Take 1 capsule by mouth daily  Cholecalciferol (VITAMIN D3) 2000 units TABS 7/6/2018 at 0900 Other (Specify) Yes No   Sig: Take 2,000 Units by mouth daily   Probiotic Product (JOHNNIE-BID PROBIOTIC PO) 7/6/2018 at 0900 Other (Specify) Yes No   Sig: Take 1 tablet by mouth daily   Sennosides-Docusate Sodium (SENEXON-S PO) 7/6/2018 at 0900 Other (Specify) Yes Yes   Sig: Take 1 tablet by mouth daily  WARFARIN SODIUM PO 7/5/2018 at 2100 Other (Specify) Yes Yes   Sig: Take 6 5 mg by mouth daily at bedtime   acetaminophen (TYLENOL) 325 mg tablet Unknown at Unknown time Other (Specify) Yes No   Sig: Take 650 mg by mouth every 6 (six) hours as needed for mild pain or fever (not to exceed 3 gm/24 hrs)  ascorbic acid (VITAMIN C) 500 mg tablet 7/6/2018 at 0900 Other (Specify) Yes No   Sig: Take 500 mg by mouth daily   aspirin 81 MG tablet 7/6/2018 at 0900 Other (Specify) Yes No   Sig: Take 81 mg by mouth daily     atorvastatin (LIPITOR) 40 mg tablet 7/5/2018 at 1700 Other (Specify) No No   Sig: Take 1 tablet (40 mg total) by mouth daily with dinner   benzocaine (ORAJEL) 10 % mucosal gel Unknown at Unknown time Other (Specify) Yes No   Sig: Apply 1 application to the mouth or throat 3 (three) times a day as needed for mucositis   bumetanide (BUMEX) 1 mg tablet 7/6/2018 at 0800 Other (Specify) No No   Sig: Take 1 tablet (1 mg total) by mouth daily   carbidopa-levodopa (SINEMET)  mg per tablet 7/6/2018 at 0900 Other (Specify) Yes No   Sig: Take 1 tablet by mouth 3 (three) times a day  carvedilol (COREG) 12 5 mg tablet 7/6/2018 at 0900 Other (Specify) Yes No   Sig: Take 12 5 mg by mouth 2 (two) times a day with meals  Hold for HR < 60 or SBP < 100  diphenhydrAMINE (BENADRYL) 50 mg capsule Unknown at Unknown time Other (Specify) Yes No   Sig: Take 50 mg by mouth every 8 (eight) hours as needed for itching  febuxostat (ULORIC) 80 MG TABS 7/6/2018 at 0900 Other (Specify) Yes No   Sig: Take 80 mg by mouth daily   gabapentin (NEURONTIN) 100 mg capsule 7/6/2018 at 0900 Other (Specify) Yes No   Sig: Take 100 mg by mouth 2 (two) times a day Two capsules (200 mg) twice daily    guaiFENesin (MUCINEX) 600 mg 12 hr tablet 7/6/2018 at 0900 Other (Specify) No No   Sig: Take 1 tablet (600 mg total) by mouth every 12 (twelve) hours   hydrALAZINE (APRESOLINE) 50 mg tablet 7/6/2018 at 0800 Other (Specify) Yes Yes   Sig: Take 50 mg by mouth every 8 (eight) hours  insulin aspart (NovoLOG) 100 units/mL injection 7/6/2018 at 0800 Other (Specify) Yes No   Sig: Inject under the skin 2 (two) times a day before meals Indications: sliding scale  Accuchecks twice daily at 0700 and 1600 with coverage per sliding scale    insulin detemir (LEVEMIR) 100 units/mL subcutaneous injection 7/5/2018 at 2100 Other (Specify) No No   Sig: Inject 16 Units under the skin daily at bedtime   isosorbide mononitrate (IMDUR) 60 mg 24 hr tablet 7/6/2018 at 0900 Other (Specify) Yes No   Sig: Take 60 mg by mouth daily   Hold for SBP < 110    lactulose 20 g/30 mL 7/6/2018 at 0900 Other (Specify) Yes No   Sig: Take 20 g by mouth 3 (three) times a week  On Monday, Wednesday, and Friday mornings only   levothyroxine 200 mcg tablet 7/6/2018 at 0700 Other (Specify) Yes No   Sig: Take 225 mcg by mouth daily in the early morning 200 mcg tablet plus 25 mcg tablet to equal 225 mcg dose    loratadine (CLARITIN) 10 mg tablet 7/6/2018 at 0900 Other (Specify) Yes No   Sig: Take 10 mg by mouth daily   magnesium oxide (MAG-OX) 400 mg 7/6/2018 at 0900 Other (Specify) Yes No   Sig: Take 400 mg by mouth 2 (two) times a day     nitroglycerin (NITROSTAT) 0 4 mg SL tablet Unknown at Unknown time Other (Specify) Yes No   Sig: Place 0 4 mg under the tongue every 5 (five) minutes as needed for chest pain  oxyCODONE-acetaminophen (PERCOCET) 5-325 mg per tablet Unknown at Unknown time Other (Specify) Yes No   Sig: Take 1 tablet by mouth every 4 (four) hours as needed for moderate pain   pantoprazole (PROTONIX) 40 mg tablet 7/6/2018 at 0700 Other (Specify) Yes No   Sig: Take 40 mg by mouth daily in the early morning  polyethylene glycol-propylene glycol (SYSTANE) 0 4-0 3 % 7/6/2018 at 0900 Other (Specify) Yes No   Sig: Administer 1 drop to both eyes 3 (three) times a day  potassium chloride (KLOR-CON) 20 mEq packet 7/6/2018 at 1200 Other (Specify) Yes No   Sig: Take 20 mEq by mouth 4 (four) times a day   repaglinide (PRANDIN) 0 5 mg tablet 7/6/2018 at 1200 Other (Specify) Yes Yes   Sig: Take 1 mg by mouth 3 (three) times a day before meals     sodium phosphate (FLEET) enema Unknown at Unknown time Other (Specify) Yes No   Sig: Insert 1 enema into the rectum once follow package directions   sorbitol 70 % solution Unknown at Unknown time Other (Specify) Yes No   Sig: Take 15 mL by mouth daily as needed (15 ml if no BM on day 3, 30 ml if no BM on day 4)          Facility-Administered Medications: None       Past Medical History:   Diagnosis Date    Anemia     Arthralgia     Atrial fibrillation (HCC)     BPH (benign prostatic hypertrophy)     CAD (coronary artery disease)     Cancer ([facility] )     thyroid    Cardiac disease     atrial fib   Cardiomegaly     Carpal tunnel syndrome     [facility]     Chronic kidney disease     Chronic systolic heart failure (HCC)     Ef 25%    Degenerative joint disease     Depression     Diabetes mellitus ([facility] )     Disease of thyroid gland     Fracture of left radius     Gastroparesis     GERD (gastroesophageal reflux disease)     Gout     Hyperlipidemia     Hypertension     Hyperthyroidism     Hypokalemia     Hypothyroid     Hypoxia     Incomplete bladder emptying     Neuropathy     Osteoarthritis     Other fluid overload     fluid restriction    Premature ventricular contraction     Psychiatric disorder     depression    PVD [facility]peripheral vascular disease) ([facility]     Renal disorder     stage 3 chronic kidney disease    Sick sinus syndrome (HCC)     Sleep apnea     Thrombocytopenia (HCC)     Tracheobronchitis     Tremor     Unstable angina (HCC)     Urinary retention        Past Surgical History:   Procedure Laterality Date    ABDOMINAL SURGERY      cholecystectomy    CARDIAC PACEMAKER PLACEMENT      CARPAL TUNNEL RELEASE      bilateral    CHOLECYSTECTOMY      COLONOSCOPY W/ POLYPECTOMY      CORONARY ARTERY BYPASS GRAFT      EYE SURGERY      bilateral CAT EXT W/IOL    HERNIA REPAIR      JOINT REPLACEMENT         Family History   Problem Relation Age of Onset    Stroke Mother     Hypertension Daughter     Heart disease Brother     Diabetes Brother     Stroke Brother      I have reviewed and agree with the history as documented      Social History   Substance Use Topics    Smoking status: Former Smoker     Packs/day: 3 00     Years: 30 00     Types: Cigarettes    Smokeless tobacco: Never Used      Comment: quit late 40's yrs old;total tobacco use 30yrs,used 3 pks of cigarettes form 3-4 yrs then changed to 10 cigars/day    Alcohol use No        Review of Systems Constitutional: Positive for activity change, appetite change and fatigue  Negative for chills, diaphoresis, fever and unexpected weight change  HENT: Negative for congestion, ear pain, rhinorrhea, sinus pressure, sore throat and tinnitus  Eyes: Negative for visual disturbance  Respiratory: Negative for cough, chest tightness, shortness of breath and wheezing  Cardiovascular: Positive for leg swelling  Negative for chest pain and palpitations  Gastrointestinal: Positive for abdominal distention  Negative for abdominal pain, constipation, diarrhea, nausea and vomiting  Genitourinary: Negative for decreased urine volume, dysuria, flank pain, frequency, hematuria, scrotal swelling and urgency  Suprapubic catheter   Musculoskeletal: Positive for gait problem  Negative for arthralgias, back pain and myalgias  Skin: Negative for rash and wound  Neurological: Negative for dizziness, tremors, syncope, weakness, numbness and headaches  Hematological: Bruises/bleeds easily (warfarin (afib))  Psychiatric/Behavioral: Negative for confusion and sleep disturbance  Physical Exam  Physical Exam   Constitutional: He is oriented to person, place, and time  Vital signs are normal   Non-toxic appearance  No distress  Elderly  Appears mildly volume overloaded- abdomen, LEs   HENT:   Head: Normocephalic and atraumatic  Right Ear: Tympanic membrane and external ear normal    Left Ear: Tympanic membrane and external ear normal    Nose: Nose normal  No rhinorrhea  Mouth/Throat: Uvula is midline and oropharynx is clear and moist    Eyes: Conjunctivae, EOM and lids are normal  Pupils are equal, round, and reactive to light  Right eye exhibits no discharge  Left eye exhibits no discharge  Neck: Normal range of motion and full passive range of motion without pain  Neck supple  No JVD present  No thyromegaly present     Cardiovascular: Normal rate, regular rhythm, normal heart sounds and intact distal pulses  No murmur heard  Pacemaker chest wall   Pulmonary/Chest: Effort normal and breath sounds normal  No accessory muscle usage  No tachypnea  No respiratory distress  Diminished bases b/l   Abdominal: Soft  Normal appearance and bowel sounds are normal  He exhibits distension  There is no hepatosplenomegaly  There is no tenderness  There is no rebound and no CVA tenderness  No hernia  Musculoskeletal: Normal range of motion  He exhibits edema (bilateral LE wrapped in bandages for edema; mild-moderate edema)  He exhibits no tenderness  Lymphadenopathy:     He has no cervical adenopathy  Neurological: He is alert and oriented to person, place, and time  No cranial nerve deficit or sensory deficit  Skin: Skin is warm, dry and intact  Capillary refill takes less than 2 seconds  No rash noted  He is not diaphoretic  No erythema  No pallor  Psychiatric: He has a normal mood and affect  His speech is normal and behavior is normal    Nursing note and vitals reviewed        Vital Signs  ED Triage Vitals [07/06/18 1338]   Temperature Pulse Respirations Blood Pressure SpO2   98 1 °F (36 7 °C) 69 18 124/58 98 %      Temp Source Heart Rate Source Patient Position - Orthostatic VS BP Location FiO2 (%)   Temporal Monitor Lying Left arm --      Pain Score       No Pain           Vitals:    07/06/18 1600 07/06/18 1630 07/06/18 1700 07/06/18 1748   BP: 138/65 144/66 134/69 141/63   Pulse: 69 69 69 70   Patient Position - Orthostatic VS: Lying Lying Lying Lying       Visual Acuity  Visual Acuity      Most Recent Value   L Pupil Size (mm)  3   R Pupil Size (mm)  3          ED Medications  Medications   atorvastatin (LIPITOR) tablet 40 mg (not administered)   insulin detemir (LEVEMIR) subcutaneous injection 16 Units (not administered)   b complex-vitamin C-folic acid (NEPHROCAPS) capsule 1 capsule (not administered)   aspirin chewable tablet 81 mg (not administered)   carbidopa-levodopa (SINEMET)  mg per tablet 1 tablet (not administered)   carvedilol (COREG) tablet 12 5 mg (not administered)   allopurinol (ZYLOPRIM) tablet 200 mg (not administered)   gabapentin (NEURONTIN) capsule 100 mg (not administered)   hydrALAZINE (APRESOLINE) tablet 50 mg (not administered)   isosorbide mononitrate (IMDUR) 24 hr tablet 60 mg (not administered)   lactulose 20 g/30 mL oral solution 20 g (not administered)   levothyroxine tablet 225 mcg (not administered)   magnesium oxide (MAG-OX) tablet 400 mg (not administered)   nitroglycerin (NITROSTAT) SL tablet 0 4 mg (not administered)   oxyCODONE-acetaminophen (PERCOCET) 5-325 mg per tablet 1 tablet (not administered)   pantoprazole (PROTONIX) EC tablet 40 mg (not administered)   repaglinide (PRANDIN) tablet 1 mg (not administered)   warfarin (COUMADIN) tablet 6 5 mg (not administered)   bumetanide (BUMEX) injection 1 mg (not administered)   insulin lispro (HumaLOG) 100 units/mL subcutaneous injection 2-12 Units (0 Units Subcutaneous Not Given 7/6/18 1902)   bumetanide (BUMEX) injection 1 mg (1 mg Intravenous Given 7/6/18 1632)       Diagnostic Studies  Results Reviewed     Procedure Component Value Units Date/Time    TSH [[id number]]  (Normal) Collected:  07/06/18 1421    Lab Status:  Final result Specimen:  Blood from Line, Venous Updated:  07/06/18 1459     TSH 3RD GENERATON 0 608 uIU/mL     Narrative:         Patients undergoing fluorescein dye angiography may retain small amounts of fluorescein in the body for 48-72 hours post procedure  Samples containing fluorescein can produce falsely depressed TSH values  If the patient had this procedure,a specimen should be resubmitted post fluorescein clearance      B-type natriuretic peptide [[id number]]  (Abnormal) Collected:  07/06/18 1421    Lab Status:  Final result Specimen:  Blood from Line, Venous Updated:  07/06/18 1459     NT-proBNP 6,852 (H) pg/mL     Comprehensive metabolic panel [[id number]]  (Abnormal) Collected:  07/06/18 1421    Lab Status:  Final result Specimen:  Blood from Line, Venous Updated:  07/06/18 1459     Sodium 143 mmol/L      Potassium 4 6 mmol/L      Chloride 106 mmol/L      CO2 30 mmol/L      Anion Gap 7 mmol/L      BUN 56 (H) mg/dL      Creatinine 2 98 (H) mg/dL      Glucose 123 mg/dL      Calcium 8 7 mg/dL      AST 24 U/L      ALT 14 U/L      Alkaline Phosphatase 361 (H) U/L      Total Protein 7 3 g/dL      Albumin 2 6 (L) g/dL      Total Bilirubin 0 90 mg/dL      eGFR 18 ml/min/1 73sq m     Narrative:         National Kidney Disease Education Program recommendations are as follows:  GFR calculation is accurate only with a steady state creatinine  Chronic Kidney disease less than 60 ml/min/1 73 sq  meters  Kidney failure less than 15 ml/min/1 73 sq  meters  Magnesium [[id number]]  (Normal) Collected:  07/06/18 1421    Lab Status:  Final result Specimen:  Blood from Line, Venous Updated:  07/06/18 1459     Magnesium 1 6 mg/dL     Troponin I [[id number]]  (Normal) Collected:  07/06/18 1421    Lab Status:  Final result Specimen:  Blood from Line, Venous Updated:  07/06/18 1456     Troponin I 0 03 ng/mL     Lactic acid, plasma [[id number]]  (Normal) Collected:  07/06/18 1421    Lab Status:  Final result Specimen:  Blood from Line, Venous Updated:  07/06/18 1454     LACTIC ACID 1 1 mmol/L     Narrative:         Result may be elevated if tourniquet was used during collection  Urine Microscopic [[id number]]  (Abnormal) Collected:  07/06/18 1421    Lab Status:  Final result Specimen:  Urine from Urine, Suprapubic catheter Updated:  07/06/18 1445     RBC, UA 2-4 (A) /hpf      WBC, UA Innumerable (A) /hpf      Epithelial Cells Moderate (A) /hpf      Bacteria, UA Moderate (A) /hpf     Urine culture [[id number]] Collected:  07/06/18 1421    Lab Status:   In process Specimen:  Urine from Urine, Suprapubic catheter Updated:  07/06/18 1444    CBC and differential [[id number]]  (Abnormal) Collected:  07/06/18 1421    Lab Status:  Final result Specimen:  Blood from Line, Venous Updated:  07/06/18 1443     WBC 9 73 Thousand/uL      RBC 3 61 (L) Million/uL      Hemoglobin 11 3 (L) g/dL      Hematocrit 36 5 %       (H) fL      MCH 31 3 pg      MCHC 31 0 (L) g/dL      RDW 18 7 (H) %      MPV 13 0 (H) fL      Platelets 81 (L) Thousands/uL      Neutrophils Relative 87 (H) %      Lymphocytes Relative 6 (L) %      Monocytes Relative 7 %      Eosinophils Relative 0 %      Basophils Relative 0 %      Neutrophils Absolute 8 47 (H) Thousands/µL      Lymphocytes Absolute 0 60 Thousands/µL      Monocytes Absolute 0 63 Thousand/µL      Eosinophils Absolute 0 02 Thousand/µL      Basophils Absolute 0 01 Thousands/µL     Protime-INR [[id number]]  (Abnormal) Collected:  07/06/18 1421    Lab Status:  Final result Specimen:  Blood from Line, Venous Updated:  07/06/18 1439     Protime 32 2 (H) seconds      INR 3 32 (H)    APTT [[id number]]  (Abnormal) Collected:  07/06/18 1421    Lab Status:  Final result Specimen:  Blood from Line, Venous Updated:  07/06/18 1439     PTT 54 (H) seconds     UA w Reflex to Microscopic w Reflex to Culture [[id number]]  (Abnormal) Collected:  07/06/18 1421    Lab Status:  Final result Specimen:  Urine from Urine, Suprapubic catheter Updated:  07/06/18 1433     Color, UA Yellow     Clarity, UA Cloudy     Specific Gravity, UA 1 015     pH, UA 7 0     Leukocytes, UA Large (A)     Nitrite, UA Negative     Protein, UA Negative mg/dl      Glucose, UA Negative mg/dl      Ketones, UA Negative mg/dl      Urobilinogen, UA 0 2 E U /dl      Bilirubin, UA Negative     Blood, UA Trace-Intact    Blood culture #2 [[id number]] Collected:  07/06/18 1415    Lab Status: In process Specimen:  Blood from Arm, Left Updated:  07/06/18 1424    Blood culture #1 [[id number]] Collected:  07/06/18 1421    Lab Status:   In process Specimen:  Blood from Line, Venous Updated:  07/06/18 1424                 XR chest 2 views   Final Result by Reinaldo Ignacio DO (07/06 1610) Bilateral basilar opacities and small effusions suggestive of mild pulmonary edema  Superimposed infection not excluded  Workstation performed: [id number]         US retroperitoneal complete    (Results Pending)              Procedures  ECG 12 Lead Documentation  Date/Time: 7/6/2018 1:30 PM  Performed by: Bela Willard  Authorized by: Bela Willard     Indications / Diagnosis:  Weakness  ECG reviewed by me, the ED Provider: yes    Patient location:  ED  Rate:     ECG rate:  70  Rhythm:     Rhythm: paced    Pacing:     Capture:  Complete    Type of pacing:  Ventricular  Ectopy:     Ectopy: none    QRS:     QRS axis:  Normal  Conduction:     Conduction: normal    ST segments:     ST segments:  Normal  T waves:     T waves: normal               Phone Contacts  ED Phone Contact    ED Course  ED Course as of Jul 06 1914 Fri Jul 06, 2018   1515 NT-proBNP: (!) 6,852   1515 TSH 3RD GENERATON: 0 608   1515 Troponin I: 0 03   1515 Magnesium: 1 6   1515 Sodium: 143   1515 Potassium: 4 6   1515 Chloride: 106   1515 CO2: 30   1515 Anion Gap: 7   1515 BUN: (!) 56   1515 Creatinine: (!) 2 98   1515 Glucose: 123   1515 eGFR: 18   1515 PTT: (!) 54   1515 Protime: (!) 32 2   1515 INR: (!) 3 32   1515 WBC: 9 73   1515 Hemoglobin: (!) 11 3   1515 Hematocrit: 36 5   1515 Platelets: (!) 81   5727 Leukocytes, UA: (!) Large   1515 RBC, UA: (!) 2-4   1515 WBC, UA: (!) Innumerable   1515 Epithelial Cells: (!) Moderate   1515 Bacteria, UA: (!) Moderate   1516 1  GERARDO (baseline cr 1 9)  2  Thrombocytopenia (baseline 70s)  3  Pyuria (suprapubic cath specimen)  4  Elevated BNP (chronically elevated, today double from prior visits)  5  INR 3 32 on warfarin for a fib  Pt appears volume overloaded  He is on several nephrotoxic agents  He has no focal symptom or complaint                                  MDM  Number of Diagnoses or Management Options  Acute kidney injury superimposed on chronic kidney disease ([facility] ): new and requires workup  Elevated brain natriuretic peptide (BNP) level: new and requires workup  Supratherapeutic INR: new and requires workup  Thrombocytopenia ([facility] ): established and improving     Amount and/or Complexity of Data Reviewed  Clinical lab tests: ordered and reviewed  Tests in the radiology section of CPT®: ordered and reviewed  Obtain history from someone other than the patient: yes  Review and summarize past medical records: yes  Discuss the patient with other providers: yes  Independent visualization of images, tracings, or specimens: yes    Patient Progress  Patient progress: stable    CritCare Time    Disposition  Final diagnoses:   Acute kidney injury superimposed on chronic kidney disease ([facility] )   Elevated brain natriuretic peptide (BNP) level   Thrombocytopenia ([facility] )   Supratherapeutic INR     Time reflects when diagnosis was documented in both MDM as applicable and the Disposition within this note     Time User Action Codes Description Comment    7/6/2018  4:15 PM [facility], [facility] [N17 9,  N18 9] Acute kidney injury superimposed on chronic kidney disease ([facility] )     7/6/2018  4:15 PM Genie Dykes Add [R79 89] Elevated brain natriuretic peptide (BNP) level     7/6/2018  4:15 PM Genie Dykes Add [D69 6] Thrombocytopenia ([facility] )     7/6/2018  4:15 PM Genie Dykes Add [R79 1] Supratherapeutic INR       ED Disposition     ED Disposition Condition Comment    Admit  Case was discussed with jean-paul and the patient's admission status was agreed to be Admission Status: inpatient status to the service of Dr Genesis Hu   Follow-up Information    None         Current Discharge Medication List      CONTINUE these medications which have NOT CHANGED    Details   hydrALAZINE (APRESOLINE) 50 mg tablet Take 50 mg by mouth every 8 (eight) hours          repaglinide (PRANDIN) 0 5 mg tablet Take 1 mg by mouth 3 (three) times a day before meals        Sennosides-Docusate Sodium (SENEXON-S PO) Take 1 tablet by mouth daily       WARFARIN SODIUM PO Take 6 5 mg by mouth daily at bedtime      acetaminophen (TYLENOL) 325 mg tablet Take 650 mg by mouth every 6 (six) hours as needed for mild pain or fever (not to exceed 3 gm/24 hrs)  ascorbic acid (VITAMIN C) 500 mg tablet Take 500 mg by mouth daily      aspirin 81 MG tablet Take 81 mg by mouth daily  atorvastatin (LIPITOR) 40 mg tablet Take 1 tablet (40 mg total) by mouth daily with dinner  Qty: 30 tablet, Refills: 0    Associated Diagnoses: Coronary artery disease involving native coronary artery of native heart with angina pectoris (HCC)      B Complex-C-Folic Acid (MICHEAL CAPS) 1 MG CAPS Take 1 capsule by mouth daily  benzocaine (ORAJEL) 10 % mucosal gel Apply 1 application to the mouth or throat 3 (three) times a day as needed for mucositis      bumetanide (BUMEX) 1 mg tablet Take 1 tablet (1 mg total) by mouth daily  Qty: 30 tablet, Refills: 0    Associated Diagnoses: Acute on chronic systolic CHF (congestive heart failure) ([facility])      carbidopa-levodopa (SINEMET)  mg per tablet Take 1 tablet by mouth 3 (three) times a day  carvedilol (COREG) 12 5 mg tablet Take 12 5 mg by mouth 2 (two) times a day with meals  Hold for HR < 60 or SBP < 100  Cholecalciferol (VITAMIN D3) 2000 units TABS Take 2,000 Units by mouth daily      diphenhydrAMINE (BENADRYL) 50 mg capsule Take 50 mg by mouth every 8 (eight) hours as needed for itching  febuxostat (ULORIC) 80 MG TABS Take 80 mg by mouth daily      gabapentin (NEURONTIN) 100 mg capsule Take 100 mg by mouth 2 (two) times a day Two capsules (200 mg) twice daily       guaiFENesin (MUCINEX) 600 mg 12 hr tablet Take 1 tablet (600 mg total) by mouth every 12 (twelve) hours  Qty: 20 tablet, Refills: 0    Associated Diagnoses: Healthcare-associated pneumonia      insulin aspart (NovoLOG) 100 units/mL injection Inject under the skin 2 (two) times a day before meals Indications: sliding scale   Accuchecks twice daily at 0700 and 1600 with coverage per sliding scale       insulin detemir (LEVEMIR) 100 units/mL subcutaneous injection Inject 16 Units under the skin daily at bedtime  Qty: 10 mL, Refills: 0    Associated Diagnoses: Type 2 diabetes mellitus with hyperglycemia, with long-term current use of insulin (HCC)      isosorbide mononitrate (IMDUR) 60 mg 24 hr tablet Take 60 mg by mouth daily  Hold for SBP < 110       lactulose 20 g/30 mL Take 20 g by mouth 3 (three) times a week  On Monday, Wednesday, and Friday mornings only      levothyroxine 200 mcg tablet Take 225 mcg by mouth daily in the early morning 200 mcg tablet plus 25 mcg tablet to equal 225 mcg dose       loratadine (CLARITIN) 10 mg tablet Take 10 mg by mouth daily      magnesium oxide (MAG-OX) 400 mg Take 400 mg by mouth 2 (two) times a day        nitroglycerin (NITROSTAT) 0 4 mg SL tablet Place 0 4 mg under the tongue every 5 (five) minutes as needed for chest pain  oxyCODONE-acetaminophen (PERCOCET) 5-325 mg per tablet Take 1 tablet by mouth every 4 (four) hours as needed for moderate pain      pantoprazole (PROTONIX) 40 mg tablet Take 40 mg by mouth daily in the early morning  polyethylene glycol-propylene glycol (SYSTANE) 0 4-0 3 % Administer 1 drop to both eyes 3 (three) times a day  potassium chloride (KLOR-CON) 20 mEq packet Take 20 mEq by mouth 4 (four) times a day      Probiotic Product (JOHNNIE-BID PROBIOTIC PO) Take 1 tablet by mouth daily      sodium phosphate (FLEET) enema Insert 1 enema into the rectum once follow package directions      sorbitol 70 % solution Take 15 mL by mouth daily as needed (15 ml if no BM on day 3, 30 ml if no BM on day 4)  No discharge procedures on file      ED Provider  Electronically Signed by           Elbert Egan PA-C  07/06/18 6446

## 2018-07-06 NOTE — ASSESSMENT & PLAN NOTE
Bumex 1 mg IV b i d    Low-salt diet  He is negative a 1000 cc  Daily weights  Troponins negative  Echocardiogram from April revealed an ejection fraction of 35% with moderate diffuse hypokinesis

## 2018-07-07 NOTE — SPEECH THERAPY NOTE
Speech Language/Pathology  Speech/Language Pathology  Assessment    Patient Name: Armand Mills  [id number] Date: 7/7/2018     Problem List  Patient Active Problem List   Diagnosis    Acute on chronic systolic CHF (congestive heart failure) ([facility] )    Pacemaker    Obstructive sleep apnea    Sick sinus syndrome ([facility] )    PVD (peripheral vascular disease) ([place] [address] )    Psychiatric disorder    Neuropathy    Essential hypertension    Hyperlipidemia    GERD (gastroesophageal reflux disease)    Gastroparesis    Type 2 diabetes mellitus with hyperglycemia, with long-term current use of insulin (HCC)    Cancer (HCC)    Chronic atrial fibrillation (HCC)    Stage 4 chronic kidney disease (HCC)    Hypokalemia    Hyperlipidemia    Thrombocytopenia (HCC)    Urinary retention    HAP (hospital-acquired pneumonia)    Dilated cbd, acquired    Premature ventricular contraction    Macrocytic anemia    [facility]    SOB (shortness of breath)    Hypothyroidism    Acute respiratory failure with hypoxia ([facility])    Hypoalbuminemia    [facility]    Aortic stenosis    Pulmonary hypertension (HCC)    Microalbuminuria    Healthcare-associated pneumonia    Acute cystitis    Hypernatremia    Dysphagia    GERARDO (acute kidney injury) ([place] [address] )    Bacteriuria     Past Medical History  Past Medical History:   Diagnosis Date    Anemia     Arthralgia     Atrial fibrillation (HCC)     BPH (benign prostatic hypertrophy)     CAD (coronary artery disease)     Cancer (HCC)     thyroid    Cardiac disease     atrial fib      Cardiomegaly     Carpal tunnel syndrome     CHF (congestive heart failure) (HCC)     Chronic kidney disease     Chronic systolic heart failure (HCC)     Ef 25%    Degenerative joint disease     Depression     Diabetes mellitus [facility] )     Disease of thyroid gland     Fracture of left radius     Gastroparesis     GERD (gastroesophageal reflux disease)     Gout  Hyperlipidemia     Hypertension     Hyperthyroidism     Hypokalemia     Hypothyroid     Hypoxia     Incomplete bladder emptying     Neuropathy     Osteoarthritis     Other fluid overload     fluid restriction    Premature ventricular contraction     Psychiatric disorder     depression    PVD (peripheral vascular disease) (HCC)     Renal disorder     stage 3 chronic kidney disease    Sick sinus syndrome (HCC)     Sleep apnea     Thrombocytopenia (HCC)     Tracheobronchitis     Tremor     Unstable angina (HCC)     Urinary retention      Past Surgical History  Past Surgical History:   Procedure Laterality Date    ABDOMINAL SURGERY      cholecystectomy    CARDIAC PACEMAKER PLACEMENT      CARPAL TUNNEL RELEASE      bilateral    CHOLECYSTECTOMY      COLONOSCOPY W/ POLYPECTOMY      CORONARY ARTERY BYPASS GRAFT      EYE SURGERY      bilateral CAT EXT W/IOL    HERNIA REPAIR      JOINT REPLACEMENT       Summary:  Pt presents as an admission from [facility] with weakness, fatigue, and SOB  Per discussion with nsg at [facility], pt has been consuming a regular texture diet with thin liquids without any significant difficulties  When discussing with pt and daughter at b/s during evaluation, when pt eats and drinks "too fast" the food and liquid "comes back up" intermittently  Pt and daughter state that this does not occur with every meal   Nsg reported that pt had difficulty with sips of thins (regurgitation noted) earlier  Recommendations:  Diet: NPO  Liquid: recommend sips of thin liquids with nsg only  Meds: crushed in small amount of puree with small sips of thin liquids only  Supervision: direct supervision  Positioning:Upright; recommend remaining upright for atleast 30 minutes following any sips given or any meds taken  Strategies: Pt to take PO/Meds only when fully alert and upright  Oral care: aspiration precautions with oral care recommended     Aspiration precautions  Reflux precautions    Therapy Prognosis: fair progress expected  Frequency: as able      Consider consult w/:  GI- strongly recommending further GI consult given symptoms noted during b/s dysphagia evaluation and hx of potential esophageal dysphagia  Nutrition      Reason for consult:  R/o aspiration  Determine safest and least restrictive diet  Failed nursing dysphagia assessment- per nsg, pt initially regurgitated sips of thin liquids, however then was ultimately placed on a diet and consumed some pierogies without difficulty  Per nsg, pt has been taking meds in puree without difficulty  Precautions:  Contact    Current diet: Recently ordered a regular diet with thin liquids per nsg  Premorbid diet::regular diet with thin liquids at [facility]  Previous VBS: No VBS, however pt did have Barium Swallow on 5/18/18-IMPRESSION:     1   Limited study with diminished peristalsis and nonperistaltic contractions  This may be secondary to presbyesophagus though there was also mild smooth tapered narrowing of the distal esophagus with proximal dilatation which could represent peptic   stricture  Achalasia is also possible though the esophagus was normal caliber on the recent CT      2  Tracheal aspiration  O2 requirement: Nasal Cannula    Voice/Speech: WFL, limited verbalizations noted  Social: Pt required cues to participate at times during evaluation  Follows commands:WFL with cueing                          Cognitive Status: appears WFL, flat affect however    Oral mech exam:  x2 lower teeth noted, no further dentition  Lingual protrusion and ROM were WFL  Facial symmetry was intact  Pt and daughter report that pt does not wear any partials or dentures  Baseline coughing and upper airway congestion noted prior to any PO trials given (however, pt had recently consumed PO for lunch and took meds in puree)    Items administered:  Puree hard solid, thin liquid Liquids were taken by straw and cup  Oral stage:  Lip closure:WFL  Mastication:Prolonged with trial of dry solid  Bolus formation:Reduced with dry solid  Bolus control: ? reduced  Transfer:Prolonged with dry solid  Oral residue: None noted  Pocketing:None noted  Pharyngeal stage:  Swallow promptness:Appears reduced  Laryngeal rise:Slightly reduced  Wet voice:WFL  Throat clear: None noted  Cough: Noted delayed after trials of dry solids  Esophageal stage:  Pt with hx of esophageal difficulties per recent Barium Swallow completed in 5/2018- see above  *of note, after pt began coughing following trials of dry solids, pt then began to regurgitate all solids and liquids given for assessment  Belching also noted after pt taking successive sips of thins initially  *after regurgitation stopped, pt given x3 further sips of thins via straw and no further regurgitation was noted  Pt did require moderate cues to use small sips strategy however  Results d/w:  Pt, nursing, family, physician  MD placed NPO with sips of thins order and referral for GI as well as further GI testing procedures  Extensive education completed with pt and his daughter as well regarding recommendations , s/s of aspiration, and current increased risk of aspiration  Daughter and pt expressed understanding  Goal(s):  Pt will tolerate least restrictive diet w/out s/s aspiration or oral/pharyngeal difficulties  Would consider possible Video Swallowing Study pending further GI assessment            [facility] [place] 2:42 PM 7/7/2018

## 2018-07-07 NOTE — PLAN OF CARE
Problem: SLP ADULT - SWALLOWING, IMPAIRED  Goal: Advance to least restrictive diet without signs or symptoms of aspiration for planned discharge setting  See evaluation for individualized goals  Patient will:    1  Complete further GI assessment/testing  2  Pt will trial advanced solids and liquids with SLP only with reduced s/s of aspiration and reduced esophageal s/s  3  Pt will advance to safest/least restrictive diet as able/appropriate  4  Determine if VBS is warranted  Outcome: Progressing  Initial evaluation completed this date

## 2018-07-07 NOTE — PROGRESS NOTES
I was notified by speech therapy that the patient was having significant dysphagia  Speech therapy recommended NPO status and sips with medications  Speech therapy also recommended a Gastroenterology consultation  I will decrease the patient's Levemir to 8 Units SQ QHS with the patient's NPO status

## 2018-07-07 NOTE — PROGRESS NOTES
Progress Note - Rinku Hernández 11/1/1928, 80 y o  male MRN: [id number]    Unit/Bed#: 424-01 Encounter: [id number]    Primary Care Provider: Lissy Whitlock DO   Date and time admitted to hospital: 7/6/2018  1:24 PM        * Acute on chronic systolic CHF (congestive heart failure) ([facility])   Assessment & Plan    Bumex 1 mg IV b i d    Low-salt diet  He is negative a 1000 cc  Daily weights  Troponins negative  Echocardiogram from April revealed an ejection fraction of 35% with moderate diffuse hypokinesis        GERARDO (acute kidney injury) ([facility] )   Assessment & Plan    Possibly due to cardiorenal syndrome  Renal function improving  FENA is 3 8%, will await Nephrology input  Continue to monitor intake and output        Stage 4 chronic kidney disease (HCC)   Assessment & Plan    Baseline creatinine is 2-2 5        Bacteriuria   Assessment & Plan    I suspect patient is colonized  Continue to hold antibiotics  Procalcitonin 0 33        GERD (gastroesophageal reflux disease)   Assessment & Plan    Continue Protonix        Suprapubic catheter [facility])   Assessment & Plan    Chronic  C treatment plan for bacteriuria        Chronic atrial fibrillation (HCC)   Assessment & Plan    Rate controlled on Coreg  INR supratherapeutic  Hold Coumadin, continue Coreg and check daily INR        Type 2 diabetes mellitus with hyperglycemia, with long-term current use of insulin ([facility])   Assessment & Plan    Hemoglobin A1c in April was 6 7  Continue Lantus  Accu-Cheks AC and HS with algorithm for sliding scale coverage            Progress Note - Rinku Hernández 80 y o  male MRN: [id number]    Unit/Bed#: 424-01 Encounter: [id number]        Subjective:   Patient seen and examined at bedside, he had 30 cc of emesis last evening, he is awake alert with no acute complaints this morning states his breathing is a little better    Objective:     Vitals:   Vitals:    07/07/18 0728   BP: 137/77   Pulse: 72   Resp: 18   Temp: (!) 97 3 °F (36 3 °C)   SpO2: 97% Body mass index is 36 71 kg/m²  Intake/Output Summary (Last 24 hours) at 07/07/18 0808  Last data filed at 07/07/18 0546   Gross per 24 hour   Intake              120 ml   Output             1150 ml   Net            -1030 ml       Physical Exam:   /77 (BP Location: Right arm)   Pulse 72   Temp (!) 97 3 °F (36 3 °C) (Temporal)   Resp 18   Ht 5' 11" (1 803 m)   Wt 119 kg (263 lb 3 7 oz)   SpO2 97%   BMI 36 71 kg/m²   General appearance: alert and oriented, in no acute distress  Lungs:  Bibasilar rales  Heart: regular rate and rhythm, S1, S2 normal, no murmur, click, rub or gallop  Abdomen:  Distended without rebound or guarding or rigidity  Extremities:  +2 lower extremity edema  Pulses:  Diminished  Skin:  Bilateral lower extremity venous stasis dermatitis  Neurologic: Grossly normal     Invasive Devices     Peripheral Intravenous Line            Peripheral IV 07/06/18 Left Antecubital less than 1 day          Drain            Suprapubic Catheter Non-latex 18 Fr   126 days                  Results from last 7 days  Lab Units 07/07/18  0531 07/06/18  1421   WBC Thousand/uL 5 84 9 73   HEMOGLOBIN g/dL 10 6* 11 3*   HEMATOCRIT % 34 8* 36 5   PLATELETS Thousands/uL 72* 81*         Results from last 7 days  Lab Units 07/07/18  0531 07/06/18  1421   SODIUM mmol/L 146* 143   POTASSIUM mmol/L 3 9 4 6   CHLORIDE mmol/L 107 106   CO2 mmol/L 27 30   BUN mg/dL 62* 56*   CREATININE mg/dL 2 87* 2 98*   CALCIUM mg/dL 8 9 8 7   TOTAL PROTEIN g/dL  --  7 3   BILIRUBIN TOTAL mg/dL  --  0 90   ALK PHOS U/L  --  361*   ALT U/L  --  14   AST U/L  --  24   GLUCOSE RANDOM mg/dL 75 123       Medication Administration - last 24 hours from 07/06/2018 0808 to 07/07/2018 5520       Date/Time Order Dose Route Action Action by     07/06/2018 1632 bumetanide (BUMEX) injection 1 mg 1 mg Intravenous Given Patricia Plasencia RN     07/06/2018 1922 atorvastatin (LIPITOR) tablet 40 mg 40 mg Oral Given Ronni Aceves RN     07/06/2018 2116 insulin detemir (LEVEMIR) subcutaneous injection 16 Units 16 Units Subcutaneous Given Jones Jay RN     07/06/2018 1922 carbidopa-levodopa (SINEMET)  mg per tablet 1 tablet 1 tablet Oral Given Jones Jay RN     07/07/2018 0736 carvedilol (COREG) tablet 12 5 mg 12 5 mg Oral Not Given Nika Ozuna RN     07/06/2018 1922 carvedilol (COREG) tablet 12 5 mg 12 5 mg Oral Given Jones Jay RN     07/06/2018 1922 gabapentin (NEURONTIN) capsule 100 mg 100 mg Oral Given Jones Jay RN     07/07/2018 0526 hydrALAZINE (APRESOLINE) tablet 50 mg 50 mg Oral Given Liane Smalls RN     07/06/2018 2116 hydrALAZINE (APRESOLINE) tablet 50 mg 50 mg Oral Given Jones Jay RN     07/06/2018 1921 lactulose 20 g/30 mL oral solution 20 g 20 g Oral Given Jones Jay RN     07/07/2018 0090 levothyroxine tablet 225 mcg 225 mcg Oral Given Liane Smalls RN     07/06/2018 1922 magnesium oxide (MAG-OX) tablet 400 mg 400 mg Oral Given Jones Jay RN     07/07/2018 0526 oxyCODONE-acetaminophen (PERCOCET) 5-325 mg per tablet 1 tablet 1 tablet Oral Given Liane Smalls RN     07/07/2018 0526 pantoprazole (PROTONIX) EC tablet 40 mg 40 mg Oral Given Liane Smalls RN     07/07/2018 0700 repaglinide (PRANDIN) tablet 1 mg 0 mg Oral Hold Liane Smalls RN     07/06/2018 1922 repaglinide (PRANDIN) tablet 1 mg 1 mg Oral Given Jones Jay RN     07/06/2018 2117 warfarin (COUMADIN) tablet 6 5 mg 6 5 mg Oral Given Jones Jay RN     07/07/2018 0737 bumetanide (BUMEX) injection 1 mg 1 mg Intravenous Given Nika Ozuna RN     07/07/2018 0736 insulin lispro (HumaLOG) 100 units/mL subcutaneous injection 2-12 Units 2 Units Subcutaneous Not Given Nika Ozuna RN     07/06/2018 1902 insulin lispro (HumaLOG) 100 units/mL subcutaneous injection 2-12 Units 0 Units Subcutaneous Not Given Jones Jay RN     07/06/2018 1069 bumetanide (BUMEX) injection 1 mg 1 mg Intravenous Given Liane Smalls RN            Lab, Imaging and other studies: I have personally reviewed pertinent reports      VTE Pharmacologic Prophylaxis: Warfarin (Coumadin)  VTE Mechanical Prophylaxis: sequential compression device     Kev Morfin MD  7/7/2018,8:08 AM

## 2018-07-07 NOTE — PROGRESS NOTES
Patient stating, "I need to breathe " He has his O2 on at 4l/min via n/c and pulse ox checked and is 97% with O2 on  Bilateral lungs have crackles upon auscultation  has occasional moist cough  No cyanosis noted  HOB up 90 degrees  ORION Rand made aware

## 2018-07-07 NOTE — PROGRESS NOTES
I was notified by the nursing staff that the patient was hypoglycemic with a blood glucose of 65  I will discontinue the patient's Levemir dosing  I will initiate the hypoglycemia protocol  I will order D50 25 ml PRN for a blood glucose less than 70

## 2018-07-08 PROBLEM — E87.70 VOLUME OVERLOAD: Status: ACTIVE | Noted: 2018-01-01

## 2018-07-08 PROBLEM — N17.9 ACUTE RENAL FAILURE (ARF) (HCC): Status: ACTIVE | Noted: 2018-01-01

## 2018-07-08 NOTE — ASSESSMENT & PLAN NOTE
· Consult Cardiology  Follow up on echocardiogram  · Consult Nephrology  Empiric Lasix  Monitor volume status creatinine closely

## 2018-07-08 NOTE — ASSESSMENT & PLAN NOTE
Rate controlled on Coreg  INR supratherapeutic  Continue coreg  Hold coumadin and give 5mg Vitamin K

## 2018-07-08 NOTE — PHYSICAL THERAPY NOTE
PHYSICAL THERAPY NOTE          Patient Name: Aram Dias  [id number] Date: 7/8/2018  PT orders received and chart review performed  Pt's most recent INR at 7 13  PT eval held today per nursing  Eval will be re-attempted when pt medically stable and appropriate

## 2018-07-08 NOTE — ASSESSMENT & PLAN NOTE
Bumex 1 mg IV b i d    Low-salt diet  Not diuresing well, only 200 out in past 24 hours  Renal function worsening   Will monitor on current dose of bumex x 24 hours

## 2018-07-08 NOTE — H&P
H&P- Kevin Duckworth 11/1/1928, 80 y o  male MRN: [id number]    Unit/Bed#: [facility] [place] 607-01 Encounter: [id number]    Primary Care Provider: Georges Sanchez DO   Date and time admitted to hospital: 7/8/2018  5:09 PM        * Acute renal failure (ARF) ([facility] [address] )   Assessment & Plan    ·   Possible hepatorenal versus cardiorenal etiology  Patient is currently volume overloaded  For now continue with diuresis  · Nephrology evaluation  · Monitor creatinine  · Bladder scan  · Renal ultrasound        Volume overload   Assessment & Plan    · Consult Cardiology  Follow up on echocardiogram  · Consult Nephrology  Empiric Lasix  Monitor volume status creatinine closely  Dysphagia   Assessment & Plan    Family reports increasing difficulty with swallowing  The family reports a history of esophageal strictures requiring prior dilatation and Botox injections  Will continue to monitor  Will consult GI for further recommendations  Patient was placed on NPO except for meds at [facility]  Aortic stenosis   Assessment & Plan    Follow up on echocardiogram   Patient presenting with volume overloaded state  Suprapubic catheter [facility])   Assessment & Plan    Patient with recent IR guided suprapubic catheter placement        Thrombocytopenia (HCC)   Assessment & Plan    Monitor platelet count  Given patient's presentation of anasarca/ascites/thrombocytopenia/coagulopathy in conjunction with reported hepatomegaly on prior imaging will ask GI to follow for possible cirrhosis  Patient appears to be at baseline platelet count with possible splenic sequestration associated with the chronically low platelets          Hyperlipidemia   Assessment & Plan    Continue with statin therapy        Chronic atrial fibrillation (HCC)   Assessment & Plan    Continue with cardiac meds        Type 2 diabetes mellitus with hyperglycemia, with long-term current use of insulin [facility])   Assessment & Plan    Lab Results   Component Value Date    HGBA1C 6 7 (H) 04/02/2018       Recent Labs      07/08/18   0218  07/08/18   0551  07/08/18   0746  07/08/18   1205   POCGLU  78  76  81  97       Blood Sugar Average: Last 72 hrs:   sliding scale insulin        Pacemaker   Assessment & Plan    Monitor rhythm                VTE Prophylaxis: INR supratherapeutic with will hold Coumadin  / sequential compression device   Code Status:  Full code  POLST: There is no POLST form on file for this patient (pre-hospital)      Anticipated Length of Stay:  Patient will be admitted on an Inpatient basis with an anticipated length of stay of  greater than 2 midnights  Justification for Hospital Stay:  Needs further evaluation of volume overloaded state    Total Time for Visit, including Counseling / Coordination of Care: 45 minutes  Greater than 50% of this total time spent on direct patient counseling and coordination of care  Chief Complaint:   Acute renal failure on chronic renal failure    History of Present Illness:    Hazel Calero is a 80 y o  male who presents with a past medical history of atrial fibrillation, chronic systolic heart failure, type 2 diabetes who presents the emergency room with chief complaint of weakness fatigue and respiratory difficulty  The patient states that he was having worsening abdominal distension  He reportedly was receiving metolazone for 2 days without improvement of symptoms  He presents with a a worsening cough but with no fevers or chills  He presents to the hospital with reported anasarca type symptoms with diffuse body swelling and edema and worsening renal function  Patient is a poor historian however he notes that he is having profound symptoms of weakness/fatigue over last several days  He subsequently came the hospital with a volume overloaded state  He has a very limited historian  Review of Systems:    Review of Systems   Constitutional: Negative for chills, diaphoresis and fatigue     HENT: Negative for congestion and sore throat  Respiratory: Positive for choking and shortness of breath  Negative for apnea and chest tightness  Cardiovascular: Negative for chest pain  All other systems reviewed and are negative  Past Medical and Surgical History:     Past Medical History:   Diagnosis Date    Anemia     Arthralgia     Atrial fibrillation (HCC)     BPH (benign prostatic hypertrophy)     CAD (coronary artery disease)     Cancer (HCC)     thyroid    Cardiac disease     atrial fib   Cardiomegaly     Carpal tunnel syndrome     CHF (congestive heart failure) (HCC)     Chronic kidney disease     Chronic systolic heart failure (HCC)     Ef 25%    Degenerative joint disease     Depression     Diabetes mellitus ([facility] [address] )     Disease of thyroid gland     Fracture of left radius     Gastroparesis     GERD (gastroesophageal reflux disease)     Gout     Hyperlipidemia     Hypertension     Hyperthyroidism     Hypokalemia     Hypothyroid     Hypoxia     Incomplete bladder emptying     Neuropathy     Osteoarthritis     Other fluid overload     fluid restriction    Premature ventricular contraction     Psychiatric disorder     depression    PVD (peripheral vascular disease) (HCC)     Renal disorder     stage 3 chronic kidney disease    Sick sinus syndrome (HCC)     Sleep apnea     Thrombocytopenia (HCC)     Tracheobronchitis     Tremor     Unstable angina (HCC)     Urinary retention        Past Surgical History:   Procedure Laterality Date    ABDOMINAL SURGERY      cholecystectomy    CARDIAC PACEMAKER PLACEMENT      CARPAL TUNNEL RELEASE      bilateral    CHOLECYSTECTOMY      COLONOSCOPY W/ POLYPECTOMY      CORONARY ARTERY BYPASS GRAFT      EYE SURGERY      bilateral CAT EXT W/IOL    HERNIA REPAIR      JOINT REPLACEMENT         Meds/Allergies:    Prior to Admission medications    Medication Sig Start Date End Date Taking?  Authorizing Provider   acetaminophen (TYLENOL) 325 mg tablet Take 650 mg by mouth every 6 (six) hours as needed for mild pain or fever (not to exceed 3 gm/24 hrs)  Historical Provider, MD   ascorbic acid (VITAMIN C) 500 mg tablet Take 500 mg by mouth daily    Historical Provider, MD   aspirin 81 MG tablet Take 81 mg by mouth daily  Historical Provider, MD   atorvastatin (LIPITOR) 40 mg tablet Take 1 tablet (40 mg total) by mouth daily with dinner 4/10/18   [facility], PA-C   B Complex-C-Folic Acid (MICHEAL CAPS) 1 MG CAPS Take 1 capsule by mouth daily  Historical Provider, MD   benzocaine (ORAJEL) 10 % mucosal gel Apply 1 application to the mouth or throat 3 (three) times a day as needed for mucositis    Historical Provider, MD   bumetanide (BUMEX) 1 mg tablet Take 1 tablet (1 mg total) by mouth daily 4/11/18   [facility], JOYCE   carbidopa-levodopa (SINEMET)  mg per tablet Take 1 tablet by mouth 3 (three) times a day  Historical Provider, MD   carvedilol (COREG) 12 5 mg tablet Take 12 5 mg by mouth 2 (two) times a day with meals  Hold for HR < 60 or SBP < 100  Historical Provider, MD   Cholecalciferol (VITAMIN D3) 2000 units TABS Take 2,000 Units by mouth daily    Historical Provider, MD   diphenhydrAMINE (BENADRYL) 50 mg capsule Take 50 mg by mouth every 8 (eight) hours as needed for itching  Historical Provider, MD   febuxostat (ULORIC) 80 MG TABS Take 80 mg by mouth daily    Historical Provider, MD   gabapentin (NEURONTIN) 100 mg capsule Take 100 mg by mouth 2 (two) times a day Two capsules (200 mg) twice daily     Historical Provider, MD   guaiFENesin (MUCINEX) 600 mg 12 hr tablet Take 1 tablet (600 mg total) by mouth every 12 (twelve) hours 4/10/18   [facility], JOYCE   hydrALAZINE (APRESOLINE) 50 mg tablet Take 50 mg by mouth every 8 (eight) hours  Historical Provider, MD   insulin aspart (NovoLOG) 100 units/mL injection Inject under the skin 2 (two) times a day before meals Indications: sliding scale   Accuchecks twice daily at 0700 and 1600 with coverage per sliding scale     Historical Provider, MD   insulin detemir (LEVEMIR) 100 units/mL subcutaneous injection Inject 16 Units under the skin daily at bedtime 4/10/18   Harris Estrada PA-C   isosorbide mononitrate (IMDUR) 60 mg 24 hr tablet Take 60 mg by mouth daily  Hold for SBP < 110  Historical Provider, MD   lactulose 20 g/30 mL Take 20 g by mouth 3 (three) times a week  On Monday, Wednesday, and Friday mornings only    Historical Provider, MD   levothyroxine 200 mcg tablet Take 225 mcg by mouth daily in the early morning 200 mcg tablet plus 25 mcg tablet to equal 225 mcg dose     Historical Provider, MD   loratadine (CLARITIN) 10 mg tablet Take 10 mg by mouth daily    Historical Provider, MD   magnesium oxide (MAG-OX) 400 mg Take 400 mg by mouth 2 (two) times a day      Historical Provider, MD   nitroglycerin (NITROSTAT) 0 4 mg SL tablet Place 0 4 mg under the tongue every 5 (five) minutes as needed for chest pain  Historical Provider, MD   oxyCODONE-acetaminophen (PERCOCET) 5-325 mg per tablet Take 1 tablet by mouth every 4 (four) hours as needed for moderate pain    Historical Provider, MD   pantoprazole (PROTONIX) 40 mg tablet Take 40 mg by mouth daily in the early morning  Historical Provider, MD   polyethylene glycol-propylene glycol (SYSTANE) 0 4-0 3 % Administer 1 drop to both eyes 3 (three) times a day  Historical Provider, MD   potassium chloride (KLOR-CON) 20 mEq packet Take 20 mEq by mouth 4 (four) times a day    Historical Provider, MD   Probiotic Product (JOHNNIE-BID PROBIOTIC PO) Take 1 tablet by mouth daily    Historical Provider, MD   repaglinide (PRANDIN) 0 5 mg tablet Take 1 mg by mouth 3 (three) times a day before meals      Historical Provider, MD   Sennosides-Docusate Sodium (SENEXON-S PO) Take 1 tablet by mouth daily      Historical Provider, MD   sodium phosphate (FLEET) enema Insert 1 enema into the rectum once follow package directions Historical Provider, MD   sorbitol 70 % solution Take 15 mL by mouth daily as needed (15 ml if no BM on day 3, 30 ml if no BM on day 4)  Historical Provider, MD   WARFARIN SODIUM PO Take 6 5 mg by mouth daily at bedtime    Historical Provider, MD     I have reviewed home medications with patient personally  Allergies: Allergies   Allergen Reactions    Benicar [Olmesartan] Other (See Comments)     Can't recall    Cardura [Doxazosin]     Codeine Other (See Comments)     confusion    Cozaar [Losartan]     Cymbalta [Duloxetine Hcl]     Dyazide [Hydrochlorothiazide W-Triamterene]     Hctz [Hydrochlorothiazide]     Iodinated Diagnostic Agents     Lasix [Furosemide] Other (See Comments)     Can't recall    Lopressor [Metoprolol] Other (See Comments)     Can't recall    Lozol [Indapamide]     Prinivil [Lisinopril]     Procardia [Nifedipine]     Verapamil     Voltaren [Diclofenac Sodium]     Aldactone [Spironolactone] Other (See Comments)     Tremors;starts as a low reaction then gets worse    Latex Rash       Social History:     Marital Status:     Occupation:  Retired  Patient Pre-hospital Living Situation:  Home  Patient Pre-hospital Level of Mobility:  Limited  Patient Pre-hospital Diet Restrictions:  Denies  Substance Use History:   History   Alcohol Use No     History   Smoking Status    Former Smoker    Packs/day: 3 00    Years: 30 00    Types: Cigarettes   Smokeless Tobacco    Never Used     Comment: quit late 40's yrs old;total tobacco use 30yrs,used 3 pks of cigarettes form 3-4 yrs then changed to 10 cigars/day     History   Drug Use No       Family History:    Family History   Problem Relation Age of Onset    Stroke Mother     Hypertension Daughter     Heart disease Brother     Diabetes Brother     Stroke Brother        Physical Exam:     Vitals:   Blood Pressure: 125/59 (07/08/18 1759)  Pulse: 70 (07/08/18 1759)  Temperature: 97 8 °F (36 6 °C) (07/08/18 1759)  Temp Source: Oral (07/08/18 1759)  Respirations: 18 (07/08/18 1759)  Height: 5' 11" (180 3 cm) (07/08/18 1714)  Weight - Scale: 117 kg (258 lb 3 2 oz) (07/08/18 1714)  SpO2: 99 % (07/08/18 1759)    Physical Exam   Constitutional: He appears well-developed  Cardiovascular:   No murmur heard  Pulmonary/Chest: He has no wheezes  He has no rales  He exhibits no tenderness  Abdominal: Soft  Increased abdominal distention  Patient with placement of suprapubic catheter  Additional Data:     Lab Results: I have personally reviewed pertinent reports  Results from last 7 days  Lab Units 07/08/18  0542   WBC Thousand/uL 4 72   HEMOGLOBIN g/dL 10 4*   HEMATOCRIT % 33 1*   PLATELETS Thousands/uL 77*   NEUTROS PCT % 79*   LYMPHS PCT % 10*   MONOS PCT % 9   EOS PCT % 2       Results from last 7 days  Lab Units 07/08/18  0542  07/06/18  1421   SODIUM mmol/L 145  < > 143   POTASSIUM mmol/L 3 9  < > 4 6   CHLORIDE mmol/L 106  < > 106   CO2 mmol/L 29  < > 30   BUN mg/dL 70*  < > 56*   CREATININE mg/dL 3 43*  < > 2 98*   CALCIUM mg/dL 8 8  < > 8 7   TOTAL PROTEIN g/dL  --   --  7 3   BILIRUBIN TOTAL mg/dL  --   --  0 90   ALK PHOS U/L  --   --  361*   ALT U/L  --   --  14   AST U/L  --   --  24   GLUCOSE RANDOM mg/dL 75  < > 123   < > = values in this interval not displayed  Results from last 7 days  Lab Units 07/08/18  0542   INR  7 13*       Results from last 7 days  Lab Units 07/08/18  1205 07/08/18  0746 07/08/18  0551 07/08/18  0218 07/08/18  0014 07/07/18  1949 07/07/18  1825 07/07/18  1446 07/07/18  1127 07/07/18  0735 07/06/18  1900   POC GLUCOSE mg/dl 97 81 76 78 79 81 65 92 102 69 106           Imaging: I have personally reviewed pertinent reports  US abdomen complete    (Results Pending)       [employer] Records Reviewed: No     ** Please Note: This note has been constructed using a voice recognition system   **

## 2018-07-08 NOTE — ASSESSMENT & PLAN NOTE
·   Possible hepatorenal versus cardiorenal etiology  Patient is currently volume overloaded  For now continue with diuresis    · Nephrology evaluation  · Monitor creatinine  · Bladder scan  · Renal ultrasound

## 2018-07-08 NOTE — ASSESSMENT & PLAN NOTE
Renal function significantly worse today  FENA is 3 8%, will await Nephrology input  Unfortunately Nephrology did not evaluate the patient, I have paged and if unavailable will transfer patient to a higher level of care   Continue to monitor intake and output

## 2018-07-08 NOTE — ASSESSMENT & PLAN NOTE
Monitor platelet count  Given patient's presentation of anasarca/ascites/thrombocytopenia/coagulopathy in conjunction with reported hepatomegaly on prior imaging will ask GI to follow for possible cirrhosis  Patient appears to be at baseline platelet count with possible splenic sequestration associated with the chronically low platelets

## 2018-07-08 NOTE — ASSESSMENT & PLAN NOTE
I suspect patient is colonized  Continue to hold antibiotics  Procalcitonin 0 33  Follow up urine cultures

## 2018-07-08 NOTE — ASSESSMENT & PLAN NOTE
Patient did not pass swallow eval  He's for a VBS tomorrow  Consult G  LAUREN for input  NPO except for meds

## 2018-07-08 NOTE — ASSESSMENT & PLAN NOTE
Family reports increasing difficulty with swallowing  The family reports a history of esophageal strictures requiring prior dilatation and Botox injections  Will continue to monitor  Will consult GI for further recommendations  Patient was placed on NPO except for meds at [address]

## 2018-07-08 NOTE — PROGRESS NOTES
Progress Note - Elaine Rosas 11/1/1928, 80 y o  male MRN: [id number]    Unit/Bed#: 424-01 Encounter: [id number]    Primary Care Provider: Jadon Cheatham DO   Date and time admitted to hospital: 7/6/2018  1:24 PM        * Acute on chronic systolic CHF (congestive heart failure) ([facility])   Assessment & Plan    Bumex 1 mg IV b i d  Low-salt diet  Not diuresing well, only 200 out in past 24 hours  Renal function worsening   Will monitor on current dose of bumex x 24 hours         GERARDO (acute kidney injury) ([facility] [address] )   Assessment & Plan    Renal function significantly worse today  FENA is 3 8%, will await Nephrology input  Unfortunately Nephrology did not evaluate the patient, I have paged and if unavailable will transfer patient to a higher level of care   Continue to monitor intake and output        Stage 4 chronic kidney disease ([facility] [address] )   Assessment & Plan    Baseline creatinine is 2-2 5        Bacteriuria   Assessment & Plan    I suspect patient is colonized  Continue to hold antibiotics  Procalcitonin 0 33  Follow up urine cultures         GERD (gastroesophageal reflux disease)   Assessment & Plan    Continue Protonix        Dysphagia   Assessment & Plan    Patient did not pass swallow eval  He's for a VBS tomorrow  Consult G  I for input  NPO except for meds         Chronic atrial fibrillation ([facility])   Assessment & Plan    Rate controlled on Coreg  INR supratherapeutic  Continue coreg  Hold coumadin and give 5mg Vitamin K        Type 2 diabetes mellitus with hyperglycemia, with long-term current use of insulin ([facility])   Assessment & Plan    Hemoglobin A1c in April was 6 7  lantus was decreased due to NPO status  Continue Q6 accu-checks             Progress Note - Elaine Rosas 80 y o  male MRN: [id number]    Unit/Bed#: 424-01 Encounter: [id number]        Subjective:     Seen and examined @ bedside  No acute complaints  He did not pass his dysphagia eval yesterday, speech recommended G  I consult     Objective:     Vitals: Vitals:    07/08/18 0823   BP:    Pulse: 68   Resp:    Temp: 98 4 °F (36 9 °C)   SpO2: 96%     Body mass index is 36 96 kg/m²      Intake/Output Summary (Last 24 hours) at 07/08/18 1017  Last data filed at 07/08/18 0726   Gross per 24 hour   Intake              180 ml   Output              750 ml   Net             -570 ml       Physical Exam:   /60 (BP Location: Right arm)   Pulse 68   Temp 98 4 °F (36 9 °C) (Temporal)   Resp 18   Ht 5' 11" (1 803 m)   Wt 120 kg (264 lb 15 9 oz)   SpO2 96%   BMI 36 96 kg/m²   General appearance: alert and oriented, in no acute distress  Head: Normocephalic, without obvious abnormality, atraumatic  Lungs: scattered ronchi, bibasilar rales, no wheezing, poor air movement  Heart: regularly irregular rhythm  Abdomen: distended without rebound/guarding  Extremities: +1 edema  Pulses: +1  Neurologic: non focal      Invasive Devices     Peripheral Intravenous Line            Peripheral IV 07/06/18 Left Antecubital 1 day          Drain            Suprapubic Catheter Non-latex 18 Fr  127 days                  Results from last 7 days  Lab Units 07/08/18  0542 07/07/18  0531 07/06/18  1421   WBC Thousand/uL 4 72 5 84 9 73   HEMOGLOBIN g/dL 10 4* 10 6* 11 3*   HEMATOCRIT % 33 1* 34 8* 36 5   PLATELETS Thousands/uL 77* 72* 81*         Results from last 7 days  Lab Units 07/08/18  0542 07/07/18  0531 07/06/18  1421   SODIUM mmol/L 145 146* 143   POTASSIUM mmol/L 3 9 3 9 4 6   CHLORIDE mmol/L 106 107 106   CO2 mmol/L 29 27 30   BUN mg/dL 70* 62* 56*   CREATININE mg/dL 3 43* 2 87* 2 98*   CALCIUM mg/dL 8 8 8 9 8 7   TOTAL PROTEIN g/dL  --   --  7 3   BILIRUBIN TOTAL mg/dL  --   --  0 90   ALK PHOS U/L  --   --  361*   ALT U/L  --   --  14   AST U/L  --   --  24   GLUCOSE RANDOM mg/dL 75 75 123       Medication Administration - last 24 hours from 07/07/2018 1017 to 07/08/2018 1017       Date/Time Order Dose Route Action Action by     07/07/2018 1540 atorvastatin (LIPITOR) tablet 40 mg 40 mg Oral Given Chris Shah, RN     07/08/2018 4796 b complex-vitamin C-folic acid (NEPHROCAPS) capsule 1 capsule 1 capsule Oral Given Chris Shah, RN     07/08/2018 7880 aspirin chewable tablet 81 mg 81 mg Oral Given Chris Shah, RN     07/08/2018 9859 carbidopa-levodopa (SINEMET)  mg per tablet 1 tablet 1 tablet Oral Given Chris Shah, RN     07/07/2018 2233 carbidopa-levodopa (SINEMET)  mg per tablet 1 tablet 1 tablet Oral Given Hue Tong, RN     07/07/2018 1540 carbidopa-levodopa (SINEMET)  mg per tablet 1 tablet 1 tablet Oral Given Chris Shah, RN     07/08/2018 0749 carvedilol (COREG) tablet 12 5 mg 12 5 mg Oral Given Chris Shah, RN     07/07/2018 1540 carvedilol (COREG) tablet 12 5 mg 12 5 mg Oral Given Chris Shah, RN     07/08/2018 6957 allopurinol (ZYLOPRIM) tablet 200 mg 200 mg Oral Given Chrsi Shah, RN     07/08/2018 8871 gabapentin (NEURONTIN) capsule 100 mg 100 mg Oral Given Miriam Alyssa, RN     07/07/2018 1827 gabapentin (NEURONTIN) capsule 100 mg 100 mg Oral Not Given Chris Shah, RN     07/08/2018 2392 hydrALAZINE (APRESOLINE) tablet 50 mg 50 mg Oral Given Hue Tong, RN     07/07/2018 2220 hydrALAZINE (APRESOLINE) tablet 50 mg 50 mg Oral Not Given Hue Tong, RN     07/07/2018 1351 hydrALAZINE (APRESOLINE) tablet 50 mg 50 mg Oral Given Chris Shah, RN     07/08/2018 6929 isosorbide mononitrate (IMDUR) 24 hr tablet 60 mg 60 mg Oral Given Chris Shah, RN     07/08/2018 9112 levothyroxine tablet 225 mcg 225 mcg Oral Given Hue Tong, RN     07/08/2018 8479 magnesium oxide (MAG-OX) tablet 400 mg 400 mg Oral Given Chris Shah, RN     07/07/2018 1828 magnesium oxide (MAG-OX) tablet 400 mg 400 mg Oral Not Given Chris Shah RN     07/08/2018 6586 oxyCODONE-acetaminophen (PERCOCET) 5-325 mg per tablet 1 tablet 1 tablet Oral Given Hue Tong RN     07/07/2018 2791 oxyCODONE-acetaminophen (PERCOCET) 5-325 mg per tablet 1 tablet 1 tablet Oral Given Hue Tong RN 07/07/2018 1828 oxyCODONE-acetaminophen (PERCOCET) 5-325 mg per tablet 1 tablet 1 tablet Oral Given Malcom Dangelo, RN     07/08/2018 0539 pantoprazole (PROTONIX) EC tablet 40 mg 40 mg Oral Given Katy Zaragoza, RN     07/07/2018 1153 repaglinide (PRANDIN) tablet 1 mg 1 mg Oral Given Malcom Dangelo, RN     07/08/2018 0748 bumetanide (BUMEX) injection 1 mg 1 mg Intravenous Given Malcom Dangelo, RN     07/07/2018 1544 bumetanide (BUMEX) injection 1 mg 1 mg Intravenous Given Malcom Dangelo, RN     07/07/2018 1127 insulin lispro (HumaLOG) 100 units/mL subcutaneous injection 2-12 Units 2 Units Subcutaneous Not Given Malcom Dangelo, RN     07/08/2018 4097 insulin lispro (HumaLOG) 100 units/mL subcutaneous injection 1-6 Units 1 Units Subcutaneous Not Given Katy Zaragoza, RN     07/08/2018 0031 insulin lispro (HumaLOG) 100 units/mL subcutaneous injection 1-6 Units 0 Units Subcutaneous Not Given Katy Zaragoza, RN     07/07/2018 1827 insulin lispro (HumaLOG) 100 units/mL subcutaneous injection 1-6 Units 1 Units Subcutaneous Not Given Malcom Dangelo, RN     07/07/2018 1446 insulin lispro (HumaLOG) 100 units/mL subcutaneous injection 1-6 Units 1 Units Subcutaneous Not Given Malcom Dangelo, MINA     07/07/2018 1833 dextrose 50 % IV solution 25 mL 25 mL Intravenous Given Malcom Dangelo RN     07/08/2018 6415 phytonadione (MEPHYTON) tablet 5 mg 5 mg Oral Given Malcom Dangelo RN            Lab, Imaging and other studies: I have personally reviewed pertinent reports      VTE Pharmacologic Prophylaxis: Coumadin (held)  VTE Mechanical Prophylaxis: sequential compression device     Stewart Humphries MD  7/8/2018,10:17 AM

## 2018-07-08 NOTE — DISCHARGE SUMMARY
Discharge Summary - Reva Wilkins 80 y o  male MRN: [id number]    Unit/Bed#: 424-01 Encounter: [id number]    Admission Date:   Admission Orders     Ordered        07/06/18 1619  Inpatient Admission (expected length of stay for this patient is greater than two midnights)  Once               Admitting Diagnosis: Weakness [R53 1]  Thrombocytopenia (HCC) [D69 6]  Elevated brain natriuretic peptide (BNP) level [R79 89]  Supratherapeutic INR [R79 1]  Acute kidney injury superimposed on chronic kidney disease ([address] ) [N17 9, N18 9]    HPI: The patient is an 80year old male known to me from previous hospitalizations with a past medical history significant for atrial fibrillation, chronic systolic congestive heart failure, type 2 diabetes mellitus who presents today to the emergency room from [facility] with a chief complaint of weakness, fatigue, and shortness of breath  The patient states that he is having worsening abdominal distension  He states that he has been receiving metolazone for the past 2 days without improvement of his symptoms  He has a slight dry cough  No fevers or chills  He denies any chest pain / nausea / vomiting / diarrhea  The patient has a chronic SPT and offers no complaints of fevers/chills or flank pain  Procedures Performed:   Orders Placed This Encounter   Procedures    ED ECG Documentation Only       Summary of Hospital Course:     Acute on chronic systolic congestive heart failure / GERARDO on CKD 4:  Patient had evidence of marked overload including abdominal ascites, he was admitted to telemetry, serial troponins trended, started on 1 mg of Bumex IV twice a day, low-salt diet along with intake and output  He initially had good diuresis and put out about a L in the 1st 24 hr, his renal function slightly improved as well  However in the following 24 hr he had a significant drop off in urine output and renal function deteriorated    Given need for cardiology consultation along with Nephrology the patient will be transferred to a higher level of care  Dysphagia:  On the evening of admission the patient was noted to have dysphagia and gurgling with his evening meal   He was made NPO and an underwent a speech evaluation the following morning  He showed signs of aspiration and was made NPO insulin was discontinued and recommendation was for GI evaluation  Given GI will not be available until Tuesday the patient will be transferred to a facility of higher level of care  Bacteriuria:  Patient has a chronic suprapubic catheter in although his urine showed large leukocytes and suspected the patient is colonized  A procalcitonin was ordered and was 0 33  Antibiotics were held  Urine cultures are pending and blood cultures are negative x 24 hours     Type 2 diabetes mellitus with hyperglycemia and long-term insulin use:  Patient was initially was placed on a diabetic diet and his Lantus was continued  However since he was made NPO insulin was held  Accu-Cheks were transitioned to q 6  Significant Findings, Care, Treatment and Services Provided:     Renal Ultrasound  1   Mildly atrophic kidneys without hydronephrosis  2   Right midpole cyst      3   Ascites  Complications: worsening renal function    Discharge Diagnosis: see above    Resolved Problems  Date Reviewed: 7/7/2018    None          Condition at Discharge: fair         Discharge instructions/Information to patient and family:   See after visit summary for information provided to patient and family  Provisions for Follow-Up Care:  See after visit summary for information related to follow-up care and any pertinent home health orders  PCP: Gabe Carrasco DO    Disposition: [facility]    Planned Readmission: No    Discharge Statement   I spent 45 minutes discharging the patient  This time was spent on the day of discharge  I had direct contact with the patient on the day of discharge   Additional documentation is required if more than 30 minutes were spent on discharge  Discharge Medications:  See after visit summary for reconciled discharge medications provided to patient and family

## 2018-07-08 NOTE — ASSESSMENT & PLAN NOTE
Lab Results   Component Value Date    HGBA1C 6 7 (H) 04/02/2018       Recent Labs      07/08/18   0218  07/08/18   0551  07/08/18   0746  07/08/18   1205   POCGLU  78  76  81  97       Blood Sugar Average: Last 72 hrs:   sliding scale insulin

## 2018-07-08 NOTE — NURSING NOTE
Patient left unit  Vital signs stable and in no acute distress  The patient is being transferred to [facility]  Report given to Kizzy Rojas RN at receiving facility

## 2018-07-09 PROBLEM — I50.23 ACUTE ON CHRONIC SYSTOLIC (CONGESTIVE) HEART FAILURE (HCC): Status: ACTIVE | Noted: 2018-01-01

## 2018-07-09 NOTE — ASSESSMENT & PLAN NOTE
Lab Results   Component Value Date    HGBA1C 6 7 (H) 04/02/2018       Recent Labs      07/08/18   1831  07/08/18   2357  07/09/18   0602  07/09/18   1216   POCGLU  90  93  107  109   ·  Continue current treatment  · A1c at goal  · Blood sugars are acceptable    Blood Sugar Average: Last 72 hrs:  (P) 103 sliding scale insulin

## 2018-07-09 NOTE — CASE MANAGEMENT
Notification of Discharge  This is a Notification of Discharge from our facility [address]  Please be advised that this patient has been discharge from our facility  Below you will find the admission and discharge date and time including the patients disposition  PRESENTATION DATE: 7/8/2018  5:09 PM  IP ADMISSION DATE: 7/8/18 1709  DISCHARGE DATE: No discharge date for patient encounter  DISPOSITION: [facility] in the [facility] by [facility] Utilization Review Department  Phone: [phone]; Fax [phone]  ATTENTION: The Network Utilization Review Department is now centralized for our 9 Facilities  Make a note that we have a new phone and fax numbers for our Department  Please call with any questions or concerns to [phone] and carefully follow the prompts so that you are directed to the right person  All voicemails are confidential  Fax any determinations, approvals, denials, and requests for initial or continue stay review clinical to [phone]  Due to HIGH CALL volume, it would be easier if you could please send faxed requests to expedite your requests and in part, help us provide discharge notifications faster

## 2018-07-09 NOTE — PLAN OF CARE

## 2018-07-09 NOTE — CONSULTS
Consultation - [facility] ([facility]) [facility]  Brian Montero 80 y o  male MRN: [id number]  Unit/Bed#: [facility] 607-01 Encounter: [id number]             Inpatient consult to gastroenterology     Performed by  [facility], [address]             Inpatient consult to gastroenterology     Performed by  [facility] by [facility], [address] consult to gastroenterology  Consult performed by: Vishal Bennett ordered by: Ronak Patino    Inpatient consult to gastroenterology  Consult performed by: Vishal Bennett ordered by: Ronak Patino          Reason for Consult / Principal Problem:     Dysphagia/Ascites      ASSESSMENT AND PLAN:      Dysphagia:  -Agree w/ speech/swallow evaluation-do not see recommended diet however- place diet as per their recommendations  -Pt may benefit from an EGD-however given his current status will defer for now  Goals of care should also be established, especially if PEG were to be considered  -Aspiration precautions    Ascites:  -Maybe due to his decompensated HF  -US reviewed, small ascites, +/- paracentesis(obtain when INR accetable)- would send for cellular analysis, cultures if concerned for infection, determine etiology of ascites  -diuresis as per nephrology and cardiology given GERARDO    ______________________________________________________________________    HPI:  Pt is a poor historian  History obtained from chart  79yo M w/ PMH significant for AF, CHF, type 2DM presenting w/ weakness, fatigue and SOB also in the setting of increasing abdominal distension  Attempts at increasing diuretics w/ metolazone as outpatient did not improve symptoms and patient subsequently developed acute on chronic kidney injury      Pt previously evaluated in GI clinic for dysphagia given his advanced age and multiple comorbid conditions it was recommended less invasive testing be attempted first  A barium swallow from 5/2018 demonstrated diminished peristalsis and non-peristaltic contractions w/ proximal dilations concern for peptic stricture  As per chart-pt has undergone esophageal dilations in the past     Pt currently reports he does not feel well but is unable to give any specifics  When asked about GI symptoms, pt denied abd pain, N/V, blood in stool, dysphagia/odynophagia, but c/o mild constipation  REVIEW OF SYSTEMS:    CONSTITUTIONAL: Denies any fever, chills, rigors, and weight loss  HEENT: No earache or tinnitus  Denies hearing loss or visual disturbances  CARDIOVASCULAR: No chest pain or palpitations  RESPIRATORY: Denies any cough, hemoptysis, shortness of breath or dyspnea on exertion  GASTROINTESTINAL: As noted in the History of Present Illness  GENITOURINARY: No problems with urination  Denies any hematuria or dysuria  NEUROLOGIC: No dizziness or vertigo, denies headaches  MUSCULOSKELETAL: Denies any muscle or joint pain  SKIN: Denies skin rashes or itching  ENDOCRINE: Denies excessive thirst  Denies intolerance to heat or cold  PSYCHOSOCIAL: Denies depression or anxiety  Denies any recent memory loss  Historical Information   Past Medical History:   Diagnosis Date    Anemia     Arthralgia     Atrial fibrillation (HCC)     BPH (benign prostatic hypertrophy)     CAD (coronary artery disease)     Cancer (HCC)     thyroid    Cardiac disease     atrial fib      Cardiomegaly     Carpal tunnel syndrome     CHF (congestive heart failure) (HCC)     Chronic kidney disease     Chronic systolic heart failure (HCC)     Ef 25%    Degenerative joint disease     Depression     Diabetes mellitus ([facility] )     Disease of thyroid gland     Fracture of left radius     Gastroparesis     GERD (gastroesophageal reflux disease)     Gout     Hyperlipidemia     Hypertension     Hyperthyroidism     Hypokalemia     Hypothyroid     Hypoxia     Incomplete bladder emptying     Neuropathy     Osteoarthritis     Other fluid overload     fluid restriction    Premature ventricular contraction     Psychiatric disorder     depression    PVD (peripheral vascular disease) (HCC)     Renal disorder     stage 3 chronic kidney disease    Sick sinus syndrome (HCC)     Sleep apnea     Thrombocytopenia (HCC)     Tracheobronchitis     Tremor     Unstable angina (HCC)     Urinary retention      Past Surgical History:   Procedure Laterality Date    ABDOMINAL SURGERY      cholecystectomy    CARDIAC PACEMAKER PLACEMENT      CARPAL TUNNEL RELEASE      bilateral    CHOLECYSTECTOMY      COLONOSCOPY W/ POLYPECTOMY      CORONARY ARTERY BYPASS GRAFT      EYE SURGERY      bilateral CAT EXT W/IOL    HERNIA REPAIR      JOINT REPLACEMENT       Social History   History   Alcohol Use No     History   Drug Use No     History   Smoking Status    Former Smoker    Packs/day: 3 00    Years: 30 00    Types: Cigarettes   Smokeless Tobacco    Never Used     Comment: quit late 42's yrs old;total tobacco use 30yrs,used 3 pks of cigarettes form 3-4 yrs then changed to 10 cigars/day     Family History   Problem Relation Age of Onset    Stroke Mother     Hypertension Daughter     Heart disease Brother     Diabetes Brother     Stroke Brother        Meds/Allergies     Prescriptions Prior to Admission   Medication    acetaminophen (TYLENOL) 325 mg tablet    ascorbic acid (VITAMIN C) 500 mg tablet    aspirin 81 MG tablet    atorvastatin (LIPITOR) 40 mg tablet    B Complex-C-Folic Acid (MICHEAL CAPS) 1 MG CAPS    benzocaine (ORAJEL) 10 % mucosal gel    bumetanide (BUMEX) 1 mg tablet    carbidopa-levodopa (SINEMET)  mg per tablet    carvedilol (COREG) 12 5 mg tablet    Cholecalciferol (VITAMIN D3) 2000 units TABS    diphenhydrAMINE (BENADRYL) 50 mg capsule    febuxostat (ULORIC) 80 MG TABS    gabapentin (NEURONTIN) 100 mg capsule    guaiFENesin (MUCINEX) 600 mg 12 hr tablet    hydrALAZINE (APRESOLINE) 50 mg tablet    insulin aspart (NovoLOG) 100 units/mL injection    insulin detemir (LEVEMIR) 100 units/mL subcutaneous injection    isosorbide mononitrate (IMDUR) 60 mg 24 hr tablet    lactulose 20 g/30 mL    levothyroxine 200 mcg tablet    loratadine (CLARITIN) 10 mg tablet    magnesium oxide (MAG-OX) 400 mg    nitroglycerin (NITROSTAT) 0 4 mg SL tablet    oxyCODONE-acetaminophen (PERCOCET) 5-325 mg per tablet    pantoprazole (PROTONIX) 40 mg tablet    polyethylene glycol-propylene glycol (SYSTANE) 0 4-0 3 %    potassium chloride (KLOR-CON) 20 mEq packet    Probiotic Product (JOHNNIE-BID PROBIOTIC PO)    repaglinide (PRANDIN) 0 5 mg tablet    Sennosides-Docusate Sodium (SENEXON-S PO)    sodium phosphate (FLEET) enema    sorbitol 70 % solution    WARFARIN SODIUM PO     Current Facility-Administered Medications   Medication Dose Route Frequency    albumin human (FLEXBUMIN) 25 % injection 25 g  25 g Intravenous BID (diuretic)    allopurinol (ZYLOPRIM) tablet 200 mg  200 mg Oral Daily    aspirin chewable tablet 81 mg  81 mg Oral Daily    atorvastatin (LIPITOR) tablet 40 mg  40 mg Oral Daily With Dinner    b complex-vitamin C-folic acid (NEPHROCAPS) capsule 1 capsule  1 capsule Oral Daily    carbidopa-levodopa (SINEMET)  mg per tablet 1 tablet  1 tablet Oral TID    carvedilol (COREG) tablet 12 5 mg  12 5 mg Oral BID With Meals    dextrose 50 % IV solution 25 mL  25 mL Intravenous Q4H PRN    gabapentin (NEURONTIN) capsule 100 mg  100 mg Oral BID    hydrALAZINE (APRESOLINE) tablet 50 mg  50 mg Oral Q8H [facility]    insulin lispro (HumaLOG) 100 units/mL subcutaneous injection 1-5 Units  1-5 Units Subcutaneous TID AC    insulin lispro (HumaLOG) 100 units/mL subcutaneous injection 1-6 Units  1-6 Units Subcutaneous Q6H [facility]    isosorbide mononitrate (IMDUR) 24 hr tablet 60 mg  60 mg Oral Daily    lactulose 20 g/30 mL oral solution 20 g  20 g Oral Once per day on Mon Wed Fri    levothyroxine tablet 225 mcg  225 mcg Oral Early Morning    magnesium oxide (MAG-OX) tablet 400 mg  400 mg Oral BID    oxyCODONE-acetaminophen (PERCOCET) 5-325 mg per tablet 1 tablet  1 tablet Oral Q4H PRN    pantoprazole (PROTONIX) EC tablet 40 mg  40 mg Oral Early Morning       Allergies   Allergen Reactions    Benicar [Olmesartan] Other (See Comments)     Can't recall    Cardura [Doxazosin]     Codeine Other (See Comments)     confusion    Cozaar [Losartan]     Cymbalta [Duloxetine Hcl]     Dyazide [Hydrochlorothiazide W-Triamterene]     Hctz [Hydrochlorothiazide]     Iodinated Diagnostic Agents     Lasix [Furosemide] Other (See Comments)     Can't recall    Lopressor [Metoprolol] Other (See Comments)     Can't recall    Lozol [Indapamide]     Prinivil [Lisinopril]     Procardia [Nifedipine]     Verapamil     Voltaren [Diclofenac Sodium]     Aldactone [Spironolactone] Other (See Comments)     Tremors;starts as a low reaction then gets worse    Latex Rash           Objective     Blood pressure 124/60, pulse 70, temperature (!) 97 4 °F (36 3 °C), temperature source Oral, resp  rate 16, height 5' 11" (1 803 m), weight 117 kg (258 lb 9 6 oz), SpO2 97 %  Body mass index is 36 07 kg/m²  Intake/Output Summary (Last 24 hours) at 07/09/18 1144  Last data filed at 07/09/18 0333   Gross per 24 hour   Intake                0 ml   Output              650 ml   Net             -650 ml         PHYSICAL EXAM:      General Appearance:   Alert, cooperative, no distress   HEENT:   Normocephalic, atraumatic, anicteric, fluid overloaded      Neck:  Supple, symmetrical, trachea midline   Lungs:   Course rales b/l; respirations unlabored    Heart[de-identified]   Regular rate and rhythm; no murmur, rub, or gallop     Abdomen:   Soft, non-tender, distended; normal bowel sounds; no masses, no organomegaly    Genitalia:   Deferred    Rectal:   Deferred    Extremities:  No cyanosis, clubbing, +2+ pitting edema b/l   Pulses:  2+ and symmetric all extremities    Skin:  No jaundice, rashes, or lesions    Lymph nodes:  No palpable cervical lymphadenopathy        Lab Results:   Admission on 07/08/2018   Component Date Value    POC Glucose 07/08/2018 93     Sodium 07/09/2018 141     Potassium 07/09/2018 3 7     Chloride 07/09/2018 104     CO2 07/09/2018 27     Anion Gap 07/09/2018 10     BUN 07/09/2018 81*    Creatinine 07/09/2018 3 53*    Glucose 07/09/2018 96     Calcium 07/09/2018 8 6     eGFR 07/09/2018 14     WBC 07/09/2018 4 38     RBC 07/09/2018 3 44*    Hemoglobin 07/09/2018 10 4*    Hematocrit 07/09/2018 34 7*    MCV 07/09/2018 101*    MCH 07/09/2018 30 2     MCHC 07/09/2018 30 0*    RDW 07/09/2018 18 2*    MPV 07/09/2018 13 0*    Platelets 10/03/6449 75*    nRBC 07/09/2018 0     Neutrophils Relative 07/09/2018 80*    Immat GRANS % 07/09/2018 1     Lymphocytes Relative 07/09/2018 8*    Monocytes Relative 07/09/2018 8     Eosinophils Relative 07/09/2018 2     Basophils Relative 07/09/2018 1     Neutrophils Absolute 07/09/2018 3 58     Immature Grans Absolute 07/09/2018 0 02     Lymphocytes Absolute 07/09/2018 0 35*    Monocytes Absolute 07/09/2018 0 34     Eosinophils Absolute 07/09/2018 0 07     Basophils Absolute 07/09/2018 0 02     Magnesium 07/09/2018 2 1     Phosphorus 07/09/2018 6 2*    POC Glucose 07/09/2018 107        Imaging Studies: I have personally reviewed reports of pertinent imaging studies  US abdomen:    IMPRESSION:  Technically difficult examination-  1  Irregular margination of a mildly enlarged liver, suggesting hepatocellular disease  2  Portal vein patent  3  Small amount of ascites  4  Status post cholecystectomy  5   No hydronephrosis

## 2018-07-09 NOTE — PROGRESS NOTES
Progress Note - Elaine Rosas 11/1/1928, 80 y o  male MRN: [id number]    Unit/Bed#: [facility] 607-01 Encounter: [id number]    Primary Care Provider: Jadon Cheatham DO   Date and time admitted to hospital: 7/8/2018  5:09 PM        * Acute on chronic systolic (congestive) heart failure [facility])   Assessment & Plan    · Cardiology input appreciated  Follow up on echocardiogram  · Nephrology input appreciated  · Continue diuresis/ IV bumex   · Monitor creatinine and output        Acute renal failure (ARF) (HCC)   Assessment & Plan    · Possible hepatorenal versus cardiorenal etiology  Patient is currently volume overloaded  For now continue with diuresis  · Nephrology evaluation appreciated  · Monitor creatinine          Aortic stenosis   Assessment & Plan    · Follow up on echocardiogram    · Cardiology following        Chronic atrial fibrillation [facility])   Assessment & Plan    · Continue Coreg        Dysphagia   Assessment & Plan    · Family reports increasing difficulty with swallowing  The family reports a history of esophageal strictures requiring prior dilatation and Botox injections  · Will continue to monitor  Will consult GI for further recommendations  · Patient tolerating clear liquids however vomited applesauce yesterday  Suprapubic catheter [facility])   Assessment & Plan    · Patient with recent IR guided suprapubic catheter placement  · Follow urine output closely         Type 2 diabetes mellitus with hyperglycemia, with long-term current use of insulin [facility])   Assessment & Plan    Lab Results   Component Value Date    HGBA1C 6 7 (H) 04/02/2018       Recent Labs      07/08/18   1831  07/08/18   2357  07/09/18   0602  07/09/18   1216   POCGLU  90  93  107  109 ·    Continue current treatment  · A1c at goal  · Blood sugars are acceptable    Blood Sugar Average: Last 72 hrs:  (P) 103 sliding scale insulin        Thrombocytopenia (HCC)   Assessment & Plan    · Monitor platelet count    Given patient's presentation of anasarca/ascites/thrombocytopenia/coagulopathy in conjunction with reported hepatomegaly on prior imaging will ask GI to follow for possible cirrhosis  Patient appears to be at baseline platelet count with possible splenic sequestration associated with the chronically low platelets  VTE Pharmacologic Prophylaxis:   Pharmacologic: On hold in case of an EGD  Mechanical VTE Prophylaxis in Place: Yes    Patient Centered Rounds: I have performed bedside rounds with nursing staff today  Discussions with Specialists or Other Care Team Provider:  Primary RN    Education and Discussions with Family / Patient:  Patient and his daughter    Time Spent for Care: 20 minutes  More than 50% of total time spent on counseling and coordination of care as described above  Current Length of Stay: 1 day(s)    Current Patient Status: Inpatient   Certification Statement: The patient will continue to require additional inpatient hospital stay due to Anasarca and acute on chronic systolic heart failure  Discharge Plan / Estimated Discharge Date:  Not medically stable for discharge today      Code Status: Level 1 - Full Code      Subjective:   He states he feels terrible  Offers no specific complaints just overall terrible  No nausea but vomited after he had apple sauce yesterday  No chest pain today  His butt hurts and he does not feel like turning or getting out of bed  Objective:     Vitals:   Temp (24hrs), Av 7 °F (36 5 °C), Min:97 4 °F (36 3 °C), Max:98 1 °F (36 7 °C)    HR:  [64-74] 70  Resp:  [16-22] 16  BP: (122-145)/(57-65) 124/60  SpO2:  [97 %-99 %] 97 %  Body mass index is 36 07 kg/m²  Input and Output Summary (last 24 hours):        Intake/Output Summary (Last 24 hours) at 18 1439  Last data filed at 18 0800   Gross per 24 hour   Intake                0 ml   Output              650 ml   Net             -650 ml       Physical Exam:     Physical Exam Constitutional: He appears well-nourished  No distress  HENT:   Head: Normocephalic  Eyes: Pupils are equal, round, and reactive to light  Neck: Normal range of motion  Neck supple  Cardiovascular: Normal rate and regular rhythm  Murmur heard  Pulmonary/Chest: Effort normal  He has rales  Abdominal: Soft  Bowel sounds are normal    Musculoskeletal: Normal range of motion  He exhibits edema  Neurological: He is alert  Skin: Skin is warm  Psychiatric: He has a normal mood and affect  His behavior is normal    Nursing note and vitals reviewed  Additional Data:     Labs:      Results from last 7 days  Lab Units 07/09/18  0510   WBC Thousand/uL 4 38   HEMOGLOBIN g/dL 10 4*   HEMATOCRIT % 34 7*   PLATELETS Thousands/uL 75*   NEUTROS PCT % 80*   LYMPHS PCT % 8*   MONOS PCT % 8   EOS PCT % 2       Results from last 7 days  Lab Units 07/09/18  0510  07/06/18  1421   SODIUM mmol/L 141  < > 143   POTASSIUM mmol/L 3 7  < > 4 6   CHLORIDE mmol/L 104  < > 106   CO2 mmol/L 27  < > 30   BUN mg/dL 81*  < > 56*   CREATININE mg/dL 3 53*  < > 2 98*   CALCIUM mg/dL 8 6  < > 8 7   TOTAL PROTEIN g/dL  --   --  7 3   BILIRUBIN TOTAL mg/dL  --   --  0 90   ALK PHOS U/L  --   --  361*   ALT U/L  --   --  14   AST U/L  --   --  24   GLUCOSE RANDOM mg/dL 96  < > 123   < > = values in this interval not displayed  Results from last 7 days  Lab Units 07/08/18  0542   INR  7 13*       * I Have Reviewed All Lab Data Listed Above  Recent Cultures (last 7 days):       Results from last 7 days  Lab Units 07/06/18  1421 07/06/18  1415   BLOOD CULTURE  No Growth at 48 hrs  No Growth at 48 hrs     URINE CULTURE  >100,000 cfu/ml Escherichia coli*  >100,000 cfu/ml Serratia marcescens*  --        Last 24 Hours Medication List:     Current Facility-Administered Medications:  albumin human 25 g Intravenous BID (diuretic) Raymond Godwin PA-C   allopurinol 200 mg Oral Daily Hetul Frazier, DO   aspirin 81 mg Oral Daily Hetul Alanis Zepeda, DO   atorvastatin 40 mg Oral Daily With [employer], DO   b complex-vitamin C-folic acid 1 capsule Oral Daily Hetul Frazier, DO   bumetanide 0 5 mg/hr Intravenous Continuous Kameron Chatterjee MD   carbidopa-levodopa 1 tablet Oral TID Hetul Frazier, DO   carvedilol 12 5 mg Oral BID With Meals Hetul Frazier, DO   dextrose 25 mL Intravenous Q4H PRN Hetul Frazier, DO   gabapentin 100 mg Oral BID Hetul Frazier, DO   hydrALAZINE 50 mg Oral Q8H [facility] Frannie Huertas PA-C   insulin lispro 1-5 Units Subcutaneous TID AC Hetul Frazier, DO   insulin lispro 1-6 Units Subcutaneous Q6H [facility], DO   isosorbide mononitrate 60 mg Oral Daily Hetul Frazier, DO   lactulose 20 g Oral Once per day on Mon Wed Fri Hetul Frazier, DO   levothyroxine 225 mcg Oral Early Morning Hetul Frazier, DO   magnesium oxide 400 mg Oral BID [facility], DO   oxyCODONE-acetaminophen 1 tablet Oral Q4H PRN [facility] Fraz[facility], DO   pantoprazole 40 mg Oral Early Morning Hetul Frazier, DO        Today, Patient Was Seen By: LADARIUS Hannah    ** Please Note: Dragon 360 Dictation voice to text software may have been used in the creation of this document   **

## 2018-07-09 NOTE — CASE MANAGEMENT
Initial Clinical Review    Thank you,  Jn Dennis  [address] Utilization Review Department  Phone: [phone]; Fax [phone]  ATTENTION: The Network Utilization Review Department is now centralized for our 9 Facilities  Make a note that we have a new phone and fax numbers for our Department  Please call with any questions or concerns to [phone] and carefully follow the prompts so that you are directed to the right person  All voicemails are confidential  Fax any determinations, approvals, denials, and requests for initial or continue stay review clinical to [phone]  Due to HIGH CALL volume, it would be easier if you could please send faxed requests to expedite your requests and in part, help us provide discharge notifications faster  Admission: Date/Time/Statement: 7/6/18 @ 1619     Admitting Physician Vannesa Sosa    Level of Care Med Surg    Bed request comments telemetry    Estimated length of stay More than 2 Midnights    Certification I certify that inpatient services are medically necessary for this patient for a duration of greater than two midnights  See H&P and MD Progress Notes for additional information about the patient's course of treatment  ED: Date/Time/Mode of Arrival:  EMS transport to [facility] ,4Th Floor Unit  7/6     Chief Complaint:  weakness, fatigue, and shortness of breath    History of Illness:  80year old male known to me from previous hospitalizations with a past medical history significant for atrial fibrillation, chronic systolic congestive heart failure, type 2 diabetes mellitus who presents today to the emergency room from [facility] with a chief complaint of weakness, fatigue, and shortness of breath  The patient states that he is having worsening abdominal distension  He states that he has been receiving metolazone for the past 2 days without improvement of his symptoms  He has a slight dry cough   The patient has a chronic SPT and offers no complaints of fevers/chills or flank pain       ED Vital Signs:  98 1-69-/58 Pox 98% RA    Vital Signs (abnormal): T 97 2    Abnormal Labs/Diagnostic Test Results:   Sodium 136 - 145 mmol/L 143    Potassium 3 5 - 5 3 mmol/L 4 6    Chloride 100 - 108 mmol/L 106    CO2 21 - 32 mmol/L 30    Anion Gap 4 - 13 mmol/L 7    BUN 5 - 25 mg/dL 56     Creatinine 0 60 - 1 30 mg/dL 2 98     Glucose 65 - 140 mg/dL 123    Calcium 8 3 - 10 1 mg/dL 8 7    AST 5 - 45 U/L 24    ALT 12 - 78 U/L 14    Alkaline Phosphatase 46 - 116 U/L 361     Total Protein 6 4 - 8 2 g/dL 7 3    Albumin 3 5 - 5 0 g/dL 2 6     Total Bilirubin 0 20 - 1 00 mg/dL 0 90       Ref Range & Units 07/06/18 1421   NT-proBNP <450 pg/mL 6,852        Ref Range & Units 07/06/18 1421   WBC 4 31 - 10 16 Thousand/uL 9 73    RBC 3 88 - 5 62 Million/uL 3 61     Hemoglobin 12 0 - 17 0 g/dL 11 3     Hematocrit 36 5 - 49 3 % 36 5    MCV 82 - 98 fL 101     MCH 26 8 - 34 3 pg 31 3    MCHC 31 4 - 37 4 g/dL 31 0     RDW 11 6 - 15 1 % 18 7     MPV 8 9 - 12 7 fL 13 0     Platelets 837 - 776 Thousands/uL 81     Neutrophils Relative 43 - 75 % 87     Lymphocytes Relative 14 - 44 % 6     Monocytes Relative 4 - 12 % 7    Eosinophils Relative 0 - 6 % 0    Basophils Relative 0 - 1 % 0    Neutrophils Absolute 1 85 - 7 62 Thousands/µL 8 47        Ref Range & Units 07/06/18 1421   Protime 11 8 - 14 2 seconds 32 2     INR 0 86 - 1 17 3 32        Ref Range & Units 07/06/18 1421   Color, UA  Yellow    Clarity, UA  Cloudy    Specific [place], UA 1 003 - 1 030 1 015    pH, UA 4 5 - 8 0 7 0    Leukocytes, UA Negative Large     Nitrite, UA Negative Negative    Protein, UA Negative mg/dl Negative    Glucose, UA Negative mg/dl Negative    Ketones, UA Negative mg/dl Negative    Urobilinogen, UA 0 2, 1 0 E U /dl E U /dl 0 2    Bilirubin, UA Negative Negative    Blood, UA Negative, Trace-Intact Trace-Intact        CXR 7/6 -- Bilateral basilar opacities and small effusions suggestive of mild pulmonary edema  Superimposed infection not excluded  US kidneys & bladder 7/6 -- 1  Mildly atrophic kidneys without hydronephrosis  2  Right midpole cyst  3  Ascites  EKG -- Paced rhythm    ED Treatment:   bumetanide (BUMEX) injection 1 mg (1 mg Intravenous Given        Past Medical/Surgical History:    Active Ambulatory Problems     Diagnosis Date Noted    Acute on chronic systolic CHF (congestive heart failure) ([place] [address] ) 06/09/2016    Pacemaker 06/09/2016    Obstructive sleep apnea 06/17/2016    Sick sinus syndrome ([place] [address] ) 06/17/2016    PVD (peripheral vascular disease) ([place] [address] ) 06/17/2016    Psychiatric disorder 06/17/2016    Neuropathy 06/17/2016    Essential hypertension 06/17/2016    Hyperlipidemia 06/17/2016    GERD (gastroesophageal reflux disease) 06/17/2016    Gastroparesis 06/17/2016    Type 2 diabetes mellitus with hyperglycemia, with long-term current use of insulin ([facility]) 06/17/2016    Cancer ([place] [address] ) 06/17/2016    Chronic atrial fibrillation ([place] [address] ) 06/17/2016    Stage 4 chronic kidney disease ([place] [address] ) 09/06/2016    Hypokalemia 09/06/2016    Hyperlipidemia 04/13/2017    Thrombocytopenia ([place] [address] ) 04/13/2017    Urinary retention 04/13/2017    HAP (hospital-acquired pneumonia) 04/13/2017    Dilated cbd, acquired 04/17/2017    Premature ventricular contraction     Macrocytic anemia     CHF (congestive heart failure) ([facility])     SOB (shortness of breath) 03/31/2018    Hypothyroidism     Acute respiratory failure with hypoxia ([place] [address] ) 04/01/2018    Hypoalbuminemia 04/01/2018    Suprapubic catheter ([place] [address] ) 04/02/2018    Aortic stenosis 04/03/2018    Pulmonary hypertension ([place] [address] ) 04/03/2018    Microalbuminuria 04/03/2018    Healthcare-associated pneumonia 04/04/2018    Acute cystitis 04/04/2018    Hypernatremia 04/06/2018    Dysphagia 04/06/2018    GERARDO (acute kidney injury) ([place] [address] ) 07/06/2018    Bacteriuria 07/06/2018     Past Medical History:   Diagnosis Date    Anemia     Arthralgia     Atrial fibrillation (HCC)     BPH (benign prostatic hypertrophy)     CAD (coronary artery disease)     Cancer (HCC)     Cardiac disease     Cardiomegaly     Carpal tunnel syndrome     [facility]congest[facility] failure) ([facility])     Chronic kidney disease     Chronic systolic heart failure (HCC)     Degenerative joint disease     Depression     Diabetes mellitus ([facility] )     Disease of thyroid gland     Fracture of left radius     Gastroparesis     GERD (gastroesophageal reflux disease)     Gout     Hyperlipidemia     Hypertension     Hyperthyroidism     Hypokalemia     Hypothyroid     Hypoxia     Incomplete bladder emptying     Neuropathy     Osteoarthritis     Other fluid overload     Premature ventricular contraction     Psychiatric disorder     PVD (peripheral vascular disease) ([facility])     Renal disorder     Sick sinus syndrome (HCC)     Sleep apnea     Thrombocytopenia (HCC)     Tracheobronchitis     Tremor     Unstable angina ([facility])     Urinary retention        Admitting Diagnosis: Acute renal failure (ARF) ([place] [address] ) [N17 9]    Age/Sex: 80 y o  male    Assessment/Plan:   Acute on chronic systolic CHF (congestive heart failure) ([facility])   Assessment & Plan     Bumex 1 mg IV b i d    Low-salt diet  Monitor intake and output  Daily weights  Check troponin x2  Echocardiogram from April revealed an ejection fraction of 35% with moderate diffuse hypokinesis          GERARDO (acute kidney injury) ([place] [address] )   Assessment & Plan     Possibly due to cardiorenal syndrome  Check renal ultrasound  Check urine sodium and creatinine  Nephrology consult  Monitor intake and output          Stage 4 chronic [facility] ([facility] )   Assessment & Plan     Baseline creatinine is 2-2 5          Bacteriuria   Assessment & Plan     I suspect patient is colonized  Will hold off on antibiotics for now and follow up urine culture  Check a pro-calcitonin now and in the morning           GERD (gastroesophageal reflux disease)   Assessment & Plan     Continue Protonix          Suprapubic catheter [facility])   Assessment & Plan     Chronic  See treatment plan for bacteriuria          Chronic atrial fibrillation (HCC)   Assessment & Plan     Rate controlled on Coreg  INR is therapeutic  Continue Coumadin, Coreg and check daily INR  Tele monitoring          Type 2 diabetes mellitus with hyperglycemia, with long-term current use of insulin (HCC)   Assessment & Plan     Hemoglobin A1c in April was 6 7  Continue Lantus  Accu-Cheks AC and HS with algorithm for sliding scale coverage         Admission Orders:  Admit to M/S/Tele unit  Telem  Serial troponins  Consult cardiology  Fingerstick glucose checks q 6h  Npo  Speech eval ----- (+) for significant dysphagia -- recommend NPO and GI consult    Scheduled Meds:   Current Facility-Administered Medications:  albumin human 25 g Intravenous BID (diuretic) Christina Alex PA-C   allopurinol 200 mg Oral Daily Hetul Frazier, DO   aspirin 81 mg Oral Daily Hetul Frazier, DO   atorvastatin 40 mg Oral Daily With [employer], DO   b complex-vitamin C-folic acid 1 capsule Oral Daily Hetul Frazier, DO   carbidopa-levodopa 1 tablet Oral TID Hetul Frazier, DO   carvedilol 12 5 mg Oral BID With Meals Hetul Frazier, DO   dextrose 25 mL Intravenous Q4H PRN Hetul Frazier, DO   gabapentin 100 mg Oral BID Hetul Frazier, DO   hydrALAZINE 50 mg Oral Q8H [facility] Christina Alex PA-C   insulin lispro 1-5 Units Subcutaneous TID AC Hetul Frazier, DO   insulin lispro 1-6 Units Subcutaneous Q6H [facility] Hetul Frazier, DO   isosorbide mononitrate 60 mg Oral Daily Hetul Frazier, DO   lactulose 20 g Oral Once per day on Mon Wed Fri Hetul Frazier, DO   levothyroxine 225 mcg Oral Early Morning Hetul Frazier, DO   magnesium oxide 400 mg Oral BID Hetul Frazier, DO   oxyCODONE-acetaminophen 1 tablet Oral Q4H PRN Hetul Frazier, DO   pantoprazole 40 mg Oral Early Morning Hetul Frazier, DO     Continuous Infusions:    PRN Meds: dextrose   oxyCODONE-acetaminophen      7/7 1800 ---- BG 65, levemir d/c'd ---> D50 25 ml prn for BG <70    7/8 -- physician note ---  * Acute on chronic systolic CHF (congestive heart failure) ([facility])   Assessment & Plan     Bumex 1 mg IV b i d  Low-salt diet  Not diuresing well, only 200 out in past 24 hours  Renal function worsening   Will monitor on current dose of bumex x 24 hours         GERARDO (acute kidney injury) ([facility] )   Assessment & Plan     Renal function significantly worse today  FENA is 3 8%, will await Nephrology input  Unfortunately Nephrology did not evaluate the patient, I have paged and if unavailable will transfer patient to a higher level of care   Continue to monitor intake and output          Stage 4 chronic kidney disease ([facility] )   Assessment & Plan     Baseline creatinine is 2-2 5          Bacteriuria   Assessment & Plan     I suspect patient is colonized  Continue to hold antibiotics  Procalcitonin 0 33  Follow up urine cultures           GERD (gastroesophageal reflux disease)   Assessment & Plan     Continue Protonix          Dysphagia   Assessment & Plan     Patient did not pass swallow eval  He's for a VBS tomorrow  Consult G  I for input  NPO except for meds           Chronic atrial fibrillation ([facility] )   Assessment & Plan     Rate controlled on Coreg  INR supratherapeutic  Continue coreg  Hold coumadin and give 5mg Vitamin K          Type 2 diabetes mellitus with hyperglycemia, with long-term current use of insulin ([facility])   Assessment & Plan     Hemoglobin A1c in April was 6 7  lantus was decreased due to NPO status  Continue Q6 accu-checks          7/8 --  Transferred to [place] [address]  for [facility] level of care       D/C summary 7/8 --  Summary of Hospital Course:      Acute on chronic systolic congestive heart failure / GERARDO on CKD 4:  Patient had evidence of marked overload including abdominal ascites, he was admitted to telemetry, serial troponins trended, started on 1 mg of Bumex IV twice a day, low-salt diet along with intake and output  He initially had good diuresis and put out about a L in the 1st 24 hr, his renal function slightly improved as well  However in the following 24 hr he had a significant drop off in urine output and renal function deteriorated  Given need for cardiology consultation along with Nephrology the patient will be transferred to a higher level of care      Dysphagia:  On the evening of admission the patient was noted to have dysphagia and gurgling with his evening meal   He was made NPO and an underwent a speech evaluation the following morning  He showed signs of aspiration and was made NPO insulin was discontinued and recommendation was for GI evaluation  Given GI will not be available until Tuesday the patient will be transferred to a facility of higher level of care      Bacteriuria:  Patient has a chronic suprapubic catheter in although his urine showed large leukocytes and suspected the patient is colonized  A procalcitonin was ordered and was 0 33  Antibiotics were held  Urine cultures are pending and blood cultures are negative x 24 hours      Type 2 diabetes mellitus with hyperglycemia and long-term insulin use:  Patient was initially was placed on a diabetic diet and his Lantus was continued  However since he was made NPO insulin was held  Accu-Cheks were transitioned to q  6

## 2018-07-09 NOTE — SOCIAL WORK
Initial interview and DC Dash:     Pt is confused at baseline  CM called and spoke with Magdy Alcaraz, Director of Nursing, at [facility]  Pt is a resident at the SNF, requires assist x 1 with all ADLs, is able to self feed and ambulates with a RW  Pt is 02 dependent at approx 4L  Per Magdy Alcaraz, pt receives LE wraps for management of chronic venous stasis; these wraps extend from his toes to his knees  Pt has a chronic Suprapubic catheter and a chronic R heel ulcer  [facility], [address] [place] [address] room [address] door  [address] [facility], [address] [place] [address], [address]  [phone]  [phone]

## 2018-07-09 NOTE — PHYSICAL THERAPY NOTE
Physical Therapy Cancellation Note      Consult received, chart reviewed  [facility], will continue to follow as able for completion of physical therapy evaluation       Claudette Kraus, PT

## 2018-07-09 NOTE — OCCUPATIONAL THERAPY NOTE
Occupational Therapy Cancellation Notice     OT orders received and chart review completed, attempted to see pt for OT evaluation today however pt not appropriate at this time per RN Bernadine Bee due to INR 7 13 and chronic diarrhea  Will continue to follow and attempt to see pt at another time when medically appropriate         [place]iver[place]ale True, MS , OTR/L

## 2018-07-09 NOTE — ASSESSMENT & PLAN NOTE
· Family reports increasing difficulty with swallowing  The family reports a history of esophageal strictures requiring prior dilatation and Botox injections  · Will continue to monitor  Will consult GI for further recommendations  · Patient tolerating clear liquids however vomited applesauce yesterday

## 2018-07-09 NOTE — SOCIAL WORK
HRR / OP CM referrals:     Patient identified as HRR per criteria - lace 83, Hx CHF, CKD, DM  Pt is resident of [facility] with plan to return  No referral made to HRR or OP CM

## 2018-07-09 NOTE — PLAN OF CARE
Problem: SLP ADULT - SWALLOWING, IMPAIRED  Goal: Initial SLP swallow eval performed  Outcome: Completed Date Met: 07/09/18

## 2018-07-09 NOTE — CONSULTS
Consultation - Cardiology   Nevaeh Phelan 80 y o  male MRN: [id number]  Unit/Bed#: [facility] 607-01 Encounter: [id number]      Assessment:  Principal Problem:    Acute renal failure (ARF) ([address] )  Active Problems:    Pacemaker    Type 2 diabetes mellitus with hyperglycemia, with long-term current use of insulin (HCC)    Chronic atrial fibrillation (HCC)    Hyperlipidemia    Thrombocytopenia (HCC)    Suprapubic catheter (HCC)    Aortic stenosis    Dysphagia    Volume overload    Assessment and plan    Acute on chronic systolic heart failure, ischemic cardiomyopathy  Last echo 04/2018 with LVEF 35-40%, moderate diffuse hypokinesis, biatrial dilation, mildly dilated RV with low normal systolic function, moderate to severe AS with a mean gradient of 20 2 mmHg, BOB by VTI of 1cm2     -initially presented to [address] with anasarca, abdominal ascites, bilateral pulmonary vascular congestion and pleural effusions, NT proBNP of 6 K(elevated from 3 K in 03/2018), and a weight of 268 lb which is higher compared to his 04/2018 discharge weight of 256 lb  -he was diuresed with Bumex 1 mg IV b i d , with good improvement in his weight and urine output, subsequently developed worsening renal function and oliguria, creatinine up trended to 3 5 from his baseline of 2     - Still appears volume overloaded, His renal function could be worse secondary to his ascites and increased venous pressure  would start Bumex drip @ 0 5 and monitor renal function and creatinine closely   -continue carvedilol 12 5 b i d , not on ACEI because of his CKD  -would repeat echocardiogram to evaluate LV function and status of his aortic valve   -check daily standing weights, 1800 mL fluid restriction, close intake and output   -continue isosorbide 60 daily, hydralazine 50 q 8h for afterload reduction  -repeat chest x-ray    Moderate - severe aortic stenosis  Echo 04/2018 with moderate to severe AS, with mean gradient of 20 mmHg, BOB by VTI 1 cm2  -possibly low-flow low gradient severe AS  -repeat echo    CAD  - history of emergent CABG for critical LAD disease, status post LIMA to LAD, SVG to OM, SVG to right posterior lateral    -currently appears stable from a symptomatic standpoint  Does not appear to be very ambulatory  -continue aspirin 81, atorvastatin 40 HS, Imdur or, carvedilol 12 5 b i d  GERARDO on CKD  Baseline creatinine of 1 9-2  Current creatinine is 3 5 Obtain nephrology input    #Chronic thrombocytopenia  #Alk-phos elevation, ascites, hypoalbuminemia  -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    Atrial fibrillation:  On Coumadin  Currently V paced on the monitor  INR is 7  Parkinsonism:  On Sinemet    Diabetes: On insulin    Peripheral vascular disease: 50-69% R ICA stenosis  On aspirin, atorvastatin    BPH:  Suprapubic catheter in place      History of Present Illness   Physician Requesting Consult: Santiago Vazquez DO  Reason for Consult / Principal Problem:  Acute on chronic systolic heart failure  HPI: Elaine Rosas is a 80y o  year old male with a past medical history of CAD status post emergent CABG x3 lima to LAD, SVG to OM, SVG to right posterior lateral in 2008, atrial fibrillation on Coumadin, ischemic cardiomyopathy with last EF 35-40%, sick sinus syndrome status post [facility] [employer] dual-chamber pacemaker, hypertension, hyperlipidemia, BPH with chronic urinary tract retention status post suprapubic catheter, hypothyroidism, diabetes, hyperlipidemia, peripheral vascular disease, carotid artery disease, chronic thrombocytopenia, initially presented to [facility] with generalized weakness, shortness of breath  Patient was also noted to have worsening abdominal distention and cough  As per reports from [facility] he was anasarcic on presentation, his chest x-ray revealed bilateral opacities and small effusions consistent with pulmonary edema, ultrasound of the abdomen revealed ascites    Has initiated on IV Bumex 1 mg b i d , initially with good urine output but on further diuresis, continued to have worsening renal functions and oliguria, hence was transferred to [facility] for further evaluation  Patient is a poor historian and history was obtained from review of records  Upon questioning, he denies any complaints at this time and is unable to answer why he thinks he is in the hospital    EKG reveals V paced rhythm  He was last admitted in 04/2018 with volume overload, fevers in the setting of UTI/pneumonia  Received IV diuresis, had a discharge weight of 256 lb  His weight on presentation to [address] [place], [place] [address] was 268 lb and is currently 258 lb  IR has been consulted for paracentesis, however he is noted to have an INR of 7  Inpatient consult to Cardiology     Performed by  Raymond Bragg by Nico Sue              Review of Systems:  Review of Systems    As per HPI     Historical Information   Past Medical History:   Diagnosis Date    Anemia     Arthralgia     Atrial fibrillation ([facility] )     BPH (benign prostatic hypertrophy)     CAD (coronary artery disease)     Cancer (HCC)     thyroid    Cardiac disease     atrial fib      Cardiomegaly     Carpal tunnel syndrome     [facility]congest[facility] failure) [facility])     Chronic kidney disease     Chronic systolic heart failure ([facility])     Ef 25%    Degenerative joint disease     Depression     Diabetes mellitus ([facility] )     Disease of thyroid gland     Fracture of left radius     Gastroparesis     GERD (gastroesophageal reflux disease)     Gout     Hyperlipidemia     Hypertension     Hyperthyroidism     Hypokalemia     Hypothyroid     Hypoxia     Incomplete bladder emptying     Neuropathy     Osteoarthritis     Other fluid overload     fluid restriction    Premature ventricular contraction     Psychiatric disorder     depression    PVD (peripheral vascular disease) ([facility])     Renal disorder     stage 3 chronic kidney disease    Sick sinus syndrome (HCC)     Sleep apnea     Thrombocytopenia (HCC)     Tracheobronchitis     Tremor     Unstable angina (HCC)     Urinary retention      Past Surgical History:   Procedure Laterality Date    ABDOMINAL SURGERY      cholecystectomy    CARDIAC PACEMAKER PLACEMENT      CARPAL TUNNEL RELEASE      bilateral    CHOLECYSTECTOMY      COLONOSCOPY W/ POLYPECTOMY      CORONARY ARTERY BYPASS GRAFT      EYE SURGERY      bilateral CAT EXT W/IOL    HERNIA REPAIR      JOINT REPLACEMENT       History   Alcohol Use No     History   Drug Use No     History   Smoking Status    Former Smoker    Packs/day: 3 00    Years: 30 00    Types: Cigarettes   Smokeless Tobacco    Never Used     Comment: quit late 40's yrs old;total tobacco use 30yrs,used 3 pks of cigarettes form 3-4 yrs then changed to 10 cigars/day     Family History: non-contributory    Meds/Allergies   PTA meds:   Prior to Admission Medications   Prescriptions Last Dose Informant Patient Reported? Taking? B Complex-C-Folic Acid (MICHEAL CAPS) 1 MG CAPS  Other (Specify) Yes No   Sig: Take 1 capsule by mouth daily  Cholecalciferol (VITAMIN D3) 2000 units TABS  Other (Specify) Yes No   Sig: Take 2,000 Units by mouth daily   Probiotic Product (JOHNNIE-BID PROBIOTIC PO)  Other (Specify) Yes No   Sig: Take 1 tablet by mouth daily   Sennosides-Docusate Sodium (SENEXON-S PO)  Other (Specify) Yes No   Sig: Take 1 tablet by mouth daily  WARFARIN SODIUM PO  Other (Specify) Yes No   Sig: Take 6 5 mg by mouth daily at bedtime   acetaminophen (TYLENOL) 325 mg tablet  Other (Specify) Yes No   Sig: Take 650 mg by mouth every 6 (six) hours as needed for mild pain or fever (not to exceed 3 gm/24 hrs)  ascorbic acid (VITAMIN C) 500 mg tablet  Other (Specify) Yes No   Sig: Take 500 mg by mouth daily   aspirin 81 MG tablet  Other (Specify) Yes No   Sig: Take 81 mg by mouth daily     atorvastatin (LIPITOR) 40 mg tablet  Other (Specify) No No Sig: Take 1 tablet (40 mg total) by mouth daily with dinner   benzocaine (ORAJEL) 10 % mucosal gel  Other (Specify) Yes No   Sig: Apply 1 application to the mouth or throat 3 (three) times a day as needed for mucositis   bumetanide (BUMEX) 1 mg tablet  Other (Specify) No No   Sig: Take 1 tablet (1 mg total) by mouth daily   carbidopa-levodopa (SINEMET)  mg per tablet  Other (Specify) Yes No   Sig: Take 1 tablet by mouth 3 (three) times a day  carvedilol (COREG) 12 5 mg tablet  Other (Specify) Yes No   Sig: Take 12 5 mg by mouth 2 (two) times a day with meals  Hold for HR < 60 or SBP < 100  diphenhydrAMINE (BENADRYL) 50 mg capsule  Other (Specify) Yes No   Sig: Take 50 mg by mouth every 8 (eight) hours as needed for itching  febuxostat (ULORIC) 80 MG TABS  Other (Specify) Yes No   Sig: Take 80 mg by mouth daily   gabapentin (NEURONTIN) 100 mg capsule  Other (Specify) Yes No   Sig: Take 100 mg by mouth 2 (two) times a day Two capsules (200 mg) twice daily    guaiFENesin (MUCINEX) 600 mg 12 hr tablet  Other (Specify) No No   Sig: Take 1 tablet (600 mg total) by mouth every 12 (twelve) hours   hydrALAZINE (APRESOLINE) 50 mg tablet  Other (Specify) Yes No   Sig: Take 50 mg by mouth every 8 (eight) hours  insulin aspart (NovoLOG) 100 units/mL injection  Other (Specify) Yes No   Sig: Inject under the skin 2 (two) times a day before meals Indications: sliding scale  Accuchecks twice daily at 0700 and 1600 with coverage per sliding scale    insulin detemir (LEVEMIR) 100 units/mL subcutaneous injection  Other (Specify) No No   Sig: Inject 16 Units under the skin daily at bedtime   isosorbide mononitrate (IMDUR) 60 mg 24 hr tablet  Other (Specify) Yes No   Sig: Take 60 mg by mouth daily  Hold for SBP < 110    lactulose 20 g/30 mL  Other (Specify) Yes No   Sig: Take 20 g by mouth 3 (three) times a week   On Monday, Wednesday, and Friday mornings only   levothyroxine 200 mcg tablet  Other (Specify) Yes No Sig: Take 225 mcg by mouth daily in the early morning 200 mcg tablet plus 25 mcg tablet to equal 225 mcg dose    loratadine (CLARITIN) 10 mg tablet  Other (Specify) Yes No   Sig: Take 10 mg by mouth daily   magnesium oxide (MAG-OX) 400 mg  Other (Specify) Yes No   Sig: Take 400 mg by mouth 2 (two) times a day     nitroglycerin (NITROSTAT) 0 4 mg SL tablet  Other (Specify) Yes No   Sig: Place 0 4 mg under the tongue every 5 (five) minutes as needed for chest pain  oxyCODONE-acetaminophen (PERCOCET) 5-325 mg per tablet  Other (Specify) Yes No   Sig: Take 1 tablet by mouth every 4 (four) hours as needed for moderate pain   pantoprazole (PROTONIX) 40 mg tablet  Other (Specify) Yes No   Sig: Take 40 mg by mouth daily in the early morning  polyethylene glycol-propylene glycol (SYSTANE) 0 4-0 3 %  Other (Specify) Yes No   Sig: Administer 1 drop to both eyes 3 (three) times a day  potassium chloride (KLOR-CON) 20 mEq packet  Other (Specify) Yes No   Sig: Take 20 mEq by mouth 4 (four) times a day   repaglinide (PRANDIN) 0 5 mg tablet  Other (Specify) Yes No   Sig: Take 1 mg by mouth 3 (three) times a day before meals     sodium phosphate (FLEET) enema  Other (Specify) Yes No   Sig: Insert 1 enema into the rectum once follow package directions   sorbitol 70 % solution  Other (Specify) Yes No   Sig: Take 15 mL by mouth daily as needed (15 ml if no BM on day 3, 30 ml if no BM on day 4)          Facility-Administered Medications: None     Allergies   Allergen Reactions    Benicar [Olmesartan] Other (See Comments)     Can't recall    Cardura [Doxazosin]     Codeine Other (See Comments)     confusion    Cozaar [Losartan]     Cymbalta [Duloxetine Hcl]     Dyazide [Hydrochlorothiazide W-Triamterene]     Hctz [Hydrochlorothiazide]     Iodinated Diagnostic Agents     Lasix [Furosemide] Other (See Comments)     Can't recall    Lopressor [Metoprolol] Other (See Comments)     Can't recall    Lozol [Indapamide]     Prinivil [Lisinopril]     Procardia [Nifedipine]     Verapamil     Voltaren [Diclofenac Sodium]     Aldactone [Spironolactone] Other (See Comments)     Tremors;starts as a low reaction then gets worse    Latex Rash       Objective   Vitals: Blood pressure 136/60, pulse 74, temperature 97 5 °F (36 4 °C), temperature source Oral, resp  rate 20, height 5' 11" (1 803 m), weight 117 kg (258 lb 9 6 oz), SpO2 97 %  , Body mass index is 36 07 kg/m² , Orthostatic Blood Pressures      Most Recent Value   Blood Pressure  136/60 filed at 07/09/2018 1001   Patient Position - Orthostatic VS  Lying filed at 07/09/2018 1001            Intake/Output Summary (Last 24 hours) at 07/09/18 1018  Last data filed at 07/09/18 0333   Gross per 24 hour   Intake                0 ml   Output              650 ml   Net             -650 ml       Invasive Devices     Peripheral Intravenous Line            Peripheral IV 07/08/18 Right;Dorsal (posterior) Forearm less than 1 day          Drain            Suprapubic Catheter Non-latex 18 Fr  128 days                Physical Exam    Gen: No acute distress  HEENT: anicteric, mucous membranes moist  Neck:  Mild JVD  Heart: regular, normal s1 and s2, 2/6 systolic ejection murmur heard  Lungs :  Coarse crackles heard bilaterally  Abdomen: soft distended, tense, normal bowel sounds  Ext:  Chronic lower extremity venous stasis changes, 1+ pitting edema  Musculoskeletal: no obvious joint deformities      Lab Results:     Lab Results   Component Value Date    CKTOTAL 48 04/02/2018    CKTOTAL 44 10/20/2015    CKTOTAL 30 (L) 08/30/2014    TROPONINI 0 04 07/06/2018    TROPONINI 0 04 07/06/2018    TROPONINI 0 03 07/06/2018       Lab Results   Component Value Date    GLUCOSE 96 07/09/2018    CALCIUM 8 6 07/09/2018     07/09/2018    K 3 7 07/09/2018    CO2 27 07/09/2018     07/09/2018    BUN 81 (H) 07/09/2018    CREATININE 3 53 (H) 07/09/2018       Lab Results   Component Value Date    WBC 4 38 07/09/2018    HGB 10 4 (L) 07/09/2018    HCT 34 7 (L) 07/09/2018     (H) 07/09/2018    PLT 75 (L) 07/09/2018       No results found for: CHOL  No results found for: HDL  No results found for: LDLCALC  No results found for: TRIG    Lab Results   Component Value Date    ALT 14 07/06/2018    AST 24 07/06/2018         Results from last 7 days  Lab Units 07/08/18  0542   INR  7 13*         Imaging: I have personally reviewed pertinent reports

## 2018-07-09 NOTE — CASE MANAGEMENT
Initial Clinical Review    Admission: Date/Time/Statement: 7/8/18 @ 1709     Orders Placed This Encounter   Procedures    Inpatient Admission     Standing Status:   Standing     Number of Occurrences:   1     Order Specific Question:   Admitting Physician     Answer:   Bina Whitaker     Order Specific Question:   Level of Care     Answer:   Med Surg [16]     Order Specific Question:   Estimated length of stay     Answer:   More than 2 Midnights     Order Specific Question:   Certification     Answer:   I certify that inpatient services are medically necessary for this patient for a duration of greater than two midnights  See H&P and MD Progress Notes for additional information about the patient's course of treatment  Chief Complaint:  Acute renal failure on chronic renal failure    History of Illness: Nevaeh Phelan is a 80 y o  male  who presents the emergency room  At [address]  On 7/7  from [facility]  with chief complaint of weakness fatigue and respiratory difficulty  The patient states that he was having worsening abdominal distension  He reportedly was receiving metolazone for 2 days without improvement of symptoms  He presents to the hospital with reported anasarca type symptoms with diffuse body swelling and edema and worsening renal function  Patient  is having profound symptoms of weakness/fatigue over last several days  He subsequently came the hospital in  a volume overloaded state  Pt transferred to [facility] due to the need for Cardiology Consult  Along with Nephrology consult pt sent to a higher level of care  Patient had evidence of marked overload including abdominal ascites, he was admitted to telemetry, serial troponins trended, started on 1 mg of Bumex IV twice a day, low-salt diet along with intake and output  He initially had good diuresis and put out about a L in the 1st 24 hr, his renal function slightly improved as well    However in the following 24 hr he had a significant drop off in urine output and renal function deteriorated  On the evening of admission the patient was noted to have dysphagia and gurgling with his evening meal   He was made NPO and an underwent a speech evaluation the following morning  He showed signs of aspiration and was made NPO insulin was discontinued and recommendation was for GI evaluation  Given GI will not be available until Tuesday the patient will be transferred to a facility of higher level of care  Patient has a chronic suprapubic catheter in although his urine showed large leukocytes and suspected the patient is colonized  A procalcitonin was ordered and was 0 33  Antibiotics were held   Urine cultures are pending and blood cultures are negative x 24 hours       Vital Signs:    Vitals   Temperature Pulse Respirations Blood Pressure SpO2   07/08/18 1759 07/08/18 1759 07/08/18 1759 07/08/18 1759 07/08/18 1759   97 8 °F (36 6 °C) 70 18 125/59 99 %      Temp Source Heart Rate Source Patient Position - Orthostatic VS BP Location FiO2 (%)   07/08/18 1759 07/08/18 1759 07/08/18 1759 07/08/18 1759 --   Oral Monitor Sitting Right arm       Pain Score       07/08/18 1714       No Pain        Wt Readings from Last 1 Encounters:   07/09/18 117 kg (258 lb 9 6 oz)         Abnormal Labs/Diagnostic Test Results:      Ref Range & Units 7/9/18 0510 7/8/18 0542 7/7/18 0531   Sodium 136 - 145 mmol/L 141  145  146     Potassium 3 5 - 5 3 mmol/L 3 7  3 9  3 9    Chloride 100 - 108 mmol/L 104  106  107    CO2 21 - 32 mmol/L 27  29  27    Anion Gap 4 - 13 mmol/L 10  10  12    BUN 5 - 25 mg/dL 81   70   62     Creatinine 0 60 - 1 30 mg/dL 3 53   3 43CM   2 87CM     Glucose 65 - 140 mg/dL 96  75CM  75CM    Calcium 8 3 - 10 1 mg/dL 8 6  8 8  8 9    eGFR ml/min/1 73sq m 14  15  19             Ref Range & Units 7/9/18 0510 7/8/18 0542 7/7/18 0531   Sodium 136 - 145 mmol/L 141  145  146     Potassium 3 5 - 5 3 mmol/L 3 7  3 9  3 9    Chloride 100 - 108 mmol/L 104  106  107    CO2 21 - 32 mmol/L 27  29  27    Anion Gap 4 - 13 mmol/L 10  10  12    BUN 5 - 25 mg/dL 81   70   62     Creatinine 0 60 - 1 30 mg/dL 3 53   3 43CM   2 87CM     Glucose 65 - 140 mg/dL 96  75CM  75CM    Calcium 8 3 - 10 1 mg/dL 8 6  8 8  8 9    eGFR ml/min/1 73sq m 14  15  19              Ref Range & Units 7/8/18 0542 7/7/18 0531   Protime 11 8 - 14 2 seconds 58 2   39 7     INR 0 86 - 1 17 7 13   4 35                 US abd - Pending    Interventional Radiology  For paracentesis - ( on Hold due to elevate INR )         Past Medical/Surgical History:   Diagnosis    Anemia    Arthralgia    Atrial fibrillation (HCC)    BPH (benign prostatic hypertrophy)    CAD (coronary artery disease)    Cancer (HCC)    Cardiac disease    Cardiomegaly    Carpal tunnel syndrome    CHF (congestive heart failure) (HCC)    Chronic kidney disease    Chronic systolic heart failure (HCC)    Degenerative joint disease    Depression    Diabetes mellitus ([facility] [address] )    Disease of thyroid gland    Fracture of left radius    Gastroparesis    GERD (gastroesophageal reflux disease)    Gout    Hyperlipidemia    Hypertension    Hyperthyroidism    Hypokalemia    Hypothyroid    Hypoxia    Incomplete bladder emptying    Neuropathy    Osteoarthritis    Other fluid overload    Premature ventricular contraction    Psychiatric disorder    PVD (peripheral vascular disease) (HCC)    Renal disorder    Sick sinus syndrome (HCC)    Sleep apnea    Thrombocytopenia (HCC)    Tracheobronchitis    Tremor    Unstable angina (HCC)    Urinary retention       Admitting Diagnosis: Acute renal failure (ARF) (HCC) [N17 9]    Age/Sex: 80 y o  male    Assessment/Plan:  Acute renal failure (ARF) (HCC)   Assessment & Plan     ·   Possible hepatorenal versus cardiorenal etiology  Patient is currently volume overloaded  For now continue with diuresis    · Nephrology evaluation  · Monitor creatinine  · Bladder scan  · Renal ultrasound          Volume overload   Assessment & Plan     · Consult Cardiology  Follow up on echocardiogram  · Consult Nephrology  Empiric Lasix  Monitor volume status creatinine closely           Dysphagia   Assessment & Plan     Family reports increasing difficulty with swallowing  The family reports a history of esophageal strictures requiring prior dilatation and Botox injections  Will continue to monitor  Will consult GI for further recommendations  Patient was placed on NPO except for meds at [facility]           Aortic stenosis   Assessment & Plan     Follow up on echocardiogram   Patient presenting with volume overloaded state           Suprapubic catheter [facility])   Assessment & Plan     Patient with recent IR guided suprapubic catheter placement          Thrombocytopenia (HCC)   Assessment & Plan     Monitor platelet count  Given patient's presentation of anasarca/ascites/thrombocytopenia/coagulopathy in conjunction with reported hepatomegaly on prior imaging will ask GI to follow for possible cirrhosis    Patient appears to be at baseline platelet count with possible splenic sequestration associated with the chronically low platelets           Hyperlipidemia   Assessment & Plan     Continue with statin therapy          Chronic atrial fibrillation (HCC)   Assessment & Plan     Continue with cardiac meds            Admission Orders:  Scheduled Meds:   Current Facility-Administered Medications:  allopurinol 200 mg Oral Daily   aspirin 81 mg Oral Daily   atorvastatin 40 mg Oral Daily With Dinner   b complex-vitamin C-folic acid 1 capsule Oral Daily   bumetanide 1 mg Intravenous BID (diuretic)   carbidopa-levodopa 1 tablet Oral TID   carvedilol 12 5 mg Oral BID With Meals   dextrose 25 mL Intravenous Q4H PRN   gabapentin 100 mg Oral BID   hydrALAZINE 50 mg Oral Q8H [address]   insulin lispro 1-5 Units Subcutaneous TID AC   insulin lispro 1-6 Units Subcutaneous Q6H [address]   isosorbide mononitrate 60 mg Oral Daily   lactulose 20 g Oral Once per day on Mon Wed Fri   levothyroxine 225 mcg Oral Early Morning   magnesium oxide 400 mg Oral BID   oxyCODONE-acetaminophen 1 tablet Oral Q4H PRN   pantoprazole 40 mg Oral Early Morning     Nursing Orders - Telem - daily weights - I & O q shift - Aspiration precautions - PVR's - Up with assistance - Diet NPO - PT/OT eval - Speech therapy eval     Gastroenterology -  Speech therapy eval -  May benefit from EGD - deferred for now ( ? Peg tube ) -  Possible paracentesis when INR acceptable     Cardiology  - Acute on chronic systolic heart failure, ischemic cardiomyopathy  Last echo 04/2018 with LVEF 35-40%, moderate diffuse hypokinesis, biatrial dilation, mildly dilated RV with low normal systolic function, moderate to severe AS with a mean gradient of 20 2 mmHg, BOB by VTI of 1cm2      -initially presented to [place] [address] with anasarca, abdominal ascites, bilateral pulmonary vascular congestion and pleural effusions, NT proBNP of 6 K(elevated from 3 K in 03/2018), and a weight of 268 lb which is higher compared to his 04/2018 discharge weight of 256 lb  -he was diuresed with Bumex 1 mg IV b i d , with good improvement in his weight and urine output, subsequently developed worsening renal function and oliguria, creatinine up trended to 3 5 from his baseline of 2      - currently has mild JVD and trace lower extremity edema, would hold off on diuresis and monitor his kidney functions closely  Would obtain Nephrology input   -continue carvedilol 12 5 b i d , not on ACEI because of his CKD    -would repeat echocardiogram to evaluate LV function and status of his aortic valve   -check daily standing weights, 1800 mL fluid restriction, close intake and output   -continue isosorbide 60 daily, hydralazine 50 q 8h for afterload reduction  -repeat chest x-ray     Moderate - severe aortic stenosis  Echo 04/2018 with moderate to severe AS, with mean gradient of 20 mmHg, BOB by VTI 1 cm2    -possibly low-flow low gradient severe AS  -repeat echo       Nephrology - 1  Acute kidney injury:  Patient was admitted with creatinine of 2 98 which has trended up to 3 5 with diuresis  BUN also worsened from 56 to 81  Could be intravascular volume depletion               -renal ultrasound:  Mild bilateral renal atrophy, no hydronephrosis, suspected right renal cyst              -UA:  Large leukocytes, 2-4 RBCs, innumerable WBCs, moderate epithelial cells, moderate bacteria              -FENa on admission 3 8%              -follow up ECHO results to help assess volume status              -agree with holding Bumex for now (last dose last night)              -will give albumin 25% 25g q12h x2 doses today               -repeat urine electrolytes:  Urine urea, chloride, creat, sodium              -follow I/o and daily weights               -am BMP   2  CKD stage 4:  Baseline creatinine appears to be 1 9-2 1 at best since 2016 but we do not have any steady state labs  He has had multiple episodes of acute renal failure in the past              -does not appear that he sees an outpatient nephrologist although he has seen Dr Romina Lam and Dr Prakash Reed in the hospital in the past   3  Aortic stenosis and history EF 35-40% in April:  Echocardiogram pending  Cardiology consulted and recommended holding diuretics which I agree with  4  History of Urinary retention with chronic suprapubic catheter  5  Ascites:  Also with thrombocytopenia and coagulopathy so there was suspicion for cirrhosis  GI was consulted              -abdominal ultrasound pending              -IR consult for paracentesis but INR was 7 1 yesterday so this is on hold  6   Hyperphosphatemia: will start low phos diet and trend for now

## 2018-07-09 NOTE — ASSESSMENT & PLAN NOTE
· Monitor platelet count  Given patient's presentation of anasarca/ascites/thrombocytopenia/coagulopathy in conjunction with reported hepatomegaly on prior imaging will ask GI to follow for possible cirrhosis  Patient appears to be at baseline platelet count with possible splenic sequestration associated with the chronically low platelets

## 2018-07-09 NOTE — ASSESSMENT & PLAN NOTE
· Cardiology input appreciated  Follow up on echocardiogram  · Nephrology input appreciated      · Continue diuresis/ IV bumex   · Monitor creatinine and output

## 2018-07-09 NOTE — CONSULTS
Consultation - Nephrology   Phuc Rosas 80 y o  male MRN: [id number]  Unit/Bed#: [facility] 607-01 Encounter: [id number]    ASSESSMENT and PLAN:  1  Acute kidney injury:  Patient was admitted with creatinine of 2 98 which has trended up to 3 5 with diuresis  BUN also worsened from 56 to 81  Could be intravascular volume depletion    -renal ultrasound:  Mild bilateral renal atrophy, no hydronephrosis, suspected right renal cyst   -UA:  Large leukocytes, 2-4 RBCs, innumerable WBCs, moderate epithelial cells, moderate bacteria   -FENa on admission 3 8%   -follow up ECHO results to help assess volume status   -agree with holding Bumex for now (last dose last night)   -will give albumin 25% 25g q12h x2 doses today    -repeat urine electrolytes:  Urine urea, chloride, creat, sodium   -follow I/o and daily weights    -am BMP   2  CKD stage 4:  Baseline creatinine appears to be 1 9-2 1 at best since 2016 but we do not have any steady state labs  He has had multiple episodes of acute renal failure in the past   -does not appear that he sees an outpatient nephrologist although he has seen Dr Mica Miller and Dr Sharif Shaw in the hospital in the past   3  Aortic stenosis and history EF 35-40% in April:  Echocardiogram pending  Cardiology consulted and recommended holding diuretics which I agree with  4  History of Urinary retention with chronic suprapubic catheter  5  Ascites:  Also with thrombocytopenia and coagulopathy so there was suspicion for cirrhosis  GI was consulted   -abdominal ultrasound pending   -IR consult for paracentesis but INR was 7 1 yesterday so this is on hold  6  Hyperphosphatemia: will start low phos diet and trend for now       HISTORY OF PRESENT ILLNESS:  Requesting Physician: Radha Randhawa DO  Reason for Consult:  Acute kidney injury    Phuc Rosas is a 80y o  year old male who was admitted to [facility] after presenting with weakness    Patient is currently poor historian and the history comes from the chart  On 07/06 patient went [facility] [facility] complaining of weakness, fatigue and shortness of breath  He also had worsening abdominal distention and evidently was taking metolazone for 2 days at home but had no improvement  On admission he was felt to have acute systolic heart failure and was started on IV Bumex  Unfortunately with this his renal function worsened  He also was found to have dysphagia and concern for aspiration  He continued to worsen and was transferred to [facility] for higher level of care  Nephrology was consulted to manage his kidney injury  Patient states he is currently unsure why she is in the hospital   He is unsure if he has ever seen a nephrologist in the past   He feels terrible but is unable to tell me more specifically how he feels  He denies any current shortness of breath, any pain, any nausea or vomiting  He is unsure if he has been eating and drinking okay  He does have a chronic suprapubic Motta  PAST MEDICAL HISTORY:  Past Medical History:   Diagnosis Date    Anemia     Arthralgia     Atrial fibrillation (HCC)     BPH (benign prostatic hypertrophy)     CAD (coronary artery disease)     Cancer (HCC)     thyroid    Cardiac disease     atrial fib      Cardiomegaly     Carpal tunnel syndrome     CHF (congestive heart failure) (HCC)     Chronic kidney disease     Chronic systolic heart failure (HCC)     Ef 25%    Degenerative joint disease     Depression     Diabetes mellitus ([facility] [address] )     Disease of thyroid gland     Fracture of left radius     Gastroparesis     GERD (gastroesophageal reflux disease)     Gout     Hyperlipidemia     Hypertension     Hyperthyroidism     Hypokalemia     Hypothyroid     Hypoxia     Incomplete bladder emptying     Neuropathy     Osteoarthritis     Other fluid overload     fluid restriction    Premature ventricular contraction     Psychiatric disorder     depression    PVD (peripheral vascular disease) ([facility] [address] )     Renal disorder     stage 3 chronic kidney disease    Sick sinus syndrome (HCC)     Sleep apnea     Thrombocytopenia (HCC)     Tracheobronchitis     Tremor     Unstable angina (HCC)     Urinary retention        PAST SURGICAL HISTORY:  Past Surgical History:   Procedure Laterality Date    ABDOMINAL SURGERY      cholecystectomy    CARDIAC PACEMAKER PLACEMENT      CARPAL TUNNEL RELEASE      bilateral    CHOLECYSTECTOMY      COLONOSCOPY W/ POLYPECTOMY      CORONARY ARTERY BYPASS GRAFT      EYE SURGERY      bilateral CAT EXT W/IOL    HERNIA REPAIR      JOINT REPLACEMENT         ALLERGIES:  Allergies   Allergen Reactions    Benicar [Olmesartan] Other (See Comments)     Can't recall    Cardura [Doxazosin]     Codeine Other (See Comments)     confusion    Cozaar [Losartan]     Cymbalta [Duloxetine Hcl]     Dyazide [Hydrochlorothiazide W-Triamterene]     Hctz [Hydrochlorothiazide]     Iodinated Diagnostic Agents     Lasix [Furosemide] Other (See Comments)     Can't recall    Lopressor [Metoprolol] Other (See Comments)     Can't recall    Lozol [Indapamide]     Prinivil [Lisinopril]     Procardia [Nifedipine]     Verapamil     Voltaren [Diclofenac Sodium]     Aldactone [Spironolactone] Other (See Comments)     Tremors;starts as a low reaction then gets worse    Latex Rash       SOCIAL HISTORY:  History   Alcohol Use No     History   Drug Use No     History   Smoking Status    Former Smoker    Packs/day: 3 00    Years: 30 00    Types: Cigarettes   Smokeless Tobacco    Never Used     Comment: quit late 42's yrs old;total tobacco use 30yrs,used 3 pks of cigarettes form 3-4 yrs then changed to 10 cigars/day       FAMILY HISTORY:  Family History   Problem Relation Age of Onset    Stroke Mother     Hypertension Daughter     Heart disease Brother     Diabetes Brother     Stroke Brother        MEDICATIONS:  Scheduled Meds:  Current Facility-Administered Medications:  allopurinol 200 mg Oral Daily Hetul Frazier, DO   aspirin 81 mg Oral Daily [facility], DO   atorvastatin 40 mg Oral Daily With [employer], DO   b complex-vitamin C-folic acid 1 capsule Oral Daily [facility], DO   bumetanide 1 mg Intravenous BID (diuretic) Hetul Frazier, DO   carbidopa-levodopa 1 tablet Oral TID [facility], DO   carvedilol 12 5 mg Oral BID With Meals [facility], DO   dextrose 25 mL Intravenous Q4H PRN [facility], DO   gabapentin 100 mg Oral BID [facility], DO   hydrALAZINE 50 mg Oral Q8H [facility], DO   insulin lispro 1-5 Units Subcutaneous TID AC [facility], DO   insulin lispro 1-6 Units Subcutaneous Q6H [facility], DO   isosorbide mononitrate 60 mg Oral Daily [facility], DO   lactulose 20 g Oral Once per day on Mon Wed Fri Hetul Frazier, DO   levothyroxine 225 mcg Oral Early Morning [facility], DO   magnesium oxide 400 mg Oral BID [facility], DO   oxyCODONE-acetaminophen 1 tablet Oral Q4H PRN [facility], DO   pantoprazole 40 mg Oral Early Morning [facility], DO       PRN Meds: dextrose    oxyCODONE-acetaminophen    Continuous Infusions:     REVIEW OF SYSTEMS:  A complete review of systems was done  Pertinent positives and negatives noted in the HPI but otherwise the review of systems is negative      PHYSICAL EXAM:  Current Weight: Weight - Scale: 117 kg (258 lb 9 6 oz)  First Weight: Weight - Scale: 117 kg (258 lb 3 2 oz)  Vitals:    07/09/18 1001   BP: 136/60   Pulse: 74   Resp: 20   Temp: 97 5 °F (36 4 °C)   SpO2: 97%       Intake/Output Summary (Last 24 hours) at 07/09/18 1028  Last data filed at 07/09/18 6361   Gross per 24 hour   Intake                0 ml   Output              650 ml   Net             -650 ml     General:  No acute distress  Skin:  No rash  Eyes:  Sclerae anicteric  ENT:  Dry oral mucosa  Neck:  Supple, symmetric  Chest:  Coarse breath sounds bilaterally  CVS:  Regular rate and rhythm  Abdomen:  Distended, nontender  Extremities:  Bilateral edema  Neuro:  Awake and alert  Psych:  Appropriate affect    Lab Results:     Results from last 7 days  Lab Units 07/09/18  0510 07/08/18  0542 07/07/18  0531 07/06/18  1421   WBC Thousand/uL 4 38 4 72 5 84 9 73   HEMOGLOBIN g/dL 10 4* 10 4* 10 6* 11 3*   HEMATOCRIT % 34 7* 33 1* 34 8* 36 5   PLATELETS Thousands/uL 75* 77* 72* 81*   SODIUM mmol/L 141 145 146* 143   POTASSIUM mmol/L 3 7 3 9 3 9 4 6   CHLORIDE mmol/L 104 106 107 106   CO2 mmol/L 27 29 27 30   BUN mg/dL 81* 70* 62* 56*   CREATININE mg/dL 3 53* 3 43* 2 87* 2 98*   CALCIUM mg/dL 8 6 8 8 8 9 8 7   MAGNESIUM mg/dL 2 1  --  1 8 1 6   PHOSPHORUS mg/dL 6 2*  --   --   --    TOTAL PROTEIN g/dL  --   --   --  7 3   BILIRUBIN TOTAL mg/dL  --   --   --  0 90   ALK PHOS U/L  --   --   --  361*   ALT U/L  --   --   --  14   AST U/L  --   --   --  24   GLUCOSE RANDOM mg/dL 96 75 75 123

## 2018-07-09 NOTE — SPEECH THERAPY NOTE
Speech Language/Pathology  Speech/Language Pathology  Assessment    Patient Name: Marisela Morris  [id number] Date: 7/9/2018     Problem List  Patient Active Problem List   Diagnosis    Acute on chronic systolic CHF (congestive heart failure) ([facility]    Pacemaker    Obstructive sleep apnea    Sick sinus syndrome ([facility]    [facility]ipheral [facility]    Psychiatric disorder    Neuropathy    Essential hypertension    Hyperlipidemia    GERD (gastroesophageal reflux disease)    Gastroparesis    Type 2 diabetes mellitus with hyperglycemia, with long-term current use of insulin (HCC)    Cancer (HCC)    Chronic atrial fibrillation (HCC)    Stage 4 chronic kidney disease (HCC)    Hypokalemia    Hyperlipidemia    Thrombocytopenia (HCC)    Urinary retention    HAP (hospital-acquired pneumonia)    Dilated cbd, acquired    Premature ventricular contraction    Macrocytic anemia    CHF (congestive heart failure) (HCC)    SOB (shortness of breath)    Hypothyroidism    Acute respiratory failure with hypoxia (HCC)    Hypoalbuminemia    Suprapubic catheter ([facility]    Aortic stenosis    Pulmonary hypertension (HCC)    Microalbuminuria    Healthcare-associated pneumonia    Acute cystitis    Hypernatremia    Dysphagia    GERARDO (acute kidney injury) (HCC)    Bacteriuria    Acute renal failure (ARF) (HCC)    Volume overload     Past Medical History  Past Medical History:   Diagnosis Date    Anemia     Arthralgia     Atrial fibrillation (HCC)     BPH (benign prostatic hypertrophy)     CAD (coronary artery disease)     Cancer (HCC)     thyroid    Cardiac disease     atrial fib      Cardiomegaly     Carpal tunnel syndrome     CHF (congestive heart failure) (HCC)     Chronic kidney disease     Chronic systolic heart failure (HCC)     Ef 25%    Degenerative joint disease     Depression     Diabetes mellitus ([facility]     Disease of thyroid gland     Fracture of left radius     Gastroparesis  GERD (gastroesophageal reflux disease)     Gout     Hyperlipidemia     Hypertension     Hyperthyroidism     Hypokalemia     Hypothyroid     Hypoxia     Incomplete bladder emptying     Neuropathy     Osteoarthritis     Other fluid overload     fluid restriction    Premature ventricular contraction     Psychiatric disorder     depression    PVD (peripheral vascular disease) (HCC)     Renal disorder     stage 3 chronic kidney disease    Sick sinus syndrome (HCC)     Sleep apnea     Thrombocytopenia (HCC)     Tracheobronchitis     Tremor     Unstable angina (HCC)     Urinary retention      Past Surgical History  Past Surgical History:   Procedure Laterality Date    ABDOMINAL SURGERY      cholecystectomy    CARDIAC PACEMAKER PLACEMENT      CARPAL TUNNEL RELEASE      bilateral    CHOLECYSTECTOMY      COLONOSCOPY W/ POLYPECTOMY      CORONARY ARTERY BYPASS GRAFT      EYE SURGERY      bilateral CAT EXT W/IOL    HERNIA REPAIR      JOINT REPLACEMENT     80 y o  male who presents as a transfer from [facility] with a past medical history of atrial fibrillation, chronic systolic heart failure, type 2 diabetes who presents the emergency room with chief complaint of weakness fatigue and respiratory difficulty  The patient states that he was having worsening abdominal distension  He reportedly was receiving metolazone for 2 days without improvement of symptoms  He presents with a a worsening cough but with no fevers or chills  He presents to the hospital with reported anasarca type symptoms with diffuse body swelling and edema and worsening renal function  Patient is a poor historian however he notes that he is having profound symptoms of weakness/fatigue over last several days  He subsequently came the hospital with a volume overloaded state  He has a very limited historian  Summary:  Pt presents w/ functional oropharyngeal swallow for small amt of thin, clears   Pt has belching during assessment  Noted h/o esophageal stricture that per notes needs to be addressed  Chart states the pt had poor intake, vomiting prior to admit  Recommendations:  Ok for clear liquids until pt can be seen by GI for stricture  At this time he is in need of a  Paracentesis however his INR is too high for any procedure at this time  Meds: crush in puree  Supervision:  Positioning:Upright  Strategies: Pt to take PO/Meds only when fully alert and upright  Oral care: please do frequently  Aspiration precautions  Reflux precautions    Frequency: will follow case until pt can be seen    Consider consult w/:  Aspiration     Reason for consult:    Determine safest and least restrictive diet  h/o esophageal dysphagia     Current diet:  npo  Premorbid diet[de-identified]  Regular w/ thin- From [facility]ovany[facility], pt has been consuming a regular texture diet with thin liquids without any significant difficulties  When discussing with pt and daughter at b/s during evaluation, when pt eats and drinks "too fast" the food and liquid "comes back up" intermittently  Pt and daughter state that this does not occur with every meal   Previous VBS:  -  O2 requirement:  NC  Voice/Speech:  Pt yelling out-what are you doing to me?  My back hurts  Social:  [facility]  Follows commands:           -not consistently               Cognitive Status:  Alert, not cooperative 2* discomfort  Oral mech exam:  Partial dentition-unsure if pt has dentures at home  Full symmetry    Items administered:  Jello, thin by straw, applesauce  Oral stage:  Lip closure: fair  Mastication: na  Bolus formation: wfl  Bolus control: fair  Transfer: mildly reduced but wfl  Oral residue: none noted  Pocketing: -    Pharyngeal stage:  Swallow promptness: wfl  Laryngeal rise: fair  Wet voice: none  Throat clear: none  Cough: no coughing  Secondary swallows: mult swallows throughout    Esophageal stage:  H/o GERD    Aspiration precautions posted    Results d/w:  Pt, nursing    Goal(s):  Pt will tolerate least restrictive diet w/out s/s aspiration or oral/pharyngeal difficulties

## 2018-07-09 NOTE — CASE MANAGEMENT
Notification of Inpatient Admission/Inpatient Authorization Request  This is a Notification of Inpatient Admission/Request for Inpatient Authorization to our facility [facility]  Please be advised that this patient is currently in our facility under Inpatient Status  Below you will find the Attending Physician and Facilitys information including NPI# and contact information for the Utilization  assigned to the [facility] where the patient is receiving services  Please feel free to contact the Utilization Review Department with any questions  Patient Information:  PATIENT NAME: Phuc Rosas  MRN: [id number]  YOB: 1928    PRESENTATION DATE: 7/8/2018  5:09 PM  IP ADMISSION DATE: 7/8/18 1709  DISCHARGE DATE: No discharge date for patient encounter  [facility]    Attending Physician:  Beatriz Sharp [facility] Practioner ID- [id number]  [address] [place] [address]  Phone 1: [phone]  Fax: [phone]  Facility:  [address] [place] [address] AN AFFILIATE [place] [address]  Phone: [phone]  Tax ID: [id number]  NPI: [id number]    [address] [place] [address] in the [facility] [place] [facility] for 2017  Network Utilization Review Department  Phone: [phone]; Fax [phone]  ATTENTION: The Network Utilization Review Department is now centralized for our 7 Facilities  Make a note that we have a new phone and fax numbers for our Department  Please call with any questions or concerns to [phone] and carefully follow the prompts so that you are directed to the right person  All voicemails are confidential  Fax any determinations, approvals, denials, and requests for initial or continue stay review clinical to [phone]   Due to HIGH CALL volume, it would be easier if you could please send faxed requests to expedite your requests and in part, help us provide discharge notifications faster

## 2018-07-10 NOTE — RESPIRATORY THERAPY NOTE
RT Protocol Note  Kip Mcdonough 80 y o  male MRN: [id number]  Unit/Bed#: [facility] 607-01 Encounter: [id number]    Assessment    Principal Problem:    Acute on chronic systolic (congestive) heart failure (HCC)  Active Problems:    Pacemaker    Type 2 diabetes mellitus with hyperglycemia, with long-term current use of insulin (HCC)    Chronic atrial fibrillation (HCC)    Hyperlipidemia    Thrombocytopenia (HCC)    Suprapubic catheter (HCC)    Aortic stenosis    Dysphagia    Acute renal failure (ARF) (HCC)      Home Pulmonary Medications:  N/A       Past Medical History:   Diagnosis Date    Anemia     Arthralgia     Atrial fibrillation (HCC)     BPH (benign prostatic hypertrophy)     CAD (coronary artery disease)     Cancer (HCC)     thyroid    Cardiac disease     atrial fib   Cardiomegaly     Carpal tunnel syndrome     CHF (congestive heart failure) (HCC)     Chronic kidney disease     Chronic systolic heart failure (HCC)     Ef 25%    Degenerative joint disease     Depression     Diabetes mellitus [facility]     Disease of thyroid gland     Fracture of left radius     Gastroparesis     GERD (gastroesophageal reflux disease)     Gout     Hyperlipidemia     Hypertension     Hyperthyroidism     Hypokalemia     Hypothyroid     Hypoxia     Incomplete bladder emptying     Neuropathy     Osteoarthritis     Other fluid overload     fluid restriction    Premature ventricular contraction     Psychiatric disorder     depression    PVD (peripheral vascular disease) (HCC)     Renal disorder     stage 3 chronic kidney disease    Sick sinus syndrome (HCC)     Sleep apnea     Thrombocytopenia (HCC)     Tracheobronchitis     Tremor     Unstable angina (HCC)     Urinary retention      Social History     Social History    Marital status:       Spouse name: N/A    Number of children: N/A    Years of education: N/A     Social History Main Topics    Smoking status: Former Smoker     Packs/day: 3 00     Years: 30 00     Types: Cigarettes    Smokeless tobacco: Never Used      Comment: quit late 40's yrs old;total tobacco use 30yrs,used 3 pks of cigarettes form 3-4 yrs then changed to 10 cigars/day    Alcohol use No    Drug use: No    Sexual activity: Not Currently     Other Topics Concern    Not on file     Social History Narrative    No narrative on file       Subjective         Objective    Physical Exam:   Assessment Type: Assess only  General Appearance: Alert, Drowsy  Respiratory Pattern: Labored  Chest Assessment: Chest expansion symmetrical  Bilateral Breath Sounds: Rales, Crackles, Coarse    Vitals:  Blood pressure 166/74, pulse 70, temperature 98 1 °F (36 7 °C), temperature source Oral, resp  rate 17, height 5' 11" (1 803 m), weight 117 kg (258 lb 2 5 oz), SpO2 100 %  Imaging and other studies: I have personally reviewed pertinent reports  Plan    Respiratory Plan: Moderate/Severe Distress pathway        Resp Comments: pt placed on BiPAP and CPOX for CHF  Repeat ABG at noon

## 2018-07-10 NOTE — PROGRESS NOTES
[facility]  Progress Note - Shade Pearl 80 y o  male MRN: [id number]    Unit/Bed#: [facility] 607-01 Encounter: [id number]    Assessment/Plan:  Dysphagia/abdominal ascites  Pt's status continues to be tenuous-at this juncture risks outweigh benefits of any GI intervention  Diet as tolerated as per Speech/Language pathologist   Concern about nutritional status  Will need to clarify goals of care, however at this juncture GI intervention risks outweigh benefits  Consider enteral nutrition via NG tube/Dobhoff    Concern on barium swallow 5/2018 of narrowed esophagus w/ proximal dilation of esophagus and at that juncture EGD w/ botox was recommended-this intervention will need to be put on hold until current status drastically improves  Subjective:   Pt's health status continues to be tenuous  At the time of exam, patient had just been taken off BiPAP  Pt sitting in bed attempting to feed himself  Pty voices no complaints    Objective:     Vitals: Blood pressure 147/61, pulse (!) 50, temperature 98 5 °F (36 9 °C), temperature source Oral, resp  rate 18, height 5' 11" (1 803 m), weight 117 kg (258 lb 2 5 oz), SpO2 98 %  ,Body mass index is 36 01 kg/m²  Intake/Output Summary (Last 24 hours) at 07/10/18 1750  Last data filed at 07/10/18 1500   Gross per 24 hour   Intake           1010 2 ml   Output             1600 ml   Net           -589 8 ml       Review of Systems: as per HPI  Review of Systems   Respiratory: Positive for shortness of breath  Gastrointestinal: Negative for abdominal pain, nausea and vomiting  Physical Exam:     Physical Exam   Constitutional: He appears well-developed  HENT:   Head: Normocephalic  Neck: Normal range of motion  Pulmonary/Chest: He has wheezes  He has rales  Abdominal: Soft  There is no tenderness  Musculoskeletal: He exhibits edema  Neurological: He is alert           Invasive Devices     Peripheral Intravenous Line            Peripheral IV 07/09/18 Left Arm 1 day          Drain            Suprapubic Catheter Non-latex 18 Fr  130 days                        CBC: No results found for: WBC, HGB, HCT, MCV, PLT, ADJUSTEDWBC, MCH, MCHC, RDW, MPV, NRBC,   CMP: Lab Results   Component Value Date     07/10/2018    K 3 0 (L) 07/10/2018     07/10/2018    CO2 31 07/10/2018    ANIONGAP 6 07/10/2018    BUN 88 (H) 07/10/2018    CREATININE 3 75 (H) 07/10/2018    GLUCOSE 121 07/10/2018    CALCIUM 8 4 07/10/2018    EGFR 13 07/10/2018   ,   Lipase: No results found for: LIPASE,  PT/INR:   Lab Results   Component Value Date    INR 2 32 (H) 07/10/2018   ,   Troponin: No results found for: TROPONINI,   Magnesium: No results found for: MAG,   Phosphorous: No results found for: PHOS

## 2018-07-10 NOTE — ASSESSMENT & PLAN NOTE
· Likely cardiorenal etiology  Patient is currently volume overloaded  For now continue with diuresis    · Monitor creatinine

## 2018-07-10 NOTE — CASE MANAGEMENT
Notification of Discharge  This is a Notification of Discharge from our facility [facility]  Please be advised that this patient has been discharge from our facility  Below you will find the admission and discharge date and time including the patients disposition  PRESENTATION DATE: 7/6/2018  1:24 PM  IP ADMISSION DATE: 7/6/18 1619  DISCHARGE DATE: 7/8/2018  3:42 PM  DISPOSITION: [facility] in the [facility] Utilization Review Department  Phone: [phone]; Fax [phone]  ATTENTION: The Network Utilization Review Department is now centralized for our 9 Facilities  Make a note that we have a new phone and fax numbers for our Department  Please call with any questions or concerns to [phone] and carefully follow the prompts so that you are directed to the right person  All voicemails are confidential  Fax any determinations, approvals, denials, and requests for initial or continue stay review clinical to [phone]  Due to HIGH CALL volume, it would be easier if you could please send faxed requests to expedite your requests and in part, help us provide discharge notifications faster

## 2018-07-10 NOTE — ASSESSMENT & PLAN NOTE
Lab Results   Component Value Date    HGBA1C 6 7 (H) 04/02/2018       Recent Labs      07/09/18   1722  07/10/18   0104  07/10/18   0708  07/10/18   1212   POCGLU  150*  147*  115  156*   ·  Continue current treatment  · A1c at goal  · Blood sugars are acceptable    Blood Sugar Average: Last 72 hrs:  (P) 125 [id number] sliding scale insulin

## 2018-07-10 NOTE — ASSESSMENT & PLAN NOTE
· Echocardiogram was a poor study but did show low ejection fraction  · Continue with IV Bumex per Cardiology, urine output has increased since increasing the dose  · P r n  BiPAP for respiratory failure  · Continue to monitor BMP daily    · Will place on level 2 step down

## 2018-07-10 NOTE — PROGRESS NOTES
NEPHROLOGY PROGRESS NOTE   Reva Wilkins 80 y o  male MRN: [id number]  Unit/Bed#: [facility] 607-01 Encounter: [id number]      ASSESSMENT and PLAN:  1  Acute kidney injury:  Patient was admitted with creatinine of 2 98 which has been worsening on diuretics  Could be intravascular volume depletion vs cardiorenal                -renal ultrasound:  Mild bilateral renal atrophy, no hydronephrosis, suspected right renal cyst              -repeat UA: moderate blood, +1 protein, large leukocytes, innumerable WBC obscuring field               -s/p albumin yesterday x2 doses    -currently on bumex drip per cardiology but no significant diuresis overnight    -will defer diuretic management to cardio and continue drip for now               -follow I/o and daily weights               -am BMP   2  CKD stage 4:  Baseline creatinine appears to be 1 9-2 1 at best since 2016 but we do not have any steady state labs  He has had multiple episodes of acute renal failure in the past              -does not appear that he sees an outpatient nephrologist although he has seen Dr Salvador Wong and Dr Len Gresham in the hospital in the past   3  Acute on chronic systolic CHF: EF 03% in April    -repeat Echo was difficult study    -on Bumex drip per cardiology   4  History of Urinary retention with chronic suprapubic catheter  5  Ascites:  per GI note this is very small and likely related to CHF but there is some concern for cirrhosis    -continue diuretics   6  Hypokalemia: replace K on Bumex drip        SUBJECTIVE:  Patient trying to pull off facemask   Not answering questing or interacting    OBJECTIVE:  Current Weight: Weight - Scale: 117 kg (258 lb 2 5 oz)  Vitals:    07/10/18 1138   BP:    Pulse:    Resp:    Temp:    SpO2: 100%       Intake/Output Summary (Last 24 hours) at 07/10/18 1146  Last data filed at 07/10/18 0935   Gross per 24 hour   Intake            890 2 ml   Output             1525 ml   Net           -634 8 ml     General: on facemask   Skin: no rash   Eyes: sclera anicteric   ENT: moist mucous membranes   Neck: supple, symmetric   Chest: coarse breath sounds   CVS: regular rate and rhythm   Abdomen: firm, non-tender  Extremities: bilateral edema   Neuro: lethargic but awakens to voice   Psych: agitated      Medications:  Scheduled Meds:  Current Facility-Administered Medications:  albuterol 2 5 mg Nebulization Q4H PRN Hetul Frazier, DO    allopurinol 200 mg Oral Daily Hetul Frazier, DO    aspirin 81 mg Oral Daily Hetul Frazier, DO    atorvastatin 40 mg Oral Daily With [employer], DO    b complex-vitamin C-folic acid 1 capsule Oral Daily Hetul Frazier, DO    bumetanide 0 5 mg/hr Intravenous Continuous Efren Orlando MD Last Rate: 0 5 mg/hr (07/10/18 0130)   carbidopa-levodopa 1 tablet Oral TID Hetul Frazier, DO    carvedilol 12 5 mg Oral BID With Meals Kayce YUKO Emmanueli, DO    dextrose 25 mL Intravenous Q4H PRN Hetul Frazier, DO    gabapentin 100 mg Oral BID Hetul Frazier, DO    hydrALAZINE 50 mg Oral Q8H [facility] Kayce K Mekhi, DO    insulin lispro 1-5 Units Subcutaneous TID AC Hetul Frazier, DO    insulin lispro 1-6 Units Subcutaneous Q6H [facility] Hetul Frazier, DO    isosorbide mononitrate 60 mg Oral Daily Kayce K Mekhi, DO    lactulose 20 g Oral Once per day on Mon Wed Fri Hetul Frazier, DO    levothyroxine 225 mcg Oral Early Morning Hetul Frazier, DO    magnesium oxide 400 mg Oral BID Hetul Frazier, DO    oxyCODONE-acetaminophen 1 tablet Oral Q4H PRN Hetul Frazier, DO    pantoprazole 40 mg Oral Early Morning Hetul Frazier, DO        PRN Meds:   albuterol    dextrose    oxyCODONE-acetaminophen    Continuous Infusions:  bumetanide 0 5 mg/hr Last Rate: 0 5 mg/hr (07/10/18 0130)       Laboratory Results:    Results from last 7 days  Lab Units 07/10/18  0631 07/09/18  0510 07/08/18  0542 07/07/18  0531 07/06/18  1421   WBC Thousand/uL  --  4 38 4 72 5 84 9 73   HEMOGLOBIN g/dL  --  10 4* 10 4* 10 6* 11 3*   HEMATOCRIT %  --  34 7* 33 1* 34 8* 36 5   PLATELETS Thousands/uL  --  75* 77* 72* 81*   SODIUM mmol/L 143 141 145 146* 143   POTASSIUM mmol/L 3 0* 3 7 3 9 3 9 4 6   CHLORIDE mmol/L 106 104 106 107 106   CO2 mmol/L 31 27 29 27 30   BUN mg/dL 88* 81* 70* 62* 56*   CREATININE mg/dL 3 75* 3 53* 3 43* 2 87* 2 98*   CALCIUM mg/dL 8 4 8 6 8 8 8 9 8 7   MAGNESIUM mg/dL  --  2 1  --  1 8 1 6   PHOSPHORUS mg/dL  --  6 2*  --   --   --    TOTAL PROTEIN g/dL  --   --   --   --  7 3   BILIRUBIN TOTAL mg/dL  --   --   --   --  0 90   ALK PHOS U/L  --   --   --   --  361*   ALT U/L  --   --   --   --  14   AST U/L  --   --   --   --  24   GLUCOSE RANDOM mg/dL 121 96 75 75 123

## 2018-07-10 NOTE — PROGRESS NOTES
Progress Note - Iraj Dural 11/1/1928, 80 y o  male MRN: [id number]    Unit/Bed#: [facility] 607-01 Encounter: [id number]    Primary Care Provider: Mary Lopez DO   Date and time admitted to hospital: 7/8/2018  5:09 PM        * Acute on chronic systolic (congestive) heart failure (HCC)   Assessment & Plan    · Echocardiogram was a poor study but did show low ejection fraction  · Continue with IV Bumex per Cardiology, urine output has increased since increasing the dose  · P r n  BiPAP for respiratory failure  · Continue to monitor BMP daily  · Will place on level 2 step down        Acute renal failure (ARF) (HCC)   Assessment & Plan    · Likely cardiorenal etiology  Patient is currently volume overloaded  For now continue with diuresis  · Monitor creatinine          Aortic stenosis   Assessment & Plan    · Could not be quantified an echocardiogram  · Will diurese and possibly repeat echo later in the admission        Chronic atrial fibrillation ([facility] [address] )   Assessment & Plan    · Continue Coreg  · Was on warfarin as an outpatient, went to [facility] and found to have an elevated INR  Will recheck INR and possibly restart         Type 2 diabetes mellitus with hyperglycemia, with long-term current use of insulin [facility])   Assessment & Plan    Lab Results   Component Value Date    HGBA1C 6 7 (H) 04/02/2018       Recent Labs      07/09/18   1722  07/10/18   0104  07/10/18   0708  07/10/18   1212   POCGLU  150*  147*  115  156* ·    Continue current treatment  · A1c at goal  · Blood sugars are acceptable    Blood Sugar Average: Last 72 hrs:  (P) 125 [id number] sliding scale insulin            VTE Pharmacologic Prophylaxis:   Pharmacologic: Warfarin (Coumadin)  Mechanical VTE Prophylaxis in Place: Yes    Patient Centered Rounds: I have performed bedside rounds with nursing staff today      Education and Discussions with Family / Patient:  Patient, plan of care, attempted to call patient's daughter at home and mobile no, no answer, will try again tomorrow  Time Spent for Care: 20 minutes  More than 50% of total time spent on counseling and coordination of care as described above  Current Length of Stay: 2 day(s)    Current Patient Status: Inpatient   Certification Statement: The patient will continue to require additional inpatient hospital stay due to IV diuresis    Discharge Plan:  Rehab in stable    Code Status: Level 1 - Full Code      Subjective:   Short of breath this morning  ABG was checked and found to have hypercapnic respiratory failure  Placed on BiPAP for over 2 hours with significant improvement patient requested BiPAP mask be removed  Repeat blood gas was significantly improved with near resolution of his acidemia     Objective:     Vitals:   Temp (24hrs), Av 3 °F (36 8 °C), Min:98 °F (36 7 °C), Max:98 7 °F (37 1 °C)    HR:  [69-76] 69  Resp:  [16-20] 16  BP: (117-166)/(50-74) 139/63  SpO2:  [96 %-100 %] 96 %  Body mass index is 36 01 kg/m²  Input and Output Summary (last 24 hours): Intake/Output Summary (Last 24 hours) at 07/10/18 1517  Last data filed at 07/10/18 1331   Gross per 24 hour   Intake           1010 2 ml   Output             1425 ml   Net           -414 8 ml       Physical Exam:     Physical Exam   Constitutional: He is oriented to person, place, and time  Tripod position, SOB   HENT:   Head: Normocephalic and atraumatic  Eyes: EOM are normal  Pupils are equal, round, and reactive to light  Neck: Neck supple  No JVD present  Cardiovascular: Normal rate and regular rhythm  Pulmonary/Chest: He has rales  Abdominal: Soft  There is no tenderness  Musculoskeletal: Normal range of motion  He exhibits no edema  Neurological: He is alert and oriented to person, place, and time  Skin: Skin is warm and dry  Psychiatric: He has a normal mood and affect   His behavior is normal          Additional Data:     Labs:      Results from last 7 days  Lab Units 07/09/18  0510   WBC Thousand/uL 4 38   HEMOGLOBIN g/dL 10 4*   HEMATOCRIT % 34 7*   PLATELETS Thousands/uL 75*   NEUTROS PCT % 80*   LYMPHS PCT % 8*   MONOS PCT % 8   EOS PCT % 2       Results from last 7 days  Lab Units 07/10/18  0631  07/06/18  1421   SODIUM mmol/L 143  < > 143   POTASSIUM mmol/L 3 0*  < > 4 6   CHLORIDE mmol/L 106  < > 106   CO2 mmol/L 31  < > 30   BUN mg/dL 88*  < > 56*   CREATININE mg/dL 3 75*  < > 2 98*   CALCIUM mg/dL 8 4  < > 8 7   TOTAL PROTEIN g/dL  --   --  7 3   BILIRUBIN TOTAL mg/dL  --   --  0 90   ALK PHOS U/L  --   --  361*   ALT U/L  --   --  14   AST U/L  --   --  24   GLUCOSE RANDOM mg/dL 121  < > 123   < > = values in this interval not displayed  Results from last 7 days  Lab Units 07/08/18  0542   INR  7 13*       Results from last 7 days  Lab Units 07/10/18  1212 07/10/18  0708 07/10/18  0104 07/09/18  1722 07/09/18  1216 07/09/18  0602 07/08/18  2357 07/08/18  1831 07/08/18  1205 07/08/18  0746 07/08/18  0551 07/08/18  0218   POC GLUCOSE mg/dl 156* 115 147* 150* 109 107 93 90 97 81 76 78             * I Have Reviewed All Lab Data Listed Above  * Additional Pertinent Lab Tests Reviewed: All Labs Within Last 24 Hours Reviewed    jose    Recent Cultures (last 7 days):       Results from last 7 days  Lab Units 07/06/18  1421 07/06/18  1415   BLOOD CULTURE  No Growth at 72 hrs  No Growth at 72 hrs     URINE CULTURE  >100,000 cfu/ml Escherichia coli*  >100,000 cfu/ml Serratia marcescens*  80,000-89,000 cfu/ml Pseudomonas aeruginosa*  --        Last 24 Hours Medication List:     Current Facility-Administered Medications:  albuterol 2 5 mg Nebulization Q4H PRN Hetul Frazier, DO    allopurinol 200 mg Oral Daily Hetul Frazier, DO    aspirin 81 mg Oral Daily Hetul Frazier, DO    atorvastatin 40 mg Oral Daily With [employer], DO    b complex-vitamin C-folic acid 1 capsule Oral Daily Charisse Frazier DO    bumetanide 1 mg/hr Intravenous Continuous Dar Pierre MD Last Rate: 0 5 mg/hr (07/10/18 0130)   carbidopa-levodopa 1 tablet Oral TID Hetul Frazier, DO    carvedilol 12 5 mg Oral BID With Meals Kayce YUKO Gaming, DO    dextrose 25 mL Intravenous Q4H PRN Hetul Frazier, DO    gabapentin 100 mg Oral BID Hetul Frazier, DO    heparin (porcine) 5,000 Units Subcutaneous Q8H [facility] Wil Pierson MD    hydrALAZINE 50 mg Oral Q8H [facility] Kayce K Mekhi, DO    insulin lispro 1-5 Units Subcutaneous TID AC Hetul Frazier, DO    insulin lispro 1-6 Units Subcutaneous Q6H [facility] Hetul Frazier, DO    isosorbide mononitrate 60 mg Oral Daily Kayce YUKO Emmanueli, DO    lactulose 20 g Oral Once per day on Mon Wed Fri Hetul Frazier, DO    levothyroxine 225 mcg Oral Early Morning Hetul Frazier, DO    magnesium oxide 400 mg Oral BID Hetul Frazier, DO    oxyCODONE-acetaminophen 1 tablet Oral Q4H PRN Hetul Frazier, DO    pantoprazole 40 mg Oral Early Morning Hetul Frazier, DO    potassium chloride 40 mEq Oral Once Kriss Ernst PA-C         Today, Patient Was Seen By: Sonia Kim MD    ** Please Note: Dictation voice to text software may have been used in the creation of this document   **

## 2018-07-10 NOTE — ASSESSMENT & PLAN NOTE
· Continue Coreg  · Was on warfarin as an outpatient, went to [facility] and found to have an elevated INR    Will recheck INR and possibly restart

## 2018-07-10 NOTE — MEDICAL STUDENT
Imtiaz Younger MEDICAL STUDENT  Inpatient H&P for TRAINING ONLY   Not Part of Legal Medical Record     Medical Student Progress Note    Unit/Bed#: [facility] 607-01 Encounter: [id number]        Kevin Duckworth 80 y o  male [id number]    Hospital Stay Days: 2      Assessment/Plan:    Principal Problem:    Acute on chronic systolic (congestive) heart failure ([address] )  previously gotten metolazone diuresis for 2 days with no reduction in distension and sxs  Other presenting sxs include cough and edema  Cardiology consultation suggested bumex drip for diuresis, along with 1800mL fluid restriction and repeat 7/0/78 ECHO was complicated but confirmed overloaded state with bilateral atria dilation  4/2018 ECHO EF was 35-40%  Continue ASA 81, coreg 12 5mg BID  Continue bumex diuresis and monitor I/Os with 1800mL fluid restriction  Total output today was 600mL from suprapubic catheter  Input was 240mL for a net loss of 360mL  Output is adequate at this time  Diuresis is improving SOB and ascites; continue while closely monitoring renal function    Ascites  2/2 CHF and ARF  Consider paracentesis to determine exudative vs  transudative  GI consulted; will await their input      Acute renal failure (ARF) [facility])     Nephrology consultation for worsening kidney function: 7/9 BUN 81/ Cre 3 53 --> 7/10 BUN 88/ Cre 3 75 suggested intravascular vol depletion vs  Cardiorenal cause and to replace K  Renal U/s mild bilat renal atrophy, suspect right renal cyst  UA: mod blood, 1+ protein, large leuk, innumerable WBC  Got x2 albumin 7/9  Has suprapubic catheter placed- could be colonized; consider abx treatment if becomes febrile  Currently afebrile with no discomfort at the site  ]  Diuresis to continue with close monitoring of BUN/Cre  Value nephrology input if progresses       Dyspnea  2/2 volume overloaded state: CHF  7/10 0948 Arterial Blood Gas result:  pO2 181 7; pCO2 57 0; pH 7 290;  HCO3 26 8, O2 Content 15 0  7/10 1020  Arterial Blood Gas result:  pO2 99 1; pCO2 36 8; pH 7 337;  HCO3 19 3, O2 Content 14 1  After first ABG, placed on BiPap  Repeat ABG showed improvement in hypercarbia and respiratory acidosis so patient put back on 4 5L NC  Wean as tolerated  Maintain saturation above 92%     Type 2 diabetes mellitus with hyperglycemia, with long-term current use of insulin (HCC)  POCT glucose 140s-150s so continue short acting insulin 1-5u  for meals  Sugars are stable  Monitor POCT for changes      Chronic atrial fibrillation (HCC)  Irregularly irregular today on exam   On home coumadin  During 's hospitalization, INR was supratherapeutic and coumadin was held  Will recheck INR and if subtherapeutic, will restart coumadin       Hyperlipidemia  Continue home atorvastatin 40mg QD      Thrombocytopenia (HCC)  Monitor with CBC for changes  SubQ heparin for DVT ppx   On coumadin at home for anticoag 2/2 afib  During 's hospitalization, INR was supratherapeutic and coumadin was held  Will recheck INR and if subtherapeutic, will restart coumadin       Aortic stenosis  ECHO repeated revealing significant aortic stenosis that was unable to be measured      Dysphagia  Prior hx of esophageal strictures  GI consult               Parkinsonism  Continue home dose sinemet 10-100mg TID, neurontin 100mg BID  Monitor for rigidity, worsening sxs etc  A    Hypothyroidism  Continue home levo 225mcg      Disposition: Requires further hospitalization       Subjective:   80year old male with history of acute on chronic systolic HF, pacemaker, aortic stenosis, Stage 4 CKD, sick sinus syndrome, pulm HTN, HTN, chronic AFib, hypothyroidism, hyperlipidemia and DM2 that presented to the ED with weakness, fatigue, SOB and increasing abdominal distension  Today he was noted to be comfortably sleeping  Was stepped down from BiPap after repeat ABG showed improvement in respiratory acidosis and hypercarbia and is currently on 4 5L NC   He states his SOB has improved and that his abdominal distension feels improved  He was sleepy during interview and was hard of hearing  He stated that he "felt okay" and is "doing fine " He did not offer additional complaints  Vitals: Temp (24hrs), Av 1 °F (36 7 °C), Min:97 6 °F (36 4 °C), Max:98 7 °F (37 1 °C)  Current: Temperature: 98 °F (36 7 °C)  Vitals:    07/10/18 0959 07/10/18 1100 07/10/18 1138 07/10/18 1323   BP:  139/63     BP Location:  Right arm     Pulse: 70 69     Resp: 17 16     Temp:  98 °F (36 7 °C)     TempSrc:  Axillary     SpO2: 100% 99% 100% 96%   Weight:       Height:        Body mass index is 36 01 kg/m²  I/O last 24 hours:   In: 1010 2 [P O :980; I V :30 2]  Out: 1825 [Urine:1825]      Physical Exam:   General appearance: asleep when entered room but awoke to sound and remained alert; in no acute distress  Lungs: wheezes and coarse crackles in all lobes bilaterally  Heart: heart sounds obstructed by lung sounds; soft heart sounds; systolic ejection murmur, irregularly irregular rate and rhythm  Abdomen: abnormal findings:  ascites, distended, shifting dullness and no tenderness to palpation; tympanic to percussion  Extremities: venous stasis dermatitis noted and present to greater extent on RLE lateral leg and feet bilaterally; mild edema to knee bilaterally     Invasive Devices     Peripheral Intravenous Line            Peripheral IV 18 Left Arm 1 day          Drain            Suprapubic Catheter Non-latex 18 Fr  130 days                          Labs:   Recent Results (from the past 24 hour(s))   Fingerstick Glucose (POCT)    Collection Time: 18  5:22 PM   Result Value Ref Range    POC Glucose 150 (H) 65 - 140 mg/dl   Sodium, urine, random    Collection Time: 18  6:44 PM   Result Value Ref Range    Sodium, Ur 47    Creatinine, urine, random    Collection Time: 18  6:44 PM   Result Value Ref Range    Creatinine, Ur 56 4 mg/dL   Urea nitrogen, urine    Collection Time: 18  6:44 PM   Result Value Ref Range    Urea Nitrogen, Ur 276 mg/dL   Chloride, urine, random    Collection Time: 07/09/18  6:44 PM   Result Value Ref Range    Chloride, Ur 109 10 - 330 mmol/L   Urinalysis with microscopic    Collection Time: 07/09/18  6:44 PM   Result Value Ref Range    Clarity, UA Turbid     Color, UA Yellow     Specific [place], UA 1 011 1 003 - 1 030    pH, UA 7 0 4 5 - 8 0    Glucose, UA Negative Negative mg/dl    Ketones, UA Negative Negative mg/dl    Blood, UA Moderate (A) Negative    Protein, UA 30 (1+) (A) Negative mg/dl    Nitrite, UA Negative Negative    Bilirubin, UA Negative Negative    Urobilinogen, UA 1 0 0 2, 1 0 E U /dl E U /dl    Leukocytes, UA Large (A) Negative    WBC, UA Innumerable (A) None Seen, 0-5, 5-55, 5-65 /hpf    RBC, UA Field obscured, unable to enumerate (A) None Seen, 0-5 /hpf    Bacteria, UA Field obscured, unable to enumerate (A) None Seen, Occasional /hpf    AMORPH PHOSPATES Innumerable /hpf    Epithelial Cells Field obscured, unable to enumerate (A) None Seen, Occasional /hpf   Bilirubin, direct    Collection Time: 07/09/18 11:20 PM   Result Value Ref Range    Bilirubin, Direct 0 54 (H) 0 00 - 0 20 mg/dL   Hemolysis Smear    Collection Time: 07/09/18 11:21 PM   Result Value Ref Range    Hemolysis Smear No Schistocytes or Helmet Cells noted    Fingerstick Glucose (POCT)    Collection Time: 07/10/18  1:04 AM   Result Value Ref Range    POC Glucose 147 (H) 65 - 140 mg/dl   Basic metabolic panel    Collection Time: 07/10/18  6:31 AM   Result Value Ref Range    Sodium 143 136 - 145 mmol/L    Potassium 3 0 (L) 3 5 - 5 3 mmol/L    Chloride 106 100 - 108 mmol/L    CO2 31 21 - 32 mmol/L    Anion Gap 6 4 - 13 mmol/L    BUN 88 (H) 5 - 25 mg/dL    Creatinine 3 75 (H) 0 60 - 1 30 mg/dL    Glucose 121 65 - 140 mg/dL    Calcium 8 4 8 3 - 10 1 mg/dL    eGFR 13 ml/min/1 73sq m   Fingerstick Glucose (POCT)    Collection Time: 07/10/18  7:08 AM   Result Value Ref Range    POC Glucose 115 65 - 140 mg/dl Blood gas, arterial    Collection Time: 07/10/18  9:48 AM   Result Value Ref Range    pH, Arterial 7 290 (L) 7 350 - 7 450    pCO2, Arterial 57 0 (HH) 36 0 - 44 0 mm Hg    pO2, Arterial 181 7 (H) 75 0 - 129 0 mm Hg    HCO3, Arterial 26 8 22 0 - 28 0 mmol/L    Base Excess, Arterial -0 4 mmol/L    O2 Content, Arterial 15 0 (L) 16 0 - 23 0 mL/dL    O2 HGB,Arterial  98 1 (H) 94 0 - 97 0 %    SOURCE Radial, Right     JOSEPH TEST Yes     Non Vent type BIPAP BIPAP     IPAP 16     EPAP 8     BIPAP fio2 50 %   Blood gas, arterial    Collection Time: 07/10/18 11:50 AM   Result Value Ref Range    pH, Arterial 7 337 (L) 7 350 - 7 450    pCO2, Arterial 36 8 36 0 - 44 0 mm Hg    pO2, Arterial 99 1 75 0 - 129 0 mm Hg    HCO3, Arterial 19 3 (L) 22 0 - 28 0 mmol/L    Base Excess, Arterial -5 9 mmol/L    O2 Content, Arterial 14 1 (L) 16 0 - 23 0 mL/dL    O2 HGB,Arterial  96 5 94 0 - 97 0 %    SOURCE Radial, Right     JOSEPH TEST No     Non Vent type BIPAP BIPAP     IPAP 16     EPAP 8     BIPAP fio2 40 %   Fingerstick Glucose (POCT)    Collection Time: 07/10/18 12:12 PM   Result Value Ref Range    POC Glucose 156 (H) 65 - 140 mg/dl       Radiology Results: I have personally reviewed pertinent reports  XR chest portable   Final Result by Yudith Hamilton MD (07/10 1048)      Stable small left pleural effusion with probable underlying consolidation  Workstation performed: [id number]         US abdomen complete   Final Result by Mohini Gomez MD (07/09 1155)   Technically difficult examination-   1  Irregular margination of a mildly enlarged liver, suggesting hepatocellular disease  2  Portal vein patent   3  Small amount of ascites   4  Status post cholecystectomy   5  No hydronephrosis                  Workstation performed: [id number]         IR consult    (Results Pending)       Other Diagnostic Testing:   I have personally reviewed pertinent reports          Active Meds:   Current Facility-Administered Medications Medication Dose Route Frequency    albuterol inhalation solution 2 5 mg  2 5 mg Nebulization Q4H PRN    allopurinol (ZYLOPRIM) tablet 200 mg  200 mg Oral Daily    aspirin chewable tablet 81 mg  81 mg Oral Daily    atorvastatin (LIPITOR) tablet 40 mg  40 mg Oral Daily With [employer]    b complex-vitamin C-folic acid (NEPHROCAPS) capsule 1 capsule  1 capsule Oral Daily    bumetanide (BUMEX) 12 5 mg infusion 50 mL  0 5 mg/hr Intravenous Continuous    carbidopa-levodopa (SINEMET)  mg per tablet 1 tablet  1 tablet Oral TID    carvedilol (COREG) tablet 12 5 mg  12 5 mg Oral BID With Meals    dextrose 50 % IV solution 25 mL  25 mL Intravenous Q4H PRN    gabapentin (NEURONTIN) capsule 100 mg  100 mg Oral BID    hydrALAZINE (APRESOLINE) tablet 50 mg  50 mg Oral Q8H [address]    insulin lispro (HumaLOG) 100 units/mL subcutaneous injection 1-5 Units  1-5 Units Subcutaneous TID AC    insulin lispro (HumaLOG) 100 units/mL subcutaneous injection 1-6 Units  1-6 Units Subcutaneous Q6H [address]    isosorbide mononitrate (IMDUR) 24 hr tablet 60 mg  60 mg Oral Daily    lactulose 20 g/30 mL oral solution 20 g  20 g Oral Once per day on Mon Wed Fri    levothyroxine tablet 225 mcg  225 mcg Oral Early Morning    magnesium oxide (MAG-OX) tablet 400 mg  400 mg Oral BID    oxyCODONE-acetaminophen (PERCOCET) 5-325 mg per tablet 1 tablet  1 tablet Oral Q4H PRN    pantoprazole (PROTONIX) EC tablet 40 mg  40 mg Oral Early Morning    potassium chloride (K-DUR,KLOR-CON) CR tablet 40 mEq  40 mEq Oral Once         VTE Pharmacologic Prophylaxis: Enoxaparin (Lovenox)  VTE Mechanical Prophylaxis: sequential compression device    Skyla Persaud

## 2018-07-10 NOTE — PROGRESS NOTES
Cardiology Progress Note - Karlee Schneider 80 y o  male MRN: [id number]    [facility]: [id number]        Subjective:    No significant events overnight  On BIPAP this AM      ROS    Objective:   Vitals: Blood pressure 166/74, pulse 70, temperature 98 1 °F (36 7 °C), temperature source Oral, resp  rate 17, height 5' 11" (1 803 m), weight 117 kg (258 lb 2 5 oz), SpO2 100 %  , Body mass index is 36 01 kg/m² , Orthostatic Blood Pressures      Most Recent Value   Blood Pressure  166/74 filed at 07/10/2018 0802   Patient Position - Orthostatic VS  Lying filed at 07/10/2018 6045         Systolic (99TLR), DDV:968 , Min:112 , UXZ:513     Diastolic (68RSV), GEX:53, Min:50, Max:74      Intake/Output Summary (Last 24 hours) at 07/10/18 1047  Last data filed at 07/10/18 0935   Gross per 24 hour   Intake            890 2 ml   Output             1525 ml   Net           -634 8 ml     Weight (last 2 days)     Date/Time   Weight    07/10/18 0600  117 (258 16)    07/09/18 0600  117 (258 6)    07/08/18 1714  117 (258 2)                    Physical Exam   Constitutional: He is oriented to person, place, and time  No distress  HENT:   Mouth/Throat: No oropharyngeal exudate  Eyes: No scleral icterus  Neck: JVD present  Cardiovascular: Normal rate  Murmur heard  Pulmonary/Chest: Effort normal and breath sounds normal  He has no wheezes  He has no rales  Abdominal: Soft  Bowel sounds are normal  He exhibits distension  There is no tenderness  Musculoskeletal: He exhibits edema  Neurological: He is alert and oriented to person, place, and time  Skin: Skin is warm and dry  He is not diaphoretic  Psychiatric: He has a normal mood and affect   His behavior is normal          Laboratory Results:    Results from last 7 days  Lab Units 07/06/18  2114 07/06/18  1820 07/06/18  1421   TROPONIN I ng/mL 0 04 0 04 0 03       CBC with diff:   Results from last 7 days  Lab Units 07/09/18  0510 07/08/18  0542 07/07/18  0531 07/06/18  1421   WBC Thousand/uL 4 38 4 72 5 84 9 73   HEMOGLOBIN g/dL 10 4* 10 4* 10 6* 11 3*   HEMATOCRIT % 34 7* 33 1* 34 8* 36 5   MCV fL 101* 100* 101* 101*   PLATELETS Thousands/uL 75* 77* 72* 81*   MCH pg 30 2 31 4 30 6 31 3   MCHC g/dL 30 0* 31 4 30 5* 31 0*   RDW % 18 2* 18 4* 18 4* 18 7*   MPV fL 13 0* 13 8*  --  13 0*   NRBC AUTO /100 WBCs 0  --   --   --          CMP:  Results from last 7 days  Lab Units 07/10/18  0631 07/09/18  0510 07/08/18  0542 07/07/18  0531 07/06/18  1421   SODIUM mmol/L 143 141 145 146* 143   POTASSIUM mmol/L 3 0* 3 7 3 9 3 9 4 6   CHLORIDE mmol/L 106 104 106 107 106   CO2 mmol/L 31 27 29 27 30   ANION GAP mmol/L 6 10 10 12 7   BUN mg/dL 88* 81* 70* 62* 56*   CREATININE mg/dL 3 75* 3 53* 3 43* 2 87* 2 98*   GLUCOSE RANDOM mg/dL 121 96 75 75 123   CALCIUM mg/dL 8 4 8 6 8 8 8 9 8 7   AST U/L  --   --   --   --  24   ALT U/L  --   --   --   --  14   ALK PHOS U/L  --   --   --   --  361*   TOTAL PROTEIN g/dL  --   --   --   --  7 3   BILIRUBIN TOTAL mg/dL  --   --   --   --  0 90   EGFR ml/min/1 73sq m 13 14 15 19 18         BMP:  Results from last 7 days  Lab Units 07/10/18  0631 07/09/18  0510 07/08/18  0542 07/07/18  0531 07/06/18  1421   SODIUM mmol/L 143 141 145 146* 143   POTASSIUM mmol/L 3 0* 3 7 3 9 3 9 4 6   CHLORIDE mmol/L 106 104 106 107 106   CO2 mmol/L 31 27 29 27 30   BUN mg/dL 88* 81* 70* 62* 56*   CREATININE mg/dL 3 75* 3 53* 3 43* 2 87* 2 98*   GLUCOSE RANDOM mg/dL 121 96 75 75 123   CALCIUM mg/dL 8 4 8 6 8 8 8 9 8 7       BNP: No results for input(s): BNP in the last 72 hours      Magnesium:   Results from last 7 days  Lab Units 07/09/18  0510 07/07/18  0531 07/06/18  1421   MAGNESIUM mg/dL 2 1 1 8 1 6       Coags:   Results from last 7 days  Lab Units 07/08/18  0542 07/07/18  0531 07/06/18  1421   PTT seconds  --   --  54*   INR  7 13* 4 35* 3 32*       TSH: No results found for: TSH    Hemoglobin A1C       Lipid Profile:       Cardiac testing: Results for orders placed during the hospital encounter of 18   Echo complete with contrast if indicated    Narrative Alex [address]  [facility], [address] [facility]  [phone]    Transthoracic Echocardiogram  2D, M-mode, Doppler, and Color Doppler    Study date:  2018    Patient: Khushboo Cevallos  MR number: [id number]  Account number: [de-identified]  : 1928  Age: 80 years  Gender: Male  Status: Inpatient  Location: Bedside  Height: 71 in  Weight: 258 lb  BP: 124/ 58 mmHg    Indications: Cardiomyopathy    Diagnoses: I42 9 - Cardiomyopathy, unspecified    Sonographer:  Annamarie Faith RDCS  Primary Physician:  Jah Camargo MD  Referring Physician:  Quang Gomes DO  Interpreting Physician:  Maci Pulido MD    SUMMARY    SUMMARY:  This was a technically difficult and limited study  The left ventricle appeared normal in size with abnormal septal motion and low normal verall systolic function  There was no obvious regional left ventricular wall motion abnormality  However, segmental wall motion could not be assessed adequately  Left ventricular diastolic function could not be assessed  The right ventricle appeared at least moderately dilated with reduced systolic function  Both atria were dilated  Significant aortic stenosis was suspected but could not be adequately quantified  At least moderate tricuspid regurgitation was present  There was no pericardial effusion  HISTORY: PRIOR HISTORY: Anemia, Afib, CAD, Thyroid Cancer, Cardiomegaly, CHF, CKD, DM, GERD, Hyperlipidemia, Hypertension, PVC, SIRISHA, Pacemaker    PROCEDURE: The procedure was performed at the bedside  This was a routine study  The transthoracic approach was used  The study included complete 2D imaging, M-mode, complete spectral Doppler, and color Doppler  The heart rate was 70 bpm,  at the start of the study   Images were obtained from the parasternal, apical, subcostal, and suprasternal notch acoustic windows  Echocardiographic views were limited due to poor patient compliance, decreased penetration, and lung  interference  This was a technically difficult study  SYSTEM MEASUREMENT TABLES    2D mode  Aortic Root Diameter; User chosen value; 2D mode;: 2 8 cm  LA/Ao (2D): 1 54  Left Atrium Vinod-posterior Systolic Dimension; User chosen value; 2D mode;: 4 3 cm  EDV (2D-Cubed): 101 cm3  EF (2D-Cubed): 72 8 %  ESV (2D-Cubed): 27 5 cm3  FS (2D-Cubed): 35 2 %  FS (2D-Teich): 35 2 %  IVS/LVPW (2D): 1 13  Interventricular Septum Diastolic Thickness; User chosen value; 2D mode;: 1 16 cm  LVOT Area (2D): 314 mm2  Left Ventricle Internal End Diastolic Dimension; User chosen value; 2D mode;: 4 66 cm  Left Ventricle Internal Systolic Dimension; User chosen value; 2D mode;: 3 02 cm  Left Ventricle Posterior Wall Diastolic Thickness; User chosen value; 2D mode;: 1 03 cm  Left Ventricular Ejection Fraction; Teichholz; 2D mode;: 64 4 %  Left Ventricular End Diastolic Volume; Teichholz; 2D mode;: 100 cm3  Left Ventricular End Systolic Volume; Teichholz; 2D mode;: 35 6 cm3  SV (2D-Cubed): 73 5 cm3  Stroke Volume; Teichholz; 2D mode;: 64 4 cm3    Apical four chamber  Left Atrium MOD Diam; Most recent value chosen; End Systole; Apical four chamber;: 2 18 cm  Left Atrium MOD Diam; Most recent value chosen; End Systole; Apical four chamber;: 2 77 cm  Left Atrium MOD Diam; Most recent value chosen; End Systole; Apical four chamber;: 3 24 cm  Left Atrium MOD Diam; Most recent value chosen; End Systole; Apical four chamber;: 3 54 cm  Left Atrium MOD Diam; Most recent value chosen; End Systole; Apical four chamber;: 3 7 cm  Left Atrium MOD Diam; Most recent value chosen; End Systole; Apical four chamber;: 3 78 cm  Left Atrium MOD Diam; Most recent value chosen; End Systole; Apical four chamber;: 3 93 cm  Left Atrium MOD Diam; Most recent value chosen; End Systole;  Apical four chamber;: 4 05 cm  Left Atrium MOD Diam; Most recent value chosen; End Systole; Apical four chamber;: 4 13 cm  Left Atrium MOD Diam; Most recent value chosen; End Systole; Apical four chamber;: 4 18 cm  Left Atrium MOD Diam; Most recent value chosen; End Systole; Apical four chamber;: 4 24 cm  Left Atrium MOD Diam; Most recent value chosen; End Systole; Apical four chamber;: 4 33 cm  Left Atrium MOD Diam; Most recent value chosen; End Systole; Apical four chamber;: 4 43 cm  Left Atrium MOD Diam; Most recent value chosen; End Systole; Apical four chamber;: 4 4 cm  Left Atrium MOD Diam; Most recent value chosen; End Systole; Apical four chamber;: 4 36 cm  Left Atrium MOD Diam; Most recent value chosen; End Systole; Apical four chamber;: 4 25 cm  Left Atrium MOD Diam; Most recent value chosen; End Systole; Apical four chamber;: 4 17 cm  Left Atrium MOD Diam; Most recent value chosen; End Systole; Apical four chamber;: 4 09 cm  Left Atrium MOD Diam; Most recent value chosen; End Systole; Apical four chamber;: 3 47 cm  Left Atrium MOD Diam; Most recent value chosen; End Systole; Apical four chamber;: 2 03 cm  Left Atrium Systolic Area; Most recent value chosen; Method of Disks, Single Plane; 2D mode; Apical four chamber;: 2570 mm2  Left Atrium Systolic Volume; Most recent value chosen; Method of Disks, Single Plane; 2D mode; Apical four chamber;: 78 cm3  Left Atrium systolic major axis; Most recent value chosen; Method of Disks, Single Plane; 2D mode; Apical four chamber;: 6 74 cm    Unspecified Scan Mode  Mean Grad; Antegrade Flow: 13 mm[Hg]  Mean Grad; Antegrade Flow: 13 mm[Hg]  Mean Grad; Mean value; Antegrade Flow: 13 mm[Hg]  Mean Dax; Antegrade Flow: 1650 mm/s  Mean Dax; Antegrade Flow: 1630 mm/s  Mean Dax; Mean value; Antegrade Flow: 1640 mm/s  Peak Grad; Mean; Antegrade Flow: 25 mm[Hg]  VTI; Antegrade Flow: 68 3 cm  VTI; Antegrade Flow: 64 8 cm  VTI; Mean; Antegrade Flow: 66 6 cm  Valve Area Peak Dax: 95 mm2  Valve Area VTI: 72 mm2  Vmax;  Antegrade Flow: 2450 mm/s  Vmax; Antegrade Flow: 2520 mm/s  Vmax; Mean; Antegrade Flow: 2490 mm/s  LVOT CV Orifice Diam; Mean: 2 cm  LVOT Diam: 2 cm  LVOT Mean Grad: 1 mm[Hg]  LVOT Mean Grad: 2 mm[Hg]  LVOT Mean Grad; Mean value: 2 mm[Hg]  LVOT Mean Dax: 408 mm/s  LVOT Mean Dax: 589 mm/s  LVOT Mean Dax; Mean value: 499 mm/s  LVOT Peak Grad; Mean: 2 mm[Hg]  LVOT VTI: 12 4 cm  LVOT VTI: 17 9 cm  LVOT VTI; Mean: 15 2 cm  LVOT Vmax: 609 mm/s  LVOT Vmax: 895 mm/s  LVOT Vmax; Mean: 752 mm/s  SV (LVOT): 48 cm3  Peak Grad; Mean; Regurgitant Flow: 38 mm[Hg]  Vmax; Mean; Regurgitant Flow: 3090 mm/s  Vmax; Regurgitant Flow: 2840 mm/s  Vmax; Regurgitant Flow: 3200 mm/s  Vmax; Regurgitant Flow: 3230 mm/s  Atrial Clinton Area;: 4160 mm2  Atrial Clinton Area; Mean; Mean value chosen;: 4160 mm2  Atrial Clinton Distance;: 7 2 cm  Atrial [employer]; Mean; Mean value chosen;: 7 2 cm  Atrial Clinton Volume;: 188 6 cm3  Atrial Clinton Volume; Mean; Mean value chosen;: 188 6 cm3  Distance;: 5 5 cm  Distance; Mean; Mean value chosen;: 5 5 cm    [facility]    Prepared and electronically signed by    Dorys Santana MD  Signed 09-Jul-2018 18:00:06       No results found for this or any previous visit  No results found for this or any previous visit  Results for orders placed in visit on 03/31/14   NM myocardial perfusion spect (stress and/or rest)    Narrative This stress test was performed by a cardiologist and the    cardiologist will render the interpretation              Toyin Tay Coordinator        Reading Radiologist- GENOVEVA Gonzalez MD        Releasing Radiologist- GENOVEAV Gonzalez MD        Released Date Time- 04/03/14 7960      ------------------------------------------------------------------------------   JULIANN Freire        Meds/Allergies   current meds:   Current Facility-Administered Medications   Medication Dose Route Frequency    albuterol inhalation solution 2 5 mg  2 5 mg Nebulization Q4H PRN    allopurinol (ZYLOPRIM) tablet 200 mg  200 mg Oral Daily    aspirin chewable tablet 81 mg  81 mg Oral Daily    atorvastatin (LIPITOR) tablet 40 mg  40 mg Oral Daily With Dinner    b complex-vitamin C-folic acid (NEPHROCAPS) capsule 1 capsule  1 capsule Oral Daily    bumetanide (BUMEX) 12 5 mg infusion 50 mL  1 mg/hr Intravenous Continuous    carbidopa-levodopa (SINEMET)  mg per tablet 1 tablet  1 tablet Oral TID    carvedilol (COREG) tablet 12 5 mg  12 5 mg Oral BID With Meals    dextrose 50 % IV solution 25 mL  25 mL Intravenous Q4H PRN    gabapentin (NEURONTIN) capsule 100 mg  100 mg Oral BID    hydrALAZINE (APRESOLINE) tablet 50 mg  50 mg Oral Q8H [facility]    insulin lispro (HumaLOG) 100 units/mL subcutaneous injection 1-5 Units  1-5 Units Subcutaneous TID AC    insulin lispro (HumaLOG) 100 units/mL subcutaneous injection 1-6 Units  1-6 Units Subcutaneous Q6H [facility]    isosorbide mononitrate (IMDUR) 24 hr tablet 60 mg  60 mg Oral Daily    lactulose 20 g/30 mL oral solution 20 g  20 g Oral Once per day on Mon Wed Fri    levothyroxine tablet 225 mcg  225 mcg Oral Early Morning    magnesium oxide (MAG-OX) tablet 400 mg  400 mg Oral BID    oxyCODONE-acetaminophen (PERCOCET) 5-325 mg per tablet 1 tablet  1 tablet Oral Q4H PRN    pantoprazole (PROTONIX) EC tablet 40 mg  40 mg Oral Early Morning    potassium chloride (K-DUR,KLOR-CON) CR tablet 40 mEq  40 mEq Oral Once     Prescriptions Prior to Admission   Medication    acetaminophen (TYLENOL) 325 mg tablet    ascorbic acid (VITAMIN C) 500 mg tablet    aspirin 81 MG tablet    atorvastatin (LIPITOR) 40 mg tablet    B Complex-C-Folic Acid (MICHEAL CAPS) 1 MG CAPS    benzocaine (ORAJEL) 10 % mucosal gel    bumetanide (BUMEX) 1 mg tablet    carbidopa-levodopa (SINEMET)  mg per tablet    carvedilol (COREG) 12 5 mg tablet    Cholecalciferol (VITAMIN D3) 2000 units TABS    diphenhydrAMINE (BENADRYL) 50 mg capsule    febuxostat (ULORIC) 80 MG TABS    gabapentin (NEURONTIN) 100 mg capsule    guaiFENesin (MUCINEX) 600 mg 12 hr tablet    hydrALAZINE (APRESOLINE) 50 mg tablet    insulin aspart (NovoLOG) 100 units/mL injection    insulin detemir (LEVEMIR) 100 units/mL subcutaneous injection    isosorbide mononitrate (IMDUR) 60 mg 24 hr tablet    lactulose 20 g/30 mL    levothyroxine 200 mcg tablet    loratadine (CLARITIN) 10 mg tablet    magnesium oxide (MAG-OX) 400 mg    nitroglycerin (NITROSTAT) 0 4 mg SL tablet    oxyCODONE-acetaminophen (PERCOCET) 5-325 mg per tablet    pantoprazole (PROTONIX) 40 mg tablet    polyethylene glycol-propylene glycol (SYSTANE) 0 4-0 3 %    potassium chloride (KLOR-CON) 20 mEq packet    Probiotic Product (JOHNNIE-BID PROBIOTIC PO)    repaglinide (PRANDIN) 0 5 mg tablet    Sennosides-Docusate Sodium (SENEXON-S PO)    sodium phosphate (FLEET) enema    sorbitol 70 % solution    WARFARIN SODIUM PO         bumetanide 0 5 mg/hr Last Rate: 0 5 mg/hr (07/10/18 0130)     Assessment:  Principal Problem:    Acute on chronic systolic (congestive) heart failure (HCC)  Active Problems:    Pacemaker    Type 2 diabetes mellitus with hyperglycemia, with long-term current use of insulin (HCC)    Chronic atrial fibrillation (HCC)    Hyperlipidemia    Thrombocytopenia (HCC)    Suprapubic catheter (HCC)    Aortic stenosis    Dysphagia    Acute renal failure (ARF) ([facility])    Plan:    Acute on Chronic Systolic CHF/ Acute Kidney Injury  Coumadin coagulopathy/Low Flow Aortic Stenosis  Atrial Fibrillation  Marginal diuresis  Increase bumex drip  Elevated filling pressures by echo  Ultimately his overall prognosis is quite poor  Counseling / Coordination of Care  Total floor / unit time spent today 25 minutes  Greater than 50% of total time was spent with the patient and / or family counseling and / or coordination of care    A description of the counseling / coordination of care

## 2018-07-10 NOTE — ASSESSMENT & PLAN NOTE
· Could not be quantified an echocardiogram  · Will diurese and possibly repeat echo later in the admission

## 2018-07-11 PROBLEM — R18.8 ASCITES: Status: ACTIVE | Noted: 2018-01-01

## 2018-07-11 PROBLEM — N18.4 ACUTE RENAL FAILURE SUPERIMPOSED ON STAGE 4 CHRONIC KIDNEY DISEASE (HCC): Status: ACTIVE | Noted: 2018-01-01

## 2018-07-11 PROBLEM — J96.02 ACUTE RESPIRATORY FAILURE WITH HYPOXIA AND HYPERCAPNIA (HCC): Status: ACTIVE | Noted: 2018-01-01

## 2018-07-11 NOTE — CONSULTS
Pulmonary Consultation   Melonie Osorio 80 y o  male MRN: [id number]  Unit/Bed#: [facility] 607-01 Encounter: [id number]      Reason for consultation:  Acute on chronic systolic CHF, pleural effusion, healthcare associated pneumonia, acute respiratory failure    Requesting physician: Dr Fontaine Aschoff    Impressions/Recommendations:    1  Acute on chronic hypoxic and hypercapnic respiratory failure-likely secondary to acute on chronic systolic heart failure,   1  Titrate oxygen therapy to maintain SpO2 of equal to or greater than 88%  2  Continue BiPAP q h s  and p r n   3  ABG noted mild hypercapnia  2  Acute on chronic systolic heart failure with severe aortic stenosis  1  Continue aggressive diuresis per Cardiology  2  Chest x-ray demonstrates increasing pulmonary edema likely secondary to severe aortic stenosis and volume overload  3  Would not be a candidate for thoracentesis at this time, recommend continue diuresis  4  Would recommend palliative care consult  5  Daily weights  6  Strict I&O  3  Abnormal chest x-ray  1  Most likely pulmonary vascular congestion secondary acute on chronic systolic heart failure with severe aortic stenosis  2  Mild elevation in procalcitonin likely not to give of acute infection in patient with chronic kidney disease  Also lack of leukocytosis, fever or sputum production makes acute infection unlikely would recommend discontinuation of antibiotics    *would recommend palliative care consult with goal clarification  Will sign off, please call with concerns  History of Present Illness   HPI:  Melonie Osorio is a 80 y o  male who was seen in consult today for acute on chronic CHF, evaluation for pleural effusion, suspected HCAP and acute respiratory failure    He has a past medical history significant for:  Obstructive sleep apnea, sick sinus syndrome, PVD, depression, PVCs, chronic hypoxic and hypercapnic respiratory failure, hypothyroidism, hypertension hyperlipidemia, GERD, gastroparesis, diabetes, chronic systolic heart failure, atrial fibrillation, CKD, and thyroid cancer  He presented to [facility] emergency room on 07/06/2018 with complaint of weakness fatigue and respiratory distress  He also noted increasing abdominal distention at this time  He was then transferred to [facility] on 07/08 with worsening kidney function, dysphagia and need for Nephrology and Cardiology consultation  He was also noted to have generalized edema and worsening renal function  Today medical of his remains on continuous BiPAP therapy with persistent lethargy and mild hypercapnia  Morning ABG 7 355/55 4/24 3, with a chest x-ray today that demonstrated increasing congestive heart failure  He currently remains on IV Bumex per Cardiology was diuresed 1 L day prior  Procalcitonin today and mildly elevated at 0 31 and was started on cefepime and vancomycin coverage  He remains afebrile without leukocytosis and unable to expectorate secretions  Unable to obtain sputum cultures at this time  Echocardiogram was attempted although poor study unable provided number for junction fracture although noted to be low  Most recent ejection fraction is noted to be 35-40%  From pulmonary standpoint is not seen by a pulmonologist the past started he had a formal diagnosis of lung disease  Per home med list does not have a daily nebulizer inhaler regimen  Per charting does have dysphagia and has been evaluated by GI  VBS performed and noted non post of peristalsis and narrowing at the GE junction with dilated esophagus, recommend continued aspiration precautions with following per speech  He underwent paracentesis on 07/11/2018 with drainage of 3300 mL bloody john fluid from the right lower quadrant        Review of systems:  Unable to obtain ROS as patient did not answer questions, no family at bedside      Historical Information   Past Medical History:   Diagnosis Date    Anemia     Arthralgia  Atrial fibrillation (HCC)     BPH (benign prostatic hypertrophy)     CAD (coronary artery disease)     Cancer (HCC)     thyroid    Cardiac disease     atrial fib      Cardiomegaly     Carpal tunnel syndrome     CHF (congestive heart failure) (HCC)     Chronic kidney disease     Chronic systolic heart failure (HCC)     Ef 25%    Degenerative joint disease     Depression     Diabetes mellitus ([address] )     Disease of thyroid gland     Fracture of left radius     Gastroparesis     GERD (gastroesophageal reflux disease)     Gout     Hyperlipidemia     Hypertension     Hyperthyroidism     Hypokalemia     Hypothyroid     Hypoxia     Incomplete bladder emptying     Neuropathy     Osteoarthritis     Other fluid overload     fluid restriction    Premature ventricular contraction     Psychiatric disorder     depression    PVD (peripheral vascular disease) (HCC)     Renal disorder     stage 3 chronic kidney disease    Sick sinus syndrome (HCC)     Sleep apnea     Thrombocytopenia (HCC)     Tracheobronchitis     Tremor     Unstable angina (HCC)     Urinary retention      Past Surgical History:   Procedure Laterality Date    ABDOMINAL SURGERY      cholecystectomy    CARDIAC PACEMAKER PLACEMENT      CARPAL TUNNEL RELEASE      bilateral    CHOLECYSTECTOMY      COLONOSCOPY W/ POLYPECTOMY      CORONARY ARTERY BYPASS GRAFT      EYE SURGERY      bilateral CAT EXT W/IOL    HERNIA REPAIR      JOINT REPLACEMENT       Family History   Problem Relation Age of Onset    Stroke Mother     Hypertension Daughter     Heart disease Brother     Diabetes Brother     Stroke Brother        Occupational history: unable to obtain    Tobacco history: per history in chart former smoker with 90 pack year history, quit estimated 50 years prior    Meds/Allergies   Current Facility-Administered Medications   Medication Dose Route Frequency    albuterol inhalation solution 2 5 mg  2 5 mg Nebulization Q4H PRN  allopurinol (ZYLOPRIM) tablet 200 mg  200 mg Oral Daily    aspirin chewable tablet 81 mg  81 mg Oral Daily    atorvastatin (LIPITOR) tablet 40 mg  40 mg Oral Daily With Dinner    b complex-vitamin C-folic acid (NEPHROCAPS) capsule 1 capsule  1 capsule Oral Daily    bumetanide (BUMEX) 12 5 mg infusion 50 mL  1 mg/hr Intravenous Continuous    carbidopa-levodopa (SINEMET)  mg per tablet 1 tablet  1 tablet Oral TID    carvedilol (COREG) tablet 12 5 mg  12 5 mg Oral BID With Meals    cefepime (MAXIPIME) IVPB (premix) 1,000 mg  1,000 mg Intravenous Q24H    dextrose 50 % IV solution 25 mL  25 mL Intravenous Q4H PRN    gabapentin (NEURONTIN) capsule 100 mg  100 mg Oral BID    heparin (porcine) subcutaneous injection 5,000 Units  5,000 Units Subcutaneous Q8H [address]    hydrALAZINE (APRESOLINE) tablet 50 mg  50 mg Oral Q8H [address]    insulin lispro (HumaLOG) 100 units/mL subcutaneous injection 1-6 Units  1-6 Units Subcutaneous TID AC    insulin lispro (HumaLOG) 100 units/mL subcutaneous injection 1-6 Units  1-6 Units Subcutaneous HS    isosorbide mononitrate (IMDUR) 24 hr tablet 60 mg  60 mg Oral Daily    lactulose 20 g/30 mL oral solution 20 g  20 g Oral Once per day on Mon Wed Fri    levothyroxine tablet 225 mcg  225 mcg Oral Early Morning    magnesium oxide (MAG-OX) tablet 400 mg  400 mg Oral BID    metolazone (ZAROXOLYN) tablet 2 5 mg  2 5 mg Oral Daily    morphine injection 1 mg  1 mg Intravenous Q4H PRN    oxyCODONE-acetaminophen (PERCOCET) 5-325 mg per tablet 1 tablet  1 tablet Oral Q4H PRN    pantoprazole (PROTONIX) EC tablet 40 mg  40 mg Oral Early Morning    potassium chloride 40 mEq IVPB (premix)  40 mEq Intravenous Once    vancomycin (VANCOCIN) 1,500 mg in sodium chloride 0 9 % 250 mL IVPB  15 mg/kg (Adjusted) Intravenous Once     Prescriptions Prior to Admission   Medication    acetaminophen (TYLENOL) 325 mg tablet    ascorbic acid (VITAMIN C) 500 mg tablet    aspirin 81 MG tablet    atorvastatin (LIPITOR) 40 mg tablet    B Complex-C-Folic Acid (MICHEAL CAPS) 1 MG CAPS    benzocaine (ORAJEL) 10 % mucosal gel    bumetanide (BUMEX) 1 mg tablet    carbidopa-levodopa (SINEMET)  mg per tablet    carvedilol (COREG) 12 5 mg tablet    Cholecalciferol (VITAMIN D3) 2000 units TABS    diphenhydrAMINE (BENADRYL) 50 mg capsule    febuxostat (ULORIC) 80 MG TABS    gabapentin (NEURONTIN) 100 mg capsule    guaiFENesin (MUCINEX) 600 mg 12 hr tablet    hydrALAZINE (APRESOLINE) 50 mg tablet    insulin aspart (NovoLOG) 100 units/mL injection    insulin detemir (LEVEMIR) 100 units/mL subcutaneous injection    isosorbide mononitrate (IMDUR) 60 mg 24 hr tablet    lactulose 20 g/30 mL    levothyroxine 200 mcg tablet    loratadine (CLARITIN) 10 mg tablet    magnesium oxide (MAG-OX) 400 mg    nitroglycerin (NITROSTAT) 0 4 mg SL tablet    oxyCODONE-acetaminophen (PERCOCET) 5-325 mg per tablet    pantoprazole (PROTONIX) 40 mg tablet    polyethylene glycol-propylene glycol (SYSTANE) 0 4-0 3 %    potassium chloride (KLOR-CON) 20 mEq packet    Probiotic Product (JOHNNIE-BID PROBIOTIC PO)    repaglinide (PRANDIN) 0 5 mg tablet    Sennosides-Docusate Sodium (SENEXON-S PO)    sodium phosphate (FLEET) enema    sorbitol 70 % solution    WARFARIN SODIUM PO     Allergies   Allergen Reactions    Benicar [Olmesartan] Other (See Comments)     Can't recall    Cardura [Doxazosin]     Codeine Other (See Comments)     confusion    Cozaar [Losartan]     Cymbalta [Duloxetine Hcl]     Dyazide [Hydrochlorothiazide W-Triamterene]     Hctz [Hydrochlorothiazide]     Iodinated Diagnostic Agents     Lasix [Furosemide] Other (See Comments)     Can't recall    Lopressor [Metoprolol] Other (See Comments)     Can't recall    Lozol [Indapamide]     Prinivil [Lisinopril]     Procardia [Nifedipine]     Verapamil     Voltaren [Diclofenac Sodium]     Aldactone [Spironolactone] Other (See Comments) Tremors;starts as a low reaction then gets worse    Latex Rash       Vitals: Blood pressure 126/68, pulse 75, temperature 98 8 °F (37 1 °C), temperature source Axillary, resp  rate 16, height 5' 11" (1 803 m), weight 117 kg (258 lb 9 6 oz), SpO2 100 %  , BiPAP 40% 16/8, Body mass index is 36 07 kg/m²  Intake/Output Summary (Last 24 hours) at 07/11/18 1611  Last data filed at 07/11/18 1500   Gross per 24 hour   Intake            624 7 ml   Output             1775 ml   Net          -1150 3 ml       Physical exam:    General Appearance:    Lethargic, eyes opened to verbal stimuli, did not follow commands, BiPAP in place, mild accessory     muscle use       Head/eyes:    Normocephalic, without obvious abnormality, atraumatic,         PERRL, extraocular muscles intact, no scleral icterus    Nose:   Nares normal, septum midline, mucosa normal, no drainage    or sinus tenderness   Throat:   Dry mucous membranes, no thrush   Neck:   Supple, trachea midline, no adenopathy; no carotid    bruit or JVD   Lungs:     Faint distant bibasilar rales   Chest Wall:    No tenderness or deformity    Heart:    Regular rate and rhythm, S1 and S2 normal, no murmur, rub   or gallop   Abdomen:     Obese, Soft, non-tender, bowel sounds active all four quadrants,     no masses, no organomegaly   Extremities:   Extremities normal, atraumatic, no cyanosis or edema   Skin:   Warm, dry, turgor normal, no rashes or lesions   Lymph nodes:   Cervical and supraclavicular nodes normal   Neurologic:   non-focal, did not follow commands         Labs: I have personally reviewed pertinent lab results  , ABG:   Lab Results   Component Value Date    PHART 7 355 07/11/2018    DXI8KTU 55 4 (H) 07/11/2018    PO2ART 104 3 07/11/2018    JOT1MTW 30 2 (H) 07/11/2018    BEART 3 6 07/11/2018    SOURCE Radial, Right 07/11/2018   , BNP: No results found for: BNP, CBC:   Lab Results   Component Value Date    WBC 4 47 07/11/2018    HGB 10 4 (L) 07/11/2018    HCT 34 4 (L) 07/11/2018     (H) 07/11/2018    PLT 73 (L) 07/11/2018    MCH 30 6 07/11/2018    MCHC 30 2 (L) 07/11/2018    RDW 17 4 (H) 07/11/2018    MPV 14 0 (H) 07/11/2018    NRBC 0 07/11/2018   , CMP:   Lab Results   Component Value Date     07/11/2018    K 2 9 (L) 07/11/2018     07/11/2018    CO2 29 07/11/2018    ANIONGAP 10 07/11/2018    BUN 93 (H) 07/11/2018    CREATININE 3 74 (H) 07/11/2018    GLUCOSE 115 07/11/2018    CALCIUM 8 6 07/11/2018    EGFR 13 07/11/2018       Imaging and other studies: I have personally reviewed pertinent reports  and I have personally reviewed pertinent films in PACS  CXR 7/11/2018  Worsening CHF    Pulmonary function testing: none    EKG, Pathology, and Other Studies: I have personally reviewed pertinent reports      Echocardiogram 7/9/2018  RB moderately dilated with reduced systolic function  Moderate tricuspid regurgitation  EF not able to be assessed    Code Status: Level 3 - DNAR and DNI      Mikaela Harshad, LADARIUS

## 2018-07-11 NOTE — SPEECH THERAPY NOTE
Speech Language/Pathology  Attempted to see pt for ongoing dx tx  Pt currently on BIPAP, poorly responsive  Will f/u when able

## 2018-07-11 NOTE — PROCEDURES
Insert PICC line  Date/Time: 7/11/2018 1:12 PM  Performed by: David Ramírez  Authorized by: Dinorah Tinajero     Patient location:  Bedside  Procedure performed by consultant: radha Short  Consent:     Consent obtained:  Verbal (Md obtained via phone)    Consent given by: daughter   Universal protocol:     Procedure explained and questions answered to patient or proxy's satisfaction: yes      Relevant documents present and verified: yes      Test results available and properly labeled: yes      Radiology Images displayed and confirmed  If images not available, report reviewed: yes      Required blood products, implants, devices, and special equipment available: yes      Site/side marked: yes      Immediately prior to procedure, a time out was called: yes      Patient identity confirmed:  Arm band and hospital-assigned identification number  Pre-procedure details:     Hand hygiene: Hand hygiene performed prior to insertion      Sterile barrier technique: All elements of maximal sterile technique followed      Skin preparation:  ChloraPrep    Skin preparation agent: Skin preparation agent completely dried prior to procedure    Indications:     PICC line indications: no peripheral vascular access    Anesthesia (see MAR for exact dosages):      Anesthesia method:  Local infiltration (3ml )    Local anesthetic:  Lidocaine 1% w/o epi  Procedure details:     Location:  Brachial    Vessel type: vein      Laterality:  Right    Approach: percutaneous endoscopic technique used      Patient position:  Flat    Procedural supplies:  Double lumen    Catheter size:  5 Fr    Landmarks identified: yes      Ultrasound guidance: yes      Sterile ultrasound techniques: Sterile gel and sterile probe covers were used      Number of attempts:  1    Successful placement: yes      Vessel of catheter tip end:  Chest Xray needed to confirm placement    Total catheter length (cm):  40    Catheter out on skin (cm):  0    Max flow rate:  999/ml     Arm circumference:  38  Post-procedure details:     Post-procedure:  Securement device placed    Assessment:  Blood return through all ports, free fluid flow and placement verification pending x-ray result    Post-procedure complications: none      Patient tolerance of procedure:   Tolerated well, no immediate complications  Comments:      Spoke to [place] with nephrology ok to place PICC/ pt refusing HD  Pink stickers applied Abram notified   Jackie Acosta [id number]  Ref [id number]

## 2018-07-11 NOTE — ASSESSMENT & PLAN NOTE
· Echocardiogram was a poor study but did show low ejection fraction  Prior ejection fraction 35-40%  · Continue with IV Bumex per Cardiology, diuresed 1 L yesterday  · Intermittent BiPAP for respiratory failure  · Continue to monitor BMP daily

## 2018-07-11 NOTE — ASSESSMENT & PLAN NOTE
· Likely cardiorenal etiology  Patient is currently volume overloaded  For now continue with diuresis    · Monitor creatinine, currently stable

## 2018-07-11 NOTE — ASSESSMENT & PLAN NOTE
Still hypercapnia on his ABG today  Will continue BiPAP for few more hours  Trial of BiPAP this afternoon prior today  Likely need to continue intermittent BiPAP while being diuresed  Will consult pulmonology for to evaluate for pleural effusion and possible thoracentesis

## 2018-07-11 NOTE — PROGRESS NOTES
[facility]  Progress Note - Aram Dias 80 y o  male MRN: [id number]    Unit/Bed#: [facility] 607-01 Encounter: [id number]    Assessment/Plan: 1  Dysphagia  -outpatient barium swallow concern for achalasia-EGD w/ botox recommended-pt's current status does not allow that however as risks outweigh benefits  -malnutrition is of concern-advance diet as tolerated, discussed w/ primary team-pt's daughter would not want Dobhoff insertion for enteral feeds, doubtful of any more aggressive therapies ie PEG   -if patient's status improves can consider EGD w/ botox or alternative therapies ie PEG if patient/patient's family desires    2  Abdominal ascites  -concern for cirrhosis given irregular hepatic borders on US  -s/p IR guided paracentesis w/ 3300mL bloody john fluid  -await ascitic fluid analysis  -continue diuresis as per cardiology/nephrology    Pt's status continues to be tenuous-at this juncture risks outweigh benefits of any GI intervention      Subjective:   Pt's health status continues to be tenuous  At the time of exam, patient about to go back on BiPAP    Objective:     Vitals: Blood pressure 140/88, pulse 83, temperature 98 6 °F (37 °C), temperature source Oral, resp  rate 16, height 5' 11" (1 803 m), weight 117 kg (258 lb 9 6 oz), SpO2 100 %  ,Body mass index is 36 07 kg/m²  Intake/Output Summary (Last 24 hours) at 07/11/18 1521  Last data filed at 07/11/18 1159   Gross per 24 hour   Intake            624 7 ml   Output             1575 ml   Net           -950 3 ml       Review of Systems: as per HPI  Review of Systems   Respiratory: Positive for shortness of breath  Gastrointestinal: Negative for abdominal pain, nausea and vomiting  Physical Exam:     Physical Exam   Constitutional: He appears well-developed  HENT:   Head: Normocephalic  Neck: Normal range of motion  Pulmonary/Chest: He has wheezes  He has rales  Abdominal: Soft  There is no tenderness     Musculoskeletal: He exhibits edema  Neurological: He is alert           Invasive Devices     Peripherally Inserted Central Catheter Line            PICC Line 07/11/18 Right Brachial less than 1 day          Peripheral Intravenous Line            Peripheral IV 07/09/18 Left Arm 2 days          Drain            Suprapubic Catheter Non-latex 18 Fr  131 days                        CBC:   Lab Results   Component Value Date    WBC 4 47 07/11/2018    HGB 10 4 (L) 07/11/2018    HCT 34 4 (L) 07/11/2018     (H) 07/11/2018    PLT 73 (L) 07/11/2018    MCH 30 6 07/11/2018    MCHC 30 2 (L) 07/11/2018    RDW 17 4 (H) 07/11/2018    MPV 14 0 (H) 07/11/2018    NRBC 0 07/11/2018   ,   CMP:   Lab Results   Component Value Date     07/11/2018    K 2 9 (L) 07/11/2018     07/11/2018    CO2 29 07/11/2018    ANIONGAP 10 07/11/2018    BUN 93 (H) 07/11/2018    CREATININE 3 74 (H) 07/11/2018    GLUCOSE 115 07/11/2018    CALCIUM 8 6 07/11/2018    EGFR 13 07/11/2018   ,   Lipase: No results found for: LIPASE,  PT/INR:   Lab Results   Component Value Date    INR 2 32 (H) 07/10/2018   ,   Troponin: No results found for: TROPONINI,   Magnesium: No results found for: MAG,   Phosphorous:   Lab Results   Component Value Date    PHOS 4 8 (H) 07/11/2018

## 2018-07-11 NOTE — RESPIRATORY THERAPY NOTE
RT Protocol Note  Rox Colon 80 y o  male MRN: [id number]  Unit/Bed#: [facility] 607-01 Encounter: [id number]    Assessment    Principal Problem:    Acute on chronic systolic (congestive) heart failure (HCC)  Active Problems:    Pacemaker    Type 2 diabetes mellitus with hyperglycemia, with long-term current use of insulin (HCC)    Chronic atrial fibrillation (HCC)    Hyperlipidemia    Thrombocytopenia (HCC)    Aortic stenosis    Dysphagia    Acute renal failure (ARF) (HCC)      Home Pulmonary Medications:         Past Medical History:   Diagnosis Date    Anemia     Arthralgia     Atrial fibrillation (HCC)     BPH (benign prostatic hypertrophy)     CAD (coronary artery disease)     Cancer (HCC)     thyroid    Cardiac disease     atrial fib   Cardiomegaly     Carpal tunnel syndrome     CHF (congestive heart failure) (HCC)     Chronic kidney disease     Chronic systolic heart failure (HCC)     Ef 25%    Degenerative joint disease     Depression     Diabetes mellitus ([facility] [address] )     Disease of thyroid gland     Fracture of left radius     Gastroparesis     GERD (gastroesophageal reflux disease)     Gout     Hyperlipidemia     Hypertension     Hyperthyroidism     Hypokalemia     Hypothyroid     Hypoxia     Incomplete bladder emptying     Neuropathy     Osteoarthritis     Other fluid overload     fluid restriction    Premature ventricular contraction     Psychiatric disorder     depression    PVD (peripheral vascular disease) (HCC)     Renal disorder     stage 3 chronic kidney disease    Sick sinus syndrome (HCC)     Sleep apnea     Thrombocytopenia (HCC)     Tracheobronchitis     Tremor     Unstable angina (HCC)     Urinary retention      Social History     Social History    Marital status:       Spouse name: N/A    Number of children: N/A    Years of education: N/A     Social History Main Topics    Smoking status: Former Smoker     Packs/day: 3 00     Years: 30 00     Types: Cigarettes    Smokeless tobacco: Never Used      Comment: quit late 40's yrs old;total tobacco use 30yrs,used 3 pks of cigarettes form 3-4 yrs then changed to 10 cigars/day    Alcohol use No    Drug use: No    Sexual activity: Not Currently     Other Topics Concern    Not on file     Social History Narrative    No narrative on file       Subjective         Objective    Physical Exam:   Assessment Type: Assess only  General Appearance: Alert, Awake  Respiratory Pattern: Dyspnea with exertion  Chest Assessment: Chest expansion symmetrical  Bilateral Breath Sounds: Crackles  Cough: Non-productive  O2 Device: BiPAP    Vitals:  Blood pressure (P) 124/56, pulse 70, temperature (P) 98 6 °F (37 °C), temperature source (P) Oral, resp  rate 18, height 5' 11" (1 803 m), weight 117 kg (258 lb 9 6 oz), SpO2 98 %  Results from last 7 days  Lab Units 07/10/18  1150   PH ART  7 337*   PCO2 ART mm Hg 36 8   PO2 ART mm Hg 99 1   HCO3 ART mmol/L 19 3*   BASE EXC ART mmol/L -5 9   O2 CONTENT ART mL/dL 14 1*   O2 HGB, ARTERIAL % 96 5   ABG SOURCE  Radial, Right   JOSEPH TEST  No       Imaging and other studies: I have personally reviewed pertinent reports  O2 Device: BiPAP     Plan    Respiratory Plan: Discontinue Protocol, Mild Distress pathway        Resp Comments: Pt evaluated per resp protocol  Pt has a PMH incluing CHF, SIRISHA and cancer  BS bilateral crakles and difuse expiratory wheezes  D/C duoneb q4 order due to no pulmonary history or bronchodilator use at home  Pt ordered Q4 prn albuterol for weezing   Pt placed on BiPAP per MD request

## 2018-07-11 NOTE — ASSESSMENT & PLAN NOTE
· Family reports increasing difficulty with swallowing  The family reports a history of esophageal strictures requiring prior dilatation and Botox injections  · EGD when more stable  · Monitor on pureed diet    · Discussed with the patient's daughter, according to his prior wishes he would never want a feeding tube of any sort

## 2018-07-11 NOTE — PROGRESS NOTES
Cardiology Progress Note - Nevaeh Phelan 80 y o  male MRN: [id number]    [facility]: [id number]        Subjective:    No significant events overnight  On BIPAP  Easily aroused  ROS    Objective:   Vitals: Blood pressure (P) 124/56, pulse 70, temperature (P) 98 6 °F (37 °C), temperature source (P) Oral, resp  rate 18, height 5' 11" (1 803 m), weight 117 kg (258 lb 9 6 oz), SpO2 98 %  , Body mass index is 36 07 kg/m² , Orthostatic Blood Pressures      Most Recent Value   Blood Pressure  (P) 124/56 filed at 07/11/2018 0754   Patient Position - Orthostatic VS  (P) Lying filed at 07/11/2018 1833         Systolic (27JQD), BXZ:514 , Min:124 , YGD:627     Diastolic (88INZ), DYK:87, Min:56, Max:63      Intake/Output Summary (Last 24 hours) at 07/11/18 1005  Last data filed at 07/11/18 0552   Gross per 24 hour   Intake            644 7 ml   Output             1450 ml   Net           -805 3 ml     Weight (last 2 days)     Date/Time   Weight    07/11/18 0600  117 (258 6)    07/10/18 0600  117 (258 16)    07/09/18 0600  117 (258 6)                    Physical Exam   Constitutional: No distress  HENT:   Mouth/Throat: No oropharyngeal exudate  Eyes: No scleral icterus  Neck: JVD present  Cardiovascular: Normal rate  An irregularly irregular rhythm present  Pulmonary/Chest: He has no wheezes  He has no rales  Abdominal: Soft  Bowel sounds are normal  He exhibits no distension  There is no tenderness  There is no rebound  Musculoskeletal: He exhibits edema  Neurological: He is alert  Skin: Skin is warm and dry  He is not diaphoretic           Laboratory Results:    Results from last 7 days  Lab Units 07/06/18  2114 07/06/18  1820 07/06/18  1421   TROPONIN I ng/mL 0 04 0 04 0 03       CBC with diff:   Results from last 7 days  Lab Units 07/11/18  0514 07/09/18  0510 07/08/18  0542 07/07/18  0531 07/06/18  1421   WBC Thousand/uL 4 47 4 38 4 72 5 84 9 73   HEMOGLOBIN g/dL 10 4* 10 4* 10 4* 10 6* 11 3*   HEMATOCRIT % 34 4* 34 7* 33 1* 34 8* 36 5   MCV fL 101* 101* 100* 101* 101*   PLATELETS Thousands/uL 73* 75* 77* 72* 81*   MCH pg 30 6 30 2 31 4 30 6 31 3   MCHC g/dL 30 2* 30 0* 31 4 30 5* 31 0*   RDW % 17 4* 18 2* 18 4* 18 4* 18 7*   MPV fL 14 0* 13 0* 13 8*  --  13 0*   NRBC AUTO /100 WBCs 0 0  --   --   --          CMP:  Results from last 7 days  Lab Units 07/11/18  0514 07/10/18  0631 07/09/18  0510 07/08/18  0542 07/07/18  0531 07/06/18  1421   SODIUM mmol/L 143 143 141 145 146* 143   POTASSIUM mmol/L 2 9* 3 0* 3 7 3 9 3 9 4 6   CHLORIDE mmol/L 104 106 104 106 107 106   CO2 mmol/L 29 31 27 29 27 30   ANION GAP mmol/L 10 6 10 10 12 7   BUN mg/dL 93* 88* 81* 70* 62* 56*   CREATININE mg/dL 3 74* 3 75* 3 53* 3 43* 2 87* 2 98*   GLUCOSE RANDOM mg/dL 115 121 96 75 75 123   CALCIUM mg/dL 8 6 8 4 8 6 8 8 8 9 8 7   AST U/L  --   --   --   --   --  24   ALT U/L  --   --   --   --   --  14   ALK PHOS U/L  --   --   --   --   --  361*   TOTAL PROTEIN g/dL  --   --   --   --   --  7 3   BILIRUBIN TOTAL mg/dL  --   --   --   --   --  0 90   EGFR ml/min/1 73sq m 13 13 14 15 19 18         BMP:  Results from last 7 days  Lab Units 07/11/18  0514 07/10/18  0631 07/09/18  0510 07/08/18  0542 07/07/18  0531 07/06/18  1421   SODIUM mmol/L 143 143 141 145 146* 143   POTASSIUM mmol/L 2 9* 3 0* 3 7 3 9 3 9 4 6   CHLORIDE mmol/L 104 106 104 106 107 106   CO2 mmol/L 29 31 27 29 27 30   BUN mg/dL 93* 88* 81* 70* 62* 56*   CREATININE mg/dL 3 74* 3 75* 3 53* 3 43* 2 87* 2 98*   GLUCOSE RANDOM mg/dL 115 121 96 75 75 123   CALCIUM mg/dL 8 6 8 4 8 6 8 8 8 9 8 7       BNP: No results for input(s): BNP in the last 72 hours      Magnesium:   Results from last 7 days  Lab Units 07/11/18  0514 07/09/18  0510 07/07/18  0531 07/06/18  1421   MAGNESIUM mg/dL 2 6 2 1 1 8 1 6       Coags:   Results from last 7 days  Lab Units 07/10/18  1637 07/08/18  0542 07/07/18  0531 07/06/18  1421   PTT seconds  --   --   --  54*   INR  2 32* 7 13* 4 35* 3 32*       TSH: No results found for: TSH    Hemoglobin A1C       Lipid Profile:       Cardiac testing:   Results for orders placed during the hospital encounter of 18   Echo complete with contrast if indicated    Narrative [address]  [facility], [address] [facility]  [phone]    Transthoracic Echocardiogram  2D, M-mode, Doppler, and Color Doppler    Study date:  2018    Patient: Samara Marcos  MR number: [id number]  Account number: [de-identified]  : 1928  Age: 80 years  Gender: Male  Status: Inpatient  Location: Bedside  Height: 71 in  Weight: 258 lb  BP: 124/ 58 mmHg    Indications: Cardiomyopathy    Diagnoses: I42 9 - Cardiomyopathy, unspecified    Sonographer:  Jose De Jesus Bates RDCS  Primary Physician:  Susanna Kruse MD  Referring Physician:  Sari Barger DO  Interpreting Physician:  Joshua March MD    SUMMARY    SUMMARY:  This was a technically difficult and limited study  The left ventricle appeared normal in size with abnormal septal motion and low normal verall systolic function  There was no obvious regional left ventricular wall motion abnormality  However, segmental wall motion could not be assessed adequately  Left ventricular diastolic function could not be assessed  The right ventricle appeared at least moderately dilated with reduced systolic function  Both atria were dilated  Significant aortic stenosis was suspected but could not be adequately quantified  At least moderate tricuspid regurgitation was present  There was no pericardial effusion  HISTORY: PRIOR HISTORY: Anemia, Afib, CAD, Thyroid Cancer, Cardiomegaly, CHF, CKD, DM, GERD, Hyperlipidemia, Hypertension, PVC, SIRISHA, Pacemaker    PROCEDURE: The procedure was performed at the bedside  This was a routine study  The transthoracic approach was used  The study included complete 2D imaging, M-mode, complete spectral Doppler, and color Doppler   The heart rate was 70 bpm,  at the start of the study  Images were obtained from the parasternal, apical, subcostal, and suprasternal notch acoustic windows  Echocardiographic views were limited due to poor patient compliance, decreased penetration, and lung  interference  This was a technically difficult study  SYSTEM MEASUREMENT TABLES    2D mode  Aortic Root Diameter; User chosen value; 2D mode;: 2 8 cm  LA/Ao (2D): 1 54  Left Atrium Vinod-posterior Systolic Dimension; User chosen value; 2D mode;: 4 3 cm  EDV (2D-Cubed): 101 cm3  EF (2D-Cubed): 72 8 %  ESV (2D-Cubed): 27 5 cm3  FS (2D-Cubed): 35 2 %  FS (2D-Teich): 35 2 %  IVS/LVPW (2D): 1 13  Interventricular Septum Diastolic Thickness; User chosen value; 2D mode;: 1 16 cm  LVOT Area (2D): 314 mm2  Left Ventricle Internal End Diastolic Dimension; User chosen value; 2D mode;: 4 66 cm  Left Ventricle Internal Systolic Dimension; User chosen value; 2D mode;: 3 02 cm  Left Ventricle Posterior Wall Diastolic Thickness; User chosen value; 2D mode;: 1 03 cm  Left Ventricular Ejection Fraction; Teichholz; 2D mode;: 64 4 %  Left Ventricular End Diastolic Volume; Teichholz; 2D mode;: 100 cm3  Left Ventricular End Systolic Volume; Teichholz; 2D mode;: 35 6 cm3  SV (2D-Cubed): 73 5 cm3  Stroke Volume; Teichholz; 2D mode;: 64 4 cm3    Apical four chamber  Left Atrium MOD Diam; Most recent value chosen; End Systole; Apical four chamber;: 2 18 cm  Left Atrium MOD Diam; Most recent value chosen; End Systole; Apical four chamber;: 2 77 cm  Left Atrium MOD Diam; Most recent value chosen; End Systole; Apical four chamber;: 3 24 cm  Left Atrium MOD Diam; Most recent value chosen; End Systole; Apical four chamber;: 3 54 cm  Left Atrium MOD Diam; Most recent value chosen; End Systole; Apical four chamber;: 3 7 cm  Left Atrium MOD Diam; Most recent value chosen; End Systole; Apical four chamber;: 3 78 cm  Left Atrium MOD Diam; Most recent value chosen; End Systole;  Apical four chamber;: 3 93 cm  Left Atrium MOD Diam; Most recent value chosen; End Systole; Apical four chamber;: 4 05 cm  Left Atrium MOD Diam; Most recent value chosen; End Systole; Apical four chamber;: 4 13 cm  Left Atrium MOD Diam; Most recent value chosen; End Systole; Apical four chamber;: 4 18 cm  Left Atrium MOD Diam; Most recent value chosen; End Systole; Apical four chamber;: 4 24 cm  Left Atrium MOD Diam; Most recent value chosen; End Systole; Apical four chamber;: 4 33 cm  Left Atrium MOD Diam; Most recent value chosen; End Systole; Apical four chamber;: 4 43 cm  Left Atrium MOD Diam; Most recent value chosen; End Systole; Apical four chamber;: 4 4 cm  Left Atrium MOD Diam; Most recent value chosen; End Systole; Apical four chamber;: 4 36 cm  Left Atrium MOD Diam; Most recent value chosen; End Systole; Apical four chamber;: 4 25 cm  Left Atrium MOD Diam; Most recent value chosen; End Systole; Apical four chamber;: 4 17 cm  Left Atrium MOD Diam; Most recent value chosen; End Systole; Apical four chamber;: 4 09 cm  Left Atrium MOD Diam; Most recent value chosen; End Systole; Apical four chamber;: 3 47 cm  Left Atrium MOD Diam; Most recent value chosen; End Systole; Apical four chamber;: 2 03 cm  Left Atrium Systolic Area; Most recent value chosen; Method of Disks, Single Plane; 2D mode; Apical four chamber;: 2570 mm2  Left Atrium Systolic Volume; Most recent value chosen; Method of Disks, Single Plane; 2D mode; Apical four chamber;: 78 cm3  Left Atrium systolic major axis; Most recent value chosen; Method of Disks, Single Plane; 2D mode; Apical four chamber;: 6 74 cm    Unspecified Scan Mode  Mean Grad; Antegrade Flow: 13 mm[Hg]  Mean Grad; Antegrade Flow: 13 mm[Hg]  Mean Grad; Mean value; Antegrade Flow: 13 mm[Hg]  Mean Dax; Antegrade Flow: 1650 mm/s  Mean Dax; Antegrade Flow: 1630 mm/s  Mean Dax; Mean value; Antegrade Flow: 1640 mm/s  Peak Grad; Mean; Antegrade Flow: 25 mm[Hg]  VTI; Antegrade Flow: 68 3 cm  VTI;  Antegrade Flow: 64 8 cm  VTI; Mean; Antegrade Flow: 66 6 cm  Valve Area Peak Dax: 95 mm2  Valve Area VTI: 72 mm2  Vmax; Antegrade Flow: 2450 mm/s  Vmax; Antegrade Flow: 2520 mm/s  Vmax; Mean; Antegrade Flow: 2490 mm/s  LVOT CV Orifice Diam; Mean: 2 cm  LVOT Diam: 2 cm  LVOT Mean Grad: 1 mm[Hg]  LVOT Mean Grad: 2 mm[Hg]  LVOT Mean Grad; Mean value: 2 mm[Hg]  LVOT Mean Dax: 408 mm/s  LVOT Mean Dax: 589 mm/s  LVOT Mean Dax; Mean value: 499 mm/s  LVOT Peak Grad; Mean: 2 mm[Hg]  LVOT VTI: 12 4 cm  LVOT VTI: 17 9 cm  LVOT VTI; Mean: 15 2 cm  LVOT Vmax: 609 mm/s  LVOT Vmax: 895 mm/s  LVOT Vmax; Mean: 752 mm/s  SV (LVOT): 48 cm3  Peak Grad; Mean; Regurgitant Flow: 38 mm[Hg]  Vmax; Mean; Regurgitant Flow: 3090 mm/s  Vmax; Regurgitant Flow: 2840 mm/s  Vmax; Regurgitant Flow: 3200 mm/s  Vmax; Regurgitant Flow: 3230 mm/s  Atrial Clinton Area;: 4160 mm2  Atrial Clinton Area; Mean; Mean value chosen;: 4160 mm2  Atrial Clinton Distance;: 7 2 cm  Atrial [employer]; Mean; Mean value chosen;: 7 2 cm  Atrial Clinton Volume;: 188 6 cm3  Atrial Clinton Volume; Mean; Mean value chosen;: 188 6 cm3  Distance;: 5 5 cm  Distance; Mean; Mean value chosen;: 5 5 cm    [facility]    Prepared and electronically signed by    Grace Mcgrath MD  Signed 09-Jul-2018 18:00:06       No results found for this or any previous visit  No results found for this or any previous visit  Results for orders placed in visit on 03/31/14   NM myocardial perfusion spect (stress and/or rest)    Narrative This stress test was performed by a cardiologist and the    cardiologist will render the interpretation              [facility] Spine Coordinator        Reading Radiologist- GENOVEVA Lamar MD        Releasing Radiologist- GENOVEVA Lamar MD        Released Date Time- 04/03/14 0955      ------------------------------------------------------------------------------   JULIANN Deng Meds/Allergies   current meds:   Current Facility-Administered Medications   Medication Dose Route Frequency    albuterol inhalation solution 2 5 mg  2 5 mg Nebulization Q4H PRN    allopurinol (ZYLOPRIM) tablet 200 mg  200 mg Oral Daily    aspirin chewable tablet 81 mg  81 mg Oral Daily    atorvastatin (LIPITOR) tablet 40 mg  40 mg Oral Daily With Dinner    b complex-vitamin C-folic acid (NEPHROCAPS) capsule 1 capsule  1 capsule Oral Daily    bumetanide (BUMEX) 12 5 mg infusion 50 mL  1 mg/hr Intravenous Continuous    carbidopa-levodopa (SINEMET)  mg per tablet 1 tablet  1 tablet Oral TID    carvedilol (COREG) tablet 12 5 mg  12 5 mg Oral BID With Meals    dextrose 50 % IV solution 25 mL  25 mL Intravenous Q4H PRN    gabapentin (NEURONTIN) capsule 100 mg  100 mg Oral BID    heparin (porcine) subcutaneous injection 5,000 Units  5,000 Units Subcutaneous Q8H [address]    hydrALAZINE (APRESOLINE) tablet 50 mg  50 mg Oral Q8H [address]    insulin lispro (HumaLOG) 100 units/mL subcutaneous injection 1-6 Units  1-6 Units Subcutaneous TID AC    insulin lispro (HumaLOG) 100 units/mL subcutaneous injection 1-6 Units  1-6 Units Subcutaneous HS    isosorbide mononitrate (IMDUR) 24 hr tablet 60 mg  60 mg Oral Daily    lactulose 20 g/30 mL oral solution 20 g  20 g Oral Once per day on Mon Wed Fri    levothyroxine tablet 225 mcg  225 mcg Oral Early Morning    magnesium oxide (MAG-OX) tablet 400 mg  400 mg Oral BID    metolazone (ZAROXOLYN) tablet 2 5 mg  2 5 mg Oral Daily    oxyCODONE-acetaminophen (PERCOCET) 5-325 mg per tablet 1 tablet  1 tablet Oral Q4H PRN    pantoprazole (PROTONIX) EC tablet 40 mg  40 mg Oral Early Morning    potassium chloride 10 % oral solution 60 mEq  60 mEq Oral Once    potassium chloride 20 mEq IVPB (premix)  20 mEq Intravenous Q2H     Prescriptions Prior to Admission   Medication    acetaminophen (TYLENOL) 325 mg tablet    ascorbic acid (VITAMIN C) 500 mg tablet    aspirin 81 MG tablet    atorvastatin (LIPITOR) 40 mg tablet    B Complex-C-Folic Acid (MICHEAL CAPS) 1 MG CAPS    benzocaine (ORAJEL) 10 % mucosal gel    bumetanide (BUMEX) 1 mg tablet    carbidopa-levodopa (SINEMET)  mg per tablet    carvedilol (COREG) 12 5 mg tablet    Cholecalciferol (VITAMIN D3) 2000 units TABS    diphenhydrAMINE (BENADRYL) 50 mg capsule    febuxostat (ULORIC) 80 MG TABS    gabapentin (NEURONTIN) 100 mg capsule    guaiFENesin (MUCINEX) 600 mg 12 hr tablet    hydrALAZINE (APRESOLINE) 50 mg tablet    insulin aspart (NovoLOG) 100 units/mL injection    insulin detemir (LEVEMIR) 100 units/mL subcutaneous injection    isosorbide mononitrate (IMDUR) 60 mg 24 hr tablet    lactulose 20 g/30 mL    levothyroxine 200 mcg tablet    loratadine (CLARITIN) 10 mg tablet    magnesium oxide (MAG-OX) 400 mg    nitroglycerin (NITROSTAT) 0 4 mg SL tablet    oxyCODONE-acetaminophen (PERCOCET) 5-325 mg per tablet    pantoprazole (PROTONIX) 40 mg tablet    polyethylene glycol-propylene glycol (SYSTANE) 0 4-0 3 %    potassium chloride (KLOR-CON) 20 mEq packet    Probiotic Product (JOHNNIE-BID PROBIOTIC PO)    repaglinide (PRANDIN) 0 5 mg tablet    Sennosides-Docusate Sodium (SENEXON-S PO)    sodium phosphate (FLEET) enema    sorbitol 70 % solution    WARFARIN SODIUM PO         bumetanide 1 mg/hr Last Rate: 1 mg/hr (07/11/18 0552)     Assessment:  Principal Problem:    Acute on chronic systolic (congestive) heart failure (HCC)  Active Problems:    Pacemaker    Type 2 diabetes mellitus with hyperglycemia, with long-term current use of insulin (HCC)    Chronic atrial fibrillation (HCC)    Hyperlipidemia    Thrombocytopenia (HCC)    Aortic stenosis    Dysphagia    Acute renal failure (ARF) (HCC)    Plan:    Acute on Chronic Systolic CHF/Low Flow AS/Atrial Fibrillation    Cr  Stable from yesterday  Urine output has picked up somewhat  Continue with Bumex drip and add metolazone  He remains on BIPAP  Overall very poor prognosis  Counseling / Coordination of Care  Total floor / unit time spent today 25 minutes  Greater than 50% of total time was spent with the patient and / or family counseling and / or coordination of care  A description of the counseling / coordination of care

## 2018-07-11 NOTE — PROGRESS NOTES
Progress Note - Eusebio Juarez 11/1/1928, 80 y o  male MRN: [id number]    Unit/Bed#: [facility] 607-01 Encounter: [id number]    Primary Care Provider: Roland Bosworth, DO   Date and time admitted to hospital: 7/8/2018  5:09 PM        Acute respiratory failure with hypoxia and hypercapnia (HCC)   Assessment & Plan    Still hypercapnia on his ABG today  Will continue BiPAP for few more hours  Trial of BiPAP this afternoon prior today  Likely need to continue intermittent BiPAP while being diuresed  Will consult pulmonology for to evaluate for pleural effusion and possible thoracentesis  * Acute on chronic systolic (congestive) heart failure (HCC)   Assessment & Plan    · Echocardiogram was a poor study but did show low ejection fraction  Prior ejection fraction 35-40%  · Continue with IV Bumex per Cardiology, diuresed 1 L yesterday  · Intermittent BiPAP for respiratory failure  · Continue to monitor BMP daily  Acute renal failure superimposed on stage 4 chronic kidney disease ([facility] [address] )   Assessment & Plan    · Likely cardiorenal etiology  Patient is currently volume overloaded  For now continue with diuresis  · Monitor creatinine, currently stable          HCAP (healthcare-associated pneumonia)   Assessment & Plan    Worsening chest x-ray today with elevated procalcitonin possible Hcap, 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  Will start cefepime, vancomycin  Check vancomycin level in a m  Will try to obtain sputum cultures and blood cultures  Aortic stenosis   Assessment & Plan    · Could not be quantified an echocardiogram  · Will diurese and possibly repeat echo later in the admission        Chronic atrial fibrillation ([facility] [address] )   Assessment & Plan    · Continue Coreg for rate control  · Was on warfarin as an outpatient, went to [facility] and found to have an elevated INR  Continue to hold  For possible thoracentesis          Type 2 diabetes mellitus with hyperglycemia, with long-term current use of insulin [facility])   Assessment & Plan    Lab Results   Component Value Date    HGBA1C 6 7 (H) 04/02/2018       Recent Labs      07/11/18   0024  07/11/18   0255  07/11/18   0846  07/11/18   1318   POCGLU  120  134  136  117 ·    Continue current treatment  · A1c at goal  · Blood sugars are acceptable    Blood Sugar Average: Last 72 hrs:  (P) 834 [id number] sliding scale insulin        Thrombocytopenia (HCC)   Assessment & Plan    Stable, will continue to monitor        Ascites   Assessment & Plan    Likely secondary to right heart failure  Status post paracentesis, 3 3 L removed  Await fluid studies  Dysphagia   Assessment & Plan    · Family reports increasing difficulty with swallowing  The family reports a history of esophageal strictures requiring prior dilatation and Botox injections  · EGD when more stable  · Monitor on pureed diet  · Discussed with the patient's daughter, according to his prior wishes he would never want a feeding tube of any sort             VTE Pharmacologic Prophylaxis:   Pharmacologic: Warfarin (Coumadin)  Mechanical VTE Prophylaxis in Place: Yes    Patient Centered Rounds: I have performed bedside rounds with nursing staff today  Discussions with Specialists or Other Care Team Provider:   GI    Education and Discussions with Family / Patient:  Discussed the plan of care with the patient's daughter via telephone  Due to his poor clinical status my did have a discussion regarding intubation and possible CPR  Given his poor clinical status and likely the inability to recover from cardiac arrest she is agreeable to making the patient level 3 DNR  Discussing with his poor nutritional status his state that he has mentioned the past that he would never want a feeding tube is well  Time Spent for Care: 20 minutes  More than 50% of total time spent on counseling and coordination of care as described above      Current Length of Stay: 3 day(s)    Current Patient Status: Inpatient   Certification Statement: The patient will continue to require additional inpatient hospital stay due to IV diuretics, antibiotics, BiPAP    Discharge Plan: To be determinedDischarge Plan:    Code Status: Level 3 - DNAR and DNI      Subjective:   Patient reports some shortness of breath, cough  Denies any pain  History gathering is limited secondary shortness of breath  Objective:     Vitals:   Temp (24hrs), Av 7 °F (37 1 °C), Min:98 6 °F (37 °C), Max:98 7 °F (37 1 °C)    HR:  [50-83] 83  Resp:  [16-22] 16  BP: (124-146)/(56-88) 140/88  SpO2:  [94 %-100 %] 100 %  Body mass index is 36 07 kg/m²  Input and Output Summary (last 24 hours): Intake/Output Summary (Last 24 hours) at 18 1516  Last data filed at 18 1159   Gross per 24 hour   Intake            624 7 ml   Output             1575 ml   Net           -950 3 ml       Physical Exam:     Physical Exam   Constitutional:   Chronically ill-appearing  Sitting up in bed, tachypneic  HENT:   Head: Normocephalic and atraumatic  Eyes: EOM are normal  Pupils are equal, round, and reactive to light  No scleral icterus  Neck: Neck supple  No JVD present  Cardiovascular:   Irregularly irregular   Pulmonary/Chest: He is in respiratory distress  He has rales  Abdominal: Soft  There is no tenderness  Musculoskeletal: He exhibits edema  Neurological: He is alert  No cranial nerve deficit  Skin: Skin is warm and dry         Additional Data:     Labs:      Results from last 7 days  Lab Units 18  0514   WBC Thousand/uL 4 47   HEMOGLOBIN g/dL 10 4*   HEMATOCRIT % 34 4*   PLATELETS Thousands/uL 73*   NEUTROS PCT % 80*   LYMPHS PCT % 9*   MONOS PCT % 8   EOS PCT % 3       Results from last 7 days  Lab Units 18  0514  18  1421   SODIUM mmol/L 143  < > 143   POTASSIUM mmol/L 2 9*  < > 4 6   CHLORIDE mmol/L 104  < > 106   CO2 mmol/L 29  < > 30   BUN mg/dL 93*  < > 56*   CREATININE mg/dL 3 74*  < > 2 98*   CALCIUM mg/dL 8 6  < > 8 7   TOTAL PROTEIN g/dL  --   --  7 3   BILIRUBIN TOTAL mg/dL  --   --  0 90   ALK PHOS U/L  --   --  361*   ALT U/L  --   --  14   AST U/L  --   --  24   GLUCOSE RANDOM mg/dL 115  < > 123   < > = values in this interval not displayed  Results from last 7 days  Lab Units 07/10/18  1637   INR  2 32*       Results from last 7 days  Lab Units 07/11/18  1318 07/11/18  0846 07/11/18  0255 07/11/18  0024 07/10/18  2134 07/10/18  1725 07/10/18  1212 07/10/18  0708 07/10/18  0104 07/09/18  1722 07/09/18  1216 07/09/18  0602   POC GLUCOSE mg/dl 117 136 134 120 134 135 156* 115 147* 150* 109 107             * I Have Reviewed All Lab Data Listed Above  * Additional Pertinent Lab Tests Reviewed: All Labs Within Last 24 Hours Reviewed    Imaging:    Imaging Reports Reviewed Today Include:  Chest x-ray, worsening pulmonary edema, possible left lung pleural effusion, possible infiltrate  Imaging Personally Reviewed by Myself Includes:  CXR    Recent Cultures (last 7 days):       Results from last 7 days  Lab Units 07/06/18  1421 07/06/18  1415   BLOOD CULTURE  No Growth After 4 Days  No Growth After 4 Days     URINE CULTURE  >100,000 cfu/ml Escherichia coli*  >100,000 cfu/ml Serratia marcescens*  80,000-89,000 cfu/ml Pseudomonas aeruginosa*  --        Last 24 Hours Medication List:     Current Facility-Administered Medications:  albuterol 2 5 mg Nebulization Q4H PRN Hetul Frazier, DO    allopurinol 200 mg Oral Daily Hetul Frazier, DO    aspirin 81 mg Oral Daily Hetul Frazier, DO    atorvastatin 40 mg Oral Daily With [employer], DO    b complex-vitamin C-folic acid 1 capsule Oral Daily Hetul Frazier, DO    bumetanide 1 mg/hr Intravenous Continuous Chick MD Stefan Last Rate: 1 mg/hr (07/11/18 0552)   carbidopa-levodopa 1 tablet Oral TID Hetul Frazier, DO    carvedilol 12 5 mg Oral BID With Meals Kayce K Mekhi, DO    cefepime 1,000 mg Intravenous Q24H Jane Akers MD Last Rate: 1,000 mg (07/11/18 3465) dextrose 25 mL Intravenous Q4H PRN Hetul Frazier, DO    gabapentin 100 mg Oral BID Hetul Frazier, DO    heparin (porcine) 5,000 Units Subcutaneous [facility] Silas Sarabia MD    hydrALAZINE 50 mg Oral Q8H [facility] Kayce Gaming, DO    insulin lispro 1-6 Units Subcutaneous TID AC Shay Faith PA-C    insulin lispro 1-6 Units Subcutaneous HS Shay Faith PA-C    isosorbide mononitrate 60 mg Oral Daily Kayce Gaming, DO    lactulose 20 g Oral Once per day on Mon Wed Fri Hetul Frazier, DO    levothyroxine 225 mcg Oral Early Morning Hetul Frazier, DO    magnesium oxide 400 mg Oral BID Hetul Frazier, DO    metolazone 2 5 mg Oral Daily Helen Taylor MD    morphine injection 1 mg Intravenous Q4H PRN Silas Sarabia MD    oxyCODONE-acetaminophen 1 tablet Oral Q4H PRN Hetul Frazier, DO    pantoprazole 40 mg Oral Early Morning Hetul Frazier, DO    potassium chloride 40 mEq Intravenous Once Silas Sarabia MD    vancomycin 15 mg/kg (Adjusted) Intravenous Once Silas Sarabia MD Last Rate: 1,500 mg (07/11/18 1505)        Today, Patient Was Seen By: Katelyn Montes De Oca MD    ** Please Note: Dictation voice to text software may have been used in the creation of this document   **

## 2018-07-11 NOTE — ASSESSMENT & PLAN NOTE
Lab Results   Component Value Date    HGBA1C 6 7 (H) 04/02/2018       Recent Labs      07/11/18   0024  07/11/18   0255  07/11/18   0846  07/11/18   1318   POCGLU  120  134  136  117   ·  Continue current treatment  · A1c at goal  · Blood sugars are acceptable    Blood Sugar Average: Last 72 hrs:  (P) 123 [id number] sliding scale insulin

## 2018-07-11 NOTE — MEDICAL STUDENT
MEDICAL STUDENT  Inpatient H&P for TRAINING ONLY   Not Part of Legal Medical Record     Medical Student Progress Note    Unit/Bed#: [facility] 607-01 Encounter: [id number]        Ashley Merlyn 80 y o  male [id number]    Hospital Stay Days: 3      Assessment/Plan:    Principal Problem:    Acute on chronic systolic (congestive) heart failure ([address] )  previously gotten metolazone diuresis for 2 days with no reduction in distension and sxs  Other presenting sxs include cough and edema  Cardiology consultation suggested bumex drip for diuresis, along with 1800mL fluid restriction and repeat 7/6/70 ECHO was complicated but confirmed overloaded state with bilateral atria dilation  4/2018 ECHO EF was 35-40%  Continue ASA 81, coreg 12 5mg BID  Continue bumex diuresis and monitor I/Os  BP has been stable in 140s so diuresis can continue  Cre stable at 3 74, mild bump in BUN today of 93 from 88  Total output today 375 with net loss of 250 3ML  Output is adequate at this time  Acute respiratory distress today with sputum and pink frothy fluid production; respiratory therapy called to place patient back on BiPAP for increased SOB and work of breathing; suction placed in room to aid in secretion removal  sats remained at 98% on 4 5L NC but may still be hypercarbic as patient would get sleepy intermittently between suctioning   CXR ordered to rule out further fluid vs  Atelectasis vs  pna (although afebrile, no WBC elevation)  procalcitonin ordered   Patient made need stepped up level of care if SOB persists  Dry mouth noted so oral care ordered    Hypokalemia  Patient refused PO KCl 40mg  tablet yesterday because cannot take things by mouth     K 3-> 2 9; ordered KCl 40mg  D5 IV premix for repletion       Ascites  2/2 CHF and ARF  Paracentesis ordered to determine exudative vs  transudative  Appears stable at this time      Acute renal failure (ARF) [facility])     Nephrology consultation for worsening kidney function: 7/9 BUN 81/ Cre 3 53 --> 7/10 BUN 88/ Cre 3 75--> 7/11 BUN 93/ cre 3 74 suggested intravascular vol depletion vs  Cardiorenal cause and to replace K  Renal U/s mild bilat renal atrophy, suspect right renal cyst  UA: mod blood, 1+ protein, large leuk, innumerable WBC  Got x2 albumin 7/9  Has suprapubic catheter placed- could be colonized; consider abx treatment if becomes febrile  Currently afebrile with no discomfort at the site  Diuresis to continue with close monitoring of BUN/Cre  Value nephrology input if progresses  Output continues to be adequate today at 375mL urine output    Dyspnea  2/2 volume overloaded state: CHF  7/10 0948 Arterial Blood Gas result:  pO2 181 7; pCO2 57 0; pH 7 290;  HCO3 26 8, O2 Content 15 0  7/10 1020  Arterial Blood Gas result:  pO2 99 1; pCO2 36 8; pH 7 337;  HCO3 19 3, O2 Content 14 1  After first ABG, placed on BiPap  Repeat ABG showed improvement in hypercarbia and respiratory acidosis so patient put back on 4 5L NC  Wean as tolerated  Maintain saturation above 92%      Type 2 diabetes mellitus with hyperglycemia, with long-term current use of insulin (HCC)  POCT glucose 140s-150s so continue short acting insulin 1-5u  for meals  Sugars are stable  Monitor POCT for changes       Chronic atrial fibrillation (HCC)  Irregularly irregular today on exam   On home coumadin  During 's hospitalization, INR was supratherapeutic and coumadin was held  Will recheck INR and if subtherapeutic, will restart coumadin        Hyperlipidemia  Continue home atorvastatin 40mg QD       Thrombocytopenia (HCC)  Monitor with CBC for changes  SubQ heparin for DVT ppx   On coumadin at home for anticoag 2/2 afib   During 's hospitalization, INR was supratherapeutic and coumadin was held-->INR 7/8: 7 13    Recheck INR      Aortic stenosis  ECHO repeated revealing significant aortic stenosis that was unable to be measured       Dysphagia  Prior hx of esophageal strictures  GI consulted; recommend endoscopy but due to poor respiratory fxn at this time, will postpone  Prior imaging suggests botox injection may be needed in the future when stable  Diet advanced from NPO to dysphagia diet with aspiration precautions still in place      Parkinsonism  Continue home dose sinemet 10-100mg TID, neurontin 100mg BID  Monitor for rigidity, worsening sxs etc  A     Hypothyroidism  Continue home levo 225mcg     Disposition: Requires further hospitalization; Level 2 Stepdown; considering deterioriation today requiring repeat BiPap, may need transfer to ICU      Subjective:   Patient found to be in acute distress, stating that "something is trying to come up but it won't come out  I can't get it out " He required suction placed for oral secretions and had to be elevated further in bed  He coughed up pink frothy fluid and light yellow mucous  Suction retrieved more mucous and pink frothy fluid  He kept repeating "oh, please help me " He only produced sputum after prompting to cough and spit  He was worried about choking and stated he felt like he was suffocating  His O2 saturation remained at 98% on 4 5L NC  He stated he was SOB but not nauseous  Other ROS not asked at this time due to distressed state of patient  Pulmonology called to place patient back on BiPap  Vitals: Temp (24hrs), Av 5 °F (36 9 °C), Min:98 °F (36 7 °C), Max:98 7 °F (37 1 °C)  Current: Temperature: (P) 98 6 °F (37 °C)  Vitals:    18 0300 18 0547 18 0600 18 0754   BP: 146/58 142/63  (P) 124/56   BP Location: Right arm   (P) Right arm   Pulse: 66   (P) 70   Resp: 20   (P) 18   Temp: 98 6 °F (37 °C)   (P) 98 6 °F (37 °C)   TempSrc: Oral   (P) Oral   SpO2: 100%   (P) 98%   Weight:   117 kg (258 lb 9 6 oz)    Height:        Body mass index is 36 07 kg/m²  I/O last 24 hours: In: 764 7 [P O :690; I V :74 7]  Out: 1750 [Urine:1750]  Review of Systems    Physical Exam:  Physical Exam   Constitutional: He appears distressed     HENT:   Head: Normocephalic  Light yellow mucous at back of throat that was suctioned out       Eyes: Left eye exhibits discharge  Light crusting around left eye     Neck:   Prominent pulsations on Right    Cardiovascular: An irregularly irregular rhythm present  Exam reveals distant heart sounds  Murmur heard  Systolic murmur is present   Pulmonary/Chest: He is in respiratory distress  He has rales (bilaterally)  Abdominal: He exhibits distension (tympanic to percussion; ascites)  There is no tenderness  There is no guarding  Musculoskeletal: He exhibits edema (bilateral lower extremities)  Neurological: He is alert  Skin: Skin is warm and dry  There is erythema (venous stasis purple discoloration bilateral LE; ecchymosis on left forearm)  There is pallor         Invasive Devices     Peripheral Intravenous Line            Peripheral IV 07/09/18 Left Arm 1 day          Drain            Suprapubic Catheter Non-latex 18 Fr  130 days                          Labs:   Recent Results (from the past 24 hour(s))   Blood gas, arterial    Collection Time: 07/10/18  9:48 AM   Result Value Ref Range    pH, Arterial 7 290 (L) 7 350 - 7 450    pCO2, Arterial 57 0 (HH) 36 0 - 44 0 mm Hg    pO2, Arterial 181 7 (H) 75 0 - 129 0 mm Hg    HCO3, Arterial 26 8 22 0 - 28 0 mmol/L    Base Excess, Arterial -0 4 mmol/L    O2 Content, Arterial 15 0 (L) 16 0 - 23 0 mL/dL    O2 HGB,Arterial  98 1 (H) 94 0 - 97 0 %    SOURCE Radial, Right     JOSEPH TEST Yes     Non Vent type BIPAP BIPAP     IPAP 16     EPAP 8     BIPAP fio2 50 %   Blood gas, arterial    Collection Time: 07/10/18 11:50 AM   Result Value Ref Range    pH, Arterial 7 337 (L) 7 350 - 7 450    pCO2, Arterial 36 8 36 0 - 44 0 mm Hg    pO2, Arterial 99 1 75 0 - 129 0 mm Hg    HCO3, Arterial 19 3 (L) 22 0 - 28 0 mmol/L    Base Excess, Arterial -5 9 mmol/L    O2 Content, Arterial 14 1 (L) 16 0 - 23 0 mL/dL    O2 HGB,Arterial  96 5 94 0 - 97 0 %    SOURCE Radial, Right     JOSEPH TEST No Non Vent type BIPAP BIPAP     IPAP 16     EPAP 8     BIPAP fio2 40 %   Fingerstick Glucose (POCT)    Collection Time: 07/10/18 12:12 PM   Result Value Ref Range    POC Glucose 156 (H) 65 - 140 mg/dl   Protime-INR    Collection Time: 07/10/18  4:37 PM   Result Value Ref Range    Protime 25 5 (H) 11 8 - 14 2 seconds    INR 2 32 (H) 0 86 - 1 17   Fingerstick Glucose (POCT)    Collection Time: 07/10/18  5:25 PM   Result Value Ref Range    POC Glucose 135 65 - 140 mg/dl   Fingerstick Glucose (POCT)    Collection Time: 07/10/18  9:34 PM   Result Value Ref Range    POC Glucose 134 65 - 140 mg/dl   Fingerstick Glucose (POCT)    Collection Time: 07/11/18 12:24 AM   Result Value Ref Range    POC Glucose 120 65 - 140 mg/dl   Fingerstick Glucose (POCT)    Collection Time: 07/11/18  2:55 AM   Result Value Ref Range    POC Glucose 134 65 - 140 mg/dl   Basic metabolic panel    Collection Time: 07/11/18  5:14 AM   Result Value Ref Range    Sodium 143 136 - 145 mmol/L    Potassium 2 9 (L) 3 5 - 5 3 mmol/L    Chloride 104 100 - 108 mmol/L    CO2 29 21 - 32 mmol/L    Anion Gap 10 4 - 13 mmol/L    BUN 93 (H) 5 - 25 mg/dL    Creatinine 3 74 (H) 0 60 - 1 30 mg/dL    Glucose 115 65 - 140 mg/dL    Calcium 8 6 8 3 - 10 1 mg/dL    eGFR 13 ml/min/1 73sq m   CBC and differential    Collection Time: 07/11/18  5:14 AM   Result Value Ref Range    WBC 4 47 4 31 - 10 16 Thousand/uL    RBC 3 40 (L) 3 88 - 5 62 Million/uL    Hemoglobin 10 4 (L) 12 0 - 17 0 g/dL    Hematocrit 34 4 (L) 36 5 - 49 3 %     (H) 82 - 98 fL    MCH 30 6 26 8 - 34 3 pg    MCHC 30 2 (L) 31 4 - 37 4 g/dL    RDW 17 4 (H) 11 6 - 15 1 %    MPV 14 0 (H) 8 9 - 12 7 fL    Platelets 73 (L) 069 - 390 Thousands/uL    nRBC 0 /100 WBCs    Neutrophils Relative 80 (H) 43 - 75 %    Immat GRANS % 0 0 - 2 %    Lymphocytes Relative 9 (L) 14 - 44 %    Monocytes Relative 8 4 - 12 %    Eosinophils Relative 3 0 - 6 %    Basophils Relative 0 0 - 1 %    Neutrophils Absolute 3 56 1 85 - 7 62 Thousands/µL    Immature Grans Absolute 0 02 0 00 - 0 20 Thousand/uL    Lymphocytes Absolute 0 39 (L) 0 60 - 4 47 Thousands/µL    Monocytes Absolute 0 35 0 17 - 1 22 Thousand/µL    Eosinophils Absolute 0 13 0 00 - 0 61 Thousand/µL    Basophils Absolute 0 02 0 00 - 0 10 Thousands/µL   Phosphorus    Collection Time: 07/11/18  5:14 AM   Result Value Ref Range    Phosphorus 4 8 (H) 2 3 - 4 1 mg/dL   Magnesium    Collection Time: 07/11/18  5:14 AM   Result Value Ref Range    Magnesium 2 6 1 6 - 2 6 mg/dL   Fingerstick Glucose (POCT)    Collection Time: 07/11/18  8:46 AM   Result Value Ref Range    POC Glucose 136 65 - 140 mg/dl       Radiology Results: I have personally reviewed pertinent reports  XR chest portable   Final Result by Eduarda You MD (07/10 7698)      Stable small left pleural effusion with probable underlying consolidation  Workstation performed: [id number]         US abdomen complete   Final Result by Tom Almaraz MD (07/09 7900)   Technically difficult examination-   1  Irregular margination of a mildly enlarged liver, suggesting hepatocellular disease  2  Portal vein patent   3  Small amount of ascites   4  Status post cholecystectomy   5  No hydronephrosis                  Workstation performed: [id number]         IR paracentesis    (Results Pending)   XR chest portable    (Results Pending)       Other Diagnostic Testing:   I have personally reviewed pertinent reports      ECHO 7/9: technically difficult and limited study: both atria dilated, significant aortic stenosis not quantified, moderate tricuspid regurg, low normal overall systolic fxn    Active Meds:   Current Facility-Administered Medications   Medication Dose Route Frequency    albuterol inhalation solution 2 5 mg  2 5 mg Nebulization Q4H PRN    allopurinol (ZYLOPRIM) tablet 200 mg  200 mg Oral Daily    aspirin chewable tablet 81 mg  81 mg Oral Daily    atorvastatin (LIPITOR) tablet 40 mg  40 mg Oral Daily With [employer]  b complex-vitamin C-folic acid (NEPHROCAPS) capsule 1 capsule  1 capsule Oral Daily    bumetanide (BUMEX) 12 5 mg infusion 50 mL  1 mg/hr Intravenous Continuous    carbidopa-levodopa (SINEMET)  mg per tablet 1 tablet  1 tablet Oral TID    carvedilol (COREG) tablet 12 5 mg  12 5 mg Oral BID With Meals    dextrose 50 % IV solution 25 mL  25 mL Intravenous Q4H PRN    gabapentin (NEURONTIN) capsule 100 mg  100 mg Oral BID    heparin (porcine) subcutaneous injection 5,000 Units  5,000 Units Subcutaneous Q8H [address]    hydrALAZINE (APRESOLINE) tablet 50 mg  50 mg Oral Q8H [address]    insulin lispro (HumaLOG) 100 units/mL subcutaneous injection 1-6 Units  1-6 Units Subcutaneous TID AC    insulin lispro (HumaLOG) 100 units/mL subcutaneous injection 1-6 Units  1-6 Units Subcutaneous HS    ipratropium-albuterol (DUO-NEB) 0 5-2 5 mg/3 mL inhalation solution 3 mL  3 mL Nebulization 4x Daily    isosorbide mononitrate (IMDUR) 24 hr tablet 60 mg  60 mg Oral Daily    lactulose 20 g/30 mL oral solution 20 g  20 g Oral Once per day on Mon Wed Fri    levothyroxine tablet 225 mcg  225 mcg Oral Early Morning    magnesium oxide (MAG-OX) tablet 400 mg  400 mg Oral BID    oxyCODONE-acetaminophen (PERCOCET) 5-325 mg per tablet 1 tablet  1 tablet Oral Q4H PRN    pantoprazole (PROTONIX) EC tablet 40 mg  40 mg Oral Early Morning    potassium chloride 20 mEq IVPB (premix)  20 mEq Intravenous Q2H         VTE Pharmacologic Prophylaxis: Enoxaparin (Lovenox)  VTE Mechanical Prophylaxis: sequential compression device    Skyla Persaud

## 2018-07-11 NOTE — PHYSICAL THERAPY NOTE
Physical Therapy Cancellation Note    Consult received, chart reviewed, spoke with RN  Pt is currently not stable for skilled therapy evaluation at this time due to increased O2 needs - pt is on bipap and is potentially scheduled for beside paracentesis  Will Hold skilled evaluation at this time  Will continue to follow for when patient is medically stable for assessment       Jared Carlos, PT

## 2018-07-11 NOTE — PROGRESS NOTES
Progress Note - Nephrology   Karlee Schneider 80 y o  male MRN: [id number]  Unit/Bed#: [facility] 607-01 Encounter: [id number]      Assessment / Plan:  1  GERARDO on CKD stage 4 ? d/t intravascular volume depletion vs Cardiorenal syndrome vs ATN   B/l Cr 1 9-2 1  Has not had recent nephrology follow up  His sCr is Now 3 74 and stable  -+ascites on ultrasound of abdomen and retroperitoneal U/S noted this as well  Echo shows elevated filling pressures  On diuresis per cardiology  He is s/p 3 3 L para today   -monitor renal indices on bumex 1mg/hr gtt per cardiology  Has made 800ml urine so far today   -of note, he would not want dialysis if needed  He clearly expressed this to me yesterday      2  Anemia with thrombocytopenia - Hgb in 10s with plts in 70s  hemolysis smear negative     3  HTN - BP well controlled on carvedilol 12 5 mg p o  b i d , hydralazine 50 mg p o  q 8 hours, Imdur 60 mg p o  Daily     4  Hypokalemia - K 2 9, likely diuretic induced, f/u K s/p IV repletion    5  Acute on chronic systolic CHF - EF 29%, on bumex gtt per cardiology  Weight stable  6  History of urinary retention s/p Suprapubic catheter    7  Ascites - s/p 3 3L paracentesis today, GI follows    Other issues: dysphagia, acute respiratory failure with hypoxia and hypercapnia on BiPAP, pulm consulted, HCAP, AS, afib, DM2      Subjective:   HPI review of systems limited as patient on BiPAP  He keeps complaining of pain shaking  He is status post 3 L paracentesis today  No other complaints  Daughter updated at bedside regarding patient is stable serum creatinine  Patient had told me yesterday that he would not want dialysis if dialysis was needed  He did state this to me 2-3 times  I encourage the patient's family to discuss goals of care with him further once he is off Bipap  Objective:     Vitals: Blood pressure 140/88, pulse 83, temperature 98 6 °F (37 °C), temperature source Oral, resp   rate 16, height 5' 11" (1 803 m), weight 117 kg (258 lb 9 6 oz), SpO2 100 %  ,Body mass index is 36 07 kg/m²  Temp (24hrs), Av 7 °F (37 1 °C), Min:98 6 °F (37 °C), Max:98 7 °F (37 1 °C)      Weight (last 2 days)     Date/Time   Weight    18 0600  117 (258 6)    07/10/18 0600  117 (258 16)    18 0600  117 (258 6)                Intake/Output Summary (Last 24 hours) at 18 1536  Last data filed at 18 1159   Gross per 24 hour   Intake            624 7 ml   Output             1575 ml   Net           -950 3 ml     I/O last 24 hours: In: 864 7 [P O :790; I V :74 7]  Out: 2350 [Urine:2350]        Physical Exam:   Physical Exam   Constitutional: He appears well-developed and well-nourished  No distress  Ill appearing, on bipap   HENT:   Head: Normocephalic and atraumatic  Mouth/Throat: No oropharyngeal exudate  Eyes: Right eye exhibits no discharge  Left eye exhibits no discharge  No scleral icterus  Neck: Neck supple  Cardiovascular: Normal rate, regular rhythm and normal heart sounds  Pulmonary/Chest: Effort normal  He has no wheezes  He has rales  Abdominal: Soft  Bowel sounds are normal  He exhibits distension  There is no tenderness  Genitourinary:   Genitourinary Comments: +SPT   Musculoskeletal: Normal range of motion  He exhibits edema  Neurological: He is alert  awake   Skin: Skin is warm and dry  No rash noted  He is not diaphoretic  Psychiatric: His behavior is normal    Flat affect   Vitals reviewed        Invasive Devices     Peripherally Inserted Central Catheter Line            PICC Line 18 Right Brachial less than 1 day          Peripheral Intravenous Line            Peripheral IV 18 Left Arm 2 days          Drain            Suprapubic Catheter Non-latex 18 Fr  131 days                Medications:    Scheduled Meds:  Current Facility-Administered Medications:  albuterol 2 5 mg Nebulization Q4H PRN Hetul Frazier, DO    allopurinol 200 mg Oral Daily Hetul Frazier, DO    aspirin 81 mg Oral Daily Hetul Frazier, DO    atorvastatin 40 mg Oral Daily With [employer], DO    b complex-vitamin C-folic acid 1 capsule Oral Daily Hetul Frazier, DO    bumetanide 1 mg/hr Intravenous Continuous Liza Hamilton MD Last Rate: 1 mg/hr (07/11/18 0525)   carbidopa-levodopa 1 tablet Oral TID Hetul Frazier, DO    carvedilol 12 5 mg Oral BID With Meals Kayce Gaming, DO    cefepime 1,000 mg Intravenous Q24H Amarilys Ovalles MD Last Rate: 1,000 mg (07/11/18 1505)   dextrose 25 mL Intravenous Q4H PRN Hetul Frazier, DO    gabapentin 100 mg Oral BID Hetul Freddie, DO    heparin (porcine) 5,000 Units Subcutaneous [facility] Amrailys Ovalles MD    hydrALAZINE 50 mg Oral Q8H [address] Kayce Gaming, DO    insulin lispro 1-6 Units Subcutaneous TID AC Shay Faith PA-C    insulin lispro 1-6 Units Subcutaneous HS Shay Faith PA-C    isosorbide mononitrate 60 mg Oral Daily Kayce Gaming, DO    lactulose 20 g Oral Once per day on Mon Wed Fri Hetul Frazier, DO    levothyroxine 225 mcg Oral Early Morning Hetul Frazier, DO    magnesium oxide 400 mg Oral BID Hetul Frazier, DO    metolazone 2 5 mg Oral Daily Liza Hamilton MD    morphine injection 1 mg Intravenous Q4H PRN Amarilys Ovalles MD    oxyCODONE-acetaminophen 1 tablet Oral Q4H PRN Charisse Frazier, DO    pantoprazole 40 mg Oral Early Morning Hetul Frazier, DO    potassium chloride 40 mEq Intravenous Once Amarilys Ovalles MD Last Rate: 40 mEq (07/11/18 1517)   vancomycin 15 mg/kg (Adjusted) Intravenous Once Amarilys Ovalles MD Last Rate: 1,500 mg (07/11/18 1505)       PRN Meds:   albuterol    dextrose    morphine injection    oxyCODONE-acetaminophen    Continuous Infusions:  bumetanide 1 mg/hr Last Rate: 1 mg/hr (07/11/18 0552)           LAB RESULTS:        Results from last 7 days  Lab Units 07/11/18  0514 07/10/18  0631 07/09/18  0510 07/08/18  0542 07/07/18  0531 07/06/18  1421   WBC Thousand/uL 4 47  --  4 38 4 72 5 84 9 73   HEMOGLOBIN g/dL 10 4*  --  10 4* 10 4* 10 6* 11 3*   HEMATOCRIT % 34 4*  -- 34 7* 33 1* 34 8* 36 5   PLATELETS Thousands/uL 73*  --  75* 77* 72* 81*   NEUTROS PCT % 80*  --  80* 79* 82* 87*   LYMPHS PCT % 9*  --  8* 10* 8* 6*   MONOS PCT % 8  --  8 9 9 7   EOS PCT % 3  --  2 2 1 0   SODIUM mmol/L 143 143 141 145 146* 143   POTASSIUM mmol/L 2 9* 3 0* 3 7 3 9 3 9 4 6   CHLORIDE mmol/L 104 106 104 106 107 106   CO2 mmol/L 29 31 27 29 27 30   BUN mg/dL 93* 88* 81* 70* 62* 56*   CREATININE mg/dL 3 74* 3 75* 3 53* 3 43* 2 87* 2 98*   CALCIUM mg/dL 8 6 8 4 8 6 8 8 8 9 8 7   TOTAL PROTEIN g/dL  --   --   --   --   --  7 3   BILIRUBIN TOTAL mg/dL  --   --   --   --   --  0 90   ALK PHOS U/L  --   --   --   --   --  361*   ALT U/L  --   --   --   --   --  14   AST U/L  --   --   --   --   --  24   GLUCOSE RANDOM mg/dL 115 121 96 75 75 123   MAGNESIUM mg/dL 2 6  --  2 1  --  1 8 1 6   PHOSPHORUS mg/dL 4 8*  --  6 2*  --   --   --        CUTURES:  Lab Results   Component Value Date    BLOODCX No Growth After 4 Days  07/06/2018    BLOODCX No Growth After 4 Days  07/06/2018    BLOODCX No Growth After 5 Days  03/31/2018    BLOODCX No Growth After 5 Days  03/31/2018    BLOODCX No Growth After 5 Days  07/07/2017    BLOODCX No Growth After 5 Days  07/07/2017    BLOODCX No Growth After 5 Days  04/13/2017    URINECX >100,000 cfu/ml Escherichia coli (A) 07/06/2018    URINECX >100,000 cfu/ml Serratia marcescens (A) 07/06/2018    URINECX 80,000-89,000 cfu/ml Pseudomonas aeruginosa (A) 07/06/2018    URINECX >100,000 cfu/ml Pseudomonas aeruginosa (A) 03/31/2018    URINECX >100,000 cfu/ml Enterococcus faecalis (A) 03/31/2018    URINECX <10,000 cfu/ml Enterococcus species 08/13/2017    URINECX >100,000 cfu/ml Serratia marcescens 07/07/2017    URINECX 80,000-89,000 cfu/ml Enterococcus faecalis 07/07/2017                 Portions of the record may have been created with voice recognition software   Occasional wrong word or "sound a like" substitutions may have occurred due to the inherent limitations of voice recognition software  Read the chart carefully and recognize, using context, where substitutions have occurred  If you have any questions, please contact the dictating provider

## 2018-07-11 NOTE — OCCUPATIONAL THERAPY NOTE
Occupational Therapy         Patient Name: Rinku Hernández  [id number] Date: 7/11/2018    OT consult received and chart review completed  Pt  cx'd this PM  Pt  Not currently appropriate for skilled therapy  Currently with increased O2 needs- pt  On bipap       Rosa Rodriguez, SYLVIA, OTR/L

## 2018-07-11 NOTE — ASSESSMENT & PLAN NOTE
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

## 2018-07-11 NOTE — ASSESSMENT & PLAN NOTE
Likely secondary to right heart failure  Status post paracentesis, 3 3 L removed  Await fluid studies

## 2018-07-11 NOTE — PLAN OF CARE
Nutrition/Hydration-ADULT     Nutrient/Hydration intake appropriate for improving, restoring or maintaining nutritional needs Not Progressing        SAFETY ADULT     Maintain or return to baseline ADL function Not Progressing     Maintain or return mobility status to optimal level Not Progressing

## 2018-07-11 NOTE — ASSESSMENT & PLAN NOTE
· Continue Coreg for rate control  · Was on warfarin as an outpatient, went to [facility] and found to have an elevated INR  Continue to hold  For possible thoracentesis

## 2018-07-12 LAB
ATRIAL RATE: 81 BPM
P AXIS: 179 DEGREES
QRS AXIS: -58 DEGREES
QRSD INTERVAL: 154 MS
QT INTERVAL: 408 MS
QTC INTERVAL: 473 MS
T WAVE AXIS: 137 DEGREES
VENTRICULAR RATE: 81 BPM

## 2018-07-12 PROCEDURE — 93010 ELECTROCARDIOGRAM REPORT: CPT | Performed by: INTERNAL MEDICINE

## 2018-07-12 NOTE — DISCHARGE SUMMARY
Discharge- Branden [facility] 11/1/1928, 80 y o  male MRN: [id number]  Unit/Bed#: [facility] 607-01 Encounter: [id number]  Primary Care Provider: Susanna Kruse DO   Date and time admitted to hospital: 7/8/2018  5:09 PM    * Acute on chronic systolic (congestive) heart failure (HCC)   Assessment & Plan    · Echocardiogram was a poor study but did show low ejection fraction  Prior ejection fraction 35-40%  · Continue with IV Bumex per Cardiology, diuresed 1 L yesterday  · Intermittent BiPAP for respiratory failure  · Continue to monitor BMP daily  HCAP (healthcare-associated pneumonia)   Assessment & Plan    Worsening chest x-ray today with elevated procalcitonin possible Hcap, 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  Will start cefepime, vancomycin  Check vancomycin level in a m  Will try to obtain sputum cultures and blood cultures  Acute respiratory failure with hypoxia and hypercapnia (HCC)   Assessment & Plan    Still hypercapnia on his ABG today  Will continue BiPAP for few more hours  Trial of BiPAP this afternoon prior today  Likely need to continue intermittent BiPAP while being diuresed  Will consult pulmonology for to evaluate for pleural effusion and possible thoracentesis  Chronic atrial fibrillation ([facility] [address] )   Assessment & Plan    · Continue Coreg for rate control  · Was on warfarin as an outpatient, went to [facility] and found to have an elevated INR  Continue to hold  For possible thoracentesis  Dysphagia   Assessment & Plan    · Family reports increasing difficulty with swallowing  The family reports a history of esophageal strictures requiring prior dilatation and Botox injections  · EGD when more stable  · Monitor on pureed diet    · Discussed with the patient's daughter, according to his prior wishes he would never want a feeding tube of any sort         Aortic stenosis   Assessment & Plan    · Could not be quantified an echocardiogram  · Will diurese and possibly repeat echo later in the admission        Acute renal failure superimposed on stage 4 chronic kidney disease ([facility] [address] )   Assessment & Plan    · Likely cardiorenal etiology  Patient is currently volume overloaded  For now continue with diuresis  · Monitor creatinine, currently stable          Ascites   Assessment & Plan    Likely secondary to right heart failure  Status post paracentesis, 3 3 L removed  Await fluid studies  Thrombocytopenia ([facility] [address] )   Assessment & Plan    Stable, will continue to monitor        Type 2 diabetes mellitus with hyperglycemia, with long-term current use of insulin [facility])   Assessment & Plan    Lab Results   Component Value Date    HGBA1C 6 7 (H) 04/02/2018       Recent Labs      07/11/18   0255  07/11/18   0846  07/11/18   1318  07/11/18   1714   POCGLU  134  136  117  180* ·    Continue current treatment  · A1c at goal  · Blood sugars are acceptable    Blood Sugar Average: Last 72 hrs:  (P) 130 [id number] sliding scale insulin          Discharging Physician / Practitioner: Vanna White PA-C  PCP: José Manuel Ferris DO  Admission Date:   Admission Orders     Ordered        07/08/18 1721  Inpatient Admission  Once             Discharge Date: 07/11/18    Resolved Problems  Date Reviewed: 7/11/2018    None        Consultations During Hospital Stay:  · Cardiology, Nephrology, GI, Pulmonology     Procedures Performed:   · IR guided paracentesis     Significant Findings / Test Results:   · US kidneys and bladder   Impression:     1   Mildly atrophic kidneys without hydronephrosis  2   Right midpole cyst    3   Ascites  · US abdomen   Impression:     1  Irregular margination of a mildly enlarged liver, suggesting hepatocellular disease  2  Portal vein patent  3  Small amount of ascites  4  Status post cholecystectomy  5  No hydronephrosis     · CXR  Impression:     Stable small left pleural effusion with probable underlying consolidation    Worsening CHF     · Echocardiogram   SUMMARY:  This was a technically difficult and limited study  The left ventricle appeared normal in size with abnormal septal motion and low normal verall systolic function  There was no obvious regional left ventricular wall motion abnormality  However, segmental wall motion could not be assessed adequately  Left ventricular diastolic function could not be assessed  The right ventricle appeared at least moderately dilated with reduced systolic function  Both atria were dilated  Significant aortic stenosis was suspected but could not be adequately quantified  At least moderate tricuspid regurgitation was present  There was no pericardial effusion  Incidental Findings:   · None     Test Results Pending at Discharge (will require follow up): · None     Outpatient Tests Requested:  · None    Complications:  None    Reason for Admission: Acute on chronic CHF and renal failure     Hospital Course:     Kylah Salas is a 80 y o  male patient who originally presented to the hospital on 7/8/2018 due to worsening fatigue and weakness  Admitted for acute on chronic CHF and renal failure with volume overload  Started on Bumex 1 mg IV BID for diuresis with good improvement in weight but subsequently developed worsening renal function  Patient was evaluated by Cardiology and Nephrology  Recommended Bumex gtt at that time with close monitoring of renal function  Please see above for results of renal US and echocardiogram  GI consulted for evaluation of dysphagia  Outpatient barium study concerning for achalasia  Patient determined to be high risk for inpatient EGD given respiratory status  Of note, concern for cirrhosis given ascites and irregular margins of the liver and thrombocytopenia  Given significant risk factors, patient did not undergo any GI intervention  Patient required BiPAP prn for respiratory failure   Pulmonary consult placed and patient was determined as not a candidate for thoracentesis, no further recommendations at that time  Rapid response called as patient was hypotensive and unresponsive  When physician arrived to bedside, patient was found to have no palpable pulses, no spontaneous respirations, no audible heart sounds across the precordium, and absent corneal reflex  Paced rhythm noted on EKG and telemetry  Please see above list of diagnoses and related plan for additional information  Condition at Discharge:      Discharge Day Visit / Exam:     * Please refer to separate progress note for these details *    Discussion with Family: SOD resident, Joselo Varma, spoke with daughter  Discharge instructions/Information to patient and family:   See after visit summary for information provided to patient and family  Provisions for Follow-Up Care:  See after visit summary for information related to follow-up care and any pertinent home health orders  Disposition:     Other: patient      For Discharges to [facility] SNF:   · Not Applicable to this Patient - Not Applicable to this Patient    Planned Readmission: no     Discharge Statement:  I spent 20 minutes discharging the patient  This time was spent on the day of discharge  I had direct contact with the patient on the day of discharge  Greater than 50% of the total time was spent examining patient, answering all patient questions, arranging and discussing plan of care with patient as well as directly providing post-discharge instructions  Additional time then spent on discharge activities  Discharge Medications:  See after visit summary for reconciled discharge medications provided to patient and family        ** Please Note: This note has been constructed using a voice recognition system **

## 2018-07-12 NOTE — PROGRESS NOTES
Kylah Salas 80 y o  male MRN: [id number]  [address] [facility]   Unit/Bed#: [place] [address]-03 Encounter: [id number]    Death Pronouncement Note    Called to bedside by RN  Patient is identified visually and identification confirmed with hospital identification bracelet  No spontaneous movements were present  There was no response to verbal or tactile stimuli  No corneal reflex  No spontaneous respirations present  No breath sounds were appreciated over bilateral lung fields  No heart sounds were auscultated across the precordium  Asystolic on two contiguous leads  Time of death: 08:50 PM      Family (daughter) was notified via telephone  SLIM primary team at bedside and aware       Alice Garcia DO  July 11, 2018

## 2018-07-12 NOTE — ASSESSMENT & PLAN NOTE
· Continue Coreg for rate control  · Was on warfarin as an outpatient, went to [facility] and found to have an elevated INR  Continue to hold  For possible thoracentesis

## 2018-07-12 NOTE — CODE DOCUMENTATION
Pt hr 70 on lifepack, pacer present  no palpable or doppler pulse  No corneal response  Last known normal ~0730

## 2018-07-12 NOTE — PLAN OF CARE
DISCHARGE PLANNING     Discharge to home or other facility with appropriate resources Completed        INFECTION - ADULT     Absence or prevention of progression during hospitalization Completed        Knowledge Deficit     Patient/family/caregiver demonstrates understanding of disease process, treatment plan, medications, and discharge instructions Completed        Nutrition/Hydration-ADULT     Nutrient/Hydration intake appropriate for improving, restoring or maintaining nutritional needs Completed        PAIN - ADULT     Verbalizes/displays adequate comfort level or baseline comfort level Completed        Potential for Falls     Patient will remain free of falls Completed        Prexisting or High Potential for Compromised Skin Integrity     Skin integrity is maintained or improved Completed        SAFETY ADULT     Maintain or return to baseline ADL function Completed     Maintain or return mobility status to optimal level Completed     Patient will remain free of falls Completed

## 2018-07-12 NOTE — RAPID RESPONSE
Progress Note - Rapid Response   Kylah Salas 80 y o  male MRN: [id number]    Time Called ( Time): 20:40  Date Called: 7/11/18  Room#: 0  Arrival Time ( Time): 20:43  Event End Time ( Time): 20:50  Primary reason for call: Acute change in SBP  Interventions:  Airway/Breathing:  No Intervention  Circulation: EKG  Other Treatments: NA       Assessment:   1  PEA arrest secondary to systolic heart failure  2  Pt was DNR/DNI    Plan:   · Pt pronounced dead at 20:50  · Family and primary team notified       HPI/Chief Complaint (Background/Situation): The patient was admitted on 7/8 for acute systolic heart failure  He has been diuresed on a Bumex drip  Nurse went in the room around 7:30pm to administer medications and the patient was noted to be hypotensive to the 78'O systolic  Rapid response was then called  When Physician arrived to bedside, the patient appeared to have passed away  No pulses were appreciable via doppler  No spontaneous respirations, he did have a paced rhythm on EKG  Code status was confirmed DNR  Time of death called at 8:50pm      Historical Information   Past Medical History:   Diagnosis Date    Anemia     Arthralgia     Atrial fibrillation (HCC)     BPH (benign prostatic hypertrophy)     CAD (coronary artery disease)     Cancer (HCC)     thyroid    Cardiac disease     atrial fib      Cardiomegaly     Carpal tunnel syndrome     CHF (congestive heart failure) (HCC)     Chronic kidney disease     Chronic systolic heart failure (HCC)     Ef 25%    Degenerative joint disease     Depression     Diabetes mellitus [facility]     Disease of thyroid gland     Fracture of left radius     Gastroparesis     GERD (gastroesophageal reflux disease)     Gout     Hyperlipidemia     Hypertension     Hyperthyroidism     Hypokalemia     Hypothyroid     Hypoxia     Incomplete bladder emptying     Neuropathy     Osteoarthritis     Other fluid overload     fluid restriction    Premature ventricular contraction     Psychiatric disorder     depression    PVD [facility]peripheral vascular disease) [facility] [facility]     Renal disorder     stage 3 chronic kidney disease    S[facility]     Sleep apnea     Thrombocytopenia [facility]     Tracheobronchitis     Tremor     Unstable angina ([facility] )     Urinary retention      Past Surgical History:   Procedure Laterality Date    ABDOMINAL SURGERY      cholecystectomy    CARDIAC PACEMAKER PLACEMENT      CARPAL TUNNEL RELEASE      bilateral    CHOLECYSTECTOMY      COLONOSCOPY W/ POLYPECTOMY      CORONARY ARTERY BYPASS GRAFT      EYE SURGERY      bilateral CAT EXT W/IOL    HERNIA REPAIR      JOINT REPLACEMENT       Social History   History   Alcohol Use No     History   Drug Use No     History   Smoking Status    Former Smoker    Packs/day: 3 00    Years: 30 00    Types: Cigarettes   Smokeless Tobacco    Never Used     Comment: quit late 40's yrs old;total tobacco use 30yrs,used 3 pks of cigarettes form 3-4 yrs then changed to 10 cigars/day     Meds/Allergies     Current Facility-Administered Medications:  albuterol 2 5 mg Nebulization Q4H PRN Hetul Frazier, DO    allopurinol 200 mg Oral Daily Hetul Frazier, DO    aspirin 81 mg Oral Daily Hetul Frazier, DO    atorvastatin 40 mg Oral Daily With [employer], DO    b complex-vitamin C-folic acid 1 capsule Oral Daily Hetul Frazier, DO    bumetanide 1 mg/hr Intravenous Continuous Sally Loya MD Last Rate: Stopped (07/11/18 2032)   carbidopa-levodopa 1 tablet Oral TID Hetul Frazier, DO    carvedilol 12 5 mg Oral BID With Meals Kayce Gaming DO    cefepime 1,000 mg Intravenous Q24H Hemant Mckay MD Last Rate: 1,000 mg (07/11/18 1505)   dextrose 25 mL Intravenous Q4H PRN Hetul Frazier, DO    gabapentin 100 mg Oral BID Hetul Frazier, DO    heparin (porcine) 5,000 Units Subcutaneous Q8H [facility] Hemant Mckay MD    hydrALAZINE 50 mg Oral Q8H [facility] Kayce Gaming DO    insulin lispro 1-6 Units Subcutaneous TID AC Shay Faith PA-C    insulin lispro 1-6 Units Subcutaneous HS Deejay Dumont PA-C    isosorbide mononitrate 60 mg Oral Daily Kayce Pinedavedi, DO    lactulose 20 g Oral Once per day on Mon Wed Fri Hetul Frazier, DO    levothyroxine 225 mcg Oral Early Morning Hetul Frazier, DO    magnesium oxide 400 mg Oral BID Hetul Frazier, DO    metolazone 2 5 mg Oral Daily Carlo Gutiérrez MD    morphine injection 4 mg Intravenous Q4H PRN Miller Castro MD    morphine injection 1 mg Intravenous Q4H PRN Miller Castro MD    oxyCODONE-acetaminophen 1 tablet Oral Q4H PRN Hetul Frazier, DO    pantoprazole 40 mg Oral Early Morning Hetul Frazier, DO          bumetanide 1 mg/hr Last Rate: Stopped (07/11/18 2032)       Allergies   Allergen Reactions    Benicar [Olmesartan] Other (See Comments)     Can't recall    Cardura [Doxazosin]     Codeine Other (See Comments)     confusion    Cozaar [Losartan]     Cymbalta [Duloxetine Hcl]     Dyazide [Hydrochlorothiazide W-Triamterene]     Hctz [Hydrochlorothiazide]     Iodinated Diagnostic Agents     Lasix [Furosemide] Other (See Comments)     Can't recall    Lopressor [Metoprolol] Other (See Comments)     Can't recall    Lozol [Indapamide]     Prinivil [Lisinopril]     Procardia [Nifedipine]     Verapamil     Voltaren [Diclofenac Sodium]     Aldactone [Spironolactone] Other (See Comments)     Tremors;starts as a low reaction then gets worse    Latex Rash       Physical Exam:  Gen:Pale and unresponsive  HEENT:No corneal reflex, pupils fixed  Chest:No respirations noted   Cor:No palpable pulses        Intake/Output Summary (Last 24 hours) at 07/11/18 2104  Last data filed at 07/11/18 1705   Gross per 24 hour   Intake            624 7 ml   Output             1725 ml   Net          -1100 3 ml       Respiratory    Lab Data (Last 4 hours)    None         O2/Vent Data (Last 4 hours)    None              Invasive Devices     Peripherally Inserted Central Catheter Line PICC Line 07/11/18 Right Brachial less than 1 day          Peripheral Intravenous Line            Peripheral IV 07/09/18 Left Arm 2 days          Drain            Suprapubic Catheter Non-latex 18 Fr  131 days                DIAGNOSTIC DATA:    Lab: I have personally reviewed pertinent lab results  CBC:     Results from last 7 days  Lab Units 07/11/18  0514   WBC Thousand/uL 4 47   HEMOGLOBIN g/dL 10 4*   HEMATOCRIT % 34 4*   PLATELETS Thousands/uL 73*     CMP:     Results from last 7 days  Lab Units 07/11/18  0514 07/10/18  0631 07/09/18  0510  07/06/18  1421   SODIUM mmol/L 143 143 141  < > 143   POTASSIUM mmol/L 2 9* 3 0* 3 7  < > 4 6   CHLORIDE mmol/L 104 106 104  < > 106   CO2 mmol/L 29 31 27  < > 30   BUN mg/dL 93* 88* 81*  < > 56*   CREATININE mg/dL 3 74* 3 75* 3 53*  < > 2 98*   CALCIUM mg/dL 8 6 8 4 8 6  < > 8 7   TOTAL PROTEIN g/dL  --   --   --   --  7 3   BILIRUBIN TOTAL mg/dL  --   --   --   --  0 90   ALK PHOS U/L  --   --   --   --  361*   ALT U/L  --   --   --   --  14   AST U/L  --   --   --   --  24   GLUCOSE RANDOM mg/dL 115 121 96  < > 123   < > = values in this interval not displayed  PT/INR:   No results found for: PT, INR,   Magnesium: No results found for: MAG,   Phosphorous:   Lab Results   Component Value Date    PHOS 4 8 (H) 07/11/2018       Microbiology:  Lab Results   Component Value Date    BLOODCX No Growth After 4 Days  07/06/2018    BLOODCX No Growth After 4 Days  07/06/2018    BLOODCX No Growth After 5 Days  03/31/2018    BLOODCX No Growth After 5 Days   03/31/2018    URINECX >100,000 cfu/ml Escherichia coli (A) 07/06/2018    URINECX >100,000 cfu/ml Serratia marcescens (A) 07/06/2018    URINECX 80,000-89,000 cfu/ml Pseudomonas aeruginosa (A) 07/06/2018    SPUTUMCULTUR 3+ Growth of Mixed Respiratory Geovanna 04/14/2017         OUTCOME:   Other: passed away  Family notified of transfer: yes  Family member contacted: Daughter Max Waite  Code Status: Level 3 - DNAR and DNI  Critical Care Time: Total Critical Care time spent 10 minutes excluding procedures, teaching and family updates

## 2018-07-12 NOTE — ASSESSMENT & PLAN NOTE
Lab Results   Component Value Date    HGBA1C 6 7 (H) 04/02/2018       Recent Labs      07/11/18   0255  07/11/18   0846  07/11/18   1318  07/11/18   1714   POCGLU  134  136  117  180*   ·  Continue current treatment  · A1c at goal  · Blood sugars are acceptable    Blood Sugar Average: Last 72 hrs:  (P) 130 [id number] sliding scale insulin

## 2018-07-14 LAB
BACTERIA SPEC BFLD CULT: NO GROWTH
GRAM STN SPEC: NORMAL

## 2020-05-17 NOTE — NURSING NOTE
2030:  Pt  Had BP 80/38  Paged SLIM JOYCE [place]uns[place], stopped Bumex drip at this time  2040:  Pt  feels cold and is not responding to sternal rub  Pt  Breathing is lessening but has a pulse  Rapid Response called  CONST: no fevers, no chills  EYES: + pain, no vision changes  ENT: no sore throat, no ear pain, no change in hearing  CV: no chest pain, no leg swelling  RESP: no shortness of breath, no cough  ABD: no abdominal pain, no nausea, no vomiting, no diarrhea  : no dysuria, no flank pain, no hematuria  MSK: no back pain, no extremity pain  NEURO: no headache or additional neurologic complaints  HEME: no easy bleeding  SKIN:  no rash

## 2022-04-15 NOTE — ASSESSMENT & PLAN NOTE
-patient was successfully weaned off BiPAP  -continue supplemental oxygen via nasal cannula to maintain oxygen saturation levels of 92% and above  -continue the respiratory protocol  -continue the airway clearance protocol  -the patient can be maintained on med/surg level of care with telemetry monitoring and continuous pulse oximetry Detail Level: Zone Initiate Treatment: Sunscreen (SPF 30 or greater) should be applied every 1.5-2 hours, during peak UV exposure (between 10am and 2pm) and reapplied after exercise or swimming. Plan: The ABCDEs of melanoma were reviewed with the patient, and the importance of monthly self-examination of moles was emphasized. Should any moles change in shape or color, or itch, bleed or burn, patient will contact our office for evaluation sooner than their interval appointment.\\nSun protective clothing is also effective at protecting against UV rays that cause skin cancer.\\n Initiate Treatment: Fluorouracil 2 *a day for 14 days
